# Patient Record
Sex: FEMALE | Race: WHITE | NOT HISPANIC OR LATINO
[De-identification: names, ages, dates, MRNs, and addresses within clinical notes are randomized per-mention and may not be internally consistent; named-entity substitution may affect disease eponyms.]

---

## 2017-01-19 ENCOUNTER — MEDICATION RENEWAL (OUTPATIENT)
Age: 62
End: 2017-01-19

## 2017-01-28 ENCOUNTER — APPOINTMENT (OUTPATIENT)
Dept: INTERNAL MEDICINE | Facility: CLINIC | Age: 62
End: 2017-01-28

## 2017-01-31 ENCOUNTER — TRANSCRIPTION ENCOUNTER (OUTPATIENT)
Age: 62
End: 2017-01-31

## 2017-02-01 ENCOUNTER — APPOINTMENT (OUTPATIENT)
Dept: ORTHOPEDIC SURGERY | Facility: CLINIC | Age: 62
End: 2017-02-01

## 2017-02-01 VITALS — BODY MASS INDEX: 32.2 KG/M2 | WEIGHT: 184 LBS | HEIGHT: 63.5 IN

## 2017-02-01 DIAGNOSIS — H53.8 OTHER VISUAL DISTURBANCES: ICD-10-CM

## 2017-02-01 DIAGNOSIS — M54.16 RADICULOPATHY, LUMBAR REGION: ICD-10-CM

## 2017-06-14 ENCOUNTER — EMERGENCY (EMERGENCY)
Facility: HOSPITAL | Age: 62
LOS: 1 days | Discharge: HOME | End: 2017-06-14
Attending: EMERGENCY MEDICINE | Admitting: EMERGENCY MEDICINE
Payer: COMMERCIAL

## 2017-06-14 VITALS
OXYGEN SATURATION: 92 % | DIASTOLIC BLOOD PRESSURE: 92 MMHG | SYSTOLIC BLOOD PRESSURE: 174 MMHG | HEIGHT: 63 IN | RESPIRATION RATE: 14 BRPM | TEMPERATURE: 98 F | HEART RATE: 78 BPM | WEIGHT: 199.96 LBS

## 2017-06-14 VITALS
DIASTOLIC BLOOD PRESSURE: 87 MMHG | SYSTOLIC BLOOD PRESSURE: 152 MMHG | RESPIRATION RATE: 19 BRPM | OXYGEN SATURATION: 99 % | HEART RATE: 82 BPM | TEMPERATURE: 98 F

## 2017-06-14 DIAGNOSIS — Z98.89 OTHER SPECIFIED POSTPROCEDURAL STATES: Chronic | ICD-10-CM

## 2017-06-14 DIAGNOSIS — Z98.890 OTHER SPECIFIED POSTPROCEDURAL STATES: ICD-10-CM

## 2017-06-14 DIAGNOSIS — Z88.8 ALLERGY STATUS TO OTHER DRUGS, MEDICAMENTS AND BIOLOGICAL SUBSTANCES STATUS: ICD-10-CM

## 2017-06-14 DIAGNOSIS — E03.9 HYPOTHYROIDISM, UNSPECIFIED: ICD-10-CM

## 2017-06-14 DIAGNOSIS — S05.02XA INJURY OF CONJUNCTIVA AND CORNEAL ABRASION WITHOUT FOREIGN BODY, LEFT EYE, INITIAL ENCOUNTER: ICD-10-CM
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
## 2017-06-14 DIAGNOSIS — I10 ESSENTIAL (PRIMARY) HYPERTENSION: ICD-10-CM

## 2017-06-14 PROCEDURE — 99284 EMERGENCY DEPT VISIT MOD MDM: CPT

## 2017-06-14 PROCEDURE — 99283 EMERGENCY DEPT VISIT LOW MDM: CPT

## 2017-06-14 RX ORDER — SULFACETAMIDE SODIUM 10 %
2 DROPS OPHTHALMIC (EYE) ONCE
Qty: 0 | Refills: 0 | Status: COMPLETED | OUTPATIENT
Start: 2017-06-14 | End: 2017-06-14

## 2017-06-14 RX ORDER — ERYTHROMYCIN BASE 5 MG/GRAM
1 OINTMENT (GRAM) OPHTHALMIC (EYE) ONCE
Qty: 0 | Refills: 0 | Status: COMPLETED | OUTPATIENT
Start: 2017-06-14 | End: 2017-06-14

## 2017-06-14 RX ORDER — IBUPROFEN 200 MG
600 TABLET ORAL ONCE
Qty: 0 | Refills: 0 | Status: COMPLETED | OUTPATIENT
Start: 2017-06-14 | End: 2017-06-14

## 2017-06-14 RX ADMIN — Medication 2 APPLICATION(S): at 22:43

## 2017-06-14 RX ADMIN — Medication 1 APPLICATION(S): at 22:43

## 2017-06-14 RX ADMIN — Medication 600 MILLIGRAM(S): at 22:43

## 2017-06-14 NOTE — ED PROVIDER NOTE - PROGRESS NOTE DETAILS
patient states eye pain has improved, states she has her own opthalmologist and will get appoinment tomorrow, understands to return to ER if having severe pain, loss of vision or worsening of symptoms

## 2017-06-14 NOTE — ED PROVIDER NOTE - OBJECTIVE STATEMENT
61 female presents to ER with  states she has left eye pain, started this morning, noted discharge and redness, states she may have scratched it, having decreased vision.

## 2017-06-15 ENCOUNTER — APPOINTMENT (OUTPATIENT)
Dept: OPHTHALMOLOGY | Facility: CLINIC | Age: 62
End: 2017-06-15

## 2017-06-23 ENCOUNTER — APPOINTMENT (OUTPATIENT)
Dept: OPHTHALMOLOGY | Facility: CLINIC | Age: 62
End: 2017-06-23

## 2017-06-27 ENCOUNTER — APPOINTMENT (OUTPATIENT)
Dept: OPHTHALMOLOGY | Facility: CLINIC | Age: 62
End: 2017-06-27

## 2017-07-03 ENCOUNTER — RX RENEWAL (OUTPATIENT)
Age: 62
End: 2017-07-03

## 2017-07-05 ENCOUNTER — RX RENEWAL (OUTPATIENT)
Age: 62
End: 2017-07-05

## 2017-07-12 NOTE — ED ADULT NURSE NOTE - NS ED NURSE LEVEL OF CONSCIOUSNESS SPEECH
Speaking Coherently Split-Thickness Skin Graft Text: The defect edges were debeveled with a #15 scalpel blade.  Given the location of the defect, shape of the defect and the proximity to free margins a split thickness skin graft was deemed most appropriate.  Using a sterile surgical marker, the primary defect shape was transferred to the donor site. The split thickness graft was then harvested.  The skin graft was then placed in the primary defect and oriented appropriately.

## 2017-07-21 ENCOUNTER — APPOINTMENT (OUTPATIENT)
Dept: INTERNAL MEDICINE | Facility: CLINIC | Age: 62
End: 2017-07-21

## 2017-08-10 ENCOUNTER — MEDICATION RENEWAL (OUTPATIENT)
Age: 62
End: 2017-08-10

## 2017-08-10 ENCOUNTER — RX RENEWAL (OUTPATIENT)
Age: 62
End: 2017-08-10

## 2017-08-11 ENCOUNTER — RX RENEWAL (OUTPATIENT)
Age: 62
End: 2017-08-11

## 2017-08-30 ENCOUNTER — MEDICATION RENEWAL (OUTPATIENT)
Age: 62
End: 2017-08-30

## 2017-10-07 ENCOUNTER — MEDICATION RENEWAL (OUTPATIENT)
Age: 62
End: 2017-10-07

## 2017-10-31 ENCOUNTER — APPOINTMENT (OUTPATIENT)
Dept: INTERNAL MEDICINE | Facility: CLINIC | Age: 62
End: 2017-10-31
Payer: COMMERCIAL

## 2017-10-31 VITALS
DIASTOLIC BLOOD PRESSURE: 80 MMHG | SYSTOLIC BLOOD PRESSURE: 130 MMHG | OXYGEN SATURATION: 97 % | HEART RATE: 81 BPM | TEMPERATURE: 98.3 F

## 2017-10-31 DIAGNOSIS — M43.16 SPONDYLOLISTHESIS, LUMBAR REGION: ICD-10-CM

## 2017-10-31 DIAGNOSIS — Z83.518 FAMILY HISTORY OF OTHER SPECIFIED EYE DISORDER: ICD-10-CM

## 2017-10-31 DIAGNOSIS — Z84.89 FAMILY HISTORY OF OTHER SPECIFIED CONDITIONS: ICD-10-CM

## 2017-10-31 PROCEDURE — 99396 PREV VISIT EST AGE 40-64: CPT | Mod: 25

## 2017-10-31 PROCEDURE — 36415 COLL VENOUS BLD VENIPUNCTURE: CPT

## 2017-11-01 LAB
ALBUMIN SERPL ELPH-MCNC: 4.2 G/DL
ALP BLD-CCNC: 93 U/L
ALT SERPL-CCNC: 34 U/L
ANION GAP SERPL CALC-SCNC: 15 MMOL/L
AST SERPL-CCNC: 34 U/L
BASOPHILS # BLD AUTO: 0.04 K/UL
BASOPHILS NFR BLD AUTO: 0.6 %
BILIRUB SERPL-MCNC: 0.4 MG/DL
BUN SERPL-MCNC: 7 MG/DL
CALCIUM SERPL-MCNC: 9.6 MG/DL
CHLORIDE SERPL-SCNC: 102 MMOL/L
CHOLEST SERPL-MCNC: 298 MG/DL
CHOLEST/HDLC SERPL: 2.6 RATIO
CO2 SERPL-SCNC: 25 MMOL/L
CREAT SERPL-MCNC: 1.15 MG/DL
EOSINOPHIL # BLD AUTO: 0.16 K/UL
EOSINOPHIL NFR BLD AUTO: 2.5 %
GLUCOSE SERPL-MCNC: 86 MG/DL
HBA1C MFR BLD HPLC: 5.4 %
HCT VFR BLD CALC: 44.8 %
HDLC SERPL-MCNC: 116 MG/DL
HGB BLD-MCNC: 14.5 G/DL
IMM GRANULOCYTES NFR BLD AUTO: 0.2 %
LDLC SERPL CALC-MCNC: 167 MG/DL
LYMPHOCYTES # BLD AUTO: 1.85 K/UL
LYMPHOCYTES NFR BLD AUTO: 28.9 %
MAN DIFF?: NORMAL
MCHC RBC-ENTMCNC: 30 PG
MCHC RBC-ENTMCNC: 32.4 GM/DL
MCV RBC AUTO: 92.8 FL
MONOCYTES # BLD AUTO: 0.39 K/UL
MONOCYTES NFR BLD AUTO: 6.1 %
NEUTROPHILS # BLD AUTO: 3.96 K/UL
NEUTROPHILS NFR BLD AUTO: 61.7 %
PLATELET # BLD AUTO: 281 K/UL
POTASSIUM SERPL-SCNC: 4.6 MMOL/L
PROT SERPL-MCNC: 7.8 G/DL
RBC # BLD: 4.83 M/UL
RBC # FLD: 14.4 %
SODIUM SERPL-SCNC: 142 MMOL/L
TRIGL SERPL-MCNC: 77 MG/DL
TSH SERPL-ACNC: 5.22 UIU/ML
WBC # FLD AUTO: 6.41 K/UL

## 2017-12-26 ENCOUNTER — EMERGENCY (EMERGENCY)
Facility: HOSPITAL | Age: 62
LOS: 1 days | Discharge: HOME | End: 2017-12-26
Attending: EMERGENCY MEDICINE | Admitting: EMERGENCY MEDICINE
Payer: COMMERCIAL

## 2017-12-26 VITALS
DIASTOLIC BLOOD PRESSURE: 92 MMHG | OXYGEN SATURATION: 98 % | RESPIRATION RATE: 16 BRPM | TEMPERATURE: 98 F | HEART RATE: 77 BPM | SYSTOLIC BLOOD PRESSURE: 158 MMHG

## 2017-12-26 DIAGNOSIS — Z98.89 OTHER SPECIFIED POSTPROCEDURAL STATES: Chronic | ICD-10-CM

## 2017-12-26 PROCEDURE — 99283 EMERGENCY DEPT VISIT LOW MDM: CPT

## 2017-12-26 RX ORDER — BACITRACIN ZINC 500 UNIT/G
1 OINTMENT IN PACKET (EA) TOPICAL
Qty: 2 | Refills: 0 | OUTPATIENT
Start: 2017-12-26 | End: 2018-01-08

## 2017-12-26 NOTE — ED PROVIDER NOTE - PROGRESS NOTE DETAILS
Huggins: pt without clear cut sign of cellulitis or sign of infection.  states she will never be placed back on steroids and has appt with new rheumatologist 1st week of January and will see pain md as well.  Dx pyoderma granuloma flare.  rx doxy and bacitracin.  f/u with rheume and pain.  left ambulatory.  return precautions given.

## 2017-12-26 NOTE — ED ADULT TRIAGE NOTE - CHIEF COMPLAINT QUOTE
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 denies any fevers.

## 2017-12-26 NOTE — ED PROVIDER NOTE - OBJECTIVE STATEMENT
Resident: 62y F PMH HTN, HLD, Lupus, Hashimoto's, Sjogren's, RA, pyoderma granulosum presents with worsening painful lesions x 2 months. Long history of pyoderma granulosum, for past five years has had only one or two lesions which have not caused significant pain. For past few months, patient has developed multiple new lesions, which are painful. For past few days, pain has become acutely worse, and lesions appear more red. Patient sees pain management, is taking Percocet at home without relief. Has appointment with new rheumatologist next week. Links new flare to recent financial stress and having to share small apartment with special needs son. Denies SI/HI.

## 2017-12-26 NOTE — ED PROVIDER NOTE - PHYSICAL EXAMINATION
Resident: Gen: well appearing, of stated age, no acute distress; ENT: PERRL, MMM, no uvular deviation, no tonsilar erythema; Chest: CTAB, no retractions, rate normal, appears to breathe comfortably; Heart: RRR S1S2 mild nonpitting peripheral edema b/l pulses 2+ in arms and legs; Abd: Soft non-tender, no rebound or guarding, no CVAT; Back: No spinal deformity; Ext: Moving all 4 extremities without obvious impairment to ROM, no obvious weakness; Neuro: fluid speech; Psych: tearful; Skin: scattered 1-2cm crusted lesions with surrounded erythema over left breast, upper back, lower abdomen, left anterior calf.

## 2017-12-26 NOTE — ED PROVIDER NOTE - ATTENDING CONTRIBUTION TO CARE
I was physically present for the E/M service provided. I agree with above history, physical, and plan which I have reviewed and edited where appropriate. I was physically present for the key portions of the service provided.    62 yr old female with hx of HTN, HLD, autoimmune d/o and pyoderma granulosum presents to ed concern for possible skin infection due to persistent pyoderma granulosum eruption at left breast, back, left leg.  pt states chronic issue but is much improve compare to past however the remaining lesion remains and now noticed it being red and wanted to make sure no infection.  pt taking percocet with mild relieve. no fever, no chills, no joint pain, no n/v, no abd pain.    *GEN:   comfortable, in no apparent distress, AOx3  *EYES:   PERRL, extra-occular movements intact  *HEENT:   airway patent, moist mucosal membranes, uvula midline  *CV:   regular rate and rhythm, normal S1/S2, no murmur  *RESP:   clear to auscultation bilaterally, non-labored, speaking in full sentences  *ABD:   soft, non tender, no guarding  *:   no cva tenderness  *MSK:   no musculoskeletal tenderness, 5/5 strength, moving all extremity  *SKIN:   multiple pyodermal granulosum lesion with mild erythema, no crepitus or discharge noted.  not foul smelling at left breast, left leg, back and lower abd.  *NEURO:   AOx3, no focal weakness or loss of sensation, gait normal, GCS 15    pt with known autoimmune will rx pt with doxycyline to cover for possible mrsa skin infection and bacitracin to area.  close return precautions given.  currently no sign of active cellulitis but due to open skin lesions will treat.

## 2017-12-26 NOTE — ED PROVIDER NOTE - MEDICAL DECISION MAKING DETAILS
Resident: pyoderma granuloma flare, possible 2/2 stress, concern for superimposed infection. Will treat with antibiotics, likely dc with rheum follow-up.

## 2018-01-18 ENCOUNTER — RX RENEWAL (OUTPATIENT)
Age: 63
End: 2018-01-18

## 2018-01-23 ENCOUNTER — INPATIENT (INPATIENT)
Facility: HOSPITAL | Age: 63
LOS: 2 days | Discharge: HOME HEALTH | End: 2018-01-26
Attending: HOSPITALIST | Admitting: HOSPITALIST
Payer: COMMERCIAL

## 2018-01-23 ENCOUNTER — MEDICATION RENEWAL (OUTPATIENT)
Age: 63
End: 2018-01-23

## 2018-01-23 VITALS
RESPIRATION RATE: 18 BRPM | HEART RATE: 74 BPM | SYSTOLIC BLOOD PRESSURE: 174 MMHG | TEMPERATURE: 99 F | OXYGEN SATURATION: 96 % | DIASTOLIC BLOOD PRESSURE: 88 MMHG

## 2018-01-23 DIAGNOSIS — L88 PYODERMA GANGRENOSUM: ICD-10-CM

## 2018-01-23 DIAGNOSIS — Z98.89 OTHER SPECIFIED POSTPROCEDURAL STATES: Chronic | ICD-10-CM

## 2018-01-23 LAB
ALBUMIN SERPL ELPH-MCNC: 3.8 G/DL — SIGNIFICANT CHANGE UP (ref 3.3–5)
ALP SERPL-CCNC: 73 U/L — SIGNIFICANT CHANGE UP (ref 40–120)
ALT FLD-CCNC: 22 U/L — SIGNIFICANT CHANGE UP (ref 4–33)
AST SERPL-CCNC: 23 U/L — SIGNIFICANT CHANGE UP (ref 4–32)
BASE EXCESS BLDV CALC-SCNC: 7.1 MMOL/L — SIGNIFICANT CHANGE UP
BASOPHILS # BLD AUTO: 0.02 K/UL — SIGNIFICANT CHANGE UP (ref 0–0.2)
BASOPHILS NFR BLD AUTO: 0.2 % — SIGNIFICANT CHANGE UP (ref 0–2)
BILIRUB SERPL-MCNC: 1.3 MG/DL — HIGH (ref 0.2–1.2)
BLOOD GAS VENOUS - CREATININE: 0.69 MG/DL — SIGNIFICANT CHANGE UP (ref 0.5–1.3)
BUN SERPL-MCNC: 13 MG/DL — SIGNIFICANT CHANGE UP (ref 7–23)
CALCIUM SERPL-MCNC: 8.8 MG/DL — SIGNIFICANT CHANGE UP (ref 8.4–10.5)
CHLORIDE BLDV-SCNC: 99 MMOL/L — SIGNIFICANT CHANGE UP (ref 96–108)
CHLORIDE SERPL-SCNC: 99 MMOL/L — SIGNIFICANT CHANGE UP (ref 98–107)
CO2 SERPL-SCNC: 32 MMOL/L — HIGH (ref 22–31)
CREAT SERPL-MCNC: 0.82 MG/DL — SIGNIFICANT CHANGE UP (ref 0.5–1.3)
EOSINOPHIL # BLD AUTO: 0.01 K/UL — SIGNIFICANT CHANGE UP (ref 0–0.5)
EOSINOPHIL NFR BLD AUTO: 0.1 % — SIGNIFICANT CHANGE UP (ref 0–6)
GAS PNL BLDV: 140 MMOL/L — SIGNIFICANT CHANGE UP (ref 136–146)
GLUCOSE BLDV-MCNC: 115 — HIGH (ref 70–99)
GLUCOSE SERPL-MCNC: 114 MG/DL — HIGH (ref 70–99)
HCO3 BLDV-SCNC: 29 MMOL/L — HIGH (ref 20–27)
HCT VFR BLD CALC: 37.6 % — SIGNIFICANT CHANGE UP (ref 34.5–45)
HCT VFR BLDV CALC: 36.8 % — SIGNIFICANT CHANGE UP (ref 34.5–45)
HGB BLD-MCNC: 11.5 G/DL — SIGNIFICANT CHANGE UP (ref 11.5–15.5)
HGB BLDV-MCNC: 12 G/DL — SIGNIFICANT CHANGE UP (ref 11.5–15.5)
IMM GRANULOCYTES # BLD AUTO: 0.03 # — SIGNIFICANT CHANGE UP
IMM GRANULOCYTES NFR BLD AUTO: 0.4 % — SIGNIFICANT CHANGE UP (ref 0–1.5)
LACTATE BLDV-MCNC: 1.5 MMOL/L — SIGNIFICANT CHANGE UP (ref 0.5–2)
LYMPHOCYTES # BLD AUTO: 0.92 K/UL — LOW (ref 1–3.3)
LYMPHOCYTES # BLD AUTO: 11.2 % — LOW (ref 13–44)
MCHC RBC-ENTMCNC: 29.4 PG — SIGNIFICANT CHANGE UP (ref 27–34)
MCHC RBC-ENTMCNC: 30.6 % — LOW (ref 32–36)
MCV RBC AUTO: 96.2 FL — SIGNIFICANT CHANGE UP (ref 80–100)
MONOCYTES # BLD AUTO: 0.26 K/UL — SIGNIFICANT CHANGE UP (ref 0–0.9)
MONOCYTES NFR BLD AUTO: 3.2 % — SIGNIFICANT CHANGE UP (ref 2–14)
NEUTROPHILS # BLD AUTO: 6.95 K/UL — SIGNIFICANT CHANGE UP (ref 1.8–7.4)
NEUTROPHILS NFR BLD AUTO: 84.9 % — HIGH (ref 43–77)
NRBC # FLD: 0 — SIGNIFICANT CHANGE UP
PCO2 BLDV: 52 MMHG — HIGH (ref 41–51)
PH BLDV: 7.4 PH — SIGNIFICANT CHANGE UP (ref 7.32–7.43)
PLATELET # BLD AUTO: 163 K/UL — SIGNIFICANT CHANGE UP (ref 150–400)
PMV BLD: 12.1 FL — SIGNIFICANT CHANGE UP (ref 7–13)
PO2 BLDV: < 24 MMHG — LOW (ref 35–40)
POTASSIUM BLDV-SCNC: 4 MMOL/L — SIGNIFICANT CHANGE UP (ref 3.4–4.5)
POTASSIUM SERPL-MCNC: 4.3 MMOL/L — SIGNIFICANT CHANGE UP (ref 3.5–5.3)
POTASSIUM SERPL-SCNC: 4.3 MMOL/L — SIGNIFICANT CHANGE UP (ref 3.5–5.3)
PROT SERPL-MCNC: 7 G/DL — SIGNIFICANT CHANGE UP (ref 6–8.3)
RBC # BLD: 3.91 M/UL — SIGNIFICANT CHANGE UP (ref 3.8–5.2)
RBC # FLD: 14.8 % — HIGH (ref 10.3–14.5)
SAO2 % BLDV: 24.1 % — LOW (ref 60–85)
SODIUM SERPL-SCNC: 142 MMOL/L — SIGNIFICANT CHANGE UP (ref 135–145)
WBC # BLD: 8.19 K/UL — SIGNIFICANT CHANGE UP (ref 3.8–10.5)
WBC # FLD AUTO: 8.19 K/UL — SIGNIFICANT CHANGE UP (ref 3.8–10.5)

## 2018-01-23 RX ORDER — MINOCYCLINE HYDROCHLORIDE 45 MG/1
100 TABLET, EXTENDED RELEASE ORAL ONCE
Qty: 0 | Refills: 0 | Status: COMPLETED | OUTPATIENT
Start: 2018-01-23 | End: 2018-01-23

## 2018-01-23 RX ORDER — MORPHINE SULFATE 50 MG/1
4 CAPSULE, EXTENDED RELEASE ORAL ONCE
Qty: 0 | Refills: 0 | Status: DISCONTINUED | OUTPATIENT
Start: 2018-01-23 | End: 2018-01-23

## 2018-01-23 RX ORDER — SODIUM CHLORIDE 9 MG/ML
1000 INJECTION INTRAMUSCULAR; INTRAVENOUS; SUBCUTANEOUS ONCE
Qty: 0 | Refills: 0 | Status: COMPLETED | OUTPATIENT
Start: 2018-01-23 | End: 2018-01-23

## 2018-01-23 RX ADMIN — MORPHINE SULFATE 4 MILLIGRAM(S): 50 CAPSULE, EXTENDED RELEASE ORAL at 20:22

## 2018-01-23 RX ADMIN — MORPHINE SULFATE 4 MILLIGRAM(S): 50 CAPSULE, EXTENDED RELEASE ORAL at 20:37

## 2018-01-23 RX ADMIN — SODIUM CHLORIDE 1000 MILLILITER(S): 9 INJECTION INTRAMUSCULAR; INTRAVENOUS; SUBCUTANEOUS at 20:22

## 2018-01-23 RX ADMIN — MINOCYCLINE HYDROCHLORIDE 100 MILLIGRAM(S): 45 TABLET, EXTENDED RELEASE ORAL at 20:22

## 2018-01-23 RX ADMIN — Medication 108 MILLIGRAM(S): at 21:45

## 2018-01-23 NOTE — ED ADULT TRIAGE NOTE - CHIEF COMPLAINT QUOTE
states "I have lesions all over my body."   h/o pyoderma granulosum, lupus, arhtritis, sjogren's syndrome with c/o worsening body lesions over the past 3 weeks.   pt appears uncomfortable in triage.

## 2018-01-23 NOTE — ED PROVIDER NOTE - MEDICAL DECISION MAKING DETAILS
61 y/o female w/ worsening pyoderma granulosum now w/ yousif superimposed infection on open wounds- labs, pain control, admit

## 2018-01-23 NOTE — ED PROVIDER NOTE - PHYSICAL EXAMINATION
multiple ulcerative lesions w/ purulence and surrounding erythema to b/l breast, torso, b/l LE and back. TTP.

## 2018-01-23 NOTE — ED PROVIDER NOTE - OBJECTIVE STATEMENT
63 y/o female pmh pyoderma gangrenosum(in remission x7 years), lupus, scleroderma, hypothyroidism, c/o multiple skin lesions x1 month. Pt admits to developing multiple skin lesions in the last 1-2 months. Pt has been following up with a dermatologist and has been taking Dapsone and prednisone x3 weeks. Pt admits to worsening lesions, now w/ purulence and surrounding erythema. Pt admits to severe pain, not relieved with percocet. Pt admits to seeing her PMD today whop advised pt to come to the hospital. Pt admits to subjective chills. Denies chest pain, sob, abd pain, n/v/d, numbness, tingling, weakness, dizziness, syncope or fever.

## 2018-01-23 NOTE — ED PROVIDER NOTE - ATTENDING CONTRIBUTION TO CARE
agree with resident note  "63 y/o female pmh pyoderma gangrenosum(in remission x7 years), lupus, scleroderma, hypothyroidism" presents with worsening PG.  Had been in remission for multiple years but over last month diffuse eyrthematous, draining lesions throughout body most prevalent on chest/breast and lower extremities have started.  Was placed on steroids and dapsone per dermatologist with no effect.  Was told by dermatologist that she needed to come to ER.  No prior hx of being on IVIG.    PE: uncomfortable in pain, VSS, CTAB/L; s1 s2 no m/r/g abd soft/NT/ND ext: no edema skin: multiple diffuse eyrthematous necrotic patches with discharge most prevalent on chest and lower extremities.

## 2018-01-24 DIAGNOSIS — I10 ESSENTIAL (PRIMARY) HYPERTENSION: ICD-10-CM

## 2018-01-24 DIAGNOSIS — E03.9 HYPOTHYROIDISM, UNSPECIFIED: ICD-10-CM

## 2018-01-24 DIAGNOSIS — E80.6 OTHER DISORDERS OF BILIRUBIN METABOLISM: ICD-10-CM

## 2018-01-24 DIAGNOSIS — L03.90 CELLULITIS, UNSPECIFIED: ICD-10-CM

## 2018-01-24 DIAGNOSIS — Z29.9 ENCOUNTER FOR PROPHYLACTIC MEASURES, UNSPECIFIED: ICD-10-CM

## 2018-01-24 DIAGNOSIS — M32.9 SYSTEMIC LUPUS ERYTHEMATOSUS, UNSPECIFIED: ICD-10-CM

## 2018-01-24 DIAGNOSIS — F41.9 ANXIETY DISORDER, UNSPECIFIED: ICD-10-CM

## 2018-01-24 DIAGNOSIS — L88 PYODERMA GANGRENOSUM: ICD-10-CM

## 2018-01-24 LAB
ALBUMIN SERPL ELPH-MCNC: 3.5 G/DL — SIGNIFICANT CHANGE UP (ref 3.3–5)
ALP SERPL-CCNC: 72 U/L — SIGNIFICANT CHANGE UP (ref 40–120)
ALT FLD-CCNC: 20 U/L — SIGNIFICANT CHANGE UP (ref 4–33)
AST SERPL-CCNC: 17 U/L — SIGNIFICANT CHANGE UP (ref 4–32)
BILIRUB DIRECT SERPL-MCNC: 0.3 MG/DL — HIGH (ref 0.1–0.2)
BILIRUB SERPL-MCNC: 0.9 MG/DL — SIGNIFICANT CHANGE UP (ref 0.2–1.2)
BILIRUB SERPL-MCNC: 0.9 MG/DL — SIGNIFICANT CHANGE UP (ref 0.2–1.2)
BUN SERPL-MCNC: 19 MG/DL — SIGNIFICANT CHANGE UP (ref 7–23)
CALCIUM SERPL-MCNC: 8.4 MG/DL — SIGNIFICANT CHANGE UP (ref 8.4–10.5)
CHLORIDE SERPL-SCNC: 101 MMOL/L — SIGNIFICANT CHANGE UP (ref 98–107)
CO2 SERPL-SCNC: 28 MMOL/L — SIGNIFICANT CHANGE UP (ref 22–31)
CREAT SERPL-MCNC: 0.77 MG/DL — SIGNIFICANT CHANGE UP (ref 0.5–1.3)
GLUCOSE SERPL-MCNC: 158 MG/DL — HIGH (ref 70–99)
HCT VFR BLD CALC: 35.5 % — SIGNIFICANT CHANGE UP (ref 34.5–45)
HGB BLD-MCNC: 11.5 G/DL — SIGNIFICANT CHANGE UP (ref 11.5–15.5)
MCHC RBC-ENTMCNC: 30.9 PG — SIGNIFICANT CHANGE UP (ref 27–34)
MCHC RBC-ENTMCNC: 32.4 % — SIGNIFICANT CHANGE UP (ref 32–36)
MCV RBC AUTO: 95.4 FL — SIGNIFICANT CHANGE UP (ref 80–100)
NRBC # FLD: 0 — SIGNIFICANT CHANGE UP
PLATELET # BLD AUTO: 162 K/UL — SIGNIFICANT CHANGE UP (ref 150–400)
PMV BLD: 11.6 FL — SIGNIFICANT CHANGE UP (ref 7–13)
POTASSIUM SERPL-MCNC: 3.8 MMOL/L — SIGNIFICANT CHANGE UP (ref 3.5–5.3)
POTASSIUM SERPL-SCNC: 3.8 MMOL/L — SIGNIFICANT CHANGE UP (ref 3.5–5.3)
PROT SERPL-MCNC: 6.2 G/DL — SIGNIFICANT CHANGE UP (ref 6–8.3)
RBC # BLD: 3.72 M/UL — LOW (ref 3.8–5.2)
RBC # FLD: 14.4 % — SIGNIFICANT CHANGE UP (ref 10.3–14.5)
SODIUM SERPL-SCNC: 142 MMOL/L — SIGNIFICANT CHANGE UP (ref 135–145)
SPECIMEN SOURCE: SIGNIFICANT CHANGE UP
SPECIMEN SOURCE: SIGNIFICANT CHANGE UP
TSH SERPL-MCNC: 0.53 UIU/ML — SIGNIFICANT CHANGE UP (ref 0.27–4.2)
WBC # BLD: 7.1 K/UL — SIGNIFICANT CHANGE UP (ref 3.8–10.5)
WBC # FLD AUTO: 7.1 K/UL — SIGNIFICANT CHANGE UP (ref 3.8–10.5)

## 2018-01-24 PROCEDURE — 12345: CPT | Mod: GC,NC

## 2018-01-24 PROCEDURE — 99223 1ST HOSP IP/OBS HIGH 75: CPT | Mod: GC

## 2018-01-24 PROCEDURE — 99223 1ST HOSP IP/OBS HIGH 75: CPT

## 2018-01-24 RX ORDER — LEVOTHYROXINE SODIUM 125 MCG
137 TABLET ORAL DAILY
Qty: 0 | Refills: 0 | Status: DISCONTINUED | OUTPATIENT
Start: 2018-01-24 | End: 2018-01-26

## 2018-01-24 RX ORDER — MUPIROCIN 20 MG/G
1 OINTMENT TOPICAL
Qty: 0 | Refills: 0 | Status: DISCONTINUED | OUTPATIENT
Start: 2018-01-24 | End: 2018-01-26

## 2018-01-24 RX ORDER — CLONAZEPAM 1 MG
1 TABLET ORAL
Qty: 0 | Refills: 0 | Status: DISCONTINUED | OUTPATIENT
Start: 2018-01-24 | End: 2018-01-26

## 2018-01-24 RX ORDER — ACETAMINOPHEN 500 MG
650 TABLET ORAL EVERY 6 HOURS
Qty: 0 | Refills: 0 | Status: DISCONTINUED | OUTPATIENT
Start: 2018-01-24 | End: 2018-01-26

## 2018-01-24 RX ORDER — FLUOXETINE HCL 10 MG
80 CAPSULE ORAL DAILY
Qty: 0 | Refills: 0 | Status: DISCONTINUED | OUTPATIENT
Start: 2018-01-24 | End: 2018-01-26

## 2018-01-24 RX ORDER — MORPHINE SULFATE 50 MG/1
4 CAPSULE, EXTENDED RELEASE ORAL EVERY 4 HOURS
Qty: 0 | Refills: 0 | Status: DISCONTINUED | OUTPATIENT
Start: 2018-01-24 | End: 2018-01-25

## 2018-01-24 RX ORDER — DAPSONE 100 MG/1
100 TABLET ORAL DAILY
Qty: 0 | Refills: 0 | Status: DISCONTINUED | OUTPATIENT
Start: 2018-01-24 | End: 2018-01-24

## 2018-01-24 RX ORDER — ENOXAPARIN SODIUM 100 MG/ML
40 INJECTION SUBCUTANEOUS DAILY
Qty: 0 | Refills: 0 | Status: DISCONTINUED | OUTPATIENT
Start: 2018-01-24 | End: 2018-01-24

## 2018-01-24 RX ORDER — HYDROXYCHLOROQUINE SULFATE 200 MG
200 TABLET ORAL EVERY 12 HOURS
Qty: 0 | Refills: 0 | Status: DISCONTINUED | OUTPATIENT
Start: 2018-01-24 | End: 2018-01-26

## 2018-01-24 RX ORDER — OXYCODONE AND ACETAMINOPHEN 5; 325 MG/1; MG/1
1 TABLET ORAL EVERY 6 HOURS
Qty: 0 | Refills: 0 | Status: DISCONTINUED | OUTPATIENT
Start: 2018-01-24 | End: 2018-01-26

## 2018-01-24 RX ORDER — METOPROLOL TARTRATE 50 MG
25 TABLET ORAL DAILY
Qty: 0 | Refills: 0 | Status: DISCONTINUED | OUTPATIENT
Start: 2018-01-24 | End: 2018-01-24

## 2018-01-24 RX ORDER — MORPHINE SULFATE 50 MG/1
4 CAPSULE, EXTENDED RELEASE ORAL EVERY 6 HOURS
Qty: 0 | Refills: 0 | Status: DISCONTINUED | OUTPATIENT
Start: 2018-01-24 | End: 2018-01-24

## 2018-01-24 RX ORDER — METOPROLOL TARTRATE 50 MG
25 TABLET ORAL DAILY
Qty: 0 | Refills: 0 | Status: DISCONTINUED | OUTPATIENT
Start: 2018-01-24 | End: 2018-01-26

## 2018-01-24 RX ADMIN — Medication 200 MILLIGRAM(S): at 19:54

## 2018-01-24 RX ADMIN — MORPHINE SULFATE 4 MILLIGRAM(S): 50 CAPSULE, EXTENDED RELEASE ORAL at 00:37

## 2018-01-24 RX ADMIN — MORPHINE SULFATE 4 MILLIGRAM(S): 50 CAPSULE, EXTENDED RELEASE ORAL at 06:55

## 2018-01-24 RX ADMIN — MORPHINE SULFATE 4 MILLIGRAM(S): 50 CAPSULE, EXTENDED RELEASE ORAL at 06:40

## 2018-01-24 RX ADMIN — Medication 80 MILLIGRAM(S): at 14:07

## 2018-01-24 RX ADMIN — Medication 25 MILLIGRAM(S): at 19:55

## 2018-01-24 RX ADMIN — MORPHINE SULFATE 4 MILLIGRAM(S): 50 CAPSULE, EXTENDED RELEASE ORAL at 16:37

## 2018-01-24 RX ADMIN — MORPHINE SULFATE 4 MILLIGRAM(S): 50 CAPSULE, EXTENDED RELEASE ORAL at 11:05

## 2018-01-24 RX ADMIN — MORPHINE SULFATE 4 MILLIGRAM(S): 50 CAPSULE, EXTENDED RELEASE ORAL at 00:22

## 2018-01-24 RX ADMIN — MORPHINE SULFATE 4 MILLIGRAM(S): 50 CAPSULE, EXTENDED RELEASE ORAL at 20:51

## 2018-01-24 RX ADMIN — MUPIROCIN 1 APPLICATION(S): 20 OINTMENT TOPICAL at 19:55

## 2018-01-24 RX ADMIN — Medication 10 MILLIGRAM(S): at 05:14

## 2018-01-24 RX ADMIN — MORPHINE SULFATE 4 MILLIGRAM(S): 50 CAPSULE, EXTENDED RELEASE ORAL at 20:36

## 2018-01-24 RX ADMIN — MORPHINE SULFATE 4 MILLIGRAM(S): 50 CAPSULE, EXTENDED RELEASE ORAL at 16:22

## 2018-01-24 RX ADMIN — MORPHINE SULFATE 4 MILLIGRAM(S): 50 CAPSULE, EXTENDED RELEASE ORAL at 10:50

## 2018-01-24 RX ADMIN — Medication 200 MILLIGRAM(S): at 05:14

## 2018-01-24 RX ADMIN — Medication 137 MICROGRAM(S): at 05:15

## 2018-01-24 NOTE — H&P ADULT - ASSESSMENT
62F PMH SLE, pyoderma gangrenosum, hashimotos, sjogrens presents with painful skin lesions that have been worsening over the past few months 2/2 pyoderma gangrenosum flare.

## 2018-01-24 NOTE — H&P ADULT - PROBLEM SELECTOR PLAN 7
- DVT ppx: IMPROVE score 2 - immobilization 2/2 pain and age - will give lovenox - c/w enalapril 10 mg   - elevated bp likely in the setting of pain, if does not improve with pain control will consider adding additional agent

## 2018-01-24 NOTE — H&P ADULT - PROBLEM SELECTOR PLAN 3
- c/w synthroid 137 mcg  - TSH in am - c/w plaquenil 200 mg BID   - outpatient rheumatology f/u - patient currently looking for new rheumatologist and has been having difficulty finding one, can set her up with appt at discharge

## 2018-01-24 NOTE — H&P ADULT - PROBLEM SELECTOR PLAN 1
- patient with pyoderma gangrenosum flare, currently on prednisone taper and dapsone 100 mg daily per dermatologist  - patient currently unsure of dose of prednisone she is currently on, will discuss with patient's dermatologist in AM   - s/p solumedrol 1 g in ED  - dermatology consult in the am  - pain control with morphine / percocet/ acetaminophen for mild/moderate/severe pain - patient with pyoderma gangrenosum flare, currently on prednisone taper and dapsone 100 mg daily per dermatologist  - patient currently unsure of dose of prednisone she is currently on, will discuss with patient's dermatologist in AM   - s/p solumedrol 1 g in ED  - dermatology consult in the am  - pain control with morphine / percocet/ acetaminophen for mild/moderate/severe pain  - wound care consult - patient with pyoderma gangrenosum flare, currently on prednisone taper and dapsone 100 mg daily per dermatologist  - patient currently unsure of dose of prednisone she is currently on, will discuss with patient's dermatologist in AM   - c/w dapsone 100 mg daily  - s/p solumedrol 1 g in ED  - dermatology consult in the am  - pain control with morphine / percocet/ acetaminophen for mild/moderate/severe pain  - wound care consult

## 2018-01-24 NOTE — H&P ADULT - HISTORY OF PRESENT ILLNESS
62F PMH SLE, pyoderma gangrenosum, hashimotos, sjogrens presents with painful skin lesions that have been worsening over the past few months.  Per patient, was diagnosed with Pyoderma Gangrenosum 15 years ago, has been in remission for the past 7 years, however over the past few months her lesions have been flaring up.  She has lesions on her chest, back, groin, legs, and abdomen.  She recently saw her dermatologist who started her on Dapsone and prednisone which she has been taking over the past three weeks, however the lesions have been getting worse and now are more purulent and with more erythema around them.  She saw her dermatologist yesterday who told her to go to the ED.  She denies fevers, chills, shortness of breath, weakness, dysuria, nausea, vomiting, diarrhea.  No involvement of mucus membranes.  She does not currently have a rheumatologist, is currently looking for a new one. 62F PMH SLE, pyoderma gangrenosum, hashimotos, sjogrens presents with painful skin lesions that have been worsening over the past few months.  Per patient, was diagnosed with Pyoderma Gangrenosum 15 years ago, has been in remission for the past 7 years, however over the past few months her lesions have been flaring up.  She has lesions on her chest, back, groin, legs, and abdomen.  She recently saw her dermatologist who started her on Dapsone and prednisone which she has been taking over the past three weeks, however the lesions have been getting worse and now are more purulent and with more erythema around them.  She saw her dermatologist yesterday who told her to go to the ED.  She denies fevers, chills, shortness of breath, weakness, dysuria, nausea, vomiting, diarrhea.  No involvement of mucus membranes.  She does not currently have a rheumatologist, is currently looking for a new one.      Patient was seen in the ED on 12/26 for worsening painful lesions, was discharged from the ED on Doxycycline and bacitracin with outpatient rheumatology followup.  She completed the course of doxycycline.      In the ED VS 98.7 74 174/88 18 96% RA  Labs notable for WBC 8, Hgb 11.5, Cr 0.82, bilirubin 1.3, lactate 1.5.  Blood cultures x 2 pending  Patient was given minocycline 100 mg, solumedrol 1 g, morphine 4 mg, and NaCl 1L.

## 2018-01-24 NOTE — CONSULT NOTE ADULT - PROBLEM SELECTOR RECOMMENDATION 9
given lesion sizes, topical therapy is appropriate. recommend Clobetasol Ointment under occlusion daily to AA.    suprapubic erosion may or may not be PG - would recommend mupirocin ointment bid    f/u in derm clinic given lesion sizes, topical therapy is appropriate. recommend Clobetasol Ointment under occlusion daily to AA. Would avoid systemic corticosteroids given patient's previous intolerance to medication and extensive side effect profile.     suprapubic erosion does not appear to be ulcerated and not related to primary problem (pyoderma gangrenosum) - recommend mupirocin ointment bid    f/u in derm clinic after discharge. Dermatology office address and phone # provided to patient.

## 2018-01-24 NOTE — H&P ADULT - PROBLEM SELECTOR PLAN 6
- c/w enalapril 10 mg   - elevated bp likely in the setting of pain, if does not improve with pain control will consider adding additional agent - c/w konipin 1 mg QID, Fluoxetine 80 mg daily  - ISTOP reviewed - reference # [id number] - DVT ppx: IMPROVE score 1 for age - no DVT ppx needed

## 2018-01-24 NOTE — H&P ADULT - NSHPREVIEWOFSYSTEMS_GEN_ALL_CORE
CONSTITUTIONAL: No fever, weight loss, or fatigue  EYES: No eye pain, visual disturbances, or discharge  ENMT:  No difficulty hearing, tinnitus, vertigo; No sinus or throat pain  RESPIRATORY: No cough, wheezing, chills or hemoptysis; No shortness of breath  CARDIOVASCULAR: No chest pain, palpitations, dizziness, or leg swelling  GASTROINTESTINAL: No abdominal or epigastric pain. No nausea, vomiting, or hematemesis; No diarrhea or constipation. No melena or hematochezia.  GENITOURINARY: No dysuria, frequency, hematuria, or incontinence  NEUROLOGICAL: No headaches, loss of strength, numbness, or tremors  SKIN: +painful skin lesions   LYMPH NODES: No enlarged glands  ENDOCRINE: No heat or cold intolerance; No polydipsia or polyuria  MUSCULOSKELETAL: No joint pain or swelling;   PSYCHIATRIC: +anxiety

## 2018-01-24 NOTE — H&P ADULT - NSHPLABSRESULTS_GEN_ALL_CORE
LABS:                        11.5   8.19  )-----------( 163      ( 23 Jan 2018 19:51 )             37.6     23 Jan 2018 19:51    142    |  99     |  13     ----------------------------<  114    4.3     |  32     |  0.82     Ca    8.8        23 Jan 2018 19:51    TPro  7.0    /  Alb  3.8    /  TBili  1.3    /  DBili  x      /  AST  23     /  ALT  22     /  AlkPhos  73     23 Jan 2018 19:51      BLOOD CULTURES PENDING     RADIOLOGY & ADDITIONAL TESTS:    Imaging Personally Reviewed:  [ ] YES

## 2018-01-24 NOTE — H&P ADULT - PROBLEM SELECTOR PLAN 2
- c/w plaquenil 200 mg BID   - outpatient rheumatology f/u - patient currently looking for new rheumatologist and has been having difficulty finding one, can set her up with appt at discharge - pyoderma gangrenosum with surrounding erythema concerning for cellulitis  - will start vancomycin, can transition to oral agent at discharge - c/w plaquenil 200 mg BID   - outpatient rheumatology f/u - patient currently looking for new rheumatologist and has been having difficulty finding one, can give her # to rheum clinic at discharge

## 2018-01-24 NOTE — H&P ADULT - PMH
Depression    HTN (hypertension)    Hypothyroid    Pyoderma gangrenosum    SLE (systemic lupus erythematosus)

## 2018-01-24 NOTE — CONSULT NOTE ADULT - SUBJECTIVE AND OBJECTIVE BOX
HPI Patient is a 62y old  Female who presents with a chief complaint of painful skin lesions (2018 00:03) PMH SLE, pyoderma gangrenosum, hashimotos, sjogrens presents with painful skin lesions that have been worsening over the past few months.  Per patient, was diagnosed with Pyoderma Gangrenosum 15 years ago, has been in remission for the past 7 years, however over the past few months her lesions have been flaring up.  She has lesions on her chest, back, groin, legs, and abdomen.  She recently saw her dermatologist who started her on Dapsone and prednisone which she has been taking over the past three weeks, however the lesions have been getting worse and now are more purulent and with more erythema around them.  She saw her dermatologist yesterday who told her to go to the ED.  She denies fevers, chills, shortness of breath, weakness, dysuria, nausea, vomiting, diarrhea.  No involvement of mucus membranes.  She does not currently have a rheumatologist, is currently looking for a new one.      Patient was seen in the ED on  for worsening painful lesions, was discharged from the ED on Doxycycline and bacitracin with outpatient rheumatology followup.  She completed the course of doxycycline.      In the ED VS 98.7 74 174/88 18 96% RA  Labs notable for WBC 8, Hgb 11.5, Cr 0.82, bilirubin 1.3, lactate 1.5.  Blood cultures x 2 pending  Patient was given minocycline 100 mg, solumedrol 1 g, morphine 4 mg, and NaCl 1L. (2018 00:03)     REVIEW OF SYSTEM : please see pmh and hpi otherwise all others negative  Allergies    Compazine (Other)    Intolerances     MEDICATIONS  (STANDING):  enalapril 10 milliGRAM(s) Oral daily  FLUoxetine 80 milliGRAM(s) Oral daily  hydroxychloroquine 200 milliGRAM(s) Oral every 12 hours  levothyroxine 137 MICROGram(s) Oral daily    MEDICATIONS  (PRN):  acetaminophen   Tablet. 650 milliGRAM(s) Oral every 6 hours PRN Mild Pain (1 - 3)  clonazePAM Tablet 1 milliGRAM(s) Oral four times a day PRN anxiety  morphine  - Injectable 4 milliGRAM(s) IV Push every 4 hours PRN Severe Pain (7 - 10)  oxyCODONE    5 mG/acetaminophen 325 mG 1 Tablet(s) Oral every 6 hours PRN Moderate Pain (4 - 6)      FAMILY HISTORY:  No lupus or PG in family history in first degree relatives      Social history:, non-smoker    PAST MEDICAL & SURGICAL HISTORY:  Pyoderma gangrenosum  Depression  Hypothyroid  SLE (systemic lupus erythematosus)  HTN (hypertension)  S/P   S/P myomectomy      I&O's Summary      Vital Signs Last 24 Hrs  T(C): 36.7 (2018 05:11), Max: 37.1 (2018 15:34)  T(F): 98 (2018 05:11), Max: 98.7 (2018 15:34)  HR: 88 (2018 10:47) (74 - 88)  BP: 160/90 (2018 10:47) (143/77 - 174/88)  BP(mean): --  RR: 18 (2018 10:47) (16 - 18)  SpO2: 95% (2018 10:47) (95% - 97%)    Height (cm): 160.02 ( @ 23:27)  Weight (kg): 92.3 ( @ 23:27)  BMI (kg/m2): 36 ( @ 23:27)  BSA (m2): 1.95 ( @ 23:27)    PHYSICAL EXAM:     Constitutional:NAD wide awake , gives history, NAD a/ox3    Eyes:non icteric    ENMT:clear, normal    Neck:posterior  lower neckc7/t1 4x1.5cmx.1    Breasts: bilateral skin lesions right 15x11.5x.3, left 13x16 clusterx.2    Back: lwer l2 paravertebral .5x.5x.2, left buttock 2.5x2.5x.3 ,     Respiratory:clear    Cardiovascular:rrr/ sternal ulcer .5x2cmx.1    Gastrointestinal:non tender, left periumbilical cluster  1x4cm    Genitourinary: supra pubic in hystectomy scar 2x1x.1 with some bleeding    Extremities:left leg thigh 2x2cm with 2inch ring of discolored skin/scar, medial calf 1x1.5x.1cm  right upper arm 2.5x.1x.1, hyperpig 1 cm around    Vascular:palpable pulse 1+    Neurological:intact rom    Skin:as noted above    Lymph Nodes:non tender    Musculoskeletal: rom intact    Psychiatric: intermitently cheerful to weepy        CBC Full  -  ( 2018 05:40 )  WBC Count : 7.10 K/uL  Hemoglobin : 11.5 g/dL  Hematocrit : 35.5 %  Platelet Count - Automated : 162 K/uL  Mean Cell Volume : 95.4 fL  Mean Cell Hemoglobin : 30.9 pg  Mean Cell Hemoglobin Concentration : 32.4 %  Auto Neutrophil # : x  Auto Lymphocyte # : x  Auto Monocyte # : x  Auto Eosinophil # : x  Auto Basophil # : x  Auto Neutrophil % : x  Auto Lymphocyte % : x  Auto Monocyte % : x  Auto Eosinophil % : x  Auto Basophil % : x          142  |  101  |  19  ----------------------------<  158<H>  3.8   |  28  |  0.77    Ca    8.4      2018 05:40    TPro  6.2  /  Alb  3.5  /  TBili  0.9  /  DBili  0.3<H>  /  AST  17  /  ALT  20  /  AlkPhos  72        eGFR if AA97  eGFR if non AA84  eGFR if AA90  eGFR if non AA78          Radiology:  none

## 2018-01-24 NOTE — CONSULT NOTE ADULT - ASSESSMENT
62y old  Female who presents with painful skin lesions back arm and left leg, sepsis signs on admit, no clear source  SLE, pyoderma gangrenosum, hashimotos, Sjogren's being treated  for lupus, s/p steroids, on  plaquenil/ consider thyroid  function and neck ultrasound, r/o nodules    currently skin does not exhibit necrosis with pus, all wounds are dry with eschar not pressure related, no cellultis  several with old changes of hyperpigmentation from previous/ chronic episodes (arm and leg)  abdominal wound? complicated by scratching, minor trauma-  daily hygiene keep covered/ for sacrum doubt pressure, regardless, would offload   would keep all covered while in hospital, wound gel applied, with foam and mepilex/allevyn    Awaiting rheumatology and dermatology assessment  Patient to followup at wound center  DVT prophylaxisis  MO- consider nutritional assessment, check hga1c

## 2018-01-24 NOTE — H&P ADULT - NSHPPHYSICALEXAM_GEN_ALL_CORE
Vital Signs Last 24 Hrs  T(C): 36.4 (23 Jan 2018 23:27), Max: 37.1 (23 Jan 2018 15:34)  T(F): 97.5 (23 Jan 2018 23:27), Max: 98.7 (23 Jan 2018 15:34)  HR: 75 (23 Jan 2018 23:27) (74 - 76)  BP: 150/89 (23 Jan 2018 23:27) (143/77 - 174/88)  BP(mean): --  RR: 18 (23 Jan 2018 23:27) (18 - 18)  SpO2: 96% (23 Jan 2018 23:27) (95% - 96%)    PHYSICAL EXAM:  GENERAL: NAD, well-groomed, well-developed  HEAD:  Atraumatic, Normocephalic  EYES: EOMI, PERRLA, conjunctiva and sclera clear  ENMT: No tonsillar erythema, exudates, or enlargement; Moist mucous membranes, Good dentition  NECK: Supple, No JVD  NERVOUS SYSTEM: AOX3, motor and sensation grossly intact in b/l UE and b/l LE  PSYCHIATRIC: Appropriate affect and mood  CHEST/LUNG: Clear to auscultation bilaterally; No rales, rhonchi, wheezing, or rubs  HEART: Regular rate and rhythm; No murmurs, rubs, or gallops. No LE edema  ABDOMEN: Soft, Nontender, Nondistended; Bowel sounds present  EXTREMITIES:  2+ Peripheral Pulses, No clubbing, cyanosis  SKIN: +open vesicles w/ surrounding erythema on chest, back, right lower extremity, right groin.  No purulence Vital Signs Last 24 Hrs  T(C): 36.4 (23 Jan 2018 23:27), Max: 37.1 (23 Jan 2018 15:34)  T(F): 97.5 (23 Jan 2018 23:27), Max: 98.7 (23 Jan 2018 15:34)  HR: 75 (23 Jan 2018 23:27) (74 - 76)  BP: 150/89 (23 Jan 2018 23:27) (143/77 - 174/88)  BP(mean): --  RR: 18 (23 Jan 2018 23:27) (18 - 18)  SpO2: 96% (23 Jan 2018 23:27) (95% - 96%)    PHYSICAL EXAM:  GENERAL: NAD, well-groomed, well-developed  HEAD:  Atraumatic, Normocephalic  EYES: EOMI, PERRLA, conjunctiva and sclera clear  ENMT: No tonsillar erythema, exudates, or enlargement; Moist mucous membranes, Good dentition  NECK: Supple, No JVD  NERVOUS SYSTEM: AOX3, motor and sensation grossly intact in b/l UE and b/l LE  PSYCHIATRIC: Appropriate affect and mood  CHEST/LUNG: Clear to auscultation bilaterally; No rales, rhonchi, wheezing, or rubs  HEART: Regular rate and rhythm; No murmurs, rubs, or gallops. No LE edema  ABDOMEN: Soft, Nontender, Nondistended; Bowel sounds present  EXTREMITIES:  2+ Peripheral Pulses, No clubbing, cyanosis  SKIN: +open crusted vesicles w/ surrounding erythema on chest, back, right lower extremity, right groin.  No purulence

## 2018-01-24 NOTE — PROGRESS NOTE ADULT - ASSESSMENT
61 yo F w/ PMH of SLE, pyoderma gangrenosum (dx 15 years ago, in remission for 7 years until a few months ago), hashimotos, sjogrens presents with painful skin lesions that have been worsening over the past few months 2/2 pyoderma gangrenosum flare.

## 2018-01-24 NOTE — H&P ADULT - PROBLEM SELECTOR PLAN 5
- c/w konipin 1 mg QID, Fluoxetine 80 mg daily  - ISTOP reviewed - reference # [id number] - bilirubin slightly elevated at 1.3   - ? 2/2 hemolysis from dapsone, will fractionate - c/w enalapril 10 mg   - elevated bp likely in the setting of pain, if does not improve with pain control will consider adding additional agent

## 2018-01-24 NOTE — H&P ADULT - PROBLEM SELECTOR PLAN 4
- bilirubin slighly elevated at 1.3   - ? 2/2 hemolysis from dapsone, will fractionate - bilirubin slightly elevated at 1.3   - ? 2/2 hemolysis from dapsone, will fractionate - c/w synthroid 137 mcg  - TSH in am - c/w konipin 1 mg QID, Fluoxetine 80 mg daily  - ISTOP reviewed - reference # [id number]

## 2018-01-24 NOTE — PROGRESS NOTE ADULT - PROBLEM SELECTOR PLAN 2
Has been stable per pt.   - c/w plaquenil 200 mg BID   - outpatient rheumatology f/u - patient currently looking for new rheumatologist and has been having difficulty finding one  - refer to rheumatology as outpatient at discharge

## 2018-01-24 NOTE — CONSULT NOTE ADULT - SUBJECTIVE AND OBJECTIVE BOX
HPI:  62F PMH SLE, pyoderma gangrenosum, hashimotos, sjogrens presents with painful skin lesions that have been worsening over the past few months.  Per patient, was diagnosed with Pyoderma Gangrenosum 15 years ago, had been on pred 9760-6260, as well as thalidomide, by Dr. Scott, and has been in remission for the past 7 years; however over the past few months her lesions have been flaring up.  She has lesions on her chest, back, groin, legs, and abdomen.  She recently saw her dermatologist who started her on Dapsone and prednisone which she has been taking over the past three weeks, however the lesions have been getting worse and now are more purulent and with more erythema around them.  She saw her dermatologist yesterday who told her to go to the ED.  She denies fevers, chills, shortness of breath, weakness, dysuria, nausea, vomiting, diarrhea.  No involvement of mucus membranes.  She does not currently have a rheumatologist, is currently looking for a new one.      Patient was seen in the ED on  for worsening painful lesions, was discharged from the ED on Doxycycline and bacitracin with outpatient rheumatology followup.  She completed the course of doxycycline.      In the ED VS 98.7 74 174/88 18 96% RA  Labs notable for WBC 8, Hgb 11.5, Cr 0.82, bilirubin 1.3, lactate 1.5.  Blood cultures x 2 pending  Patient was given minocycline 100 mg, solumedrol 1 g, morphine 4 mg, and NaCl 1L. (2018 00:03)      PAST MEDICAL & SURGICAL HISTORY:  Pyoderma gangrenosum  Depression  Hypothyroid  SLE (systemic lupus erythematosus)  HTN (hypertension)  S/P   S/P myomectomy      REVIEW OF SYSTEMS      General: no fevers/chills, no lethary	    Skin/Breast: see HPI  	  Ophthalmologic: no eye pain or change in vision  	  ENMT: no dysphagia or change in hearing    Respiratory and Thorax: no SOB or cough  	  Cardiovascular: no palpitations or chest pain    Gastrointestinal: no abdomenal pain or blood in stool     Genitourinary: no dysuria or frequency    Musculoskeletal: no joint pains or weakness	    Neurological:no weakness, numbness , or tingling    MEDICATIONS  (STANDING):  enalapril 10 milliGRAM(s) Oral daily  FLUoxetine 80 milliGRAM(s) Oral daily  hydroxychloroquine 200 milliGRAM(s) Oral every 12 hours  levothyroxine 137 MICROGram(s) Oral daily  metoprolol succinate ER 25 milliGRAM(s) Oral daily    MEDICATIONS  (PRN):  acetaminophen   Tablet. 650 milliGRAM(s) Oral every 6 hours PRN Mild Pain (1 - 3)  clonazePAM Tablet 1 milliGRAM(s) Oral four times a day PRN anxiety  morphine  - Injectable 4 milliGRAM(s) IV Push every 4 hours PRN Severe Pain (7 - 10)  oxyCODONE    5 mG/acetaminophen 325 mG 1 Tablet(s) Oral every 6 hours PRN Moderate Pain (4 - 6)      Allergies    Compazine (Other)    Intolerances        SOCIAL HISTORY:    FAMILY HISTORY:  No pertinent family history in first degree relatives      Vital Signs Last 24 Hrs  T(C): 36.8 (2018 13:05), Max: 36.8 (2018 13:05)  T(F): 98.2 (2018 13:05), Max: 98.2 (2018 13:05)  HR: 82 (2018 16:23) (75 - 94)  BP: 165/76 (2018 16:23) (150/89 - 165/76)  BP(mean): --  RR: 18 (2018 16:23) (16 - 18)  SpO2: 95% (2018 16:23) (94% - 97%)    PHYSICAL EXAM:     The patient was alert and oriented X 3, well nourished, and in no  apparent distress.  OP showed no ulcerations  There was no visible lymphadenopathy.  Conjunctiva were non injected  There was no clubbing or edema of extremities.  The scalp, hair, face, eyebrows, lips, OP, neck, chest, back,   extremities X 4, nails were examined.  There was no hyperhidrosis or bromhidrosis.    Of note on skin exam:     scattered 2-3cm ulcerations with fibrinous exudate on chest, upper back, b/l lower legs; mid abdomen with scar and overlying crust     LABS:                        11.5   7.10  )-----------( 162      ( 2018 05:40 )             35.5     01-24    142  |  101  |  19  ----------------------------<  158<H>  3.8   |  28  |  0.77    Ca    8.4      2018 05:40    TPro  6.2  /  Alb  3.5  /  TBili  0.9  /  DBili  0.3<H>  /  AST  17  /  ALT  20  /  AlkPhos  72            RADIOLOGY & ADDITIONAL STUDIES: HPI:  62F PMH SLE, pyoderma gangrenosum (biopsy proven), hashimotos, sjogrens presents with painful skin lesions that have been worsening over the past few months.  Per patient, was diagnosed with Pyoderma Gangrenosum 15 years ago, had been on pred 7451-8307, as well as thalidomide, by Dr. Scott (who had biopsied her ulcers) and has been in remission for the past 7 years; however over the past few months her lesions have been flaring up.  She has lesions on her chest, back, groin, legs, and abdomen.  She recently saw her dermatologist who started her on Dapsone and prednisone which she has been taking over the past three weeks, however the lesions have been getting worse and now are more purulent and with more erythema around them.  She saw her dermatologist yesterday who told her to go to the ED.  She denies fevers, chills, shortness of breath, weakness, dysuria, nausea, vomiting, diarrhea.  No involvement of mucus membranes.  She does not currently have a rheumatologist, is currently looking for a new one.      Patient was seen in the ED on  for worsening painful lesions, was discharged from the ED on Doxycycline and bacitracin with outpatient rheumatology followup.  She completed the course of doxycycline.      In the ED VS 98.7 74 174/88 18 96% RA  Labs notable for WBC 8, Hgb 11.5, Cr 0.82, bilirubin 1.3, lactate 1.5.  Blood cultures x 2 pending  Patient was given minocycline 100 mg, solumedrol 1 g, morphine 4 mg, and NaCl 1L. (2018 00:03)      PAST MEDICAL & SURGICAL HISTORY:  Pyoderma gangrenosum  Depression  Hypothyroid  SLE (systemic lupus erythematosus)  HTN (hypertension)  S/P   S/P myomectomy      REVIEW OF SYSTEMS      General: no fevers/chills, no lethary	    Skin/Breast: see HPI  	  Ophthalmologic: no eye pain or change in vision  	  ENMT: no dysphagia or change in hearing    Respiratory and Thorax: no SOB or cough  	  Cardiovascular: no palpitations or chest pain    Gastrointestinal: no abdomenal pain or blood in stool     Genitourinary: no dysuria or frequency    Musculoskeletal: no joint pains or weakness	    Neurological:no weakness, numbness , or tingling    MEDICATIONS  (STANDING):  enalapril 10 milliGRAM(s) Oral daily  FLUoxetine 80 milliGRAM(s) Oral daily  hydroxychloroquine 200 milliGRAM(s) Oral every 12 hours  levothyroxine 137 MICROGram(s) Oral daily  metoprolol succinate ER 25 milliGRAM(s) Oral daily    MEDICATIONS  (PRN):  acetaminophen   Tablet. 650 milliGRAM(s) Oral every 6 hours PRN Mild Pain (1 - 3)  clonazePAM Tablet 1 milliGRAM(s) Oral four times a day PRN anxiety  morphine  - Injectable 4 milliGRAM(s) IV Push every 4 hours PRN Severe Pain (7 - 10)  oxyCODONE    5 mG/acetaminophen 325 mG 1 Tablet(s) Oral every 6 hours PRN Moderate Pain (4 - 6)      Allergies    Compazine (Other)    Intolerances        SOCIAL HISTORY:    FAMILY HISTORY:  No pertinent family history in first degree relatives      Vital Signs Last 24 Hrs  T(C): 36.8 (2018 13:05), Max: 36.8 (2018 13:05)  T(F): 98.2 (2018 13:05), Max: 98.2 (2018 13:05)  HR: 82 (2018 16:23) (75 - 94)  BP: 165/76 (2018 16:23) (150/89 - 165/76)  BP(mean): --  RR: 18 (2018 16:23) (16 - 18)  SpO2: 95% (2018 16:23) (94% - 97%)    PHYSICAL EXAM:     The patient was alert and oriented X 3, well nourished, and in no  apparent distress.  OP showed no ulcerations  There was no visible lymphadenopathy.  Conjunctiva were non injected  There was no clubbing or edema of extremities.  The scalp, hair, face, eyebrows, lips, OP, neck, chest, back,   extremities X 4, nails were examined.  There was no hyperhidrosis or bromhidrosis.    Of note on skin exam:     scattered 2-3cm ulcerations with fibrinous exudate on chest, upper back, b/l lower legs; mid abdomen with shallow erosion and overlying crust     LABS:                        11.5   7.10  )-----------( 162      ( 2018 05:40 )             35.5     -    142  |  101  |  19  ----------------------------<  158<H>  3.8   |  28  |  0.77    Ca    8.4      2018 05:40    TPro  6.2  /  Alb  3.5  /  TBili  0.9  /  DBili  0.3<H>  /  AST  17  /  ALT  20  /  AlkPhos  72            RADIOLOGY & ADDITIONAL STUDIES:

## 2018-01-24 NOTE — PROGRESS NOTE ADULT - SUBJECTIVE AND OBJECTIVE BOX
Ja Mathews MD  PGY-1 | Internal Medicine  [phone] / [id number]      Patient is a 62y old  Female who presents with a chief complaint of painful skin lesions (24 Jan 2018 00:03)      SUBJECTIVE / OVERNIGHT EVENTS:    MEDICATIONS  (STANDING):  dapsone 100 milliGRAM(s) Oral daily  enalapril 10 milliGRAM(s) Oral daily  FLUoxetine 80 milliGRAM(s) Oral daily  hydroxychloroquine 200 milliGRAM(s) Oral every 12 hours  levothyroxine 137 MICROGram(s) Oral daily    MEDICATIONS  (PRN):  acetaminophen   Tablet. 650 milliGRAM(s) Oral every 6 hours PRN Mild Pain (1 - 3)  clonazePAM Tablet 1 milliGRAM(s) Oral four times a day PRN anxiety  morphine  - Injectable 4 milliGRAM(s) IV Push every 6 hours PRN Severe Pain (7 - 10)  oxyCODONE    5 mG/acetaminophen 325 mG 1 Tablet(s) Oral every 6 hours PRN Moderate Pain (4 - 6)      Vital Signs Last 24 Hrs  T(C): 36.7 (24 Jan 2018 05:11), Max: 37.1 (23 Jan 2018 15:34)  T(F): 98 (24 Jan 2018 05:11), Max: 98.7 (23 Jan 2018 15:34)  HR: 81 (24 Jan 2018 06:37) (74 - 81)  BP: 158/96 (24 Jan 2018 06:37) (143/77 - 174/88)  BP(mean): --  RR: 16 (24 Jan 2018 05:11) (16 - 18)  SpO2: 97% (24 Jan 2018 05:11) (95% - 97%)    CAPILLARY BLOOD GLUCOSE          I&O's Summary        PHYSICAL EXAM:  GENERAL: NAD, well-developed  HEAD:  NC, AT  EYES: EOMI, PERRL, conjunctiva and sclera clear  ENMT: Airway patent. MMM.  NECK: Supple, No JVD  HEART: RRR; Normal S1, S2. No murmurs, rubs, or gallops  CHEST/LUNG: CTABL; No wheezing, rhonchi, or rales  ABDOMEN: +BS; Soft, nondistended, nontender  EXTREMITIES:  2+ Peripheral Pulses, No clubbing, cyanosis, or edema  PSYCH: AAOx3  NEUROLOGY: non-focal  SKIN: Warm, dry, intact; No rashes or lesions      LABS:                        11.5   7.10  )-----------( 162      ( 24 Jan 2018 05:40 )             35.5     01-24    142  |  101  |  19  ----------------------------<  158<H>  3.8   |  28  |  0.77    Ca    8.4      24 Jan 2018 05:40    TPro  6.2  /  Alb  3.5  /  TBili  0.9  /  DBili  0.3<H>  /  AST  17  /  ALT  20  /  AlkPhos  72  01-24    LIVER FUNCTIONS - ( 24 Jan 2018 05:40 )  Alb: 3.5 g/dL / Pro: 6.2 g/dL / ALK PHOS: 72 u/L / ALT: 20 u/L / AST: 17 u/L / GGT: x                       RADIOLOGY & ADDITIONAL TESTS:    Imaging Personally Reviewed:     Consultant(s) Notes Reviewed:     Care Discussed with Consultants/Other Providers: Ja Mathews MD  PGY-1 | Internal Medicine  [phone] / [id number]      Patient is a 62y old  Female who presents with a chief complaint of painful skin lesions (24 Jan 2018 00:03)      SUBJECTIVE / OVERNIGHT EVENTS: Pt admitted overnight for severely painful pyoderma gangrenosum flare. No acute overnight events. Pt is talkative and craves coffee. Pt feels well except for tender pyoderma lesions and is stressed due to family financial situation. Pain is somewhat controlled on morphine IV. Denies fevers, chills, n/v, CP, SOB, abd pain, diarrhea, dysuria.     MEDICATIONS  (STANDING):  dapsone 100 milliGRAM(s) Oral daily  enalapril 10 milliGRAM(s) Oral daily  FLUoxetine 80 milliGRAM(s) Oral daily  hydroxychloroquine 200 milliGRAM(s) Oral every 12 hours  levothyroxine 137 MICROGram(s) Oral daily    MEDICATIONS  (PRN):  acetaminophen   Tablet. 650 milliGRAM(s) Oral every 6 hours PRN Mild Pain (1 - 3)  clonazePAM Tablet 1 milliGRAM(s) Oral four times a day PRN anxiety  morphine  - Injectable 4 milliGRAM(s) IV Push every 6 hours PRN Severe Pain (7 - 10)  oxyCODONE    5 mG/acetaminophen 325 mG 1 Tablet(s) Oral every 6 hours PRN Moderate Pain (4 - 6)      Vital Signs Last 24 Hrs  T(C): 36.7 (24 Jan 2018 05:11), Max: 37.1 (23 Jan 2018 15:34)  T(F): 98 (24 Jan 2018 05:11), Max: 98.7 (23 Jan 2018 15:34)  HR: 81 (24 Jan 2018 06:37) (74 - 81)  BP: 158/96 (24 Jan 2018 06:37) (143/77 - 174/88)  BP(mean): --  RR: 16 (24 Jan 2018 05:11) (16 - 18)  SpO2: 97% (24 Jan 2018 05:11) (95% - 97%)    CAPILLARY BLOOD GLUCOSE          I&O's Summary        PHYSICAL EXAM:  GENERAL: NAD, well-developed, obese middle aged woman lying comfortably in bed; in good spirits  HEAD:  NC, AT  EYES: EOMI, conjunctiva and sclera clear  ENMT: Airway patent. MMM.  HEART: RRR; Normal S1, S2. No murmurs, rubs, or gallops  CHEST/LUNG: CTABL; No wheezing, rhonchi, or rales  ABDOMEN: +BS; Soft, nondistended, tender purulent lesions with surrounding erythema  EXTREMITIES:  2+ Peripheral Pulses, No clubbing, cyanosis. +1 edema in L LE, trace edema in R LE  PSYCH: AAOx3  NEUROLOGY: non-focal  SKIN:  Warm, multiple large purulent lesions 1-4 cm in diameter with surrounding erythema, not actively draining, throughout abd, 1 in midback, chest, LE.       LABS:                        11.5   7.10  )-----------( 162      ( 24 Jan 2018 05:40 )             35.5     01-24    142  |  101  |  19  ----------------------------<  158<H>  3.8   |  28  |  0.77    Ca    8.4      24 Jan 2018 05:40    TPro  6.2  /  Alb  3.5  /  TBili  0.9  /  DBili  0.3<H>  /  AST  17  /  ALT  20  /  AlkPhos  72  01-24    LIVER FUNCTIONS - ( 24 Jan 2018 05:40 )  Alb: 3.5 g/dL / Pro: 6.2 g/dL / ALK PHOS: 72 u/L / ALT: 20 u/L / AST: 17 u/L / GGT: x                       RADIOLOGY & ADDITIONAL TESTS:    Imaging Personally Reviewed:     Consultant(s) Notes Reviewed:     Care Discussed with Consultants/Other Providers:

## 2018-01-25 ENCOUNTER — TRANSCRIPTION ENCOUNTER (OUTPATIENT)
Age: 63
End: 2018-01-25

## 2018-01-25 LAB
BUN SERPL-MCNC: 18 MG/DL — SIGNIFICANT CHANGE UP (ref 7–23)
CALCIUM SERPL-MCNC: 8.1 MG/DL — LOW (ref 8.4–10.5)
CHLORIDE SERPL-SCNC: 102 MMOL/L — SIGNIFICANT CHANGE UP (ref 98–107)
CO2 SERPL-SCNC: 29 MMOL/L — SIGNIFICANT CHANGE UP (ref 22–31)
CREAT SERPL-MCNC: 0.75 MG/DL — SIGNIFICANT CHANGE UP (ref 0.5–1.3)
GLUCOSE SERPL-MCNC: 97 MG/DL — SIGNIFICANT CHANGE UP (ref 70–99)
HBA1C BLD-MCNC: 5.1 % — SIGNIFICANT CHANGE UP (ref 4–5.6)
HCT VFR BLD CALC: 32.9 % — LOW (ref 34.5–45)
HGB BLD-MCNC: 10.3 G/DL — LOW (ref 11.5–15.5)
MAGNESIUM SERPL-MCNC: 2.3 MG/DL — SIGNIFICANT CHANGE UP (ref 1.6–2.6)
MCHC RBC-ENTMCNC: 29.6 PG — SIGNIFICANT CHANGE UP (ref 27–34)
MCHC RBC-ENTMCNC: 31.3 % — LOW (ref 32–36)
MCV RBC AUTO: 94.5 FL — SIGNIFICANT CHANGE UP (ref 80–100)
NRBC # FLD: 0 — SIGNIFICANT CHANGE UP
PHOSPHATE SERPL-MCNC: 3.3 MG/DL — SIGNIFICANT CHANGE UP (ref 2.5–4.5)
PLATELET # BLD AUTO: 177 K/UL — SIGNIFICANT CHANGE UP (ref 150–400)
PMV BLD: 12 FL — SIGNIFICANT CHANGE UP (ref 7–13)
POTASSIUM SERPL-MCNC: 3.9 MMOL/L — SIGNIFICANT CHANGE UP (ref 3.5–5.3)
POTASSIUM SERPL-SCNC: 3.9 MMOL/L — SIGNIFICANT CHANGE UP (ref 3.5–5.3)
RBC # BLD: 3.48 M/UL — LOW (ref 3.8–5.2)
RBC # FLD: 14.7 % — HIGH (ref 10.3–14.5)
SODIUM SERPL-SCNC: 141 MMOL/L — SIGNIFICANT CHANGE UP (ref 135–145)
WBC # BLD: 12.83 K/UL — HIGH (ref 3.8–10.5)
WBC # FLD AUTO: 12.83 K/UL — HIGH (ref 3.8–10.5)

## 2018-01-25 PROCEDURE — 99223 1ST HOSP IP/OBS HIGH 75: CPT | Mod: GC

## 2018-01-25 PROCEDURE — 93010 ELECTROCARDIOGRAM REPORT: CPT

## 2018-01-25 PROCEDURE — 99233 SBSQ HOSP IP/OBS HIGH 50: CPT | Mod: GC

## 2018-01-25 RX ORDER — HYDROMORPHONE HYDROCHLORIDE 2 MG/ML
1 INJECTION INTRAMUSCULAR; INTRAVENOUS; SUBCUTANEOUS ONCE
Qty: 0 | Refills: 0 | Status: DISCONTINUED | OUTPATIENT
Start: 2018-01-25 | End: 2018-01-25

## 2018-01-25 RX ORDER — HYDROMORPHONE HYDROCHLORIDE 2 MG/ML
1 INJECTION INTRAMUSCULAR; INTRAVENOUS; SUBCUTANEOUS EVERY 4 HOURS
Qty: 0 | Refills: 0 | Status: DISCONTINUED | OUTPATIENT
Start: 2018-01-25 | End: 2018-01-26

## 2018-01-25 RX ADMIN — Medication 200 MILLIGRAM(S): at 18:22

## 2018-01-25 RX ADMIN — HYDROMORPHONE HYDROCHLORIDE 1 MILLIGRAM(S): 2 INJECTION INTRAMUSCULAR; INTRAVENOUS; SUBCUTANEOUS at 16:29

## 2018-01-25 RX ADMIN — Medication 10 MILLIGRAM(S): at 05:24

## 2018-01-25 RX ADMIN — MORPHINE SULFATE 4 MILLIGRAM(S): 50 CAPSULE, EXTENDED RELEASE ORAL at 09:03

## 2018-01-25 RX ADMIN — HYDROMORPHONE HYDROCHLORIDE 1 MILLIGRAM(S): 2 INJECTION INTRAMUSCULAR; INTRAVENOUS; SUBCUTANEOUS at 13:04

## 2018-01-25 RX ADMIN — HYDROMORPHONE HYDROCHLORIDE 1 MILLIGRAM(S): 2 INJECTION INTRAMUSCULAR; INTRAVENOUS; SUBCUTANEOUS at 21:18

## 2018-01-25 RX ADMIN — MORPHINE SULFATE 4 MILLIGRAM(S): 50 CAPSULE, EXTENDED RELEASE ORAL at 05:00

## 2018-01-25 RX ADMIN — Medication 25 MILLIGRAM(S): at 05:24

## 2018-01-25 RX ADMIN — MUPIROCIN 1 APPLICATION(S): 20 OINTMENT TOPICAL at 18:22

## 2018-01-25 RX ADMIN — MORPHINE SULFATE 4 MILLIGRAM(S): 50 CAPSULE, EXTENDED RELEASE ORAL at 04:46

## 2018-01-25 RX ADMIN — MORPHINE SULFATE 4 MILLIGRAM(S): 50 CAPSULE, EXTENDED RELEASE ORAL at 09:18

## 2018-01-25 RX ADMIN — Medication 80 MILLIGRAM(S): at 13:06

## 2018-01-25 RX ADMIN — HYDROMORPHONE HYDROCHLORIDE 1 MILLIGRAM(S): 2 INJECTION INTRAMUSCULAR; INTRAVENOUS; SUBCUTANEOUS at 13:19

## 2018-01-25 RX ADMIN — HYDROMORPHONE HYDROCHLORIDE 1 MILLIGRAM(S): 2 INJECTION INTRAMUSCULAR; INTRAVENOUS; SUBCUTANEOUS at 21:38

## 2018-01-25 RX ADMIN — MORPHINE SULFATE 4 MILLIGRAM(S): 50 CAPSULE, EXTENDED RELEASE ORAL at 01:06

## 2018-01-25 RX ADMIN — Medication 200 MILLIGRAM(S): at 05:24

## 2018-01-25 RX ADMIN — Medication 1 APPLICATION(S): at 13:06

## 2018-01-25 RX ADMIN — HYDROMORPHONE HYDROCHLORIDE 1 MILLIGRAM(S): 2 INJECTION INTRAMUSCULAR; INTRAVENOUS; SUBCUTANEOUS at 16:14

## 2018-01-25 RX ADMIN — Medication 1 MILLIGRAM(S): at 21:17

## 2018-01-25 RX ADMIN — MORPHINE SULFATE 4 MILLIGRAM(S): 50 CAPSULE, EXTENDED RELEASE ORAL at 00:46

## 2018-01-25 RX ADMIN — MUPIROCIN 1 APPLICATION(S): 20 OINTMENT TOPICAL at 05:26

## 2018-01-25 RX ADMIN — Medication 1 MILLIGRAM(S): at 05:30

## 2018-01-25 RX ADMIN — Medication 137 MICROGRAM(S): at 05:24

## 2018-01-25 NOTE — DISCHARGE NOTE ADULT - ADDITIONAL INSTRUCTIONS
Wound care recs: apply clobetasol ointment, cover with foam and mepilex/allevyn. Change daily.  For suprapubic lesion, apply bacitracin twice a day instead of clobetasol ointment and cover with foam and mepilex/allevyn    Follow up with dermatology (Dr. Sidhu) within 2-4 weeks of discharge.  Please follow up with rheumatology within 2-4 weeks of discharge.  Please follow up with your primary medical doctor (Dr. Chisholm) within 1-2 weeks of discharge.  Please call and make an appointment with the pain management specialist (Dr. Dobbs).  Please follow up with your psychiatrist (Dr. Graham) at your regularly scheduled follow-up appointment.

## 2018-01-25 NOTE — DISCHARGE NOTE ADULT - PROVIDER TOKENS
TOKEN:'[id number]:'[id number]:'[id number]' TOKEN:'[id number]:'[id number]:'[id number]'

## 2018-01-25 NOTE — DISCHARGE NOTE ADULT - CARE PLAN
Principal Discharge DX:	Pyoderma gangrenosum  Goal:	To heal and prevent lesions  Assessment and plan of treatment:	Wound care recs:   Follow up with dermatology (Dr. Sidhu) within 2-4 weeks of discharge. Contact info provided below. Take your medications as prescribed.  Secondary Diagnosis:	Hypertension  Goal:	To control blood pressure  Assessment and plan of treatment:	Please follow up with your primary medical doctor (Dr. Chisholm) within 1-2 weeks of discharge. Take your medications as prescribed.  Secondary Diagnosis:	SLE (systemic lupus erythematosus)  Assessment and plan of treatment:	Follow up with rheumatology within 2-4 weeks of discharge. Contact info provided below. Take your medications as prescribed.  Secondary Diagnosis:	Depression  Assessment and plan of treatment:	Please follow up with your psychiatrist (Dr. Graham) at your regularly scheduled follow-up appointment. Take your medications as prescribed. Principal Discharge DX:	Pyoderma gangrenosum  Goal:	To heal and prevent lesions  Assessment and plan of treatment:	Wound care recs: apply clobetasol ointment, cover with foam and mepilex/allevyn. Change daily.   For suprapubic lesion, apply bacitracin twice a day instead of clobetasol ointment and cover with foam and mepilex/allevyn.   Follow up with dermatology (Dr. Sidhu) within 2-4 weeks of discharge. Contact info provided below. Take your medications as prescribed.  Secondary Diagnosis:	Hypertension  Goal:	To control blood pressure  Assessment and plan of treatment:	Please follow up with your primary medical doctor (Dr. Chisholm) within 1-2 weeks of discharge. Take your medications as prescribed.  Secondary Diagnosis:	SLE (systemic lupus erythematosus)  Assessment and plan of treatment:	Follow up with rheumatology within 2-4 weeks of discharge. Contact info provided below. Take your medications as prescribed.  Secondary Diagnosis:	Depression  Assessment and plan of treatment:	Please follow up with your psychiatrist (Dr. Graham) at your regularly scheduled follow-up appointment. Take your medications as prescribed.

## 2018-01-25 NOTE — DISCHARGE NOTE ADULT - MEDICATION SUMMARY - MEDICATIONS TO CHANGE
I will SWITCH the dose or number of times a day I take the medications listed below when I get home from the hospital:    enalapril 10 mg oral tablet  -- 1 tab(s) by mouth 2 times a day  -- HOLD for SBP <110, HR <60

## 2018-01-25 NOTE — DISCHARGE NOTE ADULT - CARE PROVIDERS DIRECT ADDRESSES
,DirectAddress_Unknown,[email or web address] ,DirectAddress_Unknown,[email or web address],DirectAddress_Unknown

## 2018-01-25 NOTE — DISCHARGE NOTE ADULT - MEDICATION SUMMARY - MEDICATIONS TO TAKE
I will START or STAY ON the medications listed below when I get home from the hospital:    oxycodone-acetaminophen 7.5 mg-300 mg oral tablet  -- 1 tab(s) by mouth every 6 hours, As Needed -for severe pain MDD:No more than 4 tabs per day  -- Caution federal law prohibits the transfer of this drug to any person other  than the person for whom it was prescribed.  May cause drowsiness.  Alcohol may intensify this effect.  Use care when operating dangerous machinery.  This drug may impair the ability to drive or operate machinery.  Use care until you become familiar with its effects.  This prescription cannot be refilled.  This product contains acetaminophen.  Do not use  with any other product containing acetaminophen to prevent possible liver damage.  Using more of this medication than prescribed may cause serious breathing problems.    -- Indication: For Severe pain    enalapril 10 mg oral tablet  -- 1 tab(s) by mouth once a day  -- Indication: For Hypertension    KlonoPIN 1 mg oral tablet  -- 1 tab(s) by mouth 4 times a day, As Needed  -- Indication: For Anxiety    PROzac 40 mg oral capsule  -- 2 cap(s) by mouth once a day  -- Indication: For Depression    traZODone 50 mg oral tablet  -- 1 tab(s) by mouth 2 times a day, As Needed for sleep  -- Indication: For Insomnia    Plaquenil 200 mg oral tablet  -- 2 tab(s) by mouth once a day  -- Indication: For SLE (systemic lupus erythematosus)    Toprol-XL 25 mg oral tablet, extended release  -- 1 tab(s) by mouth once a day  -- Indication: For Hypertension    clobetasol 0.05% topical ointment  -- 1 application on skin once a day  -- Indication: For Pyoderma gangrenosum    mupirocin 2% topical ointment  -- 1 application on skin 2 times a day  -- Indication: For Pyoderma gangrenosum    Synthroid 137 mcg (0.137 mg) oral tablet  -- 1 tab(s) by mouth once a day  -- Indication: For Hypothyroid I will START or STAY ON the medications listed below when I get home from the hospital:    Percocet 5/325 oral tablet  -- 2 tab(s) by mouth every 6 hours MDD:max daily dose is 8 tablets daily.  -- Caution federal law prohibits the transfer of this drug to any person other  than the person for whom it was prescribed.  May cause drowsiness.  Alcohol may intensify this effect.  Use care when operating dangerous machinery.  This prescription cannot be refilled.  This product contains acetaminophen.  Do not use  with any other product containing acetaminophen to prevent possible liver damage.  Using more of this medication than prescribed may cause serious breathing problems.    -- Indication: For Pain    enalapril 10 mg oral tablet  -- 1 tab(s) by mouth once a day  -- Indication: For Hypertension    KlonoPIN 1 mg oral tablet  -- 1 tab(s) by mouth 4 times a day, As Needed  -- Indication: For Anxiety    PROzac 40 mg oral capsule  -- 2 cap(s) by mouth once a day  -- Indication: For Depression    traZODone 50 mg oral tablet  -- 1 tab(s) by mouth 2 times a day, As Needed for sleep  -- Indication: For Insomnia    Plaquenil 200 mg oral tablet  -- 2 tab(s) by mouth once a day  -- Indication: For SLE (systemic lupus erythematosus)    Toprol-XL 25 mg oral tablet, extended release  -- 1 tab(s) by mouth once a day  -- Indication: For Hypertension    clobetasol 0.05% topical ointment  -- 1 application on skin once a day  -- Indication: For Pyoderma gangrenosum    mupirocin 2% topical ointment  -- 1 application on skin 2 times a day  -- Indication: For Pyoderma gangrenosum    Synthroid 137 mcg (0.137 mg) oral tablet  -- 1 tab(s) by mouth once a day  -- Indication: For Hypothyroid

## 2018-01-25 NOTE — DISCHARGE NOTE ADULT - CARE PROVIDER_API CALL
Pao Sidhu), DermatologyDermatopathology  [address] [place] [address]  Phone: [phone]  Fax: [phone]    Lashay Cline (JOSE; MPH), Internal Medicine; Rheumatology  [address] [facility] [address]  [place] [address]  Phone: [phone]  Fax: [phone]    Stefani Chisholm), Internal Medicine  [address] [facility] [address]  [place] [address]  Phone: [phone]  Fax: [phone] Pao Sidhu), DermatologyDermatopathology  [address] [place] [address]  Phone: [phone]  Fax: [phone]    Lashay Cline (MBCANDI; MPH), Internal Medicine; Rheumatology  [address] [facility] [address]  [place] [address]  Phone: [phone]  Fax: [phone]    Stefani Chisholm), Internal Medicine  [address] [facility] [address] [address]  Phone: [phone]  Fax: [phone]    Chato Dobbs (MD), Anesthesiology; Pain Medicine  [address]  [place] [address]  Phone: [phone]  Fax: [phone]

## 2018-01-25 NOTE — DISCHARGE NOTE ADULT - PLAN OF CARE
To heal and prevent lesions Wound care recs:   Follow up with dermatology (Dr. Sidhu) within 2-4 weeks of discharge. Contact info provided below. Take your medications as prescribed. To control blood pressure Please follow up with your primary medical doctor (Dr. Chisholm) within 1-2 weeks of discharge. Take your medications as prescribed. Follow up with rheumatology within 2-4 weeks of discharge. Contact info provided below. Take your medications as prescribed. Please follow up with your psychiatrist (Dr. Graham) at your regularly scheduled follow-up appointment. Take your medications as prescribed. Wound care recs: apply clobetasol ointment, cover with foam and mepilex/allevyn. Change daily.   For suprapubic lesion, apply bacitracin twice a day instead of clobetasol ointment and cover with foam and mepilex/allevyn.   Follow up with dermatology (Dr. Sidhu) within 2-4 weeks of discharge. Contact info provided below. Take your medications as prescribed.

## 2018-01-25 NOTE — CONSULT NOTE ADULT - ASSESSMENT
63 yo W, with history of SLE ( + FLAKO, arthritis , rash) apparently quiescent for years , on plaquenil  Pyoderma Gangrenosum ( intially developed 15 y ago, treated with thalidomide) , now with recurrence    - topical steroids as per dermatology  if no improvement, ? topical tacrolimus  - SLE clinically stable, but would check serologies and UA    patient informed about outpatient follow up with Rheumatology office ( [address] [place] [address], [place] )     Holli Gaxiola MD   Rheumatology Fellow  Pager: [phone] 61 yo W, with history of SLE (+ FLAKO, arthritis , rash) apparently quiescent for years , on plaquenil  Pyoderma Gangrenosum ( intially developed 15 y ago, treated with thalidomide) , now with recurrence    - topical steroids as per dermatology  if no improvement, ? topical tacrolimus versus systemic therapy  - SLE clinically stable, but would check serologies and UA    patient informed about outpatient follow up with Rheumatology office ([address] [place] [address], [place] )     Holli Gaxiola MD   Rheumatology Fellow  Pager: [phone]

## 2018-01-25 NOTE — DISCHARGE NOTE ADULT - HOSPITAL COURSE
HPI:   62F PMH SLE, pyoderma gangrenosum, hashimotos, sjogrens presents with painful skin lesions that have been worsening over the past few months 2/2 pyoderma flare likely due to stress.  Per patient, was diagnosed with Pyoderma Gangrenosum 15 years ago, has been in remission for the past 7 years, however over the past few months her lesions have been flaring up.  She has lesions on her chest, back, groin, legs, and abdomen.  She recently saw her dermatologist who started her on Dapsone and prednisone which she has been taking over the past three weeks, however the lesions have been getting worse and now are more purulent and with more erythema around them.  She saw her dermatologist yesterday who told her to go to the ED.  She denies fevers, chills, shortness of breath, weakness, dysuria, nausea, vomiting, diarrhea.  No involvement of mucus membranes.  She does not currently have a rheumatologist, is currently looking for a new one.      Patient was seen in the ED on 12/26 for worsening painful lesions, was discharged from the ED on Doxycycline and bacitracin with outpatient rheumatology followup.  She completed the course of doxycycline.  Per pt, she completed prednisone taper at home.     Hospital Course:   In the ED VS 98.7 74 174/88 18 96% RA. Patient was given minocycline 100 mg, solumedrol 1 g, morphine 4 mg, and NaCl 1L.  Pt was admitted to medicine. Home dapsone from private derm d/c. Wound care attending consulted. Dermatology consulted and pt was given clobetasol ointment daily for pyoderma lesions. Bacitracin BID for suprapubic lesion. Restarted home plaquenil, enalapril, toprol xl, fluoxetine, levothyroxine, klonopin. Pain managed with morphine IV. Had chest pain, EKG shows septal infarct age undetermined. Rheumatology consulted. HPI:   62F PMH SLE, pyoderma gangrenosum, hashimotos, sjogrens presents with painful skin lesions that have been worsening over the past few months 2/2 pyoderma flare likely due to stress.  Per patient, was diagnosed with Pyoderma Gangrenosum 15 years ago, has been in remission for the past 7 years, however over the past few months her lesions have been flaring up.  She has lesions on her chest, back, groin, legs, and abdomen.  She recently saw her dermatologist who started her on Dapsone and prednisone which she has been taking over the past three weeks, however the lesions have been getting worse and now are more purulent and with more erythema around them.  She saw her dermatologist yesterday who told her to go to the ED.  She denies fevers, chills, shortness of breath, weakness, dysuria, nausea, vomiting, diarrhea.  No involvement of mucus membranes.  She does not currently have a rheumatologist, is currently looking for a new one.      Patient was seen in the ED on 12/26 for worsening painful lesions, was discharged from the ED on Doxycycline and bacitracin with outpatient rheumatology followup.  She completed the course of doxycycline.  Per pt, she completed prednisone taper at home.     Hospital Course:   In the ED VS 98.7 74 174/88 18 96% RA. Patient was given minocycline 100 mg, solumedrol 1 g, morphine 4 mg, and NaCl 1L.  Pt was admitted to medicine. Home dapsone from private derm d/c. Wound care attending consulted. Dermatology consulted and pt was given clobetasol ointment daily for pyoderma lesions. Bacitracin BID for suprapubic lesion. Restarted home plaquenil, enalapril, toprol xl, fluoxetine, levothyroxine, klonopin. Pain managed with morphine IV. Had chest pain, EKG shows septal infarct age undetermined. Rheumatology consulted and recommended to continue topical steroids. Outpatient rheum follow up. Pt unhappy with morphine; switched to dilaudid, still not controlling pain. Discharge on percocet with follow up for pain management.

## 2018-01-25 NOTE — PROGRESS NOTE ADULT - SUBJECTIVE AND OBJECTIVE BOX
Ja Mathews MD  PGY-1 | Internal Medicine  [phone] / [phone]      Patient is a 62y old  Female who presents with a chief complaint of painful skin lesions (24 Jan 2018 00:03)      SUBJECTIVE / OVERNIGHT EVENTS:    MEDICATIONS  (STANDING):  clobetasol 0.05% Ointment 1 Application(s) Topical daily  enalapril 10 milliGRAM(s) Oral daily  FLUoxetine 80 milliGRAM(s) Oral daily  hydroxychloroquine 200 milliGRAM(s) Oral every 12 hours  levothyroxine 137 MICROGram(s) Oral daily  metoprolol succinate ER 25 milliGRAM(s) Oral daily  mupirocin 2% Ointment 1 Application(s) Topical two times a day    MEDICATIONS  (PRN):  acetaminophen   Tablet. 650 milliGRAM(s) Oral every 6 hours PRN Mild Pain (1 - 3)  clonazePAM Tablet 1 milliGRAM(s) Oral four times a day PRN anxiety  morphine  - Injectable 4 milliGRAM(s) IV Push every 4 hours PRN Severe Pain (7 - 10)  oxyCODONE    5 mG/acetaminophen 325 mG 1 Tablet(s) Oral every 6 hours PRN Moderate Pain (4 - 6)      Vital Signs Last 24 Hrs  T(C): 36.6 (25 Jan 2018 05:22), Max: 36.8 (24 Jan 2018 13:05)  T(F): 97.9 (25 Jan 2018 05:22), Max: 98.3 (25 Jan 2018 00:42)  HR: 64 (25 Jan 2018 09:02) (64 - 94)  BP: 143/75 (25 Jan 2018 09:02) (143/75 - 165/76)  BP(mean): --  RR: 17 (25 Jan 2018 09:02) (17 - 18)  SpO2: 98% (25 Jan 2018 09:02) (94% - 98%)    CAPILLARY BLOOD GLUCOSE          I&O's Summary        PHYSICAL EXAM:  GENERAL: NAD, well-developed  HEAD:  NC, AT  EYES: EOMI, PERRL, conjunctiva and sclera clear  ENMT: Airway patent. MMM.  NECK: Supple, No JVD  HEART: RRR; Normal S1, S2. No murmurs, rubs, or gallops  CHEST/LUNG: CTABL; No wheezing, rhonchi, or rales  ABDOMEN: +BS; Soft, nondistended, nontender  EXTREMITIES:  2+ Peripheral Pulses, No clubbing, cyanosis, or edema  PSYCH: AAOx3  NEUROLOGY: non-focal  SKIN: Warm, dry, intact; No rashes or lesions      LABS:                        10.3   12.83 )-----------( 177      ( 25 Jan 2018 05:15 )             32.9     01-25    141  |  102  |  18  ----------------------------<  97  3.9   |  29  |  0.75    Ca    8.1<L>      25 Jan 2018 05:15  Phos  3.3     01-25  Mg     2.3     01-25    TPro  6.2  /  Alb  3.5  /  TBili  0.9  /  DBili  0.3<H>  /  AST  17  /  ALT  20  /  AlkPhos  72  01-24    LIVER FUNCTIONS - ( 24 Jan 2018 05:40 )  Alb: 3.5 g/dL / Pro: 6.2 g/dL / ALK PHOS: 72 u/L / ALT: 20 u/L / AST: 17 u/L / GGT: x                       RADIOLOGY & ADDITIONAL TESTS:    Imaging Personally Reviewed:     Consultant(s) Notes Reviewed:     Care Discussed with Consultants/Other Providers: Ja Mathews MD  PGY-1 | Internal Medicine  [phone] / [phone]      Patient is a 62y old  Female who presents with a chief complaint of painful skin lesions (24 Jan 2018 00:03)      SUBJECTIVE / OVERNIGHT EVENTS: No acute overnight events. Pt complains of pain, 8/10 after morphine. 10/10 prior to pain medication. Pt unable to elaborate why she doesn't like the morphine. Pt says some lesions are oozing more pus and some are improving. Pt denies fevers, chills, n/v, diarrhea, CP, SOB, dysuria.     MEDICATIONS  (STANDING):  clobetasol 0.05% Ointment 1 Application(s) Topical daily  enalapril 10 milliGRAM(s) Oral daily  FLUoxetine 80 milliGRAM(s) Oral daily  hydroxychloroquine 200 milliGRAM(s) Oral every 12 hours  levothyroxine 137 MICROGram(s) Oral daily  metoprolol succinate ER 25 milliGRAM(s) Oral daily  mupirocin 2% Ointment 1 Application(s) Topical two times a day    MEDICATIONS  (PRN):  acetaminophen   Tablet. 650 milliGRAM(s) Oral every 6 hours PRN Mild Pain (1 - 3)  clonazePAM Tablet 1 milliGRAM(s) Oral four times a day PRN anxiety  morphine  - Injectable 4 milliGRAM(s) IV Push every 4 hours PRN Severe Pain (7 - 10)  oxyCODONE    5 mG/acetaminophen 325 mG 1 Tablet(s) Oral every 6 hours PRN Moderate Pain (4 - 6)      Vital Signs Last 24 Hrs  T(C): 36.6 (25 Jan 2018 05:22), Max: 36.8 (24 Jan 2018 13:05)  T(F): 97.9 (25 Jan 2018 05:22), Max: 98.3 (25 Jan 2018 00:42)  HR: 64 (25 Jan 2018 09:02) (64 - 94)  BP: 143/75 (25 Jan 2018 09:02) (143/75 - 165/76)  BP(mean): --  RR: 17 (25 Jan 2018 09:02) (17 - 18)  SpO2: 98% (25 Jan 2018 09:02) (94% - 98%)    CAPILLARY BLOOD GLUCOSE          I&O's Summary        PHYSICAL EXAM:  GENERAL: NAD, well-developed, obese middle aged woman lying comfortably in bed;   HEAD:  NC, AT  EYES: EOMI, conjunctiva and sclera clear  ENMT: Airway patent. MMM.  HEART: RRR; Normal S1, S2. No murmurs, rubs, or gallops  CHEST/LUNG: CTABL; No wheezing, rhonchi, or rales  ABDOMEN: +BS; Soft, nondistended, tender purulent lesions with surrounding erythema  EXTREMITIES:  2+ Peripheral Pulses, No clubbing, cyanosis. +1 edema in L LE, trace edema in R LE  PSYCH: AAOx3, appears to be in good mood  NEUROLOGY: non-focal  SKIN:  Warm, multiple large ulcerating lesions 1-4 cm in diameter with surrounding erythema, on abd, 1 in midback, chest, buttocks, suprapubic, LE, dressed with bandages/foam.       LABS:                        10.3   12.83 )-----------( 177      ( 25 Jan 2018 05:15 )             32.9     01-25    141  |  102  |  18  ----------------------------<  97  3.9   |  29  |  0.75    Ca    8.1<L>      25 Jan 2018 05:15  Phos  3.3     01-25  Mg     2.3     01-25    TPro  6.2  /  Alb  3.5  /  TBili  0.9  /  DBili  0.3<H>  /  AST  17  /  ALT  20  /  AlkPhos  72  01-24    LIVER FUNCTIONS - ( 24 Jan 2018 05:40 )  Alb: 3.5 g/dL / Pro: 6.2 g/dL / ALK PHOS: 72 u/L / ALT: 20 u/L / AST: 17 u/L / GGT: x                       RADIOLOGY & ADDITIONAL TESTS:    Imaging Personally Reviewed:     Consultant(s) Notes Reviewed:     Care Discussed with Consultants/Other Providers:

## 2018-01-25 NOTE — DISCHARGE NOTE ADULT - PATIENT PORTAL LINK FT
“You can access the Follow[employer] Patient Portal, offered by [facility], by registering with the following website: [email or web address]”

## 2018-01-25 NOTE — PROGRESS NOTE ADULT - PROBLEM SELECTOR PLAN 1
Slight improved, while some lesions still draining. Per pt, prednisone taper completed at home.   - c/w clobetasol ointment daily on all lesions except for suprapubic lesion  - c/w bacitracin BID on suprapubic lesion  - dermatology consulted; appreciate recs  - c/w pain control with percocet/ acetaminophen for mild/moderate/severe pain  - d/c morphine to see if pt can tolerate PO pain meds  - wound care consulted  - rheumatology consult

## 2018-01-25 NOTE — DISCHARGE NOTE ADULT - CONDITIONS AT DISCHARGE
Pt PIV removed- site w/out incident, pt was provided with dressings and wound care supplies, pt c/o pain in lesions relieved with percocet, pt provided all d/c paperwork, safety maintained.

## 2018-01-25 NOTE — DISCHARGE NOTE ADULT - HOME CARE AGENCY
[facility] AGENCY [phone]. Visiting Nurse to visit day after discharge. Nurse to call prior to visit to arrange visit.

## 2018-01-26 VITALS
HEART RATE: 86 BPM | SYSTOLIC BLOOD PRESSURE: 129 MMHG | DIASTOLIC BLOOD PRESSURE: 73 MMHG | OXYGEN SATURATION: 95 % | RESPIRATION RATE: 17 BRPM | TEMPERATURE: 98 F

## 2018-01-26 LAB
APPEARANCE UR: CLEAR — SIGNIFICANT CHANGE UP
APTT BLD: 24 SEC — LOW (ref 27.5–37.4)
BILIRUB UR-MCNC: NEGATIVE — SIGNIFICANT CHANGE UP
BLOOD UR QL VISUAL: NEGATIVE — SIGNIFICANT CHANGE UP
BUN SERPL-MCNC: 18 MG/DL — SIGNIFICANT CHANGE UP (ref 7–23)
C3 SERPL-MCNC: 126.6 MG/DL — SIGNIFICANT CHANGE UP (ref 90–180)
C4 SERPL-MCNC: 29.4 MG/DL — SIGNIFICANT CHANGE UP (ref 10–40)
CALCIUM SERPL-MCNC: 8 MG/DL — LOW (ref 8.4–10.5)
CHLORIDE SERPL-SCNC: 101 MMOL/L — SIGNIFICANT CHANGE UP (ref 98–107)
CO2 SERPL-SCNC: 28 MMOL/L — SIGNIFICANT CHANGE UP (ref 22–31)
COLOR SPEC: SIGNIFICANT CHANGE UP
CREAT SERPL-MCNC: 0.83 MG/DL — SIGNIFICANT CHANGE UP (ref 0.5–1.3)
DRVVT SCREEN TO CONFIRM RATIO: 1.06 — SIGNIFICANT CHANGE UP (ref 0–1.2)
GLUCOSE SERPL-MCNC: 86 MG/DL — SIGNIFICANT CHANGE UP (ref 70–99)
GLUCOSE UR-MCNC: NEGATIVE — SIGNIFICANT CHANGE UP
HCT VFR BLD CALC: 35.6 % — SIGNIFICANT CHANGE UP (ref 34.5–45)
HGB BLD-MCNC: 11.2 G/DL — LOW (ref 11.5–15.5)
INR BLD: 0.96 — SIGNIFICANT CHANGE UP (ref 0.88–1.17)
KETONES UR-MCNC: NEGATIVE — SIGNIFICANT CHANGE UP
LEUKOCYTE ESTERASE UR-ACNC: NEGATIVE — SIGNIFICANT CHANGE UP
MAGNESIUM SERPL-MCNC: 2.2 MG/DL — SIGNIFICANT CHANGE UP (ref 1.6–2.6)
MCHC RBC-ENTMCNC: 30.4 PG — SIGNIFICANT CHANGE UP (ref 27–34)
MCHC RBC-ENTMCNC: 31.5 % — LOW (ref 32–36)
MCV RBC AUTO: 96.5 FL — SIGNIFICANT CHANGE UP (ref 80–100)
MUCOUS THREADS # UR AUTO: SIGNIFICANT CHANGE UP
NITRITE UR-MCNC: NEGATIVE — SIGNIFICANT CHANGE UP
NON-SQ EPI CELLS # UR AUTO: <1 — SIGNIFICANT CHANGE UP
NORMALIZED SCT PPP-RTO: 1 — SIGNIFICANT CHANGE UP (ref 0.88–1.27)
NRBC # FLD: 0 — SIGNIFICANT CHANGE UP
PH UR: 6 — SIGNIFICANT CHANGE UP (ref 4.6–8)
PHOSPHATE SERPL-MCNC: 3.8 MG/DL — SIGNIFICANT CHANGE UP (ref 2.5–4.5)
PLATELET # BLD AUTO: 179 K/UL — SIGNIFICANT CHANGE UP (ref 150–400)
PMV BLD: 11.4 FL — SIGNIFICANT CHANGE UP (ref 7–13)
POTASSIUM SERPL-MCNC: 4.2 MMOL/L — SIGNIFICANT CHANGE UP (ref 3.5–5.3)
POTASSIUM SERPL-SCNC: 4.2 MMOL/L — SIGNIFICANT CHANGE UP (ref 3.5–5.3)
PROT UR-MCNC: NEGATIVE MG/DL — SIGNIFICANT CHANGE UP
PROTHROM AB SERPL-ACNC: 10.7 SEC — SIGNIFICANT CHANGE UP (ref 9.8–13.1)
RBC # BLD: 3.69 M/UL — LOW (ref 3.8–5.2)
RBC # FLD: 14.7 % — HIGH (ref 10.3–14.5)
RBC CASTS # UR COMP ASSIST: SIGNIFICANT CHANGE UP (ref 0–?)
SODIUM SERPL-SCNC: 140 MMOL/L — SIGNIFICANT CHANGE UP (ref 135–145)
SP GR SPEC: 1.01 — SIGNIFICANT CHANGE UP (ref 1–1.04)
SQUAMOUS # UR AUTO: SIGNIFICANT CHANGE UP
THROMBIN TIME: 21.2 SEC — SIGNIFICANT CHANGE UP (ref 17–26)
UROBILINOGEN FLD QL: NORMAL MG/DL — SIGNIFICANT CHANGE UP
WBC # BLD: 8.61 K/UL — SIGNIFICANT CHANGE UP (ref 3.8–10.5)
WBC # FLD AUTO: 8.61 K/UL — SIGNIFICANT CHANGE UP (ref 3.8–10.5)
WBC UR QL: SIGNIFICANT CHANGE UP (ref 0–?)

## 2018-01-26 PROCEDURE — 99239 HOSP IP/OBS DSCHRG MGMT >30: CPT

## 2018-01-26 RX ORDER — OXYCODONE AND ACETAMINOPHEN 5; 325 MG/1; MG/1
2 TABLET ORAL EVERY 6 HOURS
Qty: 0 | Refills: 0 | Status: DISCONTINUED | OUTPATIENT
Start: 2018-01-26 | End: 2018-01-26

## 2018-01-26 RX ORDER — MUPIROCIN 20 MG/G
1 OINTMENT TOPICAL
Qty: 15 | Refills: 0 | OUTPATIENT
Start: 2018-01-26 | End: 2018-02-24

## 2018-01-26 RX ORDER — DAPSONE 100 MG/1
1 TABLET ORAL
Qty: 0 | Refills: 0 | COMMUNITY

## 2018-01-26 RX ADMIN — HYDROMORPHONE HYDROCHLORIDE 1 MILLIGRAM(S): 2 INJECTION INTRAMUSCULAR; INTRAVENOUS; SUBCUTANEOUS at 03:21

## 2018-01-26 RX ADMIN — MUPIROCIN 1 APPLICATION(S): 20 OINTMENT TOPICAL at 05:25

## 2018-01-26 RX ADMIN — Medication 1 MILLIGRAM(S): at 05:21

## 2018-01-26 RX ADMIN — HYDROMORPHONE HYDROCHLORIDE 1 MILLIGRAM(S): 2 INJECTION INTRAMUSCULAR; INTRAVENOUS; SUBCUTANEOUS at 03:41

## 2018-01-26 RX ADMIN — HYDROMORPHONE HYDROCHLORIDE 1 MILLIGRAM(S): 2 INJECTION INTRAMUSCULAR; INTRAVENOUS; SUBCUTANEOUS at 07:25

## 2018-01-26 RX ADMIN — Medication 80 MILLIGRAM(S): at 11:45

## 2018-01-26 RX ADMIN — Medication 137 MICROGRAM(S): at 05:21

## 2018-01-26 RX ADMIN — Medication 200 MILLIGRAM(S): at 05:21

## 2018-01-26 RX ADMIN — Medication 25 MILLIGRAM(S): at 05:25

## 2018-01-26 RX ADMIN — Medication 1 MILLIGRAM(S): at 12:13

## 2018-01-26 RX ADMIN — OXYCODONE AND ACETAMINOPHEN 2 TABLET(S): 5; 325 TABLET ORAL at 11:45

## 2018-01-26 RX ADMIN — Medication 10 MILLIGRAM(S): at 05:21

## 2018-01-26 RX ADMIN — HYDROMORPHONE HYDROCHLORIDE 1 MILLIGRAM(S): 2 INJECTION INTRAMUSCULAR; INTRAVENOUS; SUBCUTANEOUS at 07:45

## 2018-01-26 RX ADMIN — OXYCODONE AND ACETAMINOPHEN 2 TABLET(S): 5; 325 TABLET ORAL at 12:30

## 2018-01-26 NOTE — PROGRESS NOTE ADULT - PROBLEM SELECTOR PROBLEM 2
SLE (systemic lupus erythematosus)

## 2018-01-26 NOTE — PROGRESS NOTE ADULT - PROBLEM SELECTOR PLAN 5
BP elevated 155/76.   - c/w enalapril 10 mg and toprol xl 25 daily   - continue to monitor BP
BP controlled; 119/65 today   - c/w enalapril 10 mg and toprol xl 25 daily   - continue to monitor BP
BP elevated 158/96.   - c/w enalapril 10 mg   - continue to monitor BP  - considering adding amlodipine if BP still elevated after pain control

## 2018-01-26 NOTE — PROGRESS NOTE ADULT - SUBJECTIVE AND OBJECTIVE BOX
Ja Mathews MD  PGY-1 | Internal Medicine  [phone] / [phone]      Patient is a 62y old  Female who presents with a chief complaint of painful skin lesions (25 Jan 2018 14:57)      SUBJECTIVE / OVERNIGHT EVENTS:    MEDICATIONS  (STANDING):  clobetasol 0.05% Ointment 1 Application(s) Topical daily  enalapril 10 milliGRAM(s) Oral daily  FLUoxetine 80 milliGRAM(s) Oral daily  hydroxychloroquine 200 milliGRAM(s) Oral every 12 hours  levothyroxine 137 MICROGram(s) Oral daily  metoprolol succinate ER 25 milliGRAM(s) Oral daily  mupirocin 2% Ointment 1 Application(s) Topical two times a day    MEDICATIONS  (PRN):  acetaminophen   Tablet. 650 milliGRAM(s) Oral every 6 hours PRN Mild Pain (1 - 3)  clonazePAM Tablet 1 milliGRAM(s) Oral four times a day PRN anxiety  HYDROmorphone  Injectable 1 milliGRAM(s) IV Push every 4 hours PRN Severe Pain (7 - 10)  oxyCODONE    5 mG/acetaminophen 325 mG 1 Tablet(s) Oral every 6 hours PRN Moderate Pain (4 - 6)      Vital Signs Last 24 Hrs  T(C): 36.6 (26 Jan 2018 05:45), Max: 37.1 (25 Jan 2018 13:02)  T(F): 97.8 (26 Jan 2018 05:45), Max: 98.7 (25 Jan 2018 13:02)  HR: 70 (26 Jan 2018 05:45) (64 - 72)  BP: 125/67 (26 Jan 2018 05:45) (109/53 - 130/65)  BP(mean): --  RR: 18 (26 Jan 2018 05:45) (17 - 18)  SpO2: 100% (26 Jan 2018 05:45) (96% - 100%)    CAPILLARY BLOOD GLUCOSE          I&O's Summary        PHYSICAL EXAM:  GENERAL: NAD, well-developed  HEAD:  NC, AT  EYES: EOMI, PERRL, conjunctiva and sclera clear  ENMT: Airway patent. MMM.  NECK: Supple, No JVD  HEART: RRR; Normal S1, S2. No murmurs, rubs, or gallops  CHEST/LUNG: CTABL; No wheezing, rhonchi, or rales  ABDOMEN: +BS; Soft, nondistended, nontender  EXTREMITIES:  2+ Peripheral Pulses, No clubbing, cyanosis, or edema  PSYCH: AAOx3  NEUROLOGY: non-focal  SKIN: Warm, dry, intact; No rashes or lesions      LABS:                        11.2   8.61  )-----------( 179      ( 26 Jan 2018 06:10 )             35.6     01-26    140  |  101  |  18  ----------------------------<  86  4.2   |  28  |  0.83    Ca    8.0<L>      26 Jan 2018 06:10  Phos  3.8     01-26  Mg     2.2     01-26        PT/INR - ( 26 Jan 2018 06:10 )   PT: 10.7 SEC;   INR: 0.96          PTT - ( 26 Jan 2018 06:10 )  PTT:24.0 SEC            RADIOLOGY & ADDITIONAL TESTS:    Imaging Personally Reviewed:     Consultant(s) Notes Reviewed:     Care Discussed with Consultants/Other Providers: Ja Mathews MD  PGY-1 | Internal Medicine  [phone] / [phone]      Patient is a 62y old  Female who presents with a chief complaint of painful skin lesions (25 Jan 2018 14:57)      SUBJECTIVE / OVERNIGHT EVENTS: No acute overnight events. Pt complains of not getting clobetasol at night. Clobetasol last applied 1 pm yesterday. Pt says shes in 9/10 pain after dilaudid, but was 15/10 pain prior to iv dilaudid. Pt was able to sleep. Pt denies fevers, chills, CP, n/v, SOB, abd pain, diarrhea, dysuria.     MEDICATIONS  (STANDING):  clobetasol 0.05% Ointment 1 Application(s) Topical daily  enalapril 10 milliGRAM(s) Oral daily  FLUoxetine 80 milliGRAM(s) Oral daily  hydroxychloroquine 200 milliGRAM(s) Oral every 12 hours  levothyroxine 137 MICROGram(s) Oral daily  metoprolol succinate ER 25 milliGRAM(s) Oral daily  mupirocin 2% Ointment 1 Application(s) Topical two times a day    MEDICATIONS  (PRN):  acetaminophen   Tablet. 650 milliGRAM(s) Oral every 6 hours PRN Mild Pain (1 - 3)  clonazePAM Tablet 1 milliGRAM(s) Oral four times a day PRN anxiety  HYDROmorphone  Injectable 1 milliGRAM(s) IV Push every 4 hours PRN Severe Pain (7 - 10)  oxyCODONE    5 mG/acetaminophen 325 mG 1 Tablet(s) Oral every 6 hours PRN Moderate Pain (4 - 6)      Vital Signs Last 24 Hrs  T(C): 36.6 (26 Jan 2018 05:45), Max: 37.1 (25 Jan 2018 13:02)  T(F): 97.8 (26 Jan 2018 05:45), Max: 98.7 (25 Jan 2018 13:02)  HR: 70 (26 Jan 2018 05:45) (64 - 72)  BP: 125/67 (26 Jan 2018 05:45) (109/53 - 130/65)  BP(mean): --  RR: 18 (26 Jan 2018 05:45) (17 - 18)  SpO2: 100% (26 Jan 2018 05:45) (96% - 100%)    CAPILLARY BLOOD GLUCOSE          I&O's Summary        PHYSICAL EXAM:  GENERAL: NAD, well-developed, obese middle aged woman lying comfortably in bed   HEAD:  NC, AT  EYES: EOMI, conjunctiva and sclera clear  ENMT: Airway patent. MMM.  HEART: RRR; Normal S1, S2. No murmurs, rubs, or gallops  CHEST/LUNG: CTABL; No wheezing, rhonchi, or rales  ABDOMEN: +BS; Soft, nondistended, tender purulent lesions with surrounding erythema  EXTREMITIES:  2+ Peripheral Pulses, No clubbing, cyanosis. trace edema b/l LE  PSYCH: AAOx3, appears to be in good mood  NEUROLOGY: non-focal  SKIN:  Warm, multiple large ulcerating lesions 1-4 cm in diameter with surrounding erythema, on abd, 1 in midback, chest, buttocks, suprapubic, LE, dressed with bandages/foam.     LABS:                        11.2   8.61  )-----------( 179      ( 26 Jan 2018 06:10 )             35.6     01-26    140  |  101  |  18  ----------------------------<  86  4.2   |  28  |  0.83    Ca    8.0<L>      26 Jan 2018 06:10  Phos  3.8     01-26  Mg     2.2     01-26        PT/INR - ( 26 Jan 2018 06:10 )   PT: 10.7 SEC;   INR: 0.96          PTT - ( 26 Jan 2018 06:10 )  PTT:24.0 SEC            RADIOLOGY & ADDITIONAL TESTS:    Imaging Personally Reviewed:     Consultant(s) Notes Reviewed:     Care Discussed with Consultants/Other Providers:

## 2018-01-26 NOTE — PROGRESS NOTE ADULT - PROBLEM SELECTOR PLAN 1
Slight improved, while some lesions still draining.  - c/w clobetasol ointment daily on all lesions except for suprapubic lesion  - c/w bacitracin BID on suprapubic lesion  - dermatology consulted; appreciate recs  - c/w pain control with percocet/ acetaminophen for mild/moderate/severe pain  - on dilaudid IV 1 mg Q4 for severe pain  - wound care consulted  - rheumatology consulted; recommended continue topical steroids and outpt followup  - f/u pain management consult Slight improved, while some lesions still draining.  - c/w clobetasol ointment daily on all lesions except for suprapubic lesion  - c/w bacitracin BID on suprapubic lesion  - dermatology consulted; appreciate recs  - c/w pain control with percocet/ acetaminophen for mild/moderate/severe pain  - d/c dilaudid; discharge on percocet  - wound care consulted, recs appreciated  - rheumatology consulted; recommended continue topical steroids and outpt followup  - f/u pain management consult

## 2018-01-26 NOTE — PROGRESS NOTE ADULT - ATTENDING COMMENTS
Appreciate Derm eval, clobetasol ointment initiated. Rheum consulted as patient previously treated by Rheum for her pyoderma with success, also needs establishment of care w/ Rheum as pt has several autoimmune diseases w/o current rheumatologist.   Pt reports uncontrolled pain, w/o relief from morphine. Will give one dose of Dilaudid IV, and have Pain Management eval (pt follows w/ OP Pain Management physician in [place]). Expressed to pt that I am hesitant to continue IV narcotics.
Appreciate Wound Care recs.  Dapsone d/c'ed as patient feels her lesions worsened on it, awaiting Derm eval.   c/w pain control w/ morphine IV/Percocet prn.
Medically stable for d/c home today  OP f/u w/ Derm/Rheum, and Pain Management

## 2018-01-26 NOTE — PROGRESS NOTE ADULT - PROBLEM SELECTOR PLAN 3
Stable; TSH 0.53  - c/w synthroid 137 mcg

## 2018-01-26 NOTE — PROGRESS NOTE ADULT - PROBLEM SELECTOR PLAN 2
Has been stable per pt.   - c/w plaquenil 200 mg BID   - outpatient rheumatology f/u  - refer to rheumatology as outpatient at discharge Has been stable per pt. C3, C4 wnl.   - c/w plaquenil 200 mg BID   - rheum consulted; recs appreciated  - f/u dsDNA titer  - refer to rheumatology as outpatient at discharge

## 2018-01-26 NOTE — PROGRESS NOTE ADULT - PROBLEM SELECTOR PLAN 4
Pt anxious due to health and family financial issues  - c/w klonipin 1 mg QID, Fluoxetine 80 mg daily
Pt anxious due to health and family financial issues  - c/w konipin 1 mg QID, Fluoxetine 80 mg daily
Pt anxious due to health and family financial issues  - c/w klonipin 1 mg QID, Fluoxetine 80 mg daily

## 2018-01-26 NOTE — PROGRESS NOTE ADULT - PROBLEM SELECTOR PLAN 6
- DVT ppx: IMPROVE score 1 - no pharmacological DVT ppx indicated  Dispo: to home when on stable pain management regimen
- DVT ppx: IMPROVE score 1 - no pharmacological DVT ppx indicated  Dispo: to home when on stable pain management regimen
- DVT ppx: IMPROVE score 1 - no pharmacological DVT ppx indicated

## 2018-01-26 NOTE — PROGRESS NOTE ADULT - ASSESSMENT
61 yo F w/ PMH of SLE, pyoderma gangrenosum (dx 15 years ago, in remission for 7 years until a few months ago), hashimotos, sjogrens presents with painful skin lesions that have been worsening over the past few months 2/2 pyoderma gangrenosum flare. 63 yo F w/ PMH of SLE, pyoderma gangrenosum (dx 15 years ago, in remission for 7 years until a few months ago), hashimotos, sjogrens presents with painful skin lesions that have been worsening over the past few months 2/2 pyoderma gangrenosum flare. Lesions improving with topical clobetasol ointment.

## 2018-01-27 LAB
ANA PAT FLD IF-IMP: SIGNIFICANT CHANGE UP
ANA TITR SER: SIGNIFICANT CHANGE UP
DSDNA AB FLD-ACNC: <0.2 — SIGNIFICANT CHANGE UP
DSDNA AB SER-ACNC: <12 IU/ML — SIGNIFICANT CHANGE UP
ENA RNP IGG SER-ACNC: < 0.2 — SIGNIFICANT CHANGE UP
ENA SM AB TITR SER: < 0.2 — SIGNIFICANT CHANGE UP
ENA SS-A AB FLD IA-ACNC: 4.5 — HIGH

## 2018-01-28 LAB
BACTERIA BLD CULT: SIGNIFICANT CHANGE UP
BACTERIA BLD CULT: SIGNIFICANT CHANGE UP

## 2018-02-02 ENCOUNTER — APPOINTMENT (OUTPATIENT)
Dept: DERMATOLOGY | Facility: CLINIC | Age: 63
End: 2018-02-02
Payer: COMMERCIAL

## 2018-02-02 VITALS
HEIGHT: 63.5 IN | WEIGHT: 200 LBS | DIASTOLIC BLOOD PRESSURE: 70 MMHG | SYSTOLIC BLOOD PRESSURE: 118 MMHG | BODY MASS INDEX: 35 KG/M2

## 2018-02-02 DIAGNOSIS — Z87.898 PERSONAL HISTORY OF OTHER SPECIFIED CONDITIONS: ICD-10-CM

## 2018-02-02 DIAGNOSIS — Z84.0 FAMILY HISTORY OF DISEASES OF THE SKIN AND SUBCUTANEOUS TISSUE: ICD-10-CM

## 2018-02-02 DIAGNOSIS — Z80.8 FAMILY HISTORY OF MALIGNANT NEOPLASM OF OTHER ORGANS OR SYSTEMS: ICD-10-CM

## 2018-02-02 DIAGNOSIS — Z86.69 PERSONAL HISTORY OF OTHER DISEASES OF THE NERVOUS SYSTEM AND SENSE ORGANS: ICD-10-CM

## 2018-02-02 DIAGNOSIS — Z87.39 PERSONAL HISTORY OF OTHER DISEASES OF THE MUSCULOSKELETAL SYSTEM AND CONNECTIVE TISSUE: ICD-10-CM

## 2018-02-02 PROCEDURE — 99203 OFFICE O/P NEW LOW 30 MIN: CPT | Mod: GC

## 2018-02-05 LAB
CARDIOLIPIN IGM SER-MCNC: 16.95 GPL — SIGNIFICANT CHANGE UP (ref 0–23)
CARDIOLIPIN IGM SER-MCNC: 9.07 MPL — SIGNIFICANT CHANGE UP (ref 0–11)

## 2018-02-06 ENCOUNTER — APPOINTMENT (OUTPATIENT)
Dept: WOUND CARE | Facility: CLINIC | Age: 63
End: 2018-02-06
Payer: COMMERCIAL

## 2018-02-06 VITALS — HEART RATE: 79 BPM | DIASTOLIC BLOOD PRESSURE: 79 MMHG | SYSTOLIC BLOOD PRESSURE: 124 MMHG | TEMPERATURE: 97.7 F

## 2018-02-06 DIAGNOSIS — Z86.39 PERSONAL HISTORY OF OTHER ENDOCRINE, NUTRITIONAL AND METABOLIC DISEASE: ICD-10-CM

## 2018-02-06 DIAGNOSIS — Z80.8 FAMILY HISTORY OF MALIGNANT NEOPLASM OF OTHER ORGANS OR SYSTEMS: ICD-10-CM

## 2018-02-06 PROCEDURE — 99214 OFFICE O/P EST MOD 30 MIN: CPT

## 2018-02-09 ENCOUNTER — INPATIENT (INPATIENT)
Facility: HOSPITAL | Age: 63
LOS: 3 days | Discharge: HOME | End: 2018-02-13
Attending: INTERNAL MEDICINE | Admitting: INTERNAL MEDICINE
Payer: COMMERCIAL

## 2018-02-09 VITALS
RESPIRATION RATE: 16 BRPM | DIASTOLIC BLOOD PRESSURE: 80 MMHG | HEART RATE: 74 BPM | TEMPERATURE: 99 F | OXYGEN SATURATION: 99 % | SYSTOLIC BLOOD PRESSURE: 150 MMHG

## 2018-02-09 DIAGNOSIS — L88 PYODERMA GANGRENOSUM: ICD-10-CM

## 2018-02-09 DIAGNOSIS — Z98.89 OTHER SPECIFIED POSTPROCEDURAL STATES: Chronic | ICD-10-CM

## 2018-02-09 LAB
ALBUMIN SERPL ELPH-MCNC: 3.4 G/DL — SIGNIFICANT CHANGE UP (ref 3.3–5)
ALP SERPL-CCNC: 73 U/L — SIGNIFICANT CHANGE UP (ref 40–120)
ALT FLD-CCNC: 26 U/L — SIGNIFICANT CHANGE UP (ref 4–33)
AST SERPL-CCNC: 38 U/L — HIGH (ref 4–32)
BASE EXCESS BLDV CALC-SCNC: 3.9 MMOL/L — SIGNIFICANT CHANGE UP
BASOPHILS # BLD AUTO: 0.03 K/UL — SIGNIFICANT CHANGE UP (ref 0–0.2)
BASOPHILS NFR BLD AUTO: 0.6 % — SIGNIFICANT CHANGE UP (ref 0–2)
BILIRUB SERPL-MCNC: 0.3 MG/DL — SIGNIFICANT CHANGE UP (ref 0.2–1.2)
BLOOD GAS VENOUS - CREATININE: 0.7 MG/DL — SIGNIFICANT CHANGE UP (ref 0.5–1.3)
BUN SERPL-MCNC: 6 MG/DL — LOW (ref 7–23)
CALCIUM SERPL-MCNC: 8.2 MG/DL — LOW (ref 8.4–10.5)
CHLORIDE BLDV-SCNC: 110 MMOL/L — HIGH (ref 96–108)
CHLORIDE SERPL-SCNC: 106 MMOL/L — SIGNIFICANT CHANGE UP (ref 98–107)
CO2 SERPL-SCNC: 24 MMOL/L — SIGNIFICANT CHANGE UP (ref 22–31)
CREAT SERPL-MCNC: 0.75 MG/DL — SIGNIFICANT CHANGE UP (ref 0.5–1.3)
EOSINOPHIL # BLD AUTO: 0.1 K/UL — SIGNIFICANT CHANGE UP (ref 0–0.5)
EOSINOPHIL NFR BLD AUTO: 1.9 % — SIGNIFICANT CHANGE UP (ref 0–6)
ERYTHROCYTE [SEDIMENTATION RATE] IN BLOOD: 43 MM/HR — HIGH (ref 4–25)
GAS PNL BLDV: 140 MMOL/L — SIGNIFICANT CHANGE UP (ref 136–146)
GLUCOSE BLDV-MCNC: 100 — HIGH (ref 70–99)
GLUCOSE SERPL-MCNC: 101 MG/DL — HIGH (ref 70–99)
HCO3 BLDV-SCNC: 27 MMOL/L — SIGNIFICANT CHANGE UP (ref 20–27)
HCT VFR BLD CALC: 35.3 % — SIGNIFICANT CHANGE UP (ref 34.5–45)
HCT VFR BLDV CALC: 34.9 % — SIGNIFICANT CHANGE UP (ref 34.5–45)
HGB BLD-MCNC: 11.2 G/DL — LOW (ref 11.5–15.5)
HGB BLDV-MCNC: 11.3 G/DL — LOW (ref 11.5–15.5)
IMM GRANULOCYTES # BLD AUTO: 0.02 # — SIGNIFICANT CHANGE UP
IMM GRANULOCYTES NFR BLD AUTO: 0.4 % — SIGNIFICANT CHANGE UP (ref 0–1.5)
LACTATE BLDV-MCNC: 1 MMOL/L — SIGNIFICANT CHANGE UP (ref 0.5–2)
LYMPHOCYTES # BLD AUTO: 1.49 K/UL — SIGNIFICANT CHANGE UP (ref 1–3.3)
LYMPHOCYTES # BLD AUTO: 28.4 % — SIGNIFICANT CHANGE UP (ref 13–44)
MCHC RBC-ENTMCNC: 30.4 PG — SIGNIFICANT CHANGE UP (ref 27–34)
MCHC RBC-ENTMCNC: 31.7 % — LOW (ref 32–36)
MCV RBC AUTO: 95.9 FL — SIGNIFICANT CHANGE UP (ref 80–100)
MONOCYTES # BLD AUTO: 0.4 K/UL — SIGNIFICANT CHANGE UP (ref 0–0.9)
MONOCYTES NFR BLD AUTO: 7.6 % — SIGNIFICANT CHANGE UP (ref 2–14)
NEUTROPHILS # BLD AUTO: 3.2 K/UL — SIGNIFICANT CHANGE UP (ref 1.8–7.4)
NEUTROPHILS NFR BLD AUTO: 61.1 % — SIGNIFICANT CHANGE UP (ref 43–77)
NRBC # FLD: 0 — SIGNIFICANT CHANGE UP
NT-PROBNP SERPL-SCNC: 2750 PG/ML — SIGNIFICANT CHANGE UP
PCO2 BLDV: 43 MMHG — SIGNIFICANT CHANGE UP (ref 41–51)
PH BLDV: 7.43 PH — SIGNIFICANT CHANGE UP (ref 7.32–7.43)
PLATELET # BLD AUTO: 238 K/UL — SIGNIFICANT CHANGE UP (ref 150–400)
PMV BLD: 10.8 FL — SIGNIFICANT CHANGE UP (ref 7–13)
PO2 BLDV: 32 MMHG — LOW (ref 35–40)
POTASSIUM BLDV-SCNC: 3.3 MMOL/L — LOW (ref 3.4–4.5)
POTASSIUM SERPL-MCNC: 4 MMOL/L — SIGNIFICANT CHANGE UP (ref 3.5–5.3)
POTASSIUM SERPL-SCNC: 4 MMOL/L — SIGNIFICANT CHANGE UP (ref 3.5–5.3)
PROT SERPL-MCNC: 6.3 G/DL — SIGNIFICANT CHANGE UP (ref 6–8.3)
RBC # BLD: 3.68 M/UL — LOW (ref 3.8–5.2)
RBC # FLD: 15.9 % — HIGH (ref 10.3–14.5)
SAO2 % BLDV: 60.2 % — SIGNIFICANT CHANGE UP (ref 60–85)
SODIUM SERPL-SCNC: 143 MMOL/L — SIGNIFICANT CHANGE UP (ref 135–145)
WBC # BLD: 5.24 K/UL — SIGNIFICANT CHANGE UP (ref 3.8–10.5)
WBC # FLD AUTO: 5.24 K/UL — SIGNIFICANT CHANGE UP (ref 3.8–10.5)

## 2018-02-09 PROCEDURE — 71046 X-RAY EXAM CHEST 2 VIEWS: CPT | Mod: 26

## 2018-02-09 PROCEDURE — 99223 1ST HOSP IP/OBS HIGH 75: CPT

## 2018-02-09 RX ORDER — HYDROMORPHONE HYDROCHLORIDE 2 MG/ML
1 INJECTION INTRAMUSCULAR; INTRAVENOUS; SUBCUTANEOUS ONCE
Qty: 0 | Refills: 0 | Status: DISCONTINUED | OUTPATIENT
Start: 2018-02-09 | End: 2018-02-09

## 2018-02-09 RX ADMIN — HYDROMORPHONE HYDROCHLORIDE 1 MILLIGRAM(S): 2 INJECTION INTRAMUSCULAR; INTRAVENOUS; SUBCUTANEOUS at 21:57

## 2018-02-09 RX ADMIN — HYDROMORPHONE HYDROCHLORIDE 1 MILLIGRAM(S): 2 INJECTION INTRAMUSCULAR; INTRAVENOUS; SUBCUTANEOUS at 23:17

## 2018-02-09 RX ADMIN — HYDROMORPHONE HYDROCHLORIDE 1 MILLIGRAM(S): 2 INJECTION INTRAMUSCULAR; INTRAVENOUS; SUBCUTANEOUS at 20:39

## 2018-02-09 RX ADMIN — HYDROMORPHONE HYDROCHLORIDE 1 MILLIGRAM(S): 2 INJECTION INTRAMUSCULAR; INTRAVENOUS; SUBCUTANEOUS at 21:19

## 2018-02-09 RX ADMIN — HYDROMORPHONE HYDROCHLORIDE 1 MILLIGRAM(S): 2 INJECTION INTRAMUSCULAR; INTRAVENOUS; SUBCUTANEOUS at 23:50

## 2018-02-09 NOTE — ED PROVIDER NOTE - OBJECTIVE STATEMENT
This pt is a 62y female w a PMHx SLE, pyoderma gangrenosum, hashimotos, sjogrens, p/w pyoderma gangrenosum flare. She was recently dced on PO percocet for pain control 1/26 after admission for management of these painful skin lesions. Prior to several months ago she states that her disease was in remission, and so she was lost to rheumatology f/u. She presents today with intractable pain from these lesions, which she says is uncontrolled. She has also had bilateral LE swelling over the last week which she says is new, and has developed a mild cough.

## 2018-02-09 NOTE — ED ADULT NURSE NOTE - OBJECTIVE STATEMENT
Pt received in spot 9. Alert and oriented x3, ambulatory. Hx of pyoderma gangrenosum and multiple autoimmune disorders including lupus, RA and Sjogren's. Co pain to entire body. Pt noted to have lesions to entire body, no drainage noted. Pt recently admitted to hospital and states pain medications she was discharged on are "not working". Denies fevers, chills, n/v/d, dizziness, chest pain, sob. Respirations even and non labored. No swelling noted to extremities. Labs sent. IV placed. Bp elevated, hx of "hypertensive episodes". Awaiting MD noonan. Will continue to monitor.

## 2018-02-09 NOTE — ED PROVIDER NOTE - ENMT, MLM
Airway patent, Nose No congestion, throat- membranes moist, tongue has a 1 cm superficial ulceration on left side. No swelling or exudate. Neck supple. No JVD, No lymphadenopathy

## 2018-02-09 NOTE — ED ADULT TRIAGE NOTE - CHIEF COMPLAINT QUOTE
pt with pyoderma gangrenosum, c/o pain to body that is not relieved by prescribed medication, recently discharged from hospital for same recently.  PMH "multiple autoimmune disorders"

## 2018-02-09 NOTE — ED PROVIDER NOTE - MEDICAL DECISION MAKING DETAILS
2y female w a PMHx SLE, pyoderma gangrenosum, hashimotos, sjogrens, p/w pyoderma gangrenosum flare. She was recently dced on PO percocet for pain control 1/26 after admission for management of these painful skin lesions. Pt returning to ED for insufficient pain control. All labs at pt baseline, disease presentation has not progressed since last dc but will admit pt for intractable pain -adrianna

## 2018-02-10 DIAGNOSIS — E66.09 OTHER OBESITY DUE TO EXCESS CALORIES: ICD-10-CM

## 2018-02-10 DIAGNOSIS — E03.9 HYPOTHYROIDISM, UNSPECIFIED: ICD-10-CM

## 2018-02-10 DIAGNOSIS — I10 ESSENTIAL (PRIMARY) HYPERTENSION: ICD-10-CM

## 2018-02-10 DIAGNOSIS — Z29.9 ENCOUNTER FOR PROPHYLACTIC MEASURES, UNSPECIFIED: ICD-10-CM

## 2018-02-10 DIAGNOSIS — F32.9 MAJOR DEPRESSIVE DISORDER, SINGLE EPISODE, UNSPECIFIED: ICD-10-CM

## 2018-02-10 DIAGNOSIS — L88 PYODERMA GANGRENOSUM: ICD-10-CM

## 2018-02-10 LAB
BASOPHILS # BLD AUTO: 0.02 K/UL — SIGNIFICANT CHANGE UP (ref 0–0.2)
BASOPHILS NFR BLD AUTO: 0.5 % — SIGNIFICANT CHANGE UP (ref 0–2)
BUN SERPL-MCNC: 7 MG/DL — SIGNIFICANT CHANGE UP (ref 7–23)
CALCIUM SERPL-MCNC: 8.8 MG/DL — SIGNIFICANT CHANGE UP (ref 8.4–10.5)
CHLORIDE SERPL-SCNC: 103 MMOL/L — SIGNIFICANT CHANGE UP (ref 98–107)
CO2 SERPL-SCNC: 30 MMOL/L — SIGNIFICANT CHANGE UP (ref 22–31)
CREAT SERPL-MCNC: 0.88 MG/DL — SIGNIFICANT CHANGE UP (ref 0.5–1.3)
EOSINOPHIL # BLD AUTO: 0.13 K/UL — SIGNIFICANT CHANGE UP (ref 0–0.5)
EOSINOPHIL NFR BLD AUTO: 3 % — SIGNIFICANT CHANGE UP (ref 0–6)
ERYTHROCYTE [SEDIMENTATION RATE] IN BLOOD: 36 MM/HR — HIGH (ref 4–25)
GLUCOSE SERPL-MCNC: 87 MG/DL — SIGNIFICANT CHANGE UP (ref 70–99)
HCT VFR BLD CALC: 37.1 % — SIGNIFICANT CHANGE UP (ref 34.5–45)
HGB BLD-MCNC: 11.5 G/DL — SIGNIFICANT CHANGE UP (ref 11.5–15.5)
IMM GRANULOCYTES # BLD AUTO: 0.01 # — SIGNIFICANT CHANGE UP
IMM GRANULOCYTES NFR BLD AUTO: 0.2 % — SIGNIFICANT CHANGE UP (ref 0–1.5)
LYMPHOCYTES # BLD AUTO: 1.47 K/UL — SIGNIFICANT CHANGE UP (ref 1–3.3)
LYMPHOCYTES # BLD AUTO: 34.2 % — SIGNIFICANT CHANGE UP (ref 13–44)
MCHC RBC-ENTMCNC: 30.1 PG — SIGNIFICANT CHANGE UP (ref 27–34)
MCHC RBC-ENTMCNC: 31 % — LOW (ref 32–36)
MCV RBC AUTO: 97.1 FL — SIGNIFICANT CHANGE UP (ref 80–100)
MONOCYTES # BLD AUTO: 0.38 K/UL — SIGNIFICANT CHANGE UP (ref 0–0.9)
MONOCYTES NFR BLD AUTO: 8.8 % — SIGNIFICANT CHANGE UP (ref 2–14)
NEUTROPHILS # BLD AUTO: 2.29 K/UL — SIGNIFICANT CHANGE UP (ref 1.8–7.4)
NEUTROPHILS NFR BLD AUTO: 53.3 % — SIGNIFICANT CHANGE UP (ref 43–77)
NRBC # FLD: 0 — SIGNIFICANT CHANGE UP
PLATELET # BLD AUTO: 237 K/UL — SIGNIFICANT CHANGE UP (ref 150–400)
PMV BLD: 10.9 FL — SIGNIFICANT CHANGE UP (ref 7–13)
POTASSIUM SERPL-MCNC: 3 MMOL/L — LOW (ref 3.5–5.3)
POTASSIUM SERPL-SCNC: 3 MMOL/L — LOW (ref 3.5–5.3)
RBC # BLD: 3.82 M/UL — SIGNIFICANT CHANGE UP (ref 3.8–5.2)
RBC # FLD: 16.3 % — HIGH (ref 10.3–14.5)
SODIUM SERPL-SCNC: 144 MMOL/L — SIGNIFICANT CHANGE UP (ref 135–145)
WBC # BLD: 4.3 K/UL — SIGNIFICANT CHANGE UP (ref 3.8–10.5)
WBC # FLD AUTO: 4.3 K/UL — SIGNIFICANT CHANGE UP (ref 3.8–10.5)

## 2018-02-10 PROCEDURE — 99233 SBSQ HOSP IP/OBS HIGH 50: CPT

## 2018-02-10 RX ORDER — LEVOTHYROXINE SODIUM 125 MCG
137 TABLET ORAL DAILY
Qty: 0 | Refills: 0 | Status: DISCONTINUED | OUTPATIENT
Start: 2018-02-10 | End: 2018-02-13

## 2018-02-10 RX ORDER — DOCUSATE SODIUM 100 MG
100 CAPSULE ORAL THREE TIMES A DAY
Qty: 0 | Refills: 0 | Status: DISCONTINUED | OUTPATIENT
Start: 2018-02-10 | End: 2018-02-13

## 2018-02-10 RX ORDER — ACETAMINOPHEN 500 MG
650 TABLET ORAL EVERY 6 HOURS
Qty: 0 | Refills: 0 | Status: DISCONTINUED | OUTPATIENT
Start: 2018-02-10 | End: 2018-02-13

## 2018-02-10 RX ORDER — METOPROLOL TARTRATE 50 MG
25 TABLET ORAL DAILY
Qty: 0 | Refills: 0 | Status: DISCONTINUED | OUTPATIENT
Start: 2018-02-10 | End: 2018-02-13

## 2018-02-10 RX ORDER — POTASSIUM CHLORIDE 20 MEQ
40 PACKET (EA) ORAL EVERY 4 HOURS
Qty: 0 | Refills: 0 | Status: COMPLETED | OUTPATIENT
Start: 2018-02-10 | End: 2018-02-10

## 2018-02-10 RX ORDER — HYDROMORPHONE HYDROCHLORIDE 2 MG/ML
0.5 INJECTION INTRAMUSCULAR; INTRAVENOUS; SUBCUTANEOUS EVERY 4 HOURS
Qty: 0 | Refills: 0 | Status: DISCONTINUED | OUTPATIENT
Start: 2018-02-10 | End: 2018-02-13

## 2018-02-10 RX ORDER — HYDROXYCHLOROQUINE SULFATE 200 MG
200 TABLET ORAL DAILY
Qty: 0 | Refills: 0 | Status: DISCONTINUED | OUTPATIENT
Start: 2018-02-10 | End: 2018-02-13

## 2018-02-10 RX ORDER — CLONAZEPAM 1 MG
1 TABLET ORAL
Qty: 0 | Refills: 0 | Status: DISCONTINUED | OUTPATIENT
Start: 2018-02-10 | End: 2018-02-13

## 2018-02-10 RX ORDER — TRAZODONE HCL 50 MG
50 TABLET ORAL AT BEDTIME
Qty: 0 | Refills: 0 | Status: DISCONTINUED | OUTPATIENT
Start: 2018-02-10 | End: 2018-02-13

## 2018-02-10 RX ORDER — SENNA PLUS 8.6 MG/1
2 TABLET ORAL AT BEDTIME
Qty: 0 | Refills: 0 | Status: DISCONTINUED | OUTPATIENT
Start: 2018-02-10 | End: 2018-02-13

## 2018-02-10 RX ORDER — ENOXAPARIN SODIUM 100 MG/ML
40 INJECTION SUBCUTANEOUS EVERY 24 HOURS
Qty: 0 | Refills: 0 | Status: DISCONTINUED | OUTPATIENT
Start: 2018-02-10 | End: 2018-02-13

## 2018-02-10 RX ORDER — KETOROLAC TROMETHAMINE 30 MG/ML
15 SYRINGE (ML) INJECTION ONCE
Qty: 0 | Refills: 0 | Status: DISCONTINUED | OUTPATIENT
Start: 2018-02-10 | End: 2018-02-13

## 2018-02-10 RX ORDER — FLUOXETINE HCL 10 MG
80 CAPSULE ORAL DAILY
Qty: 0 | Refills: 0 | Status: DISCONTINUED | OUTPATIENT
Start: 2018-02-10 | End: 2018-02-13

## 2018-02-10 RX ORDER — HYDROMORPHONE HYDROCHLORIDE 2 MG/ML
1 INJECTION INTRAMUSCULAR; INTRAVENOUS; SUBCUTANEOUS EVERY 4 HOURS
Qty: 0 | Refills: 0 | Status: DISCONTINUED | OUTPATIENT
Start: 2018-02-10 | End: 2018-02-13

## 2018-02-10 RX ADMIN — Medication 100 MILLIGRAM(S): at 22:50

## 2018-02-10 RX ADMIN — Medication 40 MILLIEQUIVALENT(S): at 18:22

## 2018-02-10 RX ADMIN — HYDROMORPHONE HYDROCHLORIDE 1 MILLIGRAM(S): 2 INJECTION INTRAMUSCULAR; INTRAVENOUS; SUBCUTANEOUS at 04:01

## 2018-02-10 RX ADMIN — HYDROMORPHONE HYDROCHLORIDE 1 MILLIGRAM(S): 2 INJECTION INTRAMUSCULAR; INTRAVENOUS; SUBCUTANEOUS at 09:30

## 2018-02-10 RX ADMIN — HYDROMORPHONE HYDROCHLORIDE 1 MILLIGRAM(S): 2 INJECTION INTRAMUSCULAR; INTRAVENOUS; SUBCUTANEOUS at 13:46

## 2018-02-10 RX ADMIN — Medication 25 MILLIGRAM(S): at 07:08

## 2018-02-10 RX ADMIN — Medication 10 MILLIGRAM(S): at 03:56

## 2018-02-10 RX ADMIN — Medication 1 TABLET(S): at 18:22

## 2018-02-10 RX ADMIN — Medication 200 MILLIGRAM(S): at 13:46

## 2018-02-10 RX ADMIN — HYDROMORPHONE HYDROCHLORIDE 1 MILLIGRAM(S): 2 INJECTION INTRAMUSCULAR; INTRAVENOUS; SUBCUTANEOUS at 00:33

## 2018-02-10 RX ADMIN — Medication 100 MILLIGRAM(S): at 07:01

## 2018-02-10 RX ADMIN — Medication 80 MILLIGRAM(S): at 13:46

## 2018-02-10 RX ADMIN — Medication 137 MICROGRAM(S): at 07:01

## 2018-02-10 RX ADMIN — Medication 40 MILLIEQUIVALENT(S): at 13:46

## 2018-02-10 RX ADMIN — HYDROMORPHONE HYDROCHLORIDE 1 MILLIGRAM(S): 2 INJECTION INTRAMUSCULAR; INTRAVENOUS; SUBCUTANEOUS at 04:30

## 2018-02-10 RX ADMIN — HYDROMORPHONE HYDROCHLORIDE 1 MILLIGRAM(S): 2 INJECTION INTRAMUSCULAR; INTRAVENOUS; SUBCUTANEOUS at 14:00

## 2018-02-10 RX ADMIN — ENOXAPARIN SODIUM 40 MILLIGRAM(S): 100 INJECTION SUBCUTANEOUS at 07:01

## 2018-02-10 RX ADMIN — Medication 100 MILLIGRAM(S): at 13:46

## 2018-02-10 RX ADMIN — Medication 1 TABLET(S): at 18:52

## 2018-02-10 RX ADMIN — HYDROMORPHONE HYDROCHLORIDE 1 MILLIGRAM(S): 2 INJECTION INTRAMUSCULAR; INTRAVENOUS; SUBCUTANEOUS at 09:13

## 2018-02-10 NOTE — H&P ADULT - RS GEN PE MLT RESP DETAILS PC
clear to auscultation bilaterally/breath sounds equal/good air movement/no rales/no subcutaneous emphysema/no chest wall tenderness/no rhonchi/no intercostal retractions/respirations non-labored/no wheezes/airway patent

## 2018-02-10 NOTE — H&P ADULT - PROBLEM SELECTOR PLAN 1
- Continue with current treatment   - Pain control with tylenol and dilaudid   - Wound care evaluation   - Rheumatology follow up as outpatient

## 2018-02-10 NOTE — H&P ADULT - NSHPPHYSICALEXAM_GEN_ALL_CORE
Vital Signs Last 24 Hrs  T(C): 36.6 (10 Feb 2018 02:03), Max: 37.2 (09 Feb 2018 16:41)  T(F): 97.9 (10 Feb 2018 02:03), Max: 99 (09 Feb 2018 16:41)  HR: 79 (10 Feb 2018 02:03) (74 - 80)  BP: 173/86 (10 Feb 2018 03:11) (150/80 - 178/92)  BP(mean): --  RR: 18 (10 Feb 2018 02:03) (16 - 18)  SpO2: 99% (10 Feb 2018 02:03) (98% - 99%)

## 2018-02-10 NOTE — H&P ADULT - ASSESSMENT
62 y.o. woman with multiple medical co-morbidities now with painful pyoderma gangrenosum lesions over body.

## 2018-02-10 NOTE — PROGRESS NOTE ADULT - SUBJECTIVE AND OBJECTIVE BOX
Patient is a 62y old  Female who presents with a chief complaint of painful skin lesions (10 Feb 2018 01:13)      INTERVAL HPI/OVERNIGHT EVENTS:    MEDICATIONS  (STANDING):  docusate sodium 100 milliGRAM(s) Oral three times a day  enalapril 10 milliGRAM(s) Oral daily  enoxaparin Injectable 40 milliGRAM(s) SubCutaneous every 24 hours  FLUoxetine 80 milliGRAM(s) Oral daily  hydroxychloroquine 200 milliGRAM(s) Oral daily  levothyroxine 137 MICROGram(s) Oral daily  metoprolol succinate ER 25 milliGRAM(s) Oral daily  potassium chloride    Tablet ER 40 milliEquivalent(s) Oral every 4 hours    MEDICATIONS  (PRN):  acetaminophen   Tablet. 650 milliGRAM(s) Oral every 6 hours PRN Mild Pain (1 - 3)  clonazePAM Tablet 1 milliGRAM(s) Oral four times a day PRN Anxiety  HYDROmorphone  Injectable 0.5 milliGRAM(s) IV Push every 4 hours PRN Moderate Pain (4 - 6)  HYDROmorphone  Injectable 1 milliGRAM(s) IV Push every 4 hours PRN Severe Pain (7 - 10)  senna 2 Tablet(s) Oral at bedtime PRN Constipation  traZODone 50 milliGRAM(s) Oral at bedtime PRN Insomnia      Allergies    Compazine (Other)    Intolerances        REVIEW OF SYSTEMS:  Please see interval HPI:    Vital Signs Last 24 Hrs  T(C): 37 (10 Feb 2018 09:05), Max: 37.2 (09 Feb 2018 16:41)  T(F): 98.6 (10 Feb 2018 09:05), Max: 99 (09 Feb 2018 16:41)  HR: 82 (10 Feb 2018 09:05) (70 - 82)  BP: 140/64 (10 Feb 2018 09:05) (140/64 - 178/92)  BP(mean): --  RR: 18 (10 Feb 2018 09:05) (16 - 18)  SpO2: 98% (10 Feb 2018 09:05) (98% - 100%)  I&O's Detail        PHYSICAL EXAM:  GENERAL:   HEAD:    EYES:   ENMT:   NECK:   NERVOUS SYSTEM:    CHEST/LUNG:   HEART:   ABDOMEN:   EXTREMITIES:    LYMPH:   SKIN:     LABS:                        11.5   4.30  )-----------( 237      ( 10 Feb 2018 06:51 )             37.1     10 Feb 2018 06:51    144    |  103    |  7      ----------------------------<  87     3.0     |  30     |  0.88     Ca    8.8        10 Feb 2018 06:51    TPro  6.3    /  Alb  3.4    /  TBili  0.3    /  DBili  x      /  AST  38     /  ALT  26     /  AlkPhos  73     09 Feb 2018 19:24      CAPILLARY BLOOD GLUCOSE        BLOOD CULTURE    RADIOLOGY & ADDITIONAL TESTS:    Imaging Personally Reviewed:  [ ] YES     Consultant(s) Notes Reviewed:      Care Discussed with Consultants/Other Providers: Patient is a 62y old  Female who presents with a chief complaint of painful skin lesions (10 Feb 2018 01:13)      INTERVAL HPI/OVERNIGHT EVENTS:  With pyoderma gangrenosum, no hesitation to show lesions. On abdomen, lower extremities, back. Deepest lesion on left thigh. Reporting "20 out of 10" pain, not controlled on home pain regimen. No fever, no purulent drainage. Did see pain management doctor and dermatologist, wound care in interval period since recent discharge. Felt that pain was getting worse. Interested in starting systemic steroids (was previously discussed during derm visit, patient had declined at that time). Reports no other family history of autoimmune diseases.     MEDICATIONS  (STANDING):  docusate sodium 100 milliGRAM(s) Oral three times a day  enalapril 10 milliGRAM(s) Oral daily  enoxaparin Injectable 40 milliGRAM(s) SubCutaneous every 24 hours  FLUoxetine 80 milliGRAM(s) Oral daily  hydroxychloroquine 200 milliGRAM(s) Oral daily  levothyroxine 137 MICROGram(s) Oral daily  metoprolol succinate ER 25 milliGRAM(s) Oral daily  potassium chloride    Tablet ER 40 milliEquivalent(s) Oral every 4 hours    MEDICATIONS  (PRN):  acetaminophen   Tablet. 650 milliGRAM(s) Oral every 6 hours PRN Mild Pain (1 - 3)  clonazePAM Tablet 1 milliGRAM(s) Oral four times a day PRN Anxiety  HYDROmorphone  Injectable 0.5 milliGRAM(s) IV Push every 4 hours PRN Moderate Pain (4 - 6)  HYDROmorphone  Injectable 1 milliGRAM(s) IV Push every 4 hours PRN Severe Pain (7 - 10)  senna 2 Tablet(s) Oral at bedtime PRN Constipation  traZODone 50 milliGRAM(s) Oral at bedtime PRN Insomnia      Allergies  Compazine (Other)    Intolerances    REVIEW OF SYSTEMS:  Please see interval HPI:    Vital Signs Last 24 Hrs  T(C): 37 (10 Feb 2018 09:05), Max: 37.2 (09 Feb 2018 16:41)  T(F): 98.6 (10 Feb 2018 09:05), Max: 99 (09 Feb 2018 16:41)  HR: 82 (10 Feb 2018 09:05) (70 - 82)  BP: 140/64 (10 Feb 2018 09:05) (140/64 - 178/92)  BP(mean): --  RR: 18 (10 Feb 2018 09:05) (16 - 18)  SpO2: 98% (10 Feb 2018 09:05) (98% - 100%)  I&O's Detail    PHYSICAL EXAM:  GENERAL: NAD, moving around bed, laughing at times  HEAD:  NC/AT  EYES: EOMI  ENMT: MMM  NECK: supple  NERVOUS SYSTEM: AOx3, moving all extremities, non-focal    CHEST/LUNG: CTAB  HEART: S1S2 RRR  ABDOMEN: obese, non-tender  EXTREMITIES:  no c/c, trace LE edema  SKIN: multiple lesions on lower extremities, abdomen, back consistent with pyoderma gangrenosum, no purulent discharge noted, deepest lesion on left thigh    LABS:                        11.5   4.30  )-----------( 237      ( 10 Feb 2018 06:51 )             37.1     10 Feb 2018 06:51    144    |  103    |  7      ----------------------------<  87     3.0     |  30     |  0.88     Ca    8.8        10 Feb 2018 06:51    TPro  6.3    /  Alb  3.4    /  TBili  0.3    /  DBili  x      /  AST  38     /  ALT  26     /  AlkPhos  73     09 Feb 2018 19:24      CAPILLARY BLOOD GLUCOSE    BLOOD CULTURE    RADIOLOGY & ADDITIONAL TESTS:    Imaging Personally Reviewed:  [ ]      Consultant(s) Notes Reviewed:      Care Discussed with Consultants/Other Providers: Derm

## 2018-02-10 NOTE — H&P ADULT - NSHPSOCIALHISTORY_GEN_ALL_CORE
lives with /son, no recent travel, no sick contacts.  Non-smoker  No alcohol use  No history of substance abuse.

## 2018-02-10 NOTE — H&P ADULT - HISTORY OF PRESENT ILLNESS
62F PMH SLE, pyoderma gangrenosum, hashimotos, sjogrens presents with painful skin lesions that have been worsening over the past few months.  Patient was recently discharged from [facility] following recent admission and treatment of painful lesions. Patient states that since discharge, she was seen and evaluated by wound care and she has followed all recommendations, however, her pain recurred and therefore, she came to the ER for further evaluation. Patient reports that her pain is over her lesions, chest, back, breast, and torso. Pain is 10/10, sharp, non-radiating, aggravated with palpation, alleviated with pain medications. No other complaints at present. In ER, patient was treated with dilaudid IVP and admitted to the medicine service for further care.

## 2018-02-11 LAB
BASOPHILS # BLD AUTO: 0.02 K/UL — SIGNIFICANT CHANGE UP (ref 0–0.2)
BASOPHILS NFR BLD AUTO: 0.5 % — SIGNIFICANT CHANGE UP (ref 0–2)
BUN SERPL-MCNC: 6 MG/DL — LOW (ref 7–23)
CALCIUM SERPL-MCNC: 8.9 MG/DL — SIGNIFICANT CHANGE UP (ref 8.4–10.5)
CHLORIDE SERPL-SCNC: 102 MMOL/L — SIGNIFICANT CHANGE UP (ref 98–107)
CO2 SERPL-SCNC: 26 MMOL/L — SIGNIFICANT CHANGE UP (ref 22–31)
CREAT SERPL-MCNC: 0.74 MG/DL — SIGNIFICANT CHANGE UP (ref 0.5–1.3)
EOSINOPHIL # BLD AUTO: 0.07 K/UL — SIGNIFICANT CHANGE UP (ref 0–0.5)
EOSINOPHIL NFR BLD AUTO: 1.6 % — SIGNIFICANT CHANGE UP (ref 0–6)
GLUCOSE SERPL-MCNC: 89 MG/DL — SIGNIFICANT CHANGE UP (ref 70–99)
HCT VFR BLD CALC: 38.3 % — SIGNIFICANT CHANGE UP (ref 34.5–45)
HGB BLD-MCNC: 12.7 G/DL — SIGNIFICANT CHANGE UP (ref 11.5–15.5)
IMM GRANULOCYTES # BLD AUTO: 0.01 # — SIGNIFICANT CHANGE UP
IMM GRANULOCYTES NFR BLD AUTO: 0.2 % — SIGNIFICANT CHANGE UP (ref 0–1.5)
LYMPHOCYTES # BLD AUTO: 0.94 K/UL — LOW (ref 1–3.3)
LYMPHOCYTES # BLD AUTO: 21.5 % — SIGNIFICANT CHANGE UP (ref 13–44)
MAGNESIUM SERPL-MCNC: 1.9 MG/DL — SIGNIFICANT CHANGE UP (ref 1.6–2.6)
MCHC RBC-ENTMCNC: 31.5 PG — SIGNIFICANT CHANGE UP (ref 27–34)
MCHC RBC-ENTMCNC: 33.2 % — SIGNIFICANT CHANGE UP (ref 32–36)
MCV RBC AUTO: 95 FL — SIGNIFICANT CHANGE UP (ref 80–100)
MONOCYTES # BLD AUTO: 0.27 K/UL — SIGNIFICANT CHANGE UP (ref 0–0.9)
MONOCYTES NFR BLD AUTO: 6.2 % — SIGNIFICANT CHANGE UP (ref 2–14)
NEUTROPHILS # BLD AUTO: 3.06 K/UL — SIGNIFICANT CHANGE UP (ref 1.8–7.4)
NEUTROPHILS NFR BLD AUTO: 70 % — SIGNIFICANT CHANGE UP (ref 43–77)
NRBC # FLD: 0 — SIGNIFICANT CHANGE UP
PHOSPHATE SERPL-MCNC: 3.2 MG/DL — SIGNIFICANT CHANGE UP (ref 2.5–4.5)
PLATELET # BLD AUTO: 242 K/UL — SIGNIFICANT CHANGE UP (ref 150–400)
PMV BLD: 10.9 FL — SIGNIFICANT CHANGE UP (ref 7–13)
POTASSIUM SERPL-MCNC: 3.7 MMOL/L — SIGNIFICANT CHANGE UP (ref 3.5–5.3)
POTASSIUM SERPL-SCNC: 3.7 MMOL/L — SIGNIFICANT CHANGE UP (ref 3.5–5.3)
RBC # BLD: 4.03 M/UL — SIGNIFICANT CHANGE UP (ref 3.8–5.2)
RBC # FLD: 15.4 % — HIGH (ref 10.3–14.5)
SODIUM SERPL-SCNC: 143 MMOL/L — SIGNIFICANT CHANGE UP (ref 135–145)
TSH SERPL-MCNC: 0.97 UIU/ML — SIGNIFICANT CHANGE UP (ref 0.27–4.2)
WBC # BLD: 4.37 K/UL — SIGNIFICANT CHANGE UP (ref 3.8–10.5)
WBC # FLD AUTO: 4.37 K/UL — SIGNIFICANT CHANGE UP (ref 3.8–10.5)

## 2018-02-11 PROCEDURE — 99233 SBSQ HOSP IP/OBS HIGH 50: CPT

## 2018-02-11 RX ADMIN — Medication 25 MILLIGRAM(S): at 05:15

## 2018-02-11 RX ADMIN — HYDROMORPHONE HYDROCHLORIDE 1 MILLIGRAM(S): 2 INJECTION INTRAMUSCULAR; INTRAVENOUS; SUBCUTANEOUS at 05:15

## 2018-02-11 RX ADMIN — Medication 100 MILLIGRAM(S): at 21:13

## 2018-02-11 RX ADMIN — Medication 1 APPLICATION(S): at 12:56

## 2018-02-11 RX ADMIN — HYDROMORPHONE HYDROCHLORIDE 1 MILLIGRAM(S): 2 INJECTION INTRAMUSCULAR; INTRAVENOUS; SUBCUTANEOUS at 16:46

## 2018-02-11 RX ADMIN — HYDROMORPHONE HYDROCHLORIDE 1 MILLIGRAM(S): 2 INJECTION INTRAMUSCULAR; INTRAVENOUS; SUBCUTANEOUS at 21:27

## 2018-02-11 RX ADMIN — Medication 200 MILLIGRAM(S): at 12:58

## 2018-02-11 RX ADMIN — Medication 100 MILLIGRAM(S): at 16:12

## 2018-02-11 RX ADMIN — HYDROMORPHONE HYDROCHLORIDE 1 MILLIGRAM(S): 2 INJECTION INTRAMUSCULAR; INTRAVENOUS; SUBCUTANEOUS at 16:31

## 2018-02-11 RX ADMIN — Medication 10 MILLIGRAM(S): at 05:15

## 2018-02-11 RX ADMIN — HYDROMORPHONE HYDROCHLORIDE 1 MILLIGRAM(S): 2 INJECTION INTRAMUSCULAR; INTRAVENOUS; SUBCUTANEOUS at 11:11

## 2018-02-11 RX ADMIN — Medication 100 MILLIGRAM(S): at 05:14

## 2018-02-11 RX ADMIN — ENOXAPARIN SODIUM 40 MILLIGRAM(S): 100 INJECTION SUBCUTANEOUS at 08:35

## 2018-02-11 RX ADMIN — Medication 137 MICROGRAM(S): at 05:15

## 2018-02-11 RX ADMIN — Medication 80 MILLIGRAM(S): at 12:56

## 2018-02-11 RX ADMIN — HYDROMORPHONE HYDROCHLORIDE 1 MILLIGRAM(S): 2 INJECTION INTRAMUSCULAR; INTRAVENOUS; SUBCUTANEOUS at 21:12

## 2018-02-11 RX ADMIN — HYDROMORPHONE HYDROCHLORIDE 1 MILLIGRAM(S): 2 INJECTION INTRAMUSCULAR; INTRAVENOUS; SUBCUTANEOUS at 11:26

## 2018-02-11 RX ADMIN — HYDROMORPHONE HYDROCHLORIDE 1 MILLIGRAM(S): 2 INJECTION INTRAMUSCULAR; INTRAVENOUS; SUBCUTANEOUS at 05:30

## 2018-02-11 NOTE — PROGRESS NOTE ADULT - SUBJECTIVE AND OBJECTIVE BOX
CHIEF COMPLAINT: Patient is a 62y old  female who presents with a chief complaint of painful skin lesions (10 Feb 2018 01:13)      SUBJECTIVE / OVERNIGHT EVENTS:    Reports that she had migraines yesterday due to caffeine withdrawal. Feeling better today.    Continues to have pain from skin lesions. No other complaints. Requesting to see dermatology.    MEDICATIONS  (STANDING):  clobetasol 0.05% Ointment 1 Application(s) Topical daily  docusate sodium 100 milliGRAM(s) Oral three times a day  enalapril 10 milliGRAM(s) Oral daily  enoxaparin Injectable 40 milliGRAM(s) SubCutaneous every 24 hours  FLUoxetine 80 milliGRAM(s) Oral daily  hydroxychloroquine 200 milliGRAM(s) Oral daily  ketorolac   Injectable 15 milliGRAM(s) IV Push once  levothyroxine 137 MICROGram(s) Oral daily  metoprolol succinate ER 25 milliGRAM(s) Oral daily    MEDICATIONS  (PRN):  acetaminophen   Tablet. 650 milliGRAM(s) Oral every 6 hours PRN Mild Pain (1 - 3)  clonazePAM Tablet 1 milliGRAM(s) Oral four times a day PRN Anxiety  HYDROmorphone  Injectable 0.5 milliGRAM(s) IV Push every 4 hours PRN Moderate Pain (4 - 6)  HYDROmorphone  Injectable 1 milliGRAM(s) IV Push every 4 hours PRN Severe Pain (7 - 10)  senna 2 Tablet(s) Oral at bedtime PRN Constipation  traZODone 50 milliGRAM(s) Oral at bedtime PRN Insomnia      VITALS:  T(F): 97.8 (18 @ 16:10), Max: 98.6 (18 @ 10:19)  HR: 77 (18 @ 16:10) (76 - 82)  BP: 146/88 (18 @ 16:10) (138/78 - 183/97)  RR: 18 (18 @ 16:10) (18 - 18)  SpO2: 96% (18 @ 16:10)      CAPILLARY BLOOD GLUCOSE    Output     I&O's Summary  T(F): 97.8 (18 @ 16:10), Max: 98.6 (18 @ 10:19)  HR: 77 (18 @ 16:10) (76 - 82)  BP: 146/88 (18 @ 16:10) (138/78 - 183/97)  RR: 18 (18 @ 16:10) (18 - 18)  SpO2: 96% (18 @ 16:10)    PHYSICAL EXAM:  GENERAL: NAD, well-developed  HEAD:  Atraumatic, Normocephalic  EYES: EOMI  NECK: Supple, No JVD  CHEST/LUNG: nonlabored breathing  HEART: nl S1/S2  ABDOMEN: nondistended, soft  EXTREMITIES:  no LE edema  PSYCH: A&Ox3  NEUROLOGY: non-focal  SKIN: No rashes noted    LABS:              12.7                 143  | 26   | 6            4.37  >-----------< 242     ------------------------< 89                    38.3                 3.7  | 102  | 0.74                                         Ca 8.9   Mg 1.9   Ph 3.2         TPro  6.3  /  Alb  3.4      TBili  0.3  /  DBili  x         AST  38  /  ALT  26            AlkPhos  73                VB-09 @ 19:24  7.43/43/32/27/60.2/3.9        MICROBIOLOGY:        RADIOLOGY & ADDITIONAL TESTS:    < from: Xray Chest 2 Views PA/Lat (18 @ 21:13) >  IMPRESSION:   Small bilateral pleural effusions, right greater than left.      < end of copied text >      Imaging Personally Reviewed:    [ ] Consultant(s) Notes Reviewed:  [ ] Care Discussed with Consultants/Other Providers:

## 2018-02-11 NOTE — PROVIDER CONTACT NOTE (OTHER) - ASSESSMENT
Pt states that she has migraine and that she has generalized pain rating 10/10. Pain med morning bp meds administered as per eMAR

## 2018-02-12 ENCOUNTER — TRANSCRIPTION ENCOUNTER (OUTPATIENT)
Age: 63
End: 2018-02-12

## 2018-02-12 LAB
BASOPHILS # BLD AUTO: 0.03 K/UL — SIGNIFICANT CHANGE UP (ref 0–0.2)
BASOPHILS NFR BLD AUTO: 0.6 % — SIGNIFICANT CHANGE UP (ref 0–2)
BUN SERPL-MCNC: 13 MG/DL — SIGNIFICANT CHANGE UP (ref 7–23)
CALCIUM SERPL-MCNC: 8.6 MG/DL — SIGNIFICANT CHANGE UP (ref 8.4–10.5)
CHLORIDE SERPL-SCNC: 102 MMOL/L — SIGNIFICANT CHANGE UP (ref 98–107)
CO2 SERPL-SCNC: 27 MMOL/L — SIGNIFICANT CHANGE UP (ref 22–31)
CREAT SERPL-MCNC: 0.87 MG/DL — SIGNIFICANT CHANGE UP (ref 0.5–1.3)
EOSINOPHIL # BLD AUTO: 0.17 K/UL — SIGNIFICANT CHANGE UP (ref 0–0.5)
EOSINOPHIL NFR BLD AUTO: 3.6 % — SIGNIFICANT CHANGE UP (ref 0–6)
GLUCOSE SERPL-MCNC: 88 MG/DL — SIGNIFICANT CHANGE UP (ref 70–99)
HCT VFR BLD CALC: 37.3 % — SIGNIFICANT CHANGE UP (ref 34.5–45)
HGB BLD-MCNC: 11.9 G/DL — SIGNIFICANT CHANGE UP (ref 11.5–15.5)
IMM GRANULOCYTES # BLD AUTO: 0.02 # — SIGNIFICANT CHANGE UP
IMM GRANULOCYTES NFR BLD AUTO: 0.4 % — SIGNIFICANT CHANGE UP (ref 0–1.5)
LYMPHOCYTES # BLD AUTO: 1.72 K/UL — SIGNIFICANT CHANGE UP (ref 1–3.3)
LYMPHOCYTES # BLD AUTO: 36.3 % — SIGNIFICANT CHANGE UP (ref 13–44)
MAGNESIUM SERPL-MCNC: 2 MG/DL — SIGNIFICANT CHANGE UP (ref 1.6–2.6)
MCHC RBC-ENTMCNC: 30.3 PG — SIGNIFICANT CHANGE UP (ref 27–34)
MCHC RBC-ENTMCNC: 31.9 % — LOW (ref 32–36)
MCV RBC AUTO: 94.9 FL — SIGNIFICANT CHANGE UP (ref 80–100)
MONOCYTES # BLD AUTO: 0.38 K/UL — SIGNIFICANT CHANGE UP (ref 0–0.9)
MONOCYTES NFR BLD AUTO: 8 % — SIGNIFICANT CHANGE UP (ref 2–14)
NEUTROPHILS # BLD AUTO: 2.42 K/UL — SIGNIFICANT CHANGE UP (ref 1.8–7.4)
NEUTROPHILS NFR BLD AUTO: 51.1 % — SIGNIFICANT CHANGE UP (ref 43–77)
NRBC # FLD: 0 — SIGNIFICANT CHANGE UP
PHOSPHATE SERPL-MCNC: 3.5 MG/DL — SIGNIFICANT CHANGE UP (ref 2.5–4.5)
PLATELET # BLD AUTO: 231 K/UL — SIGNIFICANT CHANGE UP (ref 150–400)
PMV BLD: 11 FL — SIGNIFICANT CHANGE UP (ref 7–13)
POTASSIUM SERPL-MCNC: 3.8 MMOL/L — SIGNIFICANT CHANGE UP (ref 3.5–5.3)
POTASSIUM SERPL-SCNC: 3.8 MMOL/L — SIGNIFICANT CHANGE UP (ref 3.5–5.3)
RBC # BLD: 3.93 M/UL — SIGNIFICANT CHANGE UP (ref 3.8–5.2)
RBC # FLD: 15.7 % — HIGH (ref 10.3–14.5)
SODIUM SERPL-SCNC: 140 MMOL/L — SIGNIFICANT CHANGE UP (ref 135–145)
WBC # BLD: 4.74 K/UL — SIGNIFICANT CHANGE UP (ref 3.8–10.5)
WBC # FLD AUTO: 4.74 K/UL — SIGNIFICANT CHANGE UP (ref 3.8–10.5)

## 2018-02-12 PROCEDURE — 11900 INJECT SKIN LESIONS </W 7: CPT

## 2018-02-12 PROCEDURE — 99233 SBSQ HOSP IP/OBS HIGH 50: CPT

## 2018-02-12 PROCEDURE — 99223 1ST HOSP IP/OBS HIGH 75: CPT | Mod: 25,GC

## 2018-02-12 RX ADMIN — HYDROMORPHONE HYDROCHLORIDE 1 MILLIGRAM(S): 2 INJECTION INTRAMUSCULAR; INTRAVENOUS; SUBCUTANEOUS at 18:48

## 2018-02-12 RX ADMIN — Medication 25 MILLIGRAM(S): at 06:23

## 2018-02-12 RX ADMIN — HYDROMORPHONE HYDROCHLORIDE 1 MILLIGRAM(S): 2 INJECTION INTRAMUSCULAR; INTRAVENOUS; SUBCUTANEOUS at 01:48

## 2018-02-12 RX ADMIN — Medication 200 MILLIGRAM(S): at 13:15

## 2018-02-12 RX ADMIN — Medication 137 MICROGRAM(S): at 06:23

## 2018-02-12 RX ADMIN — HYDROMORPHONE HYDROCHLORIDE 1 MILLIGRAM(S): 2 INJECTION INTRAMUSCULAR; INTRAVENOUS; SUBCUTANEOUS at 22:48

## 2018-02-12 RX ADMIN — Medication 100 MILLIGRAM(S): at 22:48

## 2018-02-12 RX ADMIN — HYDROMORPHONE HYDROCHLORIDE 1 MILLIGRAM(S): 2 INJECTION INTRAMUSCULAR; INTRAVENOUS; SUBCUTANEOUS at 06:38

## 2018-02-12 RX ADMIN — Medication 100 MILLIGRAM(S): at 06:23

## 2018-02-12 RX ADMIN — Medication 100 MILLIGRAM(S): at 13:15

## 2018-02-12 RX ADMIN — Medication 1 APPLICATION(S): at 13:15

## 2018-02-12 RX ADMIN — HYDROMORPHONE HYDROCHLORIDE 1 MILLIGRAM(S): 2 INJECTION INTRAMUSCULAR; INTRAVENOUS; SUBCUTANEOUS at 19:05

## 2018-02-12 RX ADMIN — HYDROMORPHONE HYDROCHLORIDE 1 MILLIGRAM(S): 2 INJECTION INTRAMUSCULAR; INTRAVENOUS; SUBCUTANEOUS at 01:33

## 2018-02-12 RX ADMIN — HYDROMORPHONE HYDROCHLORIDE 1 MILLIGRAM(S): 2 INJECTION INTRAMUSCULAR; INTRAVENOUS; SUBCUTANEOUS at 10:42

## 2018-02-12 RX ADMIN — Medication 10 MILLIGRAM(S): at 06:23

## 2018-02-12 RX ADMIN — HYDROMORPHONE HYDROCHLORIDE 1 MILLIGRAM(S): 2 INJECTION INTRAMUSCULAR; INTRAVENOUS; SUBCUTANEOUS at 06:23

## 2018-02-12 RX ADMIN — HYDROMORPHONE HYDROCHLORIDE 1 MILLIGRAM(S): 2 INJECTION INTRAMUSCULAR; INTRAVENOUS; SUBCUTANEOUS at 14:43

## 2018-02-12 RX ADMIN — Medication 80 MILLIGRAM(S): at 13:15

## 2018-02-12 RX ADMIN — ENOXAPARIN SODIUM 40 MILLIGRAM(S): 100 INJECTION SUBCUTANEOUS at 10:42

## 2018-02-12 RX ADMIN — HYDROMORPHONE HYDROCHLORIDE 1 MILLIGRAM(S): 2 INJECTION INTRAMUSCULAR; INTRAVENOUS; SUBCUTANEOUS at 11:05

## 2018-02-12 RX ADMIN — HYDROMORPHONE HYDROCHLORIDE 1 MILLIGRAM(S): 2 INJECTION INTRAMUSCULAR; INTRAVENOUS; SUBCUTANEOUS at 23:06

## 2018-02-12 RX ADMIN — HYDROMORPHONE HYDROCHLORIDE 1 MILLIGRAM(S): 2 INJECTION INTRAMUSCULAR; INTRAVENOUS; SUBCUTANEOUS at 15:05

## 2018-02-12 NOTE — DISCHARGE NOTE ADULT - PROVIDER TOKENS
TOKEN:'[id number]',FREE:[LAST:[Adelfo],FIRST:[Gunnar],PHONE:[phone]],FAX:[(   )    -],ADDRESS:[[address] [place] [address], [address]]]

## 2018-02-12 NOTE — PROVIDER CONTACT NOTE (OTHER) - ASSESSMENT
Patient resting in bed, asymptomatic, no acute distress noted, denies chest pain, no shortness of breath indicated. ADS Cali Pate notified regarding BP. Safety maintained.

## 2018-02-12 NOTE — DISCHARGE NOTE ADULT - HOSPITAL COURSE
62 y.o. woman with multiple medical co-morbidities now with painful pyoderma gangrenosum lesions over body.    Pyoderma gangrenosum.    - Continue with current treatment   - Pain control with tylenol and dilaudid   - Rheumatology follow up as outpatient  - Derm recs: clobetasol 0.05% BID, f/u as outpt    Acquired hypothyroidism  - Secondary to hashimoto's disease  - continue with levothyroxine  -TSH 0.97   - TFT follow up as outpatient.     Essential hypertension.    - Low sodium diet  - Continue with enalapril.     Class 1 obesity due to excess calories without serious comorbidity in adult, unspecified BMI.    - Dietary counseling  - Outpatient follow up.     Prophylactic measure.    - Lovenox 40 mg s/c daily  CXR: Small bilateral pleural effusions, right greater than left.    Dispo: 62 y.o. woman with multiple medical co-morbidities now with painful pyoderma gangrenosum lesions over body.    Pyoderma gangrenosum  - continue current regimen, change to PO dilaudid 4 mg PO q 4 PRN x 5 days  - dermatology consult - PO options, such as prednisone and cyclosporine, which pt is not interested in. Discussed topical and intralesional options as well, such as topical/intralesional steroid and cyclosporine eyedrop. Pt would like to try intralesional Kenalog injection. Pt will follow with Dr. Sidhu for re-evaluation post injection.  - dress wounds  - c/w bowel regimen to prevent opioid induced constipation  - clobetasol cream for now to lesions  - c/w plaquenil repeat FLAKO/dsDNA   -has outpt pain management Dr. Gunnar Rader-d/w him states she was given a letter of dismissal due to non compliance with treatment plan. Will need to f/u with PCP Dr. Chisholm.      Acquired hypothyroidism  - c/w synthroid  - TSH-WNL.     Essential hypertension  - monitor blood pressure  - c/w enalapril, metoprolol.     Class 1 obesity due to excess calories without serious comorbidity in adult  -  weight loss  - BMI 36.4.     Depression  - c/w klonopin prn anxiety  - c/w fluoxetine  - trazodone prn insomnia.    Stable for discharge home, follow up with PCP within 5 days. Follow up with dermatologist and rheumatologist.

## 2018-02-12 NOTE — DISCHARGE NOTE ADULT - CARE PLAN
Principal Discharge DX:	Pyoderma gangrenosum  Goal:	management and resolution  Assessment and plan of treatment:	Dermatology followed. Intralesional kenalog injection done 2/13. Continue with clobetasol 0.05% 2x/day as prescribed. Follow up with dermatologist Dr Sidhu, call for appointment, or with private dermatologist Dr. Venus Stahl [phone]. Dilaudid 4mg every 4 hours as needed for pain. Follow up with PCP Dr. Chisholm [phone] within 5 days, call for appointment  Secondary Diagnosis:	SLE (systemic lupus erythematosus)  Goal:	stable  Assessment and plan of treatment:	Continue with plaquenil. Follow up with rheumatologist within 1 week.  Secondary Diagnosis:	Essential hypertension  Goal:	stable  Assessment and plan of treatment:	continue with enalapril and toprol XL  Secondary Diagnosis:	Depression  Goal:	stable  Assessment and plan of treatment:	continue with Trazadone and prozac  Secondary Diagnosis:	Acquired hypothyroidism  Goal:	stable  Assessment and plan of treatment:	continue with synthroid

## 2018-02-12 NOTE — CONSULT NOTE ADULT - SUBJECTIVE AND OBJECTIVE BOX
HPI:  62F PMH SLE, pyoderma gangrenosum, hashimotos, sjogrens admitted for pain control of pyoderma gangrenosum. Pt is a patient of Dr. Pao Sidhu in Dermatology and dermatology was consulted for further discussion on treatment of PG.  Pt saw Dr. Sidhu on  at which time she was reluctant to start on systemic medication for her PG. Pt was using clobetasol, but the pain became uncontrollable when she ran out of medication x~1 week. Pt reports no fever, chills, or malodorous wound.    PAST MEDICAL & SURGICAL HISTORY:  Pyoderma gangrenosum  Depression  Hypothyroid  SLE (systemic lupus erythematosus)  HTN (hypertension)  S/P   S/P myomectomy    Review of Symptoms  General: no fevers/chills, no lethargy  Skin: See HPI  Ophthalmologic: no eye pain or change in vision  ENT: no dysphagia or change in hearing  Respiratory: no SOB or cough  Cardiovascular: no palpitations or chest pain  Gastrointestinal: no abdominal  pain or blood in stool  Genitourinary: no dysuria or frequency  Musculoskeletal: no joint pain or weakness  Neurological: no weakness, numbness or tingling    MEDICATIONS  (STANDING):  clobetasol 0.05% Ointment 1 Application(s) Topical daily  docusate sodium 100 milliGRAM(s) Oral three times a day  enalapril 10 milliGRAM(s) Oral daily  enoxaparin Injectable 40 milliGRAM(s) SubCutaneous every 24 hours  FLUoxetine 80 milliGRAM(s) Oral daily  hydroxychloroquine 200 milliGRAM(s) Oral daily  ketorolac   Injectable 15 milliGRAM(s) IV Push once  levothyroxine 137 MICROGram(s) Oral daily  metoprolol succinate ER 25 milliGRAM(s) Oral daily    MEDICATIONS  (PRN):  acetaminophen   Tablet. 650 milliGRAM(s) Oral every 6 hours PRN Mild Pain (1 - 3)  clonazePAM Tablet 1 milliGRAM(s) Oral four times a day PRN Anxiety  HYDROmorphone  Injectable 0.5 milliGRAM(s) IV Push every 4 hours PRN Moderate Pain (4 - 6)  HYDROmorphone  Injectable 1 milliGRAM(s) IV Push every 4 hours PRN Severe Pain (7 - 10)  senna 2 Tablet(s) Oral at bedtime PRN Constipation  traZODone 50 milliGRAM(s) Oral at bedtime PRN Insomnia      Allergies    Compazine (Other)        SOCIAL HISTORY: Non-smoker    FAMILY HISTORY:  No pertinent family history in first degree relatives      Vital Signs Last 24 Hrs  T(C): 36.8 (2018 13:10), Max: 37.2 (2018 01:05)  T(F): 98.3 (2018 13:10), Max: 98.9 (2018 01:05)  HR: 76 (2018 14:42) (66 - 77)  BP: 134/76 (2018 14:42) (118/51 - 175/88)  BP(mean): --  RR: 18 (:42) (17 - 18)  SpO2: 96% (:42) (95% - 96%)    PHYSICAL EXAM:     The patient was alert and oriented X 3, well nourished, and in no  apparent distress.  OP showed no ulcerations  There was no visible lymphadenopathy.  Conjunctiva were non injected  There was no clubbing or edema of extremities.  The scalp, hair, face, eyebrows, lips, OP, neck, chest, back,   extremities X 4, nails were examined.  There was no hyperhidrosis or bromhidrosis.    Of note on skin exam:   Deep ulcers with undermined borders on L thigh and abdomen  Shallow erosions scattered on R breast, abdomen and back      LABS:                        11.9   4.74  )-----------( 231      ( 2018 05:54 )             37.3     02-12    140  |  102  |  13  ----------------------------<  88  3.8   |  27  |  0.87    Ca    8.6      2018 05:54  Phos  3.5     02-12  Mg     2.0     02-12

## 2018-02-12 NOTE — DISCHARGE NOTE ADULT - PATIENT PORTAL LINK FT
You can access the [facility] Patient Portal, offered by [facility], by registering with the following website: [email or web address]

## 2018-02-12 NOTE — PROVIDER CONTACT NOTE (OTHER) - SITUATION
Upon vitals, patient BP high. Patient resting in bed, asymptomatic, no acute distress noted. ADS Cali Pate notified regarding BP.

## 2018-02-12 NOTE — DISCHARGE NOTE ADULT - CARE PROVIDER_API CALL
Pao Sidhu), DermatologyDermatopathology  [address] [place] [address]  Phone: [phone]  Fax: [phone]    Gunnar Emanuel  [address] [place] [address], [address]  Phone: [phone]  Fax: (       -

## 2018-02-12 NOTE — DISCHARGE NOTE ADULT - MEDICATION SUMMARY - MEDICATIONS TO TAKE
I will START or STAY ON the medications listed below when I get home from the hospital:    Dilaudid 4 mg oral tablet  -- 1 tab(s) by mouth every 4 hours, As Needed MDD:6 tabs  -- Caution federal law prohibits the transfer of this drug to any person other  than the person for whom it was prescribed.  May cause drowsiness.  Alcohol may intensify this effect.  Use care when operating dangerous machinery.  This prescription cannot be refilled.  Using more of this medication than prescribed may cause serious breathing problems.    -- Indication: For Pain medication as needed    enalapril 10 mg oral tablet  -- 1 tab(s) by mouth once a day  -- Indication: For hypertension    KlonoPIN 1 mg oral tablet  -- 1 tab(s) by mouth 4 times a day, As Needed  -- Indication: For Anxiety    PROzac 40 mg oral capsule  -- 2 cap(s) by mouth once a day  -- Indication: For Depression    traZODone 50 mg oral tablet  -- 1 tab(s) by mouth 2 times a day, As Needed for sleep  -- Indication: For Depression    Plaquenil 200 mg oral tablet  -- 2 tab(s) by mouth once a day  -- Indication: For Lupus    Toprol-XL 25 mg oral tablet, extended release  -- 1 tab(s) by mouth once a day  -- Indication: For hypertension    clobetasol 0.05% topical ointment  -- 1 application on skin once a day  -- Indication: For Pyoderma gangrenosum    mupirocin 2% topical ointment  -- 1 application on skin 2 times a day  -- Indication: For Pyoderma gangrenosum    senna oral tablet  -- 2 tab(s) by mouth once a day (at bedtime), As needed, Constipation  -- Indication: For Constipation    docusate sodium 100 mg oral capsule  -- 1 cap(s) by mouth 3 times a day  -- Indication: For Constipation    Synthroid 137 mcg (0.137 mg) oral tablet  -- 1 tab(s) by mouth once a day  -- Indication: For hypothyroid

## 2018-02-12 NOTE — DISCHARGE NOTE ADULT - PLAN OF CARE
management and resolution Dermatology followed. Intralesional kenalog injection done 2/13. Continue with clobetasol 0.05% 2x/day as prescribed. Follow up with dermatologist Dr Sidhu, call for appointment, or with private dermatologist Dr. Venus Stahl [phone]. Dilaudid 4mg every 4 hours as needed for pain. Follow up with PCP Dr. Chisholm [phone] within 5 days, call for appointment stable Continue with plaquenil. Follow up with rheumatologist within 1 week. continue with enalapril and toprol XL continue with Trazadone and prozac continue with synthroid

## 2018-02-12 NOTE — PROGRESS NOTE ADULT - SUBJECTIVE AND OBJECTIVE BOX
Patient is a 62y old  Female who presents with a chief complaint of painful skin lesions (10 Feb 2018 01:13)      SUBJECTIVE / OVERNIGHT EVENTS: Pt states pain better controlled comfortable on current regimen.    MEDICATIONS  (STANDING):  clobetasol 0.05% Ointment 1 Application(s) Topical daily  docusate sodium 100 milliGRAM(s) Oral three times a day  enalapril 10 milliGRAM(s) Oral daily  enoxaparin Injectable 40 milliGRAM(s) SubCutaneous every 24 hours  FLUoxetine 80 milliGRAM(s) Oral daily  hydroxychloroquine 200 milliGRAM(s) Oral daily  ketorolac   Injectable 15 milliGRAM(s) IV Push once  levothyroxine 137 MICROGram(s) Oral daily  metoprolol succinate ER 25 milliGRAM(s) Oral daily    MEDICATIONS  (PRN):  acetaminophen   Tablet. 650 milliGRAM(s) Oral every 6 hours PRN Mild Pain (1 - 3)  clonazePAM Tablet 1 milliGRAM(s) Oral four times a day PRN Anxiety  HYDROmorphone  Injectable 0.5 milliGRAM(s) IV Push every 4 hours PRN Moderate Pain (4 - 6)  HYDROmorphone  Injectable 1 milliGRAM(s) IV Push every 4 hours PRN Severe Pain (7 - 10)  senna 2 Tablet(s) Oral at bedtime PRN Constipation  traZODone 50 milliGRAM(s) Oral at bedtime PRN Insomnia        CAPILLARY BLOOD GLUCOSE        I&O's Summary      T(C): 36.8 (02-12-18 @ 13:10), Max: 37.2 (02-12-18 @ 01:05)  HR: 75 (02-12-18 @ 13:14) (66 - 77)  BP: 121/76 (02-12-18 @ 13:14) (118/51 - 175/88)  RR: 18 (02-12-18 @ 13:14) (17 - 18)  SpO2: 95% (02-12-18 @ 13:14) (95% - 96%)    PHYSICAL EXAM:  GENERAL: NAD, well-developed  HEAD:  Atraumatic, Normocephalic  EYES: EOMI, PERRLA, conjunctiva and sclera clear  NECK: Supple, No JVD  CHEST/LUNG: Clear to auscultation bilaterally; No wheeze  HEART: Regular rate and rhythm; No murmurs, rubs, or gallops  ABDOMEN: Soft, Nontender, Nondistended; Bowel sounds present  EXTREMITIES:  2+ Peripheral Pulses, No clubbing, cyanosis, or edema  PSYCH: AAOx3  NEUROLOGY: non-focal  SKIN: multiple skin ulcers diffuse all over  LABS:                        11.9   4.74  )-----------( 231      ( 12 Feb 2018 05:54 )             37.3     02-12    140  |  102  |  13  ----------------------------<  88  3.8   |  27  |  0.87    Ca    8.6      12 Feb 2018 05:54  Phos  3.5     02-12  Mg     2.0     02-12                  RADIOLOGY & ADDITIONAL TESTS:    Imaging Personally Reviewed:    Consultant(s) Notes Reviewed:      Care Discussed with Consultants/Other Providers:

## 2018-02-12 NOTE — ADVANCED PRACTICE NURSE CONSULT - REASON FOR CONSULT
Patient seen by Dermatology services for management of pyoderma gangrenosum, topical recommendations in consult note.

## 2018-02-12 NOTE — DISCHARGE NOTE ADULT - MEDICATION SUMMARY - MEDICATIONS TO STOP TAKING
I will STOP taking the medications listed below when I get home from the hospital:    Percocet 5/325 oral tablet  -- 2 tab(s) by mouth every 6 hours MDD:max daily dose is 8 tablets daily.  -- Caution federal law prohibits the transfer of this drug to any person other  than the person for whom it was prescribed.  May cause drowsiness.  Alcohol may intensify this effect.  Use care when operating dangerous machinery.  This prescription cannot be refilled.  This product contains acetaminophen.  Do not use  with any other product containing acetaminophen to prevent possible liver damage.  Using more of this medication than prescribed may cause serious breathing problems.

## 2018-02-13 VITALS
HEART RATE: 76 BPM | OXYGEN SATURATION: 96 % | SYSTOLIC BLOOD PRESSURE: 156 MMHG | RESPIRATION RATE: 18 BRPM | DIASTOLIC BLOOD PRESSURE: 72 MMHG | TEMPERATURE: 98 F

## 2018-02-13 LAB
DSDNA AB FLD-ACNC: <0.2 — SIGNIFICANT CHANGE UP
DSDNA AB SER-ACNC: <12 IU/ML — SIGNIFICANT CHANGE UP
ENA RNP IGG SER-ACNC: 0.2 — SIGNIFICANT CHANGE UP
ENA SM AB TITR SER: < 0.2 — SIGNIFICANT CHANGE UP
ENA SS-A AB FLD IA-ACNC: 3.9 — HIGH

## 2018-02-13 PROCEDURE — 99239 HOSP IP/OBS DSCHRG MGMT >30: CPT

## 2018-02-13 RX ORDER — HYDROMORPHONE HYDROCHLORIDE 2 MG/ML
1 INJECTION INTRAMUSCULAR; INTRAVENOUS; SUBCUTANEOUS
Qty: 30 | Refills: 0 | OUTPATIENT
Start: 2018-02-13 | End: 2018-02-17

## 2018-02-13 RX ORDER — SENNA PLUS 8.6 MG/1
2 TABLET ORAL
Qty: 14 | Refills: 0 | OUTPATIENT
Start: 2018-02-13 | End: 2018-02-19

## 2018-02-13 RX ORDER — DOCUSATE SODIUM 100 MG
1 CAPSULE ORAL
Qty: 42 | Refills: 0 | OUTPATIENT
Start: 2018-02-13 | End: 2018-02-26

## 2018-02-13 RX ORDER — LEVOTHYROXINE SODIUM 125 MCG
1 TABLET ORAL
Qty: 30 | Refills: 0 | OUTPATIENT
Start: 2018-02-13 | End: 2018-03-14

## 2018-02-13 RX ADMIN — ENOXAPARIN SODIUM 40 MILLIGRAM(S): 100 INJECTION SUBCUTANEOUS at 12:00

## 2018-02-13 RX ADMIN — Medication 80 MILLIGRAM(S): at 11:57

## 2018-02-13 RX ADMIN — HYDROMORPHONE HYDROCHLORIDE 1 MILLIGRAM(S): 2 INJECTION INTRAMUSCULAR; INTRAVENOUS; SUBCUTANEOUS at 07:51

## 2018-02-13 RX ADMIN — Medication 1 APPLICATION(S): at 11:59

## 2018-02-13 RX ADMIN — Medication 100 MILLIGRAM(S): at 06:07

## 2018-02-13 RX ADMIN — Medication 10 MILLIGRAM(S): at 06:08

## 2018-02-13 RX ADMIN — HYDROMORPHONE HYDROCHLORIDE 1 MILLIGRAM(S): 2 INJECTION INTRAMUSCULAR; INTRAVENOUS; SUBCUTANEOUS at 03:42

## 2018-02-13 RX ADMIN — Medication 100 MILLIGRAM(S): at 11:57

## 2018-02-13 RX ADMIN — Medication 200 MILLIGRAM(S): at 11:58

## 2018-02-13 RX ADMIN — HYDROMORPHONE HYDROCHLORIDE 1 MILLIGRAM(S): 2 INJECTION INTRAMUSCULAR; INTRAVENOUS; SUBCUTANEOUS at 12:15

## 2018-02-13 RX ADMIN — Medication 137 MICROGRAM(S): at 06:07

## 2018-02-13 RX ADMIN — HYDROMORPHONE HYDROCHLORIDE 1 MILLIGRAM(S): 2 INJECTION INTRAMUSCULAR; INTRAVENOUS; SUBCUTANEOUS at 08:11

## 2018-02-13 RX ADMIN — HYDROMORPHONE HYDROCHLORIDE 1 MILLIGRAM(S): 2 INJECTION INTRAMUSCULAR; INTRAVENOUS; SUBCUTANEOUS at 11:57

## 2018-02-13 RX ADMIN — Medication 25 MILLIGRAM(S): at 06:07

## 2018-02-13 RX ADMIN — HYDROMORPHONE HYDROCHLORIDE 1 MILLIGRAM(S): 2 INJECTION INTRAMUSCULAR; INTRAVENOUS; SUBCUTANEOUS at 03:27

## 2018-02-13 NOTE — PROVIDER CONTACT NOTE (OTHER) - ACTION/TREATMENT ORDERED:
As per ADS, continue to monitor, recheck BP in an hour.
NIALL Acevedo made aware, no further interventions at this time. Will continue to monitor
BP meds and continue to monitor

## 2018-02-13 NOTE — PROGRESS NOTE ADULT - PROBLEM SELECTOR PLAN 3
- monitor blood pressure  - c/w enalapril, metoprolol

## 2018-02-13 NOTE — PROGRESS NOTE ADULT - PROBLEM SELECTOR PLAN 6
DVT ppx: lovenox  Hypokalemia: replete potassium

## 2018-02-13 NOTE — PROGRESS NOTE ADULT - PROBLEM SELECTOR PLAN 5
- c/w klonopin prn anxiety  - c/w fluoxetine  - trazodone prn insomnia

## 2018-02-13 NOTE — PROGRESS NOTE ADULT - PROBLEM SELECTOR PLAN 2
- c/w synthroid  - TSH-WNL
- c/w synthroid  - f/u TSH

## 2018-02-13 NOTE — PROGRESS NOTE ADULT - SUBJECTIVE AND OBJECTIVE BOX
Patient is a 62y old  Female who presents with a chief complaint of painful skin lesions (12 Feb 2018 18:59)      SUBJECTIVE / OVERNIGHT EVENTS: Pt in NAD    MEDICATIONS  (STANDING):  clobetasol 0.05% Ointment 1 Application(s) Topical daily  docusate sodium 100 milliGRAM(s) Oral three times a day  enalapril 10 milliGRAM(s) Oral daily  enoxaparin Injectable 40 milliGRAM(s) SubCutaneous every 24 hours  FLUoxetine 80 milliGRAM(s) Oral daily  hydroxychloroquine 200 milliGRAM(s) Oral daily  ketorolac   Injectable 15 milliGRAM(s) IV Push once  levothyroxine 137 MICROGram(s) Oral daily  metoprolol succinate ER 25 milliGRAM(s) Oral daily    MEDICATIONS  (PRN):  acetaminophen   Tablet. 650 milliGRAM(s) Oral every 6 hours PRN Mild Pain (1 - 3)  clonazePAM Tablet 1 milliGRAM(s) Oral four times a day PRN Anxiety  HYDROmorphone  Injectable 0.5 milliGRAM(s) IV Push every 4 hours PRN Moderate Pain (4 - 6)  HYDROmorphone  Injectable 1 milliGRAM(s) IV Push every 4 hours PRN Severe Pain (7 - 10)  senna 2 Tablet(s) Oral at bedtime PRN Constipation  traZODone 50 milliGRAM(s) Oral at bedtime PRN Insomnia        CAPILLARY BLOOD GLUCOSE        I&O's Summary      T(C): 36.4 (02-13-18 @ 10:39), Max: 37.1 (02-12-18 @ 17:38)  HR: 77 (02-13-18 @ 10:39) (68 - 77)  BP: 144/76 (02-13-18 @ 10:39) (118/51 - 177/99)  RR: 18 (02-13-18 @ 10:39) (17 - 18)  SpO2: 97% (02-13-18 @ 10:39) (95% - 97%)    PHYSICAL EXAM:  GENERAL: NAD, well-developed  HEAD:  Atraumatic, Normocephalic  EYES: EOMI, PERRLA, conjunctiva and sclera clear  NECK: Supple, No JVD  CHEST/LUNG: Clear to auscultation bilaterally; No wheeze  HEART: Regular rate and rhythm; No murmurs, rubs, or gallops  ABDOMEN: Soft, Nontender, Nondistended; Bowel sounds present  EXTREMITIES:  2+ Peripheral Pulses, No clubbing, cyanosis, or edema  PSYCH: AAOx3  NEUROLOGY: non-focal  SKIN: multiple skin lesions    LABS:                        11.9   4.74  )-----------( 231      ( 12 Feb 2018 05:54 )             37.3     02-12    140  |  102  |  13  ----------------------------<  88  3.8   |  27  |  0.87    Ca    8.6      12 Feb 2018 05:54  Phos  3.5     02-12  Mg     2.0     02-12                  RADIOLOGY & ADDITIONAL TESTS:    Imaging Personally Reviewed:    Consultant(s) Notes Reviewed:      Care Discussed with Consultants/Other Providers:

## 2018-02-13 NOTE — PROGRESS NOTE ADULT - PROBLEM SELECTOR PLAN 4
-  weight loss  - BMI 36.4

## 2018-02-13 NOTE — PROGRESS NOTE ADULT - ASSESSMENT
61 yo F with history of multiple autoimmune diseases (SLE/Sjogren/hashimoto's_ with pyoderma gangrenosum, presents with complaints of painful skin lesions not controlled with oral medications. Had recent admission 1/23 - 1/26 for similar reasons.
63 yo F with history of multiple autoimmune diseases (SLE/Sjogren/hashimoto's_ with pyoderma gangrenosum, presents with complaints of painful skin lesions not controlled with oral medications. Had recent admission 1/23 - 1/26 for similar reasons.

## 2018-02-13 NOTE — CHART NOTE - NSCHARTNOTEFT_GEN_A_CORE
63 yo F admitted for pain control for pyoderma gangrenosum.     Intralesional Kenalog 10mg/ml (total of 1cc) was injected to a lesion on the L medial thigh.  The risks of the injection were discussed, including but not limited to pain, atrophy, infection, bleeding, skin discoloration and no effect.     Pt tolerated the procedure.    Pt knows to follow up with Dr. Sidhu (dermatology) next week.

## 2018-02-13 NOTE — PROGRESS NOTE ADULT - PROBLEM SELECTOR PLAN 1
- continue current regimen, change to PO dilaudid 4 mg PO q 4 PRN x 5 days  - dermatology consult - PO options, such as prednisone and cyclosporine, which pt is not interested in. Discussed topical and intralesional options as well, such as topical/intralesional steroid and cyclosporine eyedrop. Pt would like to try intralesional Kenalog injection. Pt will follow with Dr. Sidhu for re-evaluation post injection.  - dress wounds  - c/w bowel regimen to prevent opioid induced constipation  - clobetasol cream for now to lesions  - c/w plaquenil repeat FLAKO/dsDNA   -has outpt pain management Dr. Gunnar Rader-d/w him states she was given a letter of dismissal due to non compliance with treatment plan. Will need to f/u with PCP Dr. Chisholm.
- reports "20/10" pain not controlled with IV dilaudid, though does not appear acutely uncomfortable on my exam, laughing, moving fully  - nursing reportedly also concerned as they note patient has been sleepy at times, they do not feel comfortable with escalation in IV dilaudid regimen at this time  - may need to reach out to pain management for assistance, would need to be cautious with escalation of IV narcotics  - ISTOP # [id number] patient last filled script for oxycodone-acetaminophen  mg 150 tabs on 2/1/18 (25 day supply)  - offer other means of pain control  - c/w bowel regimen to prevent opioid induced constipation  - wound care consult  - clobetasol cream for now to lesions  - I consulted dermatology to see if there would be any benefit to systemic glucocorticoids as they did discuss it during recent visit (per note in [employer]), f/u recs, is there also benefit for topical pain management?  - given multiple autoimmune disease history, should have rheumatology followup as well  - c/w plaquenil
- continue current regimen, IV Dilaudid PRN pain  - dermatology consult - Dr. Venus Stahl  - may benefit from systemic immunosuppressants?  - per patient, her last episode improved with thalidomide  - wound care consult  - dress wounds  - c/w bowel regimen to prevent opioid induced constipation  - clobetasol cream for now to lesions  - given multiple autoimmune disease history, should have rheumatology followup as well  - c/w plaquenil
- continue current regimen, IV Dilaudid PRN pain  - dermatology consult -P  - may benefit from systemic immunosuppressants  - per patient, her last episode improved with thalidomide  - wound care consult  - dress wounds  - c/w bowel regimen to prevent opioid induced constipation  - clobetasol cream for now to lesions  - c/w plaquenil repeat FLAKO/dsDNA   -has outpt pain management Dr. Gunnar Rader

## 2018-02-14 ENCOUNTER — OTHER (OUTPATIENT)
Age: 63
End: 2018-02-14

## 2018-02-14 LAB
ANA PAT FLD IF-IMP: SIGNIFICANT CHANGE UP
ANA PAT FLD IF-IMP: SIGNIFICANT CHANGE UP
ANA TITR SER: SIGNIFICANT CHANGE UP
ANA TITR SER: SIGNIFICANT CHANGE UP
DSDNA AB SER-ACNC: <12 IU/ML — SIGNIFICANT CHANGE UP

## 2018-02-22 LAB
CARDIOLIPIN IGM SER-MCNC: 6.12 MPL — SIGNIFICANT CHANGE UP (ref 0–11)
CARDIOLIPIN IGM SER-MCNC: 9.98 GPL — SIGNIFICANT CHANGE UP (ref 0–23)

## 2018-02-23 ENCOUNTER — APPOINTMENT (OUTPATIENT)
Dept: DERMATOLOGY | Facility: CLINIC | Age: 63
End: 2018-02-23
Payer: COMMERCIAL

## 2018-02-23 VITALS — DIASTOLIC BLOOD PRESSURE: 86 MMHG | SYSTOLIC BLOOD PRESSURE: 130 MMHG

## 2018-02-23 PROCEDURE — 11900 INJECT SKIN LESIONS </W 7: CPT | Mod: GC

## 2018-02-26 ENCOUNTER — APPOINTMENT (OUTPATIENT)
Dept: GASTROENTEROLOGY | Facility: CLINIC | Age: 63
End: 2018-02-26
Payer: COMMERCIAL

## 2018-02-26 VITALS
TEMPERATURE: 98.1 F | HEIGHT: 63 IN | WEIGHT: 200 LBS | DIASTOLIC BLOOD PRESSURE: 83 MMHG | OXYGEN SATURATION: 95 % | HEART RATE: 65 BPM | SYSTOLIC BLOOD PRESSURE: 129 MMHG | BODY MASS INDEX: 35.44 KG/M2

## 2018-02-26 DIAGNOSIS — Z12.11 ENCOUNTER FOR SCREENING FOR MALIGNANT NEOPLASM OF COLON: ICD-10-CM

## 2018-02-26 DIAGNOSIS — Z82.49 FAMILY HISTORY OF ISCHEMIC HEART DISEASE AND OTHER DISEASES OF THE CIRCULATORY SYSTEM: ICD-10-CM

## 2018-02-26 DIAGNOSIS — Z80.0 FAMILY HISTORY OF MALIGNANT NEOPLASM OF DIGESTIVE ORGANS: ICD-10-CM

## 2018-02-26 DIAGNOSIS — Z80.2 FAMILY HISTORY OF MALIGNANT NEOPLASM OF OTHER RESPIRATORY AND INTRATHORACIC ORGANS: ICD-10-CM

## 2018-02-26 PROCEDURE — 99244 OFF/OP CNSLTJ NEW/EST MOD 40: CPT

## 2018-02-26 RX ORDER — OFLOXACIN 3 MG/ML
0.3 SOLUTION/ DROPS OPHTHALMIC 4 TIMES DAILY
Qty: 1 | Refills: 5 | Status: DISCONTINUED | COMMUNITY
Start: 2017-06-15 | End: 2018-02-26

## 2018-02-26 RX ORDER — ACYCLOVIR 800 MG/1
800 TABLET ORAL
Qty: 60 | Refills: 5 | Status: DISCONTINUED | COMMUNITY
Start: 2017-06-15 | End: 2018-02-26

## 2018-02-27 PROBLEM — Z80.2 FAMILY HISTORY OF LYMPHOMA: Status: ACTIVE | Noted: 2018-02-26

## 2018-02-27 PROBLEM — Z82.49 FAMILY HISTORY OF HEART FAILURE: Status: ACTIVE | Noted: 2018-02-26

## 2018-02-27 PROBLEM — Z80.0 FAMILY HISTORY OF PANCREATIC CANCER: Status: ACTIVE | Noted: 2018-02-26

## 2018-03-08 ENCOUNTER — MEDICATION RENEWAL (OUTPATIENT)
Age: 63
End: 2018-03-08

## 2018-03-14 ENCOUNTER — APPOINTMENT (OUTPATIENT)
Dept: INTERNAL MEDICINE | Facility: CLINIC | Age: 63
End: 2018-03-14
Payer: COMMERCIAL

## 2018-03-14 VITALS
HEART RATE: 72 BPM | OXYGEN SATURATION: 97 % | HEIGHT: 63 IN | SYSTOLIC BLOOD PRESSURE: 128 MMHG | TEMPERATURE: 97.5 F | DIASTOLIC BLOOD PRESSURE: 82 MMHG

## 2018-03-14 DIAGNOSIS — M32.9 SYSTEMIC LUPUS ERYTHEMATOSUS, UNSPECIFIED: ICD-10-CM

## 2018-03-14 DIAGNOSIS — Z86.39 PERSONAL HISTORY OF OTHER ENDOCRINE, NUTRITIONAL AND METABOLIC DISEASE: ICD-10-CM

## 2018-03-14 DIAGNOSIS — Z86.59 PERSONAL HISTORY OF OTHER MENTAL AND BEHAVIORAL DISORDERS: ICD-10-CM

## 2018-03-14 PROCEDURE — 99214 OFFICE O/P EST MOD 30 MIN: CPT | Mod: 25

## 2018-03-14 PROCEDURE — 36415 COLL VENOUS BLD VENIPUNCTURE: CPT

## 2018-03-16 ENCOUNTER — APPOINTMENT (OUTPATIENT)
Dept: DERMATOLOGY | Facility: CLINIC | Age: 63
End: 2018-03-16
Payer: COMMERCIAL

## 2018-03-16 VITALS — DIASTOLIC BLOOD PRESSURE: 80 MMHG | SYSTOLIC BLOOD PRESSURE: 140 MMHG

## 2018-03-16 PROCEDURE — 99213 OFFICE O/P EST LOW 20 MIN: CPT | Mod: 25

## 2018-03-16 PROCEDURE — 11901 INJECT SKIN LESIONS >7: CPT

## 2018-03-22 ENCOUNTER — MEDICATION RENEWAL (OUTPATIENT)
Age: 63
End: 2018-03-22

## 2018-03-22 ENCOUNTER — OTHER (OUTPATIENT)
Age: 63
End: 2018-03-22

## 2018-03-27 PROBLEM — Z80.8 FAMILY HISTORY OF BASAL CELL CARCINOMA (BCC): Status: ACTIVE | Noted: 2018-02-02

## 2018-03-27 PROBLEM — Z86.39 HISTORY OF HASHIMOTO THYROIDITIS: Status: RESOLVED | Noted: 2018-02-02 | Resolved: 2018-03-27

## 2018-03-30 ENCOUNTER — APPOINTMENT (OUTPATIENT)
Dept: DERMATOLOGY | Facility: CLINIC | Age: 63
End: 2018-03-30
Payer: COMMERCIAL

## 2018-03-30 ENCOUNTER — MOBILE ON CALL (OUTPATIENT)
Age: 63
End: 2018-03-30

## 2018-03-30 VITALS
SYSTOLIC BLOOD PRESSURE: 130 MMHG | WEIGHT: 202 LBS | BODY MASS INDEX: 35.79 KG/M2 | DIASTOLIC BLOOD PRESSURE: 70 MMHG | HEIGHT: 63 IN

## 2018-03-30 PROCEDURE — 99213 OFFICE O/P EST LOW 20 MIN: CPT

## 2018-04-12 ENCOUNTER — APPOINTMENT (OUTPATIENT)
Dept: GASTROENTEROLOGY | Facility: HOSPITAL | Age: 63
End: 2018-04-12

## 2018-04-13 ENCOUNTER — APPOINTMENT (OUTPATIENT)
Dept: DERMATOLOGY | Facility: CLINIC | Age: 63
End: 2018-04-13
Payer: COMMERCIAL

## 2018-04-13 VITALS
DIASTOLIC BLOOD PRESSURE: 78 MMHG | HEIGHT: 63 IN | BODY MASS INDEX: 35.79 KG/M2 | SYSTOLIC BLOOD PRESSURE: 140 MMHG | WEIGHT: 202 LBS

## 2018-04-13 PROCEDURE — 99214 OFFICE O/P EST MOD 30 MIN: CPT

## 2018-04-19 ENCOUNTER — APPOINTMENT (OUTPATIENT)
Dept: DERMATOLOGY | Facility: CLINIC | Age: 63
End: 2018-04-19
Payer: COMMERCIAL

## 2018-04-19 PROCEDURE — 99215 OFFICE O/P EST HI 40 MIN: CPT

## 2018-05-03 ENCOUNTER — INPATIENT (INPATIENT)
Facility: HOSPITAL | Age: 63
LOS: 0 days | Discharge: HOME | End: 2018-05-04
Attending: HOSPITALIST | Admitting: HOSPITALIST
Payer: COMMERCIAL

## 2018-05-03 VITALS
RESPIRATION RATE: 20 BRPM | HEART RATE: 100 BPM | DIASTOLIC BLOOD PRESSURE: 120 MMHG | SYSTOLIC BLOOD PRESSURE: 189 MMHG | TEMPERATURE: 98 F

## 2018-05-03 DIAGNOSIS — F41.9 ANXIETY DISORDER, UNSPECIFIED: ICD-10-CM

## 2018-05-03 DIAGNOSIS — R60.0 LOCALIZED EDEMA: ICD-10-CM

## 2018-05-03 DIAGNOSIS — L88 PYODERMA GANGRENOSUM: ICD-10-CM

## 2018-05-03 DIAGNOSIS — M32.9 SYSTEMIC LUPUS ERYTHEMATOSUS, UNSPECIFIED: ICD-10-CM

## 2018-05-03 DIAGNOSIS — Z98.89 OTHER SPECIFIED POSTPROCEDURAL STATES: Chronic | ICD-10-CM

## 2018-05-03 DIAGNOSIS — M35.00 SJOGREN SYNDROME, UNSPECIFIED: ICD-10-CM

## 2018-05-03 DIAGNOSIS — R52 PAIN, UNSPECIFIED: ICD-10-CM

## 2018-05-03 DIAGNOSIS — I10 ESSENTIAL (PRIMARY) HYPERTENSION: ICD-10-CM

## 2018-05-03 DIAGNOSIS — E03.9 HYPOTHYROIDISM, UNSPECIFIED: ICD-10-CM

## 2018-05-03 DIAGNOSIS — Z98.890 OTHER SPECIFIED POSTPROCEDURAL STATES: Chronic | ICD-10-CM

## 2018-05-03 DIAGNOSIS — R41.3 OTHER AMNESIA: ICD-10-CM

## 2018-05-03 DIAGNOSIS — R58 HEMORRHAGE, NOT ELSEWHERE CLASSIFIED: ICD-10-CM

## 2018-05-03 LAB
ALBUMIN SERPL ELPH-MCNC: 3.7 G/DL — SIGNIFICANT CHANGE UP (ref 3.3–5)
ALP SERPL-CCNC: 61 U/L — SIGNIFICANT CHANGE UP (ref 40–120)
ALT FLD-CCNC: 24 U/L — SIGNIFICANT CHANGE UP (ref 4–33)
AST SERPL-CCNC: 21 U/L — SIGNIFICANT CHANGE UP (ref 4–32)
BASOPHILS # BLD AUTO: 0.03 K/UL — SIGNIFICANT CHANGE UP (ref 0–0.2)
BASOPHILS NFR BLD AUTO: 0.5 % — SIGNIFICANT CHANGE UP (ref 0–2)
BILIRUB SERPL-MCNC: 0.4 MG/DL — SIGNIFICANT CHANGE UP (ref 0.2–1.2)
BUN SERPL-MCNC: 15 MG/DL — SIGNIFICANT CHANGE UP (ref 7–23)
CALCIUM SERPL-MCNC: 8.9 MG/DL — SIGNIFICANT CHANGE UP (ref 8.4–10.5)
CHLORIDE SERPL-SCNC: 102 MMOL/L — SIGNIFICANT CHANGE UP (ref 98–107)
CO2 SERPL-SCNC: 28 MMOL/L — SIGNIFICANT CHANGE UP (ref 22–31)
CREAT SERPL-MCNC: 0.99 MG/DL — SIGNIFICANT CHANGE UP (ref 0.5–1.3)
EOSINOPHIL # BLD AUTO: 0.13 K/UL — SIGNIFICANT CHANGE UP (ref 0–0.5)
EOSINOPHIL NFR BLD AUTO: 2 % — SIGNIFICANT CHANGE UP (ref 0–6)
GLUCOSE SERPL-MCNC: 95 MG/DL — SIGNIFICANT CHANGE UP (ref 70–99)
HCT VFR BLD CALC: 37.2 % — SIGNIFICANT CHANGE UP (ref 34.5–45)
HGB BLD-MCNC: 11.8 G/DL — SIGNIFICANT CHANGE UP (ref 11.5–15.5)
IMM GRANULOCYTES # BLD AUTO: 0.01 # — SIGNIFICANT CHANGE UP
IMM GRANULOCYTES NFR BLD AUTO: 0.2 % — SIGNIFICANT CHANGE UP (ref 0–1.5)
LYMPHOCYTES # BLD AUTO: 1.54 K/UL — SIGNIFICANT CHANGE UP (ref 1–3.3)
LYMPHOCYTES # BLD AUTO: 24.1 % — SIGNIFICANT CHANGE UP (ref 13–44)
MCHC RBC-ENTMCNC: 29.9 PG — SIGNIFICANT CHANGE UP (ref 27–34)
MCHC RBC-ENTMCNC: 31.7 % — LOW (ref 32–36)
MCV RBC AUTO: 94.2 FL — SIGNIFICANT CHANGE UP (ref 80–100)
MONOCYTES # BLD AUTO: 0.44 K/UL — SIGNIFICANT CHANGE UP (ref 0–0.9)
MONOCYTES NFR BLD AUTO: 6.9 % — SIGNIFICANT CHANGE UP (ref 2–14)
NEUTROPHILS # BLD AUTO: 4.23 K/UL — SIGNIFICANT CHANGE UP (ref 1.8–7.4)
NEUTROPHILS NFR BLD AUTO: 66.3 % — SIGNIFICANT CHANGE UP (ref 43–77)
NRBC # FLD: 0 — SIGNIFICANT CHANGE UP
PLATELET # BLD AUTO: 204 K/UL — SIGNIFICANT CHANGE UP (ref 150–400)
PMV BLD: 11.2 FL — SIGNIFICANT CHANGE UP (ref 7–13)
POTASSIUM SERPL-MCNC: 3.7 MMOL/L — SIGNIFICANT CHANGE UP (ref 3.5–5.3)
POTASSIUM SERPL-SCNC: 3.7 MMOL/L — SIGNIFICANT CHANGE UP (ref 3.5–5.3)
PROT SERPL-MCNC: 6.5 G/DL — SIGNIFICANT CHANGE UP (ref 6–8.3)
RBC # BLD: 3.95 M/UL — SIGNIFICANT CHANGE UP (ref 3.8–5.2)
RBC # FLD: 13.2 % — SIGNIFICANT CHANGE UP (ref 10.3–14.5)
SODIUM SERPL-SCNC: 140 MMOL/L — SIGNIFICANT CHANGE UP (ref 135–145)
WBC # BLD: 6.38 K/UL — SIGNIFICANT CHANGE UP (ref 3.8–10.5)
WBC # FLD AUTO: 6.38 K/UL — SIGNIFICANT CHANGE UP (ref 3.8–10.5)

## 2018-05-03 PROCEDURE — 93010 ELECTROCARDIOGRAM REPORT: CPT

## 2018-05-03 PROCEDURE — 99223 1ST HOSP IP/OBS HIGH 75: CPT | Mod: GC

## 2018-05-03 RX ORDER — HYDROMORPHONE HYDROCHLORIDE 2 MG/ML
0.5 INJECTION INTRAMUSCULAR; INTRAVENOUS; SUBCUTANEOUS ONCE
Qty: 0 | Refills: 0 | Status: DISCONTINUED | OUTPATIENT
Start: 2018-05-03 | End: 2018-05-03

## 2018-05-03 RX ORDER — KETOROLAC TROMETHAMINE 30 MG/ML
15 SYRINGE (ML) INJECTION ONCE
Qty: 0 | Refills: 0 | Status: DISCONTINUED | OUTPATIENT
Start: 2018-05-03 | End: 2018-05-03

## 2018-05-03 RX ORDER — MORPHINE SULFATE 50 MG/1
4 CAPSULE, EXTENDED RELEASE ORAL ONCE
Qty: 0 | Refills: 0 | Status: DISCONTINUED | OUTPATIENT
Start: 2018-05-03 | End: 2018-05-03

## 2018-05-03 RX ORDER — LIDOCAINE 4 G/100G
1 CREAM TOPICAL EVERY 12 HOURS
Qty: 0 | Refills: 0 | Status: DISCONTINUED | OUTPATIENT
Start: 2018-05-03 | End: 2018-05-04

## 2018-05-03 RX ORDER — CLONAZEPAM 1 MG
1 TABLET ORAL EVERY 6 HOURS
Qty: 0 | Refills: 0 | Status: DISCONTINUED | OUTPATIENT
Start: 2018-05-03 | End: 2018-05-04

## 2018-05-03 RX ORDER — LEVOTHYROXINE SODIUM 125 MCG
137 TABLET ORAL DAILY
Qty: 0 | Refills: 0 | Status: DISCONTINUED | OUTPATIENT
Start: 2018-05-03 | End: 2018-05-04

## 2018-05-03 RX ORDER — HYDROMORPHONE HYDROCHLORIDE 2 MG/ML
0.5 INJECTION INTRAMUSCULAR; INTRAVENOUS; SUBCUTANEOUS EVERY 4 HOURS
Qty: 0 | Refills: 0 | Status: DISCONTINUED | OUTPATIENT
Start: 2018-05-03 | End: 2018-05-04

## 2018-05-03 RX ORDER — CLONAZEPAM 1 MG
1 TABLET ORAL ONCE
Qty: 0 | Refills: 0 | Status: DISCONTINUED | OUTPATIENT
Start: 2018-05-03 | End: 2018-05-03

## 2018-05-03 RX ORDER — HYDROMORPHONE HYDROCHLORIDE 2 MG/ML
1 INJECTION INTRAMUSCULAR; INTRAVENOUS; SUBCUTANEOUS EVERY 4 HOURS
Qty: 0 | Refills: 0 | Status: DISCONTINUED | OUTPATIENT
Start: 2018-05-03 | End: 2018-05-04

## 2018-05-03 RX ORDER — ENOXAPARIN SODIUM 100 MG/ML
40 INJECTION SUBCUTANEOUS EVERY 24 HOURS
Qty: 0 | Refills: 0 | Status: DISCONTINUED | OUTPATIENT
Start: 2018-05-03 | End: 2018-05-04

## 2018-05-03 RX ORDER — SENNA PLUS 8.6 MG/1
2 TABLET ORAL AT BEDTIME
Qty: 0 | Refills: 0 | Status: DISCONTINUED | OUTPATIENT
Start: 2018-05-03 | End: 2018-05-04

## 2018-05-03 RX ORDER — DOCUSATE SODIUM 100 MG
100 CAPSULE ORAL THREE TIMES A DAY
Qty: 0 | Refills: 0 | Status: DISCONTINUED | OUTPATIENT
Start: 2018-05-03 | End: 2018-05-04

## 2018-05-03 RX ORDER — METOPROLOL TARTRATE 50 MG
12.5 TABLET ORAL
Qty: 0 | Refills: 0 | Status: DISCONTINUED | OUTPATIENT
Start: 2018-05-03 | End: 2018-05-04

## 2018-05-03 RX ORDER — FLUOXETINE HCL 10 MG
80 CAPSULE ORAL DAILY
Qty: 0 | Refills: 0 | Status: DISCONTINUED | OUTPATIENT
Start: 2018-05-03 | End: 2018-05-04

## 2018-05-03 RX ORDER — HYDROXYCHLOROQUINE SULFATE 200 MG
400 TABLET ORAL DAILY
Qty: 0 | Refills: 0 | Status: DISCONTINUED | OUTPATIENT
Start: 2018-05-03 | End: 2018-05-04

## 2018-05-03 RX ORDER — ACETAMINOPHEN 500 MG
650 TABLET ORAL EVERY 6 HOURS
Qty: 0 | Refills: 0 | Status: DISCONTINUED | OUTPATIENT
Start: 2018-05-03 | End: 2018-05-04

## 2018-05-03 RX ADMIN — Medication 100 MILLIGRAM(S): at 23:22

## 2018-05-03 RX ADMIN — MORPHINE SULFATE 4 MILLIGRAM(S): 50 CAPSULE, EXTENDED RELEASE ORAL at 18:59

## 2018-05-03 RX ADMIN — Medication 400 MILLIGRAM(S): at 23:21

## 2018-05-03 RX ADMIN — Medication 12.5 MILLIGRAM(S): at 20:32

## 2018-05-03 RX ADMIN — HYDROMORPHONE HYDROCHLORIDE 0.5 MILLIGRAM(S): 2 INJECTION INTRAMUSCULAR; INTRAVENOUS; SUBCUTANEOUS at 23:40

## 2018-05-03 RX ADMIN — MORPHINE SULFATE 4 MILLIGRAM(S): 50 CAPSULE, EXTENDED RELEASE ORAL at 17:47

## 2018-05-03 RX ADMIN — Medication 15 MILLIGRAM(S): at 17:47

## 2018-05-03 RX ADMIN — Medication 1 MILLIGRAM(S): at 23:21

## 2018-05-03 RX ADMIN — MORPHINE SULFATE 4 MILLIGRAM(S): 50 CAPSULE, EXTENDED RELEASE ORAL at 19:15

## 2018-05-03 RX ADMIN — MORPHINE SULFATE 4 MILLIGRAM(S): 50 CAPSULE, EXTENDED RELEASE ORAL at 18:00

## 2018-05-03 RX ADMIN — Medication 137 MICROGRAM(S): at 23:21

## 2018-05-03 RX ADMIN — HYDROMORPHONE HYDROCHLORIDE 0.5 MILLIGRAM(S): 2 INJECTION INTRAMUSCULAR; INTRAVENOUS; SUBCUTANEOUS at 23:23

## 2018-05-03 RX ADMIN — Medication 1 APPLICATION(S): at 22:51

## 2018-05-03 RX ADMIN — Medication 15 MILLIGRAM(S): at 18:00

## 2018-05-03 NOTE — ED ADULT NURSE NOTE - OBJECTIVE STATEMENT
Pt a&ox3 getting treated for pain mgmt related to skin lesions. Pt denies sob breathing even and unlabored, denies cp/discomfort, denies headache/dizziness. Abdomen soft, non-tender, non-distended. Skin is cool dry and intact. IVL placed, labs sent. Will continue to monitor.

## 2018-05-03 NOTE — H&P ADULT - PROBLEM SELECTOR PLAN 1
Reports worsening over last few months with worsening pain. Follows with Derm. On clobetasol cream. Increased pain.  - Pain control as below.  - Wound care consult  - Derm consult in AM  - Rheum consult in AM  - C/w clobetasol BID for now Reports worsening over last few months with worsening pain. Follows with Derm. On clobetasol cream. Increased pain.  - reports relief with thalidomide in the past, but states practitioners hesitant to re-prescribe same at present  - Pain control as below.  - Wound care consult  - Derm consult in AM  - Rheum consult in AM  - C/w clobetasol BID for now Reports worsening over last few months with worsening pain. Follows with Derm. On clobetasol cream. Increased pain.  - reports relief with thalidomide in the past, but states practitioners hesitant to re-prescribe same at present  - Pain control as below.  - Wound care consult  - Derm consult in AM  - Rheum consult in AM  - C/w clobetasol BID for now  - Consider adding Vit C, MVI, zinc sulfate x 20 days to aid wound healing, if Derm  and Rheum agree

## 2018-05-03 NOTE — H&P ADULT - NSHPPHYSICALEXAM_GEN_ALL_CORE
.  VITAL SIGNS:  T(C): 37 (05-03-18 @ 20:51), Max: 37 (05-03-18 @ 20:51)  T(F): 98.6 (05-03-18 @ 20:51), Max: 98.6 (05-03-18 @ 20:51)  HR: 80 (05-03-18 @ 20:51) (80 - 100)  BP: 154/84 (05-03-18 @ 20:51) (154/84 - 189/120)  BP(mean): --  RR: 18 (05-03-18 @ 20:51) (16 - 20)  SpO2: 97% (05-03-18 @ 20:51) (97% - 100%)  Wt(kg): --    PHYSICAL EXAM:    Constitutional: resting comfortably in bed; NAD  Head: NC/AT  Eyes: PERRL, EOMI, anicteric sclera  ENT: no oral ulcers, nasal discharge; uvula midline, no oropharyngeal erythema or exudates; MMM  Neck: supple; no JVD or thyromegaly  Respiratory: CTA B/L; no retractions  Cardiac: +S1/S2; RRR; faint systolic murmur  Gastrointestinal: soft, NT/ND; no rebound or guarding; +BSx4  Back: spine midline, no bony tenderness or step-offs; no CVAT B/L  Extremities: bilateral pitting edema, primarily below ankle in R foot and above ankle in Left. No clubbing or cyanosis  Musculoskeletal: NROM x4; no joint swelling, tenderness or erythema  Vascular: 2+ radial, femoral, DP/PT pulses B/L  Dermatologic: skin warm, dry and intact; no rashes, wounds, or scars  Lymphatic: no submandibular or cervical LAD  Neurologic: AAOx3; CNII-XII grossly intact; no focal deficits  Psychiatric: Patient with some difficulty recalling words, meds, names, aspects of history. affect and characteristics of appearance, verbalizations, behaviors are appropriate

## 2018-05-03 NOTE — ED PROCEDURE NOTE - PROCEDURE ADDITIONAL DETAILS
Focused Ed ultrasound bilateral lower extremity.  Leg scanned in B mode and with color doppler from the common femoral  past bifurcation into superficial and deep femoral, then down to popliteal vein past the trifurcation.  Full compression at all sites, with no visualization of clot.  Recommend repeat ultrasound in 5 - 7 days.    Impression: no proximal DVT of bilateral lower extremity  [facility]  [id number]

## 2018-05-03 NOTE — ED PROVIDER NOTE - ATTENDING CONTRIBUTION TO CARE
Attending Attestation: Dr. Lemon  I have personally performed a history and physical examination of the patient and discussed management with the resident as well as the patient.  I reviewed the resident's note and agree with the documented findings and plan of care.  I have authored and modified critical sections of the Provider Note, including but not limited to HPI, Physical Exam and MDM. 62F presenting with worsening of skin lesion and pain despite home pain meds. No fever. No other systemic sxs. Will get Basic labs, Pain control, consider admission for derm eval.  Also duplex to r/o dvt for new swelling.

## 2018-05-03 NOTE — H&P ADULT - FAMILY HISTORY
No pertinent family history in first degree relatives Mother  Still living? Unknown  Family history of pancreatic cancer, Age at diagnosis: Age Unknown     Father  Still living? Unknown  Family history of coronary artery disease, Age at diagnosis: Age Unknown

## 2018-05-03 NOTE — ED PROVIDER NOTE - MUSCULOSKELETAL, MLM
Spine appears normal, range of motion is not limited, no muscle or joint tenderness Spine appears normal, range of motion is not limited, no muscle or joint tenderness. BL LE swelling, new for her.

## 2018-05-03 NOTE — H&P ADULT - HISTORY OF PRESENT ILLNESS
62F PMH SLE, Sjogren's, pyoderma gangrenosum (dx 15 yrs ago, bx 2007, s/p prednisone [id number] and thalidomide treatments, had been in remission past 7 yrs), hypothyroidism 2/2 Hashimoto's thyroiditis, HTN, anxiety and depression who presents with pyoderma gangrenosum pain. She had 2 prior admissions this year for pain related to her pyoderma gangrenosum and presents today for pain and worsening pyoderma not relieved by her home PO opiates. She is currently having pain most significantly from lesions on the R breast and shoulder, and also reports painful lesions on her abdomen and legs. She states her lesions have changed as they used to be broader in area but now appear deeper. She currently uses clobetasol cream at home which her  helps her to apply to the wounds. She also ecchymotic on her arms that have appeared over the past 3 weeks, not associated with trauma or pain. She notes LE swelling that is new but is unsure when this started, and does endorse some increased BALDERAS recently. The patient states she has not followed with Rheumatology in some time. She states she is supposed to be on Plaquenil but has not taken it in several months due to cost.  Additionally, she complains of memory loss and episodes of disorientation over the past 6 months. Patient denies fevers, chills, N/V, diarrhea, constipation, palpitations, chest pain, vision changes, dizziness, headaches.    Initial ED Vitals: T 98.2, , /120, RR 20/  Initial Labs: WBC 6.38, Hgb 11.8, Plt 204, BUN 15, Cr .99 (baseline ~.75 in Feb), LFTs wnl.  Initial Imaging: Bedside ED LE Doppler performed, no DVT observed.    In the ED, the patient received Toradol 15 mg IV, morphine 4 mg IV x2.

## 2018-05-03 NOTE — ED PROVIDER NOTE - PROGRESS NOTE DETAILS
Pain still not controlled.  Will admit for further pain mgmt and Derm eval, possible DMARD initiation.

## 2018-05-03 NOTE — H&P ADULT - PROBLEM SELECTOR PLAN 10
No eye dryness or use of drops.  Nothing to do actively. No eye dryness or use of drops.  Prescribe artificial tears if becomes symptomatic Noted on lab-work of 12/2018  In the setting of autoimmune disease, most likely inactivity of intrinsic factor  - may be contributing toward report of dizziness for the past few days  - starting supplementation IM; to be transitioned to oral formulation upon discharge  - would recommend prescribing sublingual at time of discharge in lieu of tablets (?inactivity of intrinsic factor) to ensure absorption Noted on lab-work today  In the setting of autoimmune disease, most likely inactivity of intrinsic factor  - may be contributing toward report of dizziness for the past few days  - starting supplementation IM; to be transitioned to oral formulation upon discharge  - would recommend prescribing sublingual at time of discharge in lieu of tablets (?inactivity of intrinsic factor) to ensure absorption

## 2018-05-03 NOTE — ED PROVIDER NOTE - MEDICAL DECISION MAKING DETAILS
62F presenting with worsening of skin lesion and pain despite home pain meds. No fever. No other systemic sxs. Will get Basics, Pain control, consider admission. 62F presenting with worsening of skin lesion and pain despite home pain meds. No fever. No other systemic sxs. Will get Basic labs, Pain control, consider admission for derm eval.  Also duplex to r/o dvt for new swelling.

## 2018-05-03 NOTE — H&P ADULT - PROBLEM SELECTOR PLAN 7
Patient not demonstrating any signs of flare of Lupus besides pyoderma and possible neurologic manifestations (memory loss). Patient has not been adherent to Plaquenil  - Check urinalysis and urine protein/Cr given slight increase in Cr.

## 2018-05-03 NOTE — H&P ADULT - PROBLEM SELECTOR PLAN 9
Patient on Prozac 80 mg daily and Clonazepam 1 mg QID PRN. Patient reports frequent use of her Clonazepam at home.   - C/w home Prozac 80 mg daily  - PRN Clonazepam 1 mg q6h Patient on Prozac 80 mg daily and Clonazepam 1 mg QID PRN. Patient reports frequent use of her Clonazepam at home.   - C/w home Prozac 80 mg daily  - PRN Clonazepam 1 mg q6h  - Consider psychiatry consult as patient may benefit from amitriptyline given pain and depression

## 2018-05-03 NOTE — H&P ADULT - PROBLEM SELECTOR PLAN 5
Patient on Metoprolol 25 ER daily and Enalapril 10 mg PO daily. Patient took Enalapril this AM but did not take her metoprolol today. Patient hypertensive to 189/120 in ED, likely secondary to pain.  - Monitor with pain control  - Will give pm dose Metoprolol tartrate 12.5 now, start metoprolol tartrate 12.5 BID.  - Patient anxious, has not gotten her clonazepam today, will give home dose.  - Monitor, will escalate BP control if needed.

## 2018-05-03 NOTE — H&P ADULT - PROBLEM SELECTOR PLAN 6
Chronic memory loss and disorientation reported. May be due to age/dementia, overmedication (opiates and benzodiazepines), lupus/autoimmune history.  - TSH .53 (1/24/18).  - B12 on AM labs  - Outpatient follow-up Chronic memory loss and disorientation reported. May be due to age/dementia, overmedication (opiates and benzodiazepines), lupus/autoimmune history.  - TSH .53 (1/24/18).  - B12, vitamin D level on AM labs  - Outpatient follow-up Chronic memory loss and disorientation reported. May be due to age/dementia, overmedication (opiates and benzodiazepines), lupus/autoimmune history.  - TSH 0.53 (1/24/18).  - B12, vitamin D level on AM labs  - Outpatient follow-up

## 2018-05-03 NOTE — ED ADULT NURSE REASSESSMENT NOTE - NS ED NURSE REASSESS COMMENT FT1
Pt left unit at this time. pt is a&ox3, respirations even and unlabored, in NAD. Pt hypertensive as per report, medicated as ordered. pt states "I always have pain, I'm okay right now". Denies severe headache or vision changes. belongings at bedside. Pt left to [facility], pt given to accepting RN by HINA PAYNE

## 2018-05-03 NOTE — H&P ADULT - PROBLEM SELECTOR PLAN 3
Patient has developed new ecchymotic lesions, primarily in the arms, over the past 3 weeks. CBC wnl. Unclear cause. Patient with history significant for multiple autoimmune processes. Would rule out vasculitis etiology.  - F/u Coags, fibrinogen, D-dimer  - F/u ANCA, MPO ab, ESR/CRP

## 2018-05-03 NOTE — H&P ADULT - NSHPREVIEWOFSYSTEMS_GEN_ALL_CORE
Review of Systems:   CONSTITUTIONAL: No fever, chills  EYES: No eye pain, visual disturbances, or discharge  ENMT:  No oral ulcers. No difficulty hearing, tinnitus, vertigo; No sinus or throat pain  NECK: No pain or stiffness  RESPIRATORY: + BALDERAS. No cough, wheezing, chills or hemoptysis  CARDIOVASCULAR: + leg swelling. No chest pain, palpitations, dizziness  GASTROINTESTINAL: + chronic abdominal pain (associates with known pancreatic cyst). No nausea, vomiting, or hematemesis; No diarrhea or constipation. No melena or hematochezia.  GENITOURINARY: No dysuria, frequency, hematuria, or incontinence  NEUROLOGICAL: + memory loss. No headaches, loss of strength, numbness, or tremors  SKIN: + painful lesions on breast, leg, back, abdomen, R shoulder   LYMPH NODES: No enlarged glands  PSYCHIATRIC: No depression, anxiety, mood swings, or difficulty sleeping  HEME/LYMPH: No bleeding gums  ALLERY AND IMMUNOLOGIC: No hives or eczema

## 2018-05-03 NOTE — ED ADULT TRIAGE NOTE - CHIEF COMPLAINT QUOTE
Pt co blister like lesions to torso and bruising. Pt co pain to area. Pt states has a rare skin condition called Pyoderma gangrenosum. Pt states took morphine IR and percocet this am.

## 2018-05-03 NOTE — H&P ADULT - ASSESSMENT
62F PMH SLE, Sjogren's, pyoderma gangrenosum (dx 15 yrs ago, bx 2007, s/p prednisone [id number] and thalidomide treatments, had been in remission past 7 yrs), hypothyroidism 2/2 Hashimoto's thyroiditis, HTN, anxiety and depression who presents with pyoderma gangrenosum pain, noted to have bilateral LE swelling, recent ecchymosis, and reporting increased memory loss.

## 2018-05-03 NOTE — ED PROVIDER NOTE - PHYSICAL EXAMINATION
Several skin lesions (x2 on left leg, x5 on R. breast, x 4 on rt. arm, x 4 on abdomen, x 2 Buttock, x 2 neck)  Appear sleepy Several skin lesions (x2 on left leg, x5 on R. breast, x 4 on rt. arm, x 4 on abdomen, x 2 Buttock, x 2 neck), erythematous. Tender.  Some with discharge. Various stages of healing.   Appear sleepy

## 2018-05-03 NOTE — H&P ADULT - NSHPLABSRESULTS_GEN_ALL_CORE
All labs personally reviewed: WBC 6.38, Hgb 11.8, Plt 204, BUN 15, Cr .99 (baseline ~.75 in Feb), LFTs wnl.    All Imaging personally reviewed: Bedside ED LE Doppler performed, no DVT observed.    No EKG to review. EKG ordered

## 2018-05-03 NOTE — H&P ADULT - PROBLEM SELECTOR PLAN 2
Patient taking Percocet 10mg/325mg BID at home. Patient s/p 4 mg IV x2 in ED. Noted on last hospitalization to be on PRN IV dilaudid 1 mg severe pain, .5 mg moderate pain.  - C/w regimen 1 mg IV dilaudid for severe pain, .5 mg IV dilaudid for moderate pain.  - Lidocaine gel 2%.

## 2018-05-03 NOTE — H&P ADULT - PROBLEM SELECTOR PLAN 8
Secondary to Hashimoto's Thyroiditis.  - C/w home Synthroid 137 mcg/daily Secondary to Hashimoto's Thyroiditis. TSH .53 (1/24/18)  - C/w home Synthroid 137 mcg/daily Secondary to Hashimoto's Thyroiditis. TSH 0.53 (1/24/18)  - C/w home Synthroid 137 mcg/daily

## 2018-05-03 NOTE — H&P ADULT - PROBLEM SELECTOR PLAN 4
Patient reports recent increase in LE swelling. Exam demonstrates bilateral pitting edema. More edema in foot on R side whereas primarily above ankle on left side. Some minor signs of venous insufficiency. Bedside doppler in ED negative for DVT. Reports some shortness of breath in recent months.  - CXR, EKG, TTE ordered  - Follow-up urinalysis and urine protein/cr ratio given history of Lupus.  - Would repeat formal LE dopplers if suspicion VTE increases or above work-up negative Patient reports recent increase in LE swelling. Exam demonstrates bilateral pitting edema. More edema in foot on R side whereas primarily above ankle on left side. Some minor signs of venous insufficiency. Bedside doppler in ED negative for DVT. Reports some shortness of breath in recent months.  Family history of CAD  - CXR, EKG, TTE ordered  - Follow-up urinalysis and urine protein/cr ratio given history of Lupus.  - Would repeat formal LE dopplers if suspicion VTE increases or above work-up negative  - Elevate B/L LE while in bed, and while siting, as tolerated

## 2018-05-04 ENCOUNTER — TRANSCRIPTION ENCOUNTER (OUTPATIENT)
Age: 63
End: 2018-05-04

## 2018-05-04 VITALS
DIASTOLIC BLOOD PRESSURE: 101 MMHG | TEMPERATURE: 98 F | SYSTOLIC BLOOD PRESSURE: 181 MMHG | OXYGEN SATURATION: 96 % | HEART RATE: 66 BPM | RESPIRATION RATE: 18 BRPM

## 2018-05-04 DIAGNOSIS — F11.90 OPIOID USE, UNSPECIFIED, UNCOMPLICATED: ICD-10-CM

## 2018-05-04 DIAGNOSIS — E53.8 DEFICIENCY OF OTHER SPECIFIED B GROUP VITAMINS: ICD-10-CM

## 2018-05-04 LAB
24R-OH-CALCIDIOL SERPL-MCNC: 6.1 NG/ML — LOW (ref 30–80)
APTT BLD: 27.9 SEC — SIGNIFICANT CHANGE UP (ref 27.5–37.4)
BASOPHILS # BLD AUTO: 0.04 K/UL — SIGNIFICANT CHANGE UP (ref 0–0.2)
BASOPHILS NFR BLD AUTO: 0.6 % — SIGNIFICANT CHANGE UP (ref 0–2)
BUN SERPL-MCNC: 15 MG/DL — SIGNIFICANT CHANGE UP (ref 7–23)
CALCIUM SERPL-MCNC: 8.9 MG/DL — SIGNIFICANT CHANGE UP (ref 8.4–10.5)
CHLORIDE SERPL-SCNC: 103 MMOL/L — SIGNIFICANT CHANGE UP (ref 98–107)
CO2 SERPL-SCNC: 24 MMOL/L — SIGNIFICANT CHANGE UP (ref 22–31)
CREAT ?TM UR-MCNC: 26.07 MG/DL — SIGNIFICANT CHANGE UP
CREAT SERPL-MCNC: 0.98 MG/DL — SIGNIFICANT CHANGE UP (ref 0.5–1.3)
CRP SERPL-MCNC: 6.1 MG/L — HIGH
D DIMER BLD IA.RAPID-MCNC: 258 NG/ML — SIGNIFICANT CHANGE UP
EOSINOPHIL # BLD AUTO: 0.16 K/UL — SIGNIFICANT CHANGE UP (ref 0–0.5)
EOSINOPHIL NFR BLD AUTO: 2.3 % — SIGNIFICANT CHANGE UP (ref 0–6)
ERYTHROCYTE [SEDIMENTATION RATE] IN BLOOD: 22 MM/HR — SIGNIFICANT CHANGE UP (ref 4–25)
FIBRINOGEN PPP-MCNC: 413 MG/DL — SIGNIFICANT CHANGE UP (ref 310–510)
GLUCOSE SERPL-MCNC: 84 MG/DL — SIGNIFICANT CHANGE UP (ref 70–99)
HCT VFR BLD CALC: 40.7 % — SIGNIFICANT CHANGE UP (ref 34.5–45)
HGB BLD-MCNC: 12.8 G/DL — SIGNIFICANT CHANGE UP (ref 11.5–15.5)
IMM GRANULOCYTES # BLD AUTO: 0.01 # — SIGNIFICANT CHANGE UP
IMM GRANULOCYTES NFR BLD AUTO: 0.1 % — SIGNIFICANT CHANGE UP (ref 0–1.5)
INR BLD: 0.96 — SIGNIFICANT CHANGE UP (ref 0.88–1.17)
LYMPHOCYTES # BLD AUTO: 1.47 K/UL — SIGNIFICANT CHANGE UP (ref 1–3.3)
LYMPHOCYTES # BLD AUTO: 21.4 % — SIGNIFICANT CHANGE UP (ref 13–44)
MAGNESIUM SERPL-MCNC: 2.1 MG/DL — SIGNIFICANT CHANGE UP (ref 1.6–2.6)
MCHC RBC-ENTMCNC: 29.5 PG — SIGNIFICANT CHANGE UP (ref 27–34)
MCHC RBC-ENTMCNC: 31.4 % — LOW (ref 32–36)
MCV RBC AUTO: 93.8 FL — SIGNIFICANT CHANGE UP (ref 80–100)
MONOCYTES # BLD AUTO: 0.44 K/UL — SIGNIFICANT CHANGE UP (ref 0–0.9)
MONOCYTES NFR BLD AUTO: 6.4 % — SIGNIFICANT CHANGE UP (ref 2–14)
MYELOPEROXIDASE AB SER-ACNC: <5 UNITS — SIGNIFICANT CHANGE UP
NEUTROPHILS # BLD AUTO: 4.75 K/UL — SIGNIFICANT CHANGE UP (ref 1.8–7.4)
NEUTROPHILS NFR BLD AUTO: 69.2 % — SIGNIFICANT CHANGE UP (ref 43–77)
NRBC # FLD: 0 — SIGNIFICANT CHANGE UP
NT-PROBNP SERPL-SCNC: 594.6 PG/ML — SIGNIFICANT CHANGE UP
PHOSPHATE SERPL-MCNC: 3.6 MG/DL — SIGNIFICANT CHANGE UP (ref 2.5–4.5)
PLATELET # BLD AUTO: 207 K/UL — SIGNIFICANT CHANGE UP (ref 150–400)
PMV BLD: 11 FL — SIGNIFICANT CHANGE UP (ref 7–13)
POTASSIUM SERPL-MCNC: 3.8 MMOL/L — SIGNIFICANT CHANGE UP (ref 3.5–5.3)
POTASSIUM SERPL-SCNC: 3.8 MMOL/L — SIGNIFICANT CHANGE UP (ref 3.5–5.3)
PROT UR-MCNC: < 4 MG/DL — SIGNIFICANT CHANGE UP
PROTHROM AB SERPL-ACNC: 10.7 SEC — SIGNIFICANT CHANGE UP (ref 9.8–13.1)
RBC # BLD: 4.34 M/UL — SIGNIFICANT CHANGE UP (ref 3.8–5.2)
RBC # FLD: 13.3 % — SIGNIFICANT CHANGE UP (ref 10.3–14.5)
SODIUM SERPL-SCNC: 137 MMOL/L — SIGNIFICANT CHANGE UP (ref 135–145)
VIT B12 SERPL-MCNC: 214 PG/ML — SIGNIFICANT CHANGE UP (ref 200–900)
WBC # BLD: 6.87 K/UL — SIGNIFICANT CHANGE UP (ref 3.8–10.5)
WBC # FLD AUTO: 6.87 K/UL — SIGNIFICANT CHANGE UP (ref 3.8–10.5)

## 2018-05-04 PROCEDURE — 99223 1ST HOSP IP/OBS HIGH 75: CPT

## 2018-05-04 PROCEDURE — 71045 X-RAY EXAM CHEST 1 VIEW: CPT | Mod: 26

## 2018-05-04 PROCEDURE — 99239 HOSP IP/OBS DSCHRG MGMT >30: CPT

## 2018-05-04 RX ORDER — PREGABALIN 225 MG/1
1 CAPSULE ORAL
Qty: 30 | Refills: 0 | OUTPATIENT
Start: 2018-05-04 | End: 2018-06-02

## 2018-05-04 RX ORDER — PREGABALIN 225 MG/1
1000 CAPSULE ORAL DAILY
Qty: 0 | Refills: 0 | Status: DISCONTINUED | OUTPATIENT
Start: 2018-05-04 | End: 2018-05-04

## 2018-05-04 RX ORDER — OXYCODONE AND ACETAMINOPHEN 5; 325 MG/1; MG/1
2 TABLET ORAL
Qty: 0 | Refills: 0 | Status: DISCONTINUED | OUTPATIENT
Start: 2018-05-04 | End: 2018-05-04

## 2018-05-04 RX ORDER — MORPHINE SULFATE 50 MG/1
1 CAPSULE, EXTENDED RELEASE ORAL
Qty: 0 | Refills: 0 | DISCHARGE
Start: 2018-05-04

## 2018-05-04 RX ORDER — CHOLECALCIFEROL (VITAMIN D3) 125 MCG
1 CAPSULE ORAL
Qty: 8 | Refills: 0 | OUTPATIENT
Start: 2018-05-04 | End: 2018-06-28

## 2018-05-04 RX ORDER — MORPHINE SULFATE 50 MG/1
15 CAPSULE, EXTENDED RELEASE ORAL EVERY 8 HOURS
Qty: 0 | Refills: 0 | Status: DISCONTINUED | OUTPATIENT
Start: 2018-05-04 | End: 2018-05-04

## 2018-05-04 RX ORDER — PREGABALIN 225 MG/1
1000 CAPSULE ORAL DAILY
Qty: 0 | Refills: 0 | Status: CANCELLED | OUTPATIENT
Start: 2018-05-07 | End: 2018-05-04

## 2018-05-04 RX ORDER — MORPHINE SULFATE 50 MG/1
1 CAPSULE, EXTENDED RELEASE ORAL
Qty: 0 | Refills: 0 | COMMUNITY
Start: 2018-05-04

## 2018-05-04 RX ADMIN — MORPHINE SULFATE 15 MILLIGRAM(S): 50 CAPSULE, EXTENDED RELEASE ORAL at 15:20

## 2018-05-04 RX ADMIN — OXYCODONE AND ACETAMINOPHEN 2 TABLET(S): 5; 325 TABLET ORAL at 10:17

## 2018-05-04 RX ADMIN — Medication 100 MILLIGRAM(S): at 06:36

## 2018-05-04 RX ADMIN — PREGABALIN 1000 MICROGRAM(S): 225 CAPSULE ORAL at 14:21

## 2018-05-04 RX ADMIN — Medication 400 MILLIGRAM(S): at 14:22

## 2018-05-04 RX ADMIN — OXYCODONE AND ACETAMINOPHEN 2 TABLET(S): 5; 325 TABLET ORAL at 10:59

## 2018-05-04 RX ADMIN — Medication 80 MILLIGRAM(S): at 14:22

## 2018-05-04 RX ADMIN — Medication 137 MICROGRAM(S): at 06:35

## 2018-05-04 RX ADMIN — ENOXAPARIN SODIUM 40 MILLIGRAM(S): 100 INJECTION SUBCUTANEOUS at 06:35

## 2018-05-04 RX ADMIN — Medication 12.5 MILLIGRAM(S): at 06:37

## 2018-05-04 RX ADMIN — HYDROMORPHONE HYDROCHLORIDE 1 MILLIGRAM(S): 2 INJECTION INTRAMUSCULAR; INTRAVENOUS; SUBCUTANEOUS at 04:30

## 2018-05-04 RX ADMIN — Medication 1 APPLICATION(S): at 06:36

## 2018-05-04 RX ADMIN — Medication 10 MILLIGRAM(S): at 06:37

## 2018-05-04 RX ADMIN — MORPHINE SULFATE 15 MILLIGRAM(S): 50 CAPSULE, EXTENDED RELEASE ORAL at 14:21

## 2018-05-04 NOTE — PROGRESS NOTE ADULT - PROBLEM SELECTOR PLAN 10
- Noted on lab-work today  - In the setting of autoimmune disease, most likely inactivity of intrinsic factor  - may be contributing toward memory loss  - starting supplementation IM; to be transitioned to oral formulation upon discharge    11. Need for ppx:  - lovenox subq  - DASH diet  - Dispo- today?

## 2018-05-04 NOTE — CHART NOTE - NSCHARTNOTEFT_GEN_A_CORE
pt with known dermatitis artefacta / neurodermatitis  a/w worsening lesions.    pt with poor insight into her condition. denies SI/HI    no interventions from dermatology at this time.  recommend psych consult    Blade Kulkarni MD  d/w Dr Martinez (attg) dermatitis artefacta / neurodermatitis  a/w worsening lesions.    pt well known to derm. pt with poor insight into her condition. denies SI/HI    no interventions from dermatology at this time.  recommend psych consult    Blade Kulkarni MD  d/w Dr Martinez (attg)

## 2018-05-04 NOTE — ADVANCED PRACTICE NURSE CONSULT - REASON FOR CONSULT
Patient appropriately seen and followed by Wound MD, Dr. Askew.     Please contact Wound Care Service Line if we can be of further assistance (ext [phone]).

## 2018-05-04 NOTE — DISCHARGE NOTE ADULT - PLAN OF CARE
Ongoing care- dermatology follow up You were admitted to the hospital for worsening of your pyoderma lesions. You were treated with clobetasol, and evaluated by our wound care team as well as our dermatology team, who did not recommend any further interventions in patient. It is macie You were admitted to the hospital for worsening of your pyoderma lesions. You were treated with clobetasol, and evaluated by our wound care team as well as our dermatology team, who did not recommend any further interventions in patient. It is very important you follow up with your dermatologist for your biopsy and follow up with a rheumatologist as well. ongoing care Please follow up with your rheumatologist for further management and evaluation of your bruises as well. Ongoing care Please continue to take your vitamin B 12 as prescribed, and follow up with your primary care doctor for possible outpatient IM B 12 injections. Further evaluation Please follow up with your primary care regarding concerns for memory loss. You will likely need a further workup as outpatient please continue to take your synthroid as prescribed. Your vitamin D level was extremely low. Please take 1 capsule of vitamin D as prescribed every week for 8 weeks.

## 2018-05-04 NOTE — DISCHARGE NOTE ADULT - CARE PROVIDERS DIRECT ADDRESSES
[email or web address]

## 2018-05-04 NOTE — PROGRESS NOTE ADULT - ATTENDING COMMENTS
Patient seen and examined. During my interview, patient extremely tearful about her experience in the [facility]. She has very slow speech and her response to the experience seems quite exaggerated. Lesions visualized by me throughout the body. Agree with description above by Dr. Sanders with several scattered bruises on her arms. Although I do not know her baseline wounds, dermatology and wound care (Dr. Askew) stating they appear better. There is certainly no sign of infection in any of the wounds. There is a concern that these wounds may be self-inflicted. Either way, there does not appear to be any indication for continued inpatient hospitalization as even her pain is controlled. Medically stable for discharge home once BP improved.     Further details of plan per resident note above Patient seen and examined. During my interview, patient extremely tearful about her experience in the [facility]. She has very slow speech and her response to the experience seems quite exaggerated. Lesions visualized by me throughout the body. Agree with description above by Dr. Sanders with several scattered bruises on her arms. Although I do not know her baseline wounds, dermatology and wound care (Dr. Askew) stating they appear better. There is certainly no sign of infection in any of the wounds. There is a concern that these wounds may be self-inflicted. Either way, there does not appear to be any indication for continued inpatient hospitalization as even her pain is controlled. Medically stable for discharge home once BP improved.     Further details of plan per resident note above    DISCHARGE TIME- 31 MINUTES SPENT PREPARING DISCHARGE, INCLUDING PAPERWORK, MEDICATION RECONCILIATION, AND COUNSELLING OF PATIENT.

## 2018-05-04 NOTE — PROGRESS NOTE ADULT - PROBLEM SELECTOR PLAN 9
- Patient on Prozac 80 mg daily and Clonazepam 1 mg QID PRN  - C/w home Prozac 80 mg daily  - PRN Clonazepam 1 mg q6h

## 2018-05-04 NOTE — DISCHARGE NOTE ADULT - CARE PLAN
Principal Discharge DX:	Pyoderma gangrenosum  Goal:	Ongoing care- dermatology follow up  Assessment and plan of treatment:	You were admitted to the hospital for worsening of your pyoderma lesions. You were treated with clobetasol, and evaluated by our wound care team as well as our dermatology team, who did not recommend any further interventions in patient. It is macie  Secondary Diagnosis:	SLE (systemic lupus erythematosus)  Secondary Diagnosis:	Vitamin B12 deficiency  Secondary Diagnosis:	Memory loss  Secondary Diagnosis:	Hypothyroidism Principal Discharge DX:	Pyoderma gangrenosum  Goal:	Ongoing care- dermatology follow up  Assessment and plan of treatment:	You were admitted to the hospital for worsening of your pyoderma lesions. You were treated with clobetasol, and evaluated by our wound care team as well as our dermatology team, who did not recommend any further interventions in patient. It is very important you follow up with your dermatologist for your biopsy and follow up with a rheumatologist as well.  Secondary Diagnosis:	SLE (systemic lupus erythematosus)  Goal:	ongoing care  Assessment and plan of treatment:	Please follow up with your rheumatologist for further management and evaluation of your bruises as well.  Secondary Diagnosis:	Vitamin B12 deficiency  Goal:	Ongoing care  Assessment and plan of treatment:	Please continue to take your vitamin B 12 as prescribed, and follow up with your primary care doctor for possible outpatient IM B 12 injections.  Secondary Diagnosis:	Memory loss  Goal:	Further evaluation  Assessment and plan of treatment:	Please follow up with your primary care regarding concerns for memory loss. You will likely need a further workup as outpatient  Secondary Diagnosis:	Hypothyroidism  Goal:	ongoing care  Assessment and plan of treatment:	please continue to take your synthroid as prescribed. Principal Discharge DX:	Pyoderma gangrenosum  Goal:	Ongoing care- dermatology follow up  Assessment and plan of treatment:	You were admitted to the hospital for worsening of your pyoderma lesions. You were treated with clobetasol, and evaluated by our wound care team as well as our dermatology team, who did not recommend any further interventions in patient. It is very important you follow up with your dermatologist for your biopsy and follow up with a rheumatologist as well.  Secondary Diagnosis:	SLE (systemic lupus erythematosus)  Goal:	ongoing care  Assessment and plan of treatment:	Please follow up with your rheumatologist for further management and evaluation of your bruises as well.  Secondary Diagnosis:	Vitamin B12 deficiency  Goal:	Ongoing care  Assessment and plan of treatment:	Please continue to take your vitamin B 12 as prescribed, and follow up with your primary care doctor for possible outpatient IM B 12 injections.  Secondary Diagnosis:	Memory loss  Goal:	Further evaluation  Assessment and plan of treatment:	Please follow up with your primary care regarding concerns for memory loss. You will likely need a further workup as outpatient  Secondary Diagnosis:	Hypothyroidism  Goal:	ongoing care  Assessment and plan of treatment:	please continue to take your synthroid as prescribed.  Secondary Diagnosis:	Vitamin D deficiency  Goal:	Ongoing care  Assessment and plan of treatment:	Your vitamin D level was extremely low. Please take 1 capsule of vitamin D as prescribed every week for 8 weeks.

## 2018-05-04 NOTE — DISCHARGE NOTE ADULT - SECONDARY DIAGNOSIS.
SLE (systemic lupus erythematosus) Vitamin B12 deficiency Memory loss Hypothyroidism Vitamin D deficiency

## 2018-05-04 NOTE — PROGRESS NOTE ADULT - PROBLEM SELECTOR PLAN 3
- Patient has developed new ecchymotic lesions, primarily in the arms, over the past 3 weeks  - CBC and platelets WNL  - may be underlying rheumatological process- SLE vs vasculitis   - history significant for multiple autoimmune processes  - coags WNL   - F/u ANCA, MPO ab, ESR/CRP

## 2018-05-04 NOTE — DISCHARGE NOTE ADULT - ADDITIONAL INSTRUCTIONS
1. Please follow up with your primary care doctor within 1 week of discharge.  2. Please follow up with a rheumatologist as soon as possible.  3. Please follow up with your dermatologist for your biopsy and management of your skin conditions.

## 2018-05-04 NOTE — PROGRESS NOTE ADULT - PROBLEM SELECTOR PLAN 1
- patient reports worsening pain and increased number of lesions over the past few days- weeks  - patient follows with outpatient dermatologist Dr. Sidhu, who has scheduled a biopsy for next week  - per conversation with Derm fellow, likely not PG- however official recs to follow  - Dr. Escalante evaluated patient in the AM- official recs to follow  - reports relief with thalidomide in the past, but states practitioners hesitant to re-prescribe same at present  - c/w clobetasol BID for now

## 2018-05-04 NOTE — DISCHARGE NOTE ADULT - CARE PROVIDER_API CALL
Eduardo Joseph), Internal Medicine; Rheumatology  [address] [facility] [address]  [place] [address]  Phone: [phone]  Fax: [phone]    Pao Sidhu), DermatologyDermatopathology  [address] [place] [address]  Phone: [phone]  Fax: [phone]    Stefani Chisholm), Internal Medicine  [address] [facility] [address]  [place] [address]  Phone: [phone]  Fax: [phone]

## 2018-05-04 NOTE — PROGRESS NOTE ADULT - PROBLEM SELECTOR PLAN 5
- patient on Metoprolol 25 ER daily and Enalapril 10 mg PO daily- c/w home medications  - can uptitrate as necessary

## 2018-05-04 NOTE — PROGRESS NOTE ADULT - SUBJECTIVE AND OBJECTIVE BOX
Mikala Tommy PGY 1  Pager: [phone]/ [phone]    Patient is a 62y old  Female who presents with a chief complaint of painful liesions  all over body (04 May 2018 04:59)      SUBJECTIVE / OVERNIGHT EVENTS:  Patient seen and examined at bedside. Patient reports pain at the site of her lesions. Otherwise, she has no complaints at this time.    Other Review of Systems Negative.    MEDICATIONS  (STANDING):  clobetasol 0.05% Ointment 1 Application(s) Topical every 12 hours  cyanocobalamin Injectable 1000 MICROGram(s) IntraMuscular daily  docusate sodium 100 milliGRAM(s) Oral three times a day  enalapril 10 milliGRAM(s) Oral daily  enoxaparin Injectable 40 milliGRAM(s) SubCutaneous every 24 hours  FLUoxetine 80 milliGRAM(s) Oral daily  hydroxychloroquine 400 milliGRAM(s) Oral daily  levothyroxine 137 MICROGram(s) Oral daily  metoprolol tartrate 12.5 milliGRAM(s) Oral two times a day  morphine ER Tablet 15 milliGRAM(s) Oral every 8 hours    MEDICATIONS  (PRN):  acetaminophen   Tablet 650 milliGRAM(s) Oral every 6 hours PRN For Temp greater than 38 C (100.4 F)  clonazePAM Tablet 1 milliGRAM(s) Oral every 6 hours PRN Anxiety  lidocaine 2% Gel 1 Application(s) Topical every 12 hours PRN pain  oxyCODONE    5 mG/acetaminophen 325 mG 2 Tablet(s) Oral two times a day PRN Severe Pain (7 - 10)  senna 2 Tablet(s) Oral at bedtime PRN Constipation      OBJECTIVE:    Vital Signs Last 24 Hrs  T(C): 36.6 (04 May 2018 04:55), Max: 37 (03 May 2018 20:51)  T(F): 97.9 (04 May 2018 04:55), Max: 98.6 (03 May 2018 20:51)  HR: 60 (04 May 2018 04:55) (60 - 100)  BP: 186/90 (04 May 2018 04:55) (154/84 - 189/120)  BP(mean): --  RR: 18 (04 May 2018 04:55) (16 - 20)  SpO2: 97% (04 May 2018 04:55) (97% - 100%)    CAPILLARY BLOOD GLUCOSE        I&O's Summary      PHYSICAL EXAM:  GENERAL: NAD, well-developed  EYES: EOMI, PERRLA, conjunctiva and sclera clear  NECK: Supple, No JVD  CHEST/LUNG: Clear to auscultation bilaterally; No wheeze  HEART: Regular rate and rhythm; No murmurs, rubs, or gallops  ABDOMEN: Soft, Nontender, Nondistended; Bowel sounds present  EXTREMITIES:  2+ Peripheral Pulses, No clubbing, cyanosis, 1+ pitting edema up to ankles  PSYCH: AAOx3  NEUROLOGY: non-focal  SKIN: deep ulcerations on her left thigh and left leg, and abdomen; shallow similar appearing ulcers diffusely on her back, chest and abdomen no purulence or erythema, no signs of cellulitis     LABS:                        12.8   6.87  )-----------( 207      ( 04 May 2018 04:15 )             40.7     Auto Eosinophil # 0.16  / Auto Eosinophil % 2.3   / Auto Neutrophil # 4.75  / Auto Neutrophil % 69.2  / BANDS % x                            11.8   6.38  )-----------( 204      ( 03 May 2018 17:20 )             37.2     Auto Eosinophil # 0.13  / Auto Eosinophil % 2.0   / Auto Neutrophil # 4.23  / Auto Neutrophil % 66.3  / BANDS % x        05-04    137  |  103  |  15  ----------------------------<  84  3.8   |  24  |  0.98  05-03    140  |  102  |  15  ----------------------------<  95  3.7   |  28  |  0.99    Ca    8.9      04 May 2018 04:15  Mg     2.1     05-04  Phos  3.6     05-04  TPro  6.5  /  Alb  3.7  /  TBili  0.4  /  DBili  x   /  AST  21  /  ALT  24  /  AlkPhos  61  05-03    PT/INR - ( 04 May 2018 04:15 )   PT: 10.7 SEC;   INR: 0.96          PTT - ( 04 May 2018 04:15 )  PTT:27.9 SEC Mikala Sanders PGY 1  Pager: [phone]/ [phone]    Patient is a 62y old  Female who presents with a chief complaint of painful liesions  all over body (04 May 2018 04:59)      SUBJECTIVE / OVERNIGHT EVENTS:  Patient seen and examined at bedside. Patient reports pain at the site of her lesions. Otherwise, she has no complaints at this time.    Patient re-examined with Dr. Levy and at that time very tearful and upset regarding her experience in the [facility].    Other Review of Systems Negative.    MEDICATIONS  (STANDING):  clobetasol 0.05% Ointment 1 Application(s) Topical every 12 hours  cyanocobalamin Injectable 1000 MICROGram(s) IntraMuscular daily  docusate sodium 100 milliGRAM(s) Oral three times a day  enalapril 10 milliGRAM(s) Oral daily  enoxaparin Injectable 40 milliGRAM(s) SubCutaneous every 24 hours  FLUoxetine 80 milliGRAM(s) Oral daily  hydroxychloroquine 400 milliGRAM(s) Oral daily  levothyroxine 137 MICROGram(s) Oral daily  metoprolol tartrate 12.5 milliGRAM(s) Oral two times a day  morphine ER Tablet 15 milliGRAM(s) Oral every 8 hours    MEDICATIONS  (PRN):  acetaminophen   Tablet 650 milliGRAM(s) Oral every 6 hours PRN For Temp greater than 38 C (100.4 F)  clonazePAM Tablet 1 milliGRAM(s) Oral every 6 hours PRN Anxiety  lidocaine 2% Gel 1 Application(s) Topical every 12 hours PRN pain  oxyCODONE    5 mG/acetaminophen 325 mG 2 Tablet(s) Oral two times a day PRN Severe Pain (7 - 10)  senna 2 Tablet(s) Oral at bedtime PRN Constipation      OBJECTIVE:    Vital Signs Last 24 Hrs  T(C): 36.6 (04 May 2018 04:55), Max: 37 (03 May 2018 20:51)  T(F): 97.9 (04 May 2018 04:55), Max: 98.6 (03 May 2018 20:51)  HR: 60 (04 May 2018 04:55) (60 - 100)  BP: 186/90 (04 May 2018 04:55) (154/84 - 189/120)  BP(mean): --  RR: 18 (04 May 2018 04:55) (16 - 20)  SpO2: 97% (04 May 2018 04:55) (97% - 100%)    CAPILLARY BLOOD GLUCOSE        I&O's Summary      PHYSICAL EXAM:  GENERAL: NAD, well-developed  EYES: EOMI, PERRLA, conjunctiva and sclera clear  NECK: Supple, No JVD  CHEST/LUNG: Clear to auscultation bilaterally; No wheeze  HEART: Regular rate and rhythm; No murmurs, rubs, or gallops  ABDOMEN: Soft, Nontender, Nondistended; Bowel sounds present  EXTREMITIES:  2+ Peripheral Pulses, No clubbing, cyanosis, 1+ pitting edema up to ankles  PSYCH: labile mood  NEUROLOGY: non-focal  SKIN: deep ulcerations on her left thigh and left leg, and abdomen; shallow similar appearing ulcers diffusely on her back, chest and abdomen no purulence or erythema, no signs of cellulitis     LABS:                        12.8   6.87  )-----------( 207      ( 04 May 2018 04:15 )             40.7     Auto Eosinophil # 0.16  / Auto Eosinophil % 2.3   / Auto Neutrophil # 4.75  / Auto Neutrophil % 69.2  / BANDS % x                            11.8   6.38  )-----------( 204      ( 03 May 2018 17:20 )             37.2     Auto Eosinophil # 0.13  / Auto Eosinophil % 2.0   / Auto Neutrophil # 4.23  / Auto Neutrophil % 66.3  / BANDS % x        05-04    137  |  103  |  15  ----------------------------<  84  3.8   |  24  |  0.98  05-03    140  |  102  |  15  ----------------------------<  95  3.7   |  28  |  0.99    Ca    8.9      04 May 2018 04:15  Mg     2.1     05-04  Phos  3.6     05-04  TPro  6.5  /  Alb  3.7  /  TBili  0.4  /  DBili  x   /  AST  21  /  ALT  24  /  AlkPhos  61  05-03    PT/INR - ( 04 May 2018 04:15 )   PT: 10.7 SEC;   INR: 0.96          PTT - ( 04 May 2018 04:15 )  PTT:27.9 SEC Mikala Sanders PGY 1  Pager: [phone]/ [phone]    Patient is a 62y old  Female who presents with a chief complaint of painful liesions  all over body (04 May 2018 04:59)      SUBJECTIVE / OVERNIGHT EVENTS:  Patient seen and examined at bedside. Patient reports pain at the site of her lesions. Otherwise, she has no complaints at this time.    Patient re-examined with Dr. Levy and at that time very tearful and upset regarding her experience in the [facility].    Other Review of Systems Negative.    MEDICATIONS  (STANDING):  clobetasol 0.05% Ointment 1 Application(s) Topical every 12 hours  cyanocobalamin Injectable 1000 MICROGram(s) IntraMuscular daily  docusate sodium 100 milliGRAM(s) Oral three times a day  enalapril 10 milliGRAM(s) Oral daily  enoxaparin Injectable 40 milliGRAM(s) SubCutaneous every 24 hours  FLUoxetine 80 milliGRAM(s) Oral daily  hydroxychloroquine 400 milliGRAM(s) Oral daily  levothyroxine 137 MICROGram(s) Oral daily  metoprolol tartrate 12.5 milliGRAM(s) Oral two times a day  morphine ER Tablet 15 milliGRAM(s) Oral every 8 hours    MEDICATIONS  (PRN):  acetaminophen   Tablet 650 milliGRAM(s) Oral every 6 hours PRN For Temp greater than 38 C (100.4 F)  clonazePAM Tablet 1 milliGRAM(s) Oral every 6 hours PRN Anxiety  lidocaine 2% Gel 1 Application(s) Topical every 12 hours PRN pain  oxyCODONE    5 mG/acetaminophen 325 mG 2 Tablet(s) Oral two times a day PRN Severe Pain (7 - 10)  senna 2 Tablet(s) Oral at bedtime PRN Constipation      OBJECTIVE:    Vital Signs Last 24 Hrs  T(C): 36.6 (04 May 2018 04:55), Max: 37 (03 May 2018 20:51)  T(F): 97.9 (04 May 2018 04:55), Max: 98.6 (03 May 2018 20:51)  HR: 60 (04 May 2018 04:55) (60 - 100)  BP: 186/90 (04 May 2018 04:55) (154/84 - 189/120)  BP(mean): --  RR: 18 (04 May 2018 04:55) (16 - 20)  SpO2: 97% (04 May 2018 04:55) (97% - 100%)    CAPILLARY BLOOD GLUCOSE        I&O's Summary      PHYSICAL EXAM:  GENERAL: NAD, well-developed  EYES: EOMI, PERRLA, conjunctiva and sclera clear  NECK: Supple, No JVD  CHEST/LUNG: Clear to auscultation bilaterally; No wheeze  HEART: Regular rate and rhythm; No murmurs, rubs, or gallops  ABDOMEN: Soft, Nontender, Nondistended; Bowel sounds present  EXTREMITIES:  2+ Peripheral Pulses, No clubbing, cyanosis, 1+ pitting edema up to ankles  PSYCH: labile mood  NEUROLOGY: non-focal  SKIN: Small ulcerations on her left thigh and left leg, and abdomen; shallow similar appearing ulcers diffusely on her back, chest and abdomen no purulence or erythema, no signs of cellulitis     LABS:                        12.8   6.87  )-----------( 207      ( 04 May 2018 04:15 )             40.7     Auto Eosinophil # 0.16  / Auto Eosinophil % 2.3   / Auto Neutrophil # 4.75  / Auto Neutrophil % 69.2  / BANDS % x                            11.8   6.38  )-----------( 204      ( 03 May 2018 17:20 )             37.2     Auto Eosinophil # 0.13  / Auto Eosinophil % 2.0   / Auto Neutrophil # 4.23  / Auto Neutrophil % 66.3  / BANDS % x        05-04    137  |  103  |  15  ----------------------------<  84  3.8   |  24  |  0.98  05-03    140  |  102  |  15  ----------------------------<  95  3.7   |  28  |  0.99    Ca    8.9      04 May 2018 04:15  Mg     2.1     05-04  Phos  3.6     05-04  TPro  6.5  /  Alb  3.7  /  TBili  0.4  /  DBili  x   /  AST  21  /  ALT  24  /  AlkPhos  61  05-03    PT/INR - ( 04 May 2018 04:15 )   PT: 10.7 SEC;   INR: 0.96          PTT - ( 04 May 2018 04:15 )  PTT:27.9 SEC

## 2018-05-04 NOTE — PROGRESS NOTE ADULT - PROBLEM SELECTOR PLAN 2
- on morphine ER 15mg q8 and percocet 10mg BID PRN for pain  - will continue on home medications as patient states the dose is working at this time

## 2018-05-04 NOTE — DISCHARGE NOTE ADULT - CONDITIONS AT DISCHARGE
She is stable for discharge, alert , independent with all of her care and she will follow up with her PMD and Dermatologist.  Vital Signs are stable and Discharge Instructions are provided.

## 2018-05-04 NOTE — DISCHARGE NOTE ADULT - PATIENT PORTAL LINK FT
You can access the [facility], offered by [facility], by registering with the following website: [email or web address]

## 2018-05-04 NOTE — PROGRESS NOTE ADULT - PROBLEM SELECTOR PLAN 4
- b/l ankle swelling, increased slightly over the past few days  - non pitting above the ankle   - CXR with small left sided effusion- no evidence of fluid overload at this time; no orthopnea  - Elevate B/L LE while in bed, and while siting, as tolerated- may be 2/2 to venous stasis  - low suspicion for HF- pro BNP <600 and low suspicion for VTE as Wells Score =0

## 2018-05-04 NOTE — PROGRESS NOTE ADULT - PROBLEM SELECTOR PLAN 7
- patient not demonstrating any signs of flare of Lupus besides pyoderma   - Patient has not been adherent to Plaquenil  - Check urinalysis and urine protein/Cr given slight increase in Cr  - Pr/Cr ratio < .15 (WNL)

## 2018-05-04 NOTE — PROGRESS NOTE ADULT - ASSESSMENT
62F PMH SLE, Sjogren's, pyoderma gangrenosum (dx 15 yrs ago, bx 2007, s/p prednisone [id number] and thalidomide treatments, had been in remission past 7 yrs), hypothyroidism 2/2 Hashimoto's thyroiditis, HTN, anxiety and depression who presents with pyoderma gangrenosum pain, noted to have bilateral LE swelling, recent ecchymosis, and reporting increased memory loss.

## 2018-05-04 NOTE — PROGRESS NOTE ADULT - PROBLEM SELECTOR PLAN 6
- chronic memory loss and disorientation reported  - patient mood also very labile  - possible underlying neuro/psych issue- B12 very low, will give IM supplementation inpatient

## 2018-05-04 NOTE — DISCHARGE NOTE ADULT - MEDICATION SUMMARY - MEDICATIONS TO TAKE
I will START or STAY ON the medications listed below when I get home from the hospital:    Percocet 10/325 oral tablet  -- 1 tab(s) by mouth every 6 hours  -- Indication: For Pain    morphine 15 mg/12 hr oral tablet, extended release  -- 1 tab(s) by mouth every 8 hours  -- Indication: For Pain    enalapril 10 mg oral tablet  -- 1 tab(s) by mouth once a day  -- Indication: For Hypertension    KlonoPIN 1 mg oral tablet  -- 1 tab(s) by mouth 4 times a day, As Needed  -- Indication: For Anxiety    PROzac 40 mg oral capsule  -- 2 cap(s) by mouth once a day  -- Indication: For depression    traZODone 50 mg oral tablet  -- 1 tab(s) by mouth 2 times a day, As Needed for sleep  -- Indication: For SLEep    Plaquenil 200 mg oral tablet  -- 2 tab(s) by mouth once a day  -- Indication: For SLE (systemic lupus erythematosus)    Toprol-XL 25 mg oral tablet, extended release  -- 1 tab(s) by mouth once a day  -- Indication: For Hypertension    clobetasol 0.05% topical ointment  -- 1 application on skin once a day  -- Indication: For Pyoderma gangrenosum    senna oral tablet  -- 2 tab(s) by mouth once a day (at bedtime), As needed, Constipation  -- Indication: For Constipation    docusate sodium 100 mg oral capsule  -- 1 cap(s) by mouth 3 times a day  -- Indication: For Constipation    Synthroid 137 mcg (0.137 mg) oral tablet  -- 1 tab(s) by mouth once a day  -- Indication: For Hypothyroidism    Vitamin B12 1000 mcg oral tablet  -- 1 tab(s) by mouth once a day  -- Indication: For Low vitamin B12 I will START or STAY ON the medications listed below when I get home from the hospital:    Percocet 10/325 oral tablet  -- 1 tab(s) by mouth every 6 hours  -- Indication: For Pain    morphine 15 mg/12 hr oral tablet, extended release  -- 1 tab(s) by mouth every 8 hours  -- Indication: For Pain    enalapril 10 mg oral tablet  -- 1 tab(s) by mouth once a day  -- Indication: For Hypertension    KlonoPIN 1 mg oral tablet  -- 1 tab(s) by mouth 4 times a day, As Needed  -- Indication: For Anxiety    PROzac 40 mg oral capsule  -- 2 cap(s) by mouth once a day  -- Indication: For depression    traZODone 50 mg oral tablet  -- 1 tab(s) by mouth 2 times a day, As Needed for sleep  -- Indication: For SLEep    Plaquenil 200 mg oral tablet  -- 2 tab(s) by mouth once a day  -- Indication: For SLE (systemic lupus erythematosus)    Toprol-XL 25 mg oral tablet, extended release  -- 1 tab(s) by mouth once a day  -- Indication: For Hypertension    clobetasol 0.05% topical ointment  -- 1 application on skin once a day  -- Indication: For Pyoderma gangrenosum    senna oral tablet  -- 2 tab(s) by mouth once a day (at bedtime), As needed, Constipation  -- Indication: For Constipation    docusate sodium 100 mg oral capsule  -- 1 cap(s) by mouth 3 times a day  -- Indication: For Constipation    Synthroid 137 mcg (0.137 mg) oral tablet  -- 1 tab(s) by mouth once a day  -- Indication: For Hypothyroidism    Vitamin B12 1000 mcg oral tablet  -- 1 tab(s) by mouth once a day  -- Indication: For Low vitamin B12    Vitamin D3 50,000 intl units oral capsule  -- 1 cap(s) by mouth once a week for 8 weeks  -- It is very important that you take or use this exactly as directed.  Do not skip doses or discontinue unless directed by your doctor.    -- Indication: For Vitamin d deficiency

## 2018-05-04 NOTE — DISCHARGE NOTE ADULT - HOSPITAL COURSE
[facility]  [address]  [place] [address]               Eloy Chang   2017 12:40 AM   ED    Description:  Male : 1962   Department:  [facility]           Your Care was Coordinated By:     Provider Role From To    Nancie Hinson MD Attending Provider 17 0047 --      Reason for Visit     Cough     Muscle Pain           Diagnoses this Visit        Comments    Bronchitis    -  Primary     Abdominal pain         Cough           ED Disposition     ED Disposition Condition Comment    Discharge             To Do List           Follow-up Information     Follow up with Halle Fernandes MD In 1 week(s).    Specialty:  Cardiology    Contact information:    [address]  [address]  [place] [address]  [phone]         These Medications        Disp Refills Start End    hydrocodone-homatropine 5-1.5 mg/5 ml (HYCODAN) 5-1.5 mg/5 mL Syrp 120 mL 0 2017     Take 5 mLs by mouth every 4 (four) hours as needed. - Oral    Pharmacy: [facility] [place], [place] - [address] Ph #: [phone]       predniSONE (DELTASONE) 20 MG tablet 10 tablet 0 2017    Take 2 tablets (40 mg total) by mouth once daily. - Oral    Pharmacy: [facility] [place], [place] - [address] Ph #: [phone]         [facility]     [facility] Care [facility] -  Assistance  Unless otherwise directed by your provider, please contact [facility], our nurse care line that is available for  assistance.     Registered nurses in the [facility] provide: appointment scheduling, clinical advisement, health education, and other advisory services.  Call: [phone] (toll free)               Medications           Message regarding Medications     Verify the changes and/or additions to your medication regime listed below are the same as discussed with your clinician today.  If any of these changes or additions  "are incorrect, please notify your healthcare provider.        START taking these NEW medications        Refills    hydrocodone-homatropine 5-1.5 mg/5 ml (HYCODAN) 5-1.5 mg/5 mL Syrp 0    Sig: Take 5 mLs by mouth every 4 (four) hours as needed.    Class: Print    Route: Oral    predniSONE (DELTASONE) 20 MG tablet 0    Sig: Take 2 tablets (40 mg total) by mouth once daily.    Class: Normal    Route: Oral           Verify that the below list of medications is an accurate representation of the medications you are currently taking.  If none reported, the list may be blank. If incorrect, please contact your healthcare provider. Carry this list with you in case of emergency.           Current Medications     buPROPion (WELLBUTRIN SR) 150 MG TBSR 12 hr tablet Take 150 mg by mouth once daily.    clopidogrel (PLAVIX) 75 mg tablet Take 75 mg by mouth once daily.    etodolac (LODINE XL) 400 MG 24 hr tablet Take 400 mg by mouth 2 (two) times daily as needed.    fluticasone (FLONASE) 50 mcg/actuation nasal spray U 2 SPRAYS IEN QD    hydrochlorothiazide (HYDRODIURIL) 25 MG tablet TK 1 T PO QD FOR HIGH BP    hydrocodone-homatropine 5-1.5 mg/5 ml (HYCODAN) 5-1.5 mg/5 mL Syrp Take 5 mLs by mouth every 4 (four) hours as needed.    insulin glargine (LANTUS) 100 unit/mL injection Inject 36 Units into the skin every evening.    insulin needles, disposable, 31 X 5/16 " Ndle by Misc.(Non-Drug; Combo Route) route as directed.    insulin syringe-needle U-100 0.5 mL 31 gauge x 5/16 Syrg U UTD WITH LANTUS    lisinopril (PRINIVIL,ZESTRIL) 20 MG tablet Take 20 mg by mouth once daily.    metformin (GLUCOPHAGE) 1000 MG tablet Take 1,000 mg by mouth 2 (two) times daily.    pentoxifylline (TRENTAL) 400 mg TbSR Take 400 mg by mouth 2 (two) times daily with meals.    predniSONE (DELTASONE) 20 MG tablet Take 2 tablets (40 mg total) by mouth once daily.    tramadol (ULTRAM) 50 mg tablet TK 1 TO 2 TS PO Q 6 H PRN P    trazodone (DESYREL) 100 MG tablet TK " 1 T PO QHS PRF INSOMNIA           Clinical Reference Information           Your Vitals Were     BP Pulse Temp Resp Height Weight    146/86 92 100 °F (37.8 °C) (Oral) 18 6' (1.829 m) 95.3 kg (210 lb)    SpO2 BMI             99% 28.48 kg/m2         Allergies as of 5/4/2017        Reactions    No Known Allergies       Immunizations Administered on Date of Encounter - 5/4/2017     None      ED Micro, Lab, POCT     None      ED Imaging Orders     Start Ordered       Status Ordering Provider    05/04/17 0104 05/04/17 0103  X-Ray Abdomen Flat And Erect  1 time imaging      In process     05/04/17 0104 05/04/17 0103  X-Ray Chest PA And Lateral  1 time imaging      In process         Discharge Instructions           Acute Bronchitis  Your healthcare provider has told you that you have acute bronchitis. Bronchitis is infection or inflammation of the bronchial tubes (airways in the lungs). Normally, air moves easily in and out of the airways. Bronchitis narrows the airways, making it harder for air to flow in and out of the lungs. This causes symptoms such as shortness of breath, coughing, and wheezing. Bronchitis can be acute or chronic. Acute means the condition comes on quickly and goes away in a short time. Chronic means a condition lasts a long time and often comes back. Read on to learn more about acute bronchitis.    What causes acute bronchitis?  Acute bronchitis almost always starts as a viral respiratory infection, such as a cold or the flu. Certain factors make it more likely for a cold or flu to turn into bronchitis. These include being very young or very old or having a heart or lung problem. Cigarette smoking also makes bronchitis more likely.  When bronchitis develops, the airways become swollen. The airways may also become infected with bacteria. This is known as a secondary infection.  Diagnosing acute bronchitis  Your healthcare provider will examine you and ask about your symptoms and health history. You  may also have a sputum culture to test the fluid in your lungs. Chest X-rays may be done to look for infection in the lungs.  Treating acute bronchitis  Bronchitis usually clears up as the cold or flu goes away. You can help feel better faster by doing the following:  · Take medicine as directed. You may be told to take ibuprofen or other over-the-counter medicines. These help relieve inflammation in your bronchial tubes. Your doctor may prescribe an inhaler to help open up the bronchial tubes. Most of the time, acute bronchitis is caused by a viral infection. Antibiotics are usually not prescribed for viral infections.  · Drink plenty of fluids, such as water, juice, or warm soup. Fluids loosen mucus so that you can cough it up. This helps you breathe more easily. Fluids also prevent dehydration.  · Make sure you get plenty of rest.  · Do not smoke. Do not allow anyone else to smoke in your home.  Recovery and follow-up  Follow up with your doctor as you are told. You will likely feel better in a week or two. But a dry cough can linger beyond that time. Let your doctor know if you still have symptoms (other than a dry cough) after 2 weeks. If youre prone to getting bronchial infections, let your doctor know. And take steps to protect yourself from future infections. These steps include stopping smoking and avoiding tobacco smoke, washing your hands often, and getting a yearly flu shot.  When to call the doctor  Call the doctor if you have any of the following:  · Fever of 100.4°F (38.0°C) higher  · Symptoms that get worse, or new symptoms  · Trouble breathing  · Symptoms that dont start to improve within a week, or within 3 days of taking antibiotics   Date Last Reviewed: 8/4/2014  © 9438-2637 [employer]. [address] [place] [address], [place] [address]. All rights reserved. This information is not intended as a substitute for professional medical care. Always follow your healthcare professional's  instructions.          Bronchitis, Viral (Adult)    You have a viral bronchitis. Bronchitis is inflammation and swelling of the lining of the lungs. This is often caused by an infection. Symptoms include a dry, hacking cough that is worse at night. The cough may bring up yellow-green mucus. You may also feel short of breath or wheeze. Other symptoms may include tiredness, chest discomfort, and chills.  Bronchitis that is caused by a virus is not treated with antibiotics. Instead, medicines may be given to help relieve symptoms. Symptoms can last up to 2 weeks, although the cough may last much longer.  This illness is contagious during the first few days and is spread through the air by coughing and sneezing, or by direct contact (touching the sick person and then touching your own eyes, nose, or mouth).  Most viral illnesses resolve within 10 to 14 days with rest and simple home remedies, although they may sometimes last for several weeks.  Home care  · If symptoms are severe, rest at home for the first 2 to 3 days. When you go back to your usual activities, don't let yourself get too tired.  · Do not smoke. Also avoid being exposed to secondhand smoke.  · You may use over-the-counter medicine to control fever or pain, unless another pain medicine was prescribed. (Note: If you have chronic liver or kidney disease or have ever had a stomach ulcer or gastrointestinal bleeding, talk with your healthcare provider before using these medicines. Also talk to your provider if you are taking medicine to prevent blood clots.) Aspirin should never be given to anyone younger than 18 years of age who is ill with a viral infection or fever. It may cause severe liver or brain damage.  · Your appetite may be poor, so a light diet is fine. Avoid dehydration by drinking 6 to 8 glasses of fluids per day (such as water, soft drinks, sports drinks, juices, tea, or soup). Extra fluids will help loosen secretions in the nose and  lungs.  · Over-the-counter cough, cold, and sore-throat medicines will not shorten the length of the illness, but they may help to reduce symptoms. (Note: Do not use decongestants if you have high blood pressure.)  Follow-up care  Follow up with your healthcare provider, or as advised. If you had an X-ray or ECG (electrocardiogram), a specialist will review it. You will be notified of any new findings that may affect your care.  Note: If you are age 65 or older, or if you have a chronic lung disease or condition that affects your immune system, or you smoke, talk to your healthcare provider about having pneumococcal vaccinations and a yearly influenza vaccination (flu shot).  When to seek medical advice  Call your healthcare provider right away if any of these occur:  · Fever of 100.4°F (38°C) or higher  · Coughing up increased amounts of colored sputum  · Weakness, drowsiness, headache, facial pain, ear pain, or a stiff neck  Call 911, or get immediate medical care  Contact emergency services right away if any of these occur:  · Coughing up blood  · Worsening weakness, drowsiness, headache, or stiff neck  · Trouble breathing, wheezing, or pain with breathing  Date Last Reviewed: 9/13/2015  © 3598-0874 [employer]. [address] [place] [address], [place] [address]. All rights reserved. This information is not intended as a substitute for professional medical care. Always follow your healthcare professional's instructions.          [facility] Sign-Up     Activating your [facility] account is as easy as 1-2-3!     1) Visit [email or web address], select Sign Up Now, enter this activation code and your date of birth, then select Next.  [email or web address]  Expires: 6/18/2017  1:36 AM      2) Create a username and password to use when you visit [facility] in the future and select a security question in case you lose your password and select Next.    3) Enter your e-mail address and click Sign Up!    Additional Information  If  you have questions, please e-mail [email or web address] or call [phone] to talk to our [facility] staff. Remember, [facility] is NOT to be used for urgent needs. For medical emergencies, dial 911.         Smoking Cessation     If you would like to quit smoking:   You may be eligible for free services if you are a [place] resident and started smoking cigarettes before September 1, 1988.  Call the Smoking Cessation Trust ([facility]) toll free at ([phone] 522-4709 or ([phone].   Call 1-800-QUIT-NOW if you do not meet the above criteria.   Contact us via email: [email or web address]   View our website for more information: [email or web address]         [facility] complies with applicable Federal civil rights laws and does not discriminate on the basis of race, color, national origin, age, disability, or sex.        Language Assistance Services     ATTENTION: Language assistance services are available, free of charge. Please call [phone].      ATENCIÓN: Si habla español, tiene a washburn disposición servicios gratuitos de asistencia lingüística. Llame al [phone].     CHÚ Ý: N?u b?n nói Ti?ng Vi?t, có các d?ch v? h? tr? ngôn ng? mi?n phí dành cho b?n. G?i s? [phone].         62F PMH SLE, Sjogren's, pyoderma gangrenosum (dx 15 yrs ago, bx 2007, s/p prednisone [id number] and thalidomide treatments, had been in remission past 7 yrs), hypothyroidism 2/2 Hashimoto's thyroiditis, HTN, anxiety and depression who presents with pyoderma gangrenosum pain. She had 2 prior admissions this year for pain related to her pyoderma gangrenosum and presents today for pain and worsening pyoderma not relieved by her home PO opiates. She is currently having pain most significantly from lesions on the R breast and shoulder, and also reports painful lesions on her abdomen and legs. She states her lesions have changed as they used to be broader in area but now appear deeper. She currently uses clobetasol cream at home which her  helps her to apply to the wounds. She also ecchymotic on her arms that have appeared over the past 3 weeks, not associated with trauma or pain. She notes LE swelling that is new but is unsure when this started, and does endorse some increased BALDERAS recently. The patient states she has not followed with Rheumatology in some time. She states she is supposed to be on Plaquenil but has not taken it in several months due to cost.  Additionally, she complains of memory loss and episodes of disorientation over the past 6 months. Patient denies fevers, chills, N/V, diarrhea, constipation, palpitations, chest pain, vision changes, dizziness, headaches.    Initial ED Vitals: T 98.2, , /120, RR 20/  Initial Labs: WBC 6.38, Hgb 11.8, Plt 204, BUN 15, Cr .99 (baseline ~.75 in Feb), LFTs wnl.  Initial Imaging: Bedside ED LE Doppler performed, no DVT observed.  In the ED, the patient received Toradol 15 mg IV, morphine 4 mg IV x2.    Patient admitted for further management of her dermatological conditions. Wound care was consulted who recommended clobetosol dressing twice a day. Dermatology was also consulted who did not recommend any inpatient workup. All lesions on patients body were examined (left leg, chest, breasts, arms and back) and patient was noted to have multiple ulcers, some deeper than other, without evidence of overlying infection or cellulitis. Furthermore, patient had ecchymotic lesions on her arms b/l, one which she states appeared suddenly during hospitalization. Ecchymoses were non blanching and non painful. In regards to her labs, patient was found to be afebrile with no signs of infection. She did have elevated blood pressure on admission, likely secondary to anxiety and pain. During hospitalization, patient's mood was found to be labile, and patient had some trouble with memory recall, however was AxOx3.     Of note, patient found to have markedly decreased B12 on admission, and was given IM B12 x1 and started on PO b12. She will need to follow up outpatient with her PCP for further management.     Given patient HD stable without evidence of infection, acute lupus flare, and pain controlled, she will be discharged with close follow up with her PCP, dermatology and rheumatology. All her providers will be contacted via [employer] Portal to ensure that her 62F PMH SLE, Sjogren's, pyoderma gangrenosum (dx 15 yrs ago, bx 2007, s/p prednisone [id number] and thalidomide treatments, had been in remission past 7 yrs), hypothyroidism 2/2 Hashimoto's thyroiditis, HTN, anxiety and depression who presents with pyoderma gangrenosum pain. She had 2 prior admissions this year for pain related to her pyoderma gangrenosum and presents today for pain and worsening pyoderma not relieved by her home PO opiates. She is currently having pain most significantly from lesions on the R breast and shoulder, and also reports painful lesions on her abdomen and legs. She states her lesions have changed as they used to be broader in area but now appear deeper. She currently uses clobetasol cream at home which her  helps her to apply to the wounds. She also ecchymotic on her arms that have appeared over the past 3 weeks, not associated with trauma or pain. She notes LE swelling that is new but is unsure when this started, and does endorse some increased BALDERAS recently. The patient states she has not followed with Rheumatology in some time. She states she is supposed to be on Plaquenil but has not taken it in several months due to cost.  Additionally, she complains of memory loss and episodes of disorientation over the past 6 months. Patient denies fevers, chills, N/V, diarrhea, constipation, palpitations, chest pain, vision changes, dizziness, headaches.    Initial ED Vitals: T 98.2, , /120, RR 20/  Initial Labs: WBC 6.38, Hgb 11.8, Plt 204, BUN 15, Cr .99 (baseline ~.75 in Feb), LFTs wnl.  Initial Imaging: Bedside ED LE Doppler performed, no DVT observed.  In the ED, the patient received Toradol 15 mg IV, morphine 4 mg IV x2.    Patient admitted for further management of her dermatological conditions. Wound care was consulted who recommended clobetosol dressing twice a day. Dermatology was also consulted who did not recommend any inpatient workup. All lesions on patients body were examined (left leg, chest, breasts, arms and back) and patient was noted to have multiple ulcers, some deeper than other, without evidence of overlying infection or cellulitis. Furthermore, patient had ecchymotic lesions on her arms b/l, one which she states appeared suddenly during hospitalization. Ecchymoses were non blanching and non painful. In regards to her labs, patient was found to be afebrile with no signs of infection. She did have elevated blood pressure on admission, likely secondary to anxiety and pain. During hospitalization, patient's mood was found to be labile, and patient had some trouble with memory recall, however was AxOx3.     Of note, patient found to have markedly decreased B12 on admission, and was given IM B12 x1 and started on PO b12. She will need to follow up outpatient with her PCP for further management.     Given patient HD stable without evidence of infection, acute lupus flare, and pain controlled, she will be discharged with close follow up with her PCP, dermatology and rheumatology. All her providers will be contacted via [employer] Portal to ensure that they are aware of her admission, and it can aid in obtaining earlier follow up appointments (Dr. Joseph, Dr. Sidhu and Dr. Chisholm were all tasked via [employer]). 62F PMH SLE, Sjogren's, pyoderma gangrenosum (dx 15 yrs ago, bx 2007, s/p prednisone [id number] and thalidomide treatments, had been in remission past 7 yrs), hypothyroidism 2/2 Hashimoto's thyroiditis, HTN, anxiety and depression who presents with pyoderma gangrenosum pain. She had 2 prior admissions this year for pain related to her pyoderma gangrenosum and presents today for pain and worsening pyoderma not relieved by her home PO opiates. She is currently having pain most significantly from lesions on the R breast and shoulder, and also reports painful lesions on her abdomen and legs. She states her lesions have changed as they used to be broader in area but now appear deeper. She currently uses clobetasol cream at home which her  helps her to apply to the wounds. She also ecchymotic on her arms that have appeared over the past 3 weeks, not associated with trauma or pain. She notes LE swelling that is new but is unsure when this started, and does endorse some increased BALDERAS recently. The patient states she has not followed with Rheumatology in some time. She states she is supposed to be on Plaquenil but has not taken it in several months due to cost.  Additionally, she complains of memory loss and episodes of disorientation over the past 6 months. Patient denies fevers, chills, N/V, diarrhea, constipation, palpitations, chest pain, vision changes, dizziness, headaches.    Initial ED Vitals: T 98.2, , /120, RR 20/  Initial Labs: WBC 6.38, Hgb 11.8, Plt 204, BUN 15, Cr .99 (baseline ~.75 in Feb), LFTs wnl.  Initial Imaging: Bedside ED LE Doppler performed, no DVT observed.  In the ED, the patient received Toradol 15 mg IV, morphine 4 mg IV x2.    Patient admitted for further management of her dermatological conditions. Wound care was consulted who recommended clobetosol dressing twice a day. Dermatology (well known to their service) was also consulted who did not recommend any inpatient workup given their suspicion for self-inflicted wounds and neurodermatitis. All lesions on patients body were examined (left leg, chest, breasts, arms and back) and patient was noted to have multiple ulcers, some deeper than other, without evidence of overlying infection or cellulitis. Furthermore, patient had ecchymotic lesions on her arms b/l, one which she states appeared suddenly during hospitalization. Ecchymoses were non blanching and non painful. In regards to her labs, patient was found to be afebrile with no signs of infection. She did have elevated blood pressure on admission, likely secondary to anxiety and pain. During hospitalization, patient's mood was found to be labile, and patient had some trouble with memory recall, however was AxOx3.     Of note, patient found to have markedly decreased B12 on admission, and was given IM B12 x1 and started on PO b12. She will need to follow up outpatient with her PCP for further management.     Given patient HD stable without evidence of infection, acute lupus flare, and pain controlled, she will be discharged with close follow up with her PCP, dermatology and rheumatology. All her providers will be contacted via [employer] Portal to ensure that they are aware of her admission, and it can aid in obtaining earlier follow up appointments (Dr. Joseph, Dr. Sidhu and Dr. Chisholm were all tasked via [employer]).

## 2018-05-05 ENCOUNTER — OTHER (OUTPATIENT)
Age: 63
End: 2018-05-05

## 2018-05-07 LAB
C-ANCA SER-ACNC: NEGATIVE — SIGNIFICANT CHANGE UP
P-ANCA SER-ACNC: NEGATIVE — SIGNIFICANT CHANGE UP

## 2018-05-18 ENCOUNTER — LABORATORY RESULT (OUTPATIENT)
Age: 63
End: 2018-05-18

## 2018-05-18 ENCOUNTER — APPOINTMENT (OUTPATIENT)
Dept: DERMATOLOGY | Facility: CLINIC | Age: 63
End: 2018-05-18
Payer: COMMERCIAL

## 2018-05-18 VITALS — DIASTOLIC BLOOD PRESSURE: 92 MMHG | SYSTOLIC BLOOD PRESSURE: 148 MMHG

## 2018-05-18 DIAGNOSIS — D48.5 NEOPLASM OF UNCERTAIN BEHAVIOR OF SKIN: ICD-10-CM

## 2018-05-18 PROCEDURE — 11100 BX SKIN SUBCUTANEOUS&/MUCOUS MEMBRANE 1 LESION: CPT

## 2018-05-18 PROCEDURE — 99213 OFFICE O/P EST LOW 20 MIN: CPT | Mod: 25

## 2018-05-29 ENCOUNTER — OTHER (OUTPATIENT)
Age: 63
End: 2018-05-29

## 2018-05-30 ENCOUNTER — RESULT REVIEW (OUTPATIENT)
Age: 63
End: 2018-05-30

## 2018-06-01 ENCOUNTER — APPOINTMENT (OUTPATIENT)
Dept: DERMATOLOGY | Facility: CLINIC | Age: 63
End: 2018-06-01
Payer: COMMERCIAL

## 2018-06-01 VITALS — SYSTOLIC BLOOD PRESSURE: 146 MMHG | DIASTOLIC BLOOD PRESSURE: 88 MMHG

## 2018-06-01 PROCEDURE — 99212 OFFICE O/P EST SF 10 MIN: CPT

## 2018-06-22 ENCOUNTER — APPOINTMENT (OUTPATIENT)
Dept: WOUND CARE | Facility: CLINIC | Age: 63
End: 2018-06-22
Payer: COMMERCIAL

## 2018-06-22 VITALS
HEART RATE: 74 BPM | BODY MASS INDEX: 35.78 KG/M2 | RESPIRATION RATE: 18 BRPM | WEIGHT: 202 LBS | DIASTOLIC BLOOD PRESSURE: 95 MMHG | TEMPERATURE: 97.7 F | SYSTOLIC BLOOD PRESSURE: 164 MMHG

## 2018-06-22 PROCEDURE — 99204 OFFICE O/P NEW MOD 45 MIN: CPT

## 2018-07-03 ENCOUNTER — RX RENEWAL (OUTPATIENT)
Age: 63
End: 2018-07-03

## 2018-07-11 ENCOUNTER — APPOINTMENT (OUTPATIENT)
Dept: WOUND CARE | Facility: CLINIC | Age: 63
End: 2018-07-11

## 2018-07-24 PROBLEM — F19.959: Chronic | Status: ACTIVE | Noted: 2018-05-04

## 2018-07-24 PROBLEM — L88 PYODERMA GANGRENOSUM: Chronic | Status: ACTIVE | Noted: 2018-01-24

## 2018-07-26 ENCOUNTER — APPOINTMENT (OUTPATIENT)
Dept: INTERNAL MEDICINE | Facility: CLINIC | Age: 63
End: 2018-07-26

## 2018-08-09 ENCOUNTER — APPOINTMENT (OUTPATIENT)
Dept: INTERNAL MEDICINE | Facility: CLINIC | Age: 63
End: 2018-08-09
Payer: COMMERCIAL

## 2018-08-09 VITALS
DIASTOLIC BLOOD PRESSURE: 70 MMHG | RESPIRATION RATE: 16 BRPM | SYSTOLIC BLOOD PRESSURE: 100 MMHG | HEART RATE: 72 BPM | OXYGEN SATURATION: 96 % | TEMPERATURE: 98 F

## 2018-08-09 DIAGNOSIS — L30.4 ERYTHEMA INTERTRIGO: ICD-10-CM

## 2018-08-09 PROCEDURE — 36415 COLL VENOUS BLD VENIPUNCTURE: CPT

## 2018-08-09 PROCEDURE — 99214 OFFICE O/P EST MOD 30 MIN: CPT | Mod: 25

## 2018-08-10 NOTE — ASSESSMENT
[FreeTextEntry1] : 63 yo F pmhx anxiety/depression, HTN, hypothyroid, GERD, Pyoderma gangrenosum, SLE with chronic pain here for acute visit\par \par \par skin rash- likely candidal\par -will tx with clotrimazole\par -advised to keep areas dry\par -advised derm f/u if not improved\par \par HTN- asx\par -on enalapril and metoprolol\par -check bmp\par \par hypothyroid- \par -10/17 TSH 5.2 with Rx increased to 150\par -on levothyroxine 150, reports taking on empty stomach and is adherent.  Short refill given as only has 2 pills left.\par -check TSH \par \par anxiety/depression- reports stable mood\par -followed by psych, on meds\par \par hx chronic pain- reports stable generalized pains 2/2 SLE\par -followed by pain mgmnt- states seen monthly, has appt in 10 days\par -on percocet prn, morphine ER prn- unsure of doses, advised to provide doses to PMD Dr. Chisholm at next f/u appt\par -rheum appt pending 8/18\par \par \par Pt's cell: [phone]

## 2018-08-10 NOTE — REVIEW OF SYSTEMS
[Negative] : Neurological [Dizziness] : no dizziness [FreeTextEntry9] : see HPI [de-identified] : see HPI [de-identified] : see HPI

## 2018-08-10 NOTE — PHYSICAL EXAM
[No Acute Distress] : no acute distress [Normal Sclera/Conjunctiva] : normal sclera/conjunctiva [PERRL] : pupils equal round and reactive to light [Normal Oropharynx] : the oropharynx was normal [Supple] : supple [No Respiratory Distress] : no respiratory distress  [Clear to Auscultation] : lungs were clear to auscultation bilaterally [Normal Rate] : normal rate  [Regular Rhythm] : with a regular rhythm [Normal S1, S2] : normal S1 and S2 [No Murmur] : no murmur heard [No Edema] : there was no peripheral edema [Soft] : abdomen soft [Non Tender] : non-tender [No HSM] : no HSM [Normal Gait] : normal gait [Normal Affect] : the affect was normal [de-identified] : obese [de-identified] : right under breast/left groin/abdominal fold: macular erythema with some scaling and satellite lesions, no tenderness, open wounds or d/c

## 2018-08-10 NOTE — HISTORY OF PRESENT ILLNESS
[FreeTextEntry8] : \par 61 yo F pmhx anxiety/depression, HTN, hypothyroid, GERD, Pyoderma gangrenosum, SLE with chronic pain here for acute visit\par \par Needs med refill- requests to new pharmacy, [facility]\par \par \par c/o rash under left breast, left groin and under abdominal fold - hx sx's on/off treated with topical for fungus per pt.  \par -followed by derm for PG/SLE, reports healed lesions\par \par HTN-\par -on enalapril and metoprolol, adherent\par \par hypothyroid- \par -on levothyroxine 150, reports taking on empty stomach and is adherent\par \par anxiety/depression- reports stable mood\par -followed by psych, on meds- seen last few months ago, considering therapy\par \par hx chronic pain- reports stable generalized pains 2/2 SLE\par -followed by pain mgmnt- states seen monthly, has appt in 10 days\par -on percocet prn, morphine ER prn- unsure of doses\par

## 2018-08-11 LAB
ANION GAP SERPL CALC-SCNC: 13 MMOL/L
BUN SERPL-MCNC: 7 MG/DL
CALCIUM SERPL-MCNC: 8.6 MG/DL
CHLORIDE SERPL-SCNC: 100 MMOL/L
CO2 SERPL-SCNC: 27 MMOL/L
CREAT SERPL-MCNC: 1.1 MG/DL
GLUCOSE SERPL-MCNC: 83 MG/DL
POTASSIUM SERPL-SCNC: 4 MMOL/L
SODIUM SERPL-SCNC: 140 MMOL/L
TSH SERPL-ACNC: 2.14 UIU/ML

## 2018-08-13 ENCOUNTER — APPOINTMENT (OUTPATIENT)
Dept: INTERNAL MEDICINE | Facility: CLINIC | Age: 63
End: 2018-08-13

## 2018-08-24 ENCOUNTER — APPOINTMENT (OUTPATIENT)
Dept: DERMATOLOGY | Facility: CLINIC | Age: 63
End: 2018-08-24

## 2018-08-24 VITALS
TEMPERATURE: 99 F | SYSTOLIC BLOOD PRESSURE: 166 MMHG | OXYGEN SATURATION: 100 % | RESPIRATION RATE: 14 BRPM | HEART RATE: 87 BPM | DIASTOLIC BLOOD PRESSURE: 72 MMHG

## 2018-08-24 NOTE — ED ADULT TRIAGE NOTE - CHIEF COMPLAINT QUOTE
Pt c/o subjective fevers, cough, malaise, green sputum. Pt strange affect, states hx of depression but is not SI/HI. VSS at this time. Hx of SLE, polyderma gangrenosum, hypothyroidism, depression, HTN.

## 2018-08-25 ENCOUNTER — INPATIENT (INPATIENT)
Facility: HOSPITAL | Age: 63
LOS: 1 days | Discharge: HOME | End: 2018-08-27
Attending: HOSPITALIST | Admitting: HOSPITALIST
Payer: COMMERCIAL

## 2018-08-25 DIAGNOSIS — F32.9 MAJOR DEPRESSIVE DISORDER, SINGLE EPISODE, UNSPECIFIED: ICD-10-CM

## 2018-08-25 DIAGNOSIS — M32.9 SYSTEMIC LUPUS ERYTHEMATOSUS, UNSPECIFIED: ICD-10-CM

## 2018-08-25 DIAGNOSIS — Z65.8 OTHER SPECIFIED PROBLEMS RELATED TO PSYCHOSOCIAL CIRCUMSTANCES: ICD-10-CM

## 2018-08-25 DIAGNOSIS — J12.9 VIRAL PNEUMONIA, UNSPECIFIED: ICD-10-CM

## 2018-08-25 DIAGNOSIS — Z98.890 OTHER SPECIFIED POSTPROCEDURAL STATES: Chronic | ICD-10-CM

## 2018-08-25 DIAGNOSIS — I10 ESSENTIAL (PRIMARY) HYPERTENSION: ICD-10-CM

## 2018-08-25 DIAGNOSIS — Z98.89 OTHER SPECIFIED POSTPROCEDURAL STATES: Chronic | ICD-10-CM

## 2018-08-25 DIAGNOSIS — Z29.9 ENCOUNTER FOR PROPHYLACTIC MEASURES, UNSPECIFIED: ICD-10-CM

## 2018-08-25 LAB
ALBUMIN SERPL ELPH-MCNC: 3.8 G/DL — SIGNIFICANT CHANGE UP (ref 3.3–5)
ALP SERPL-CCNC: 69 U/L — SIGNIFICANT CHANGE UP (ref 40–120)
ALT FLD-CCNC: 18 U/L — SIGNIFICANT CHANGE UP (ref 4–33)
APPEARANCE UR: CLEAR — SIGNIFICANT CHANGE UP
AST SERPL-CCNC: 38 U/L — HIGH (ref 4–32)
B PERT DNA SPEC QL NAA+PROBE: SIGNIFICANT CHANGE UP
BASE EXCESS BLDV CALC-SCNC: -1.5 MMOL/L — SIGNIFICANT CHANGE UP
BASOPHILS # BLD AUTO: 0.03 K/UL — SIGNIFICANT CHANGE UP (ref 0–0.2)
BASOPHILS NFR BLD AUTO: 0.4 % — SIGNIFICANT CHANGE UP (ref 0–2)
BILIRUB SERPL-MCNC: 1 MG/DL — SIGNIFICANT CHANGE UP (ref 0.2–1.2)
BILIRUB UR-MCNC: NEGATIVE — SIGNIFICANT CHANGE UP
BLOOD GAS VENOUS - CREATININE: 0.67 MG/DL — SIGNIFICANT CHANGE UP (ref 0.5–1.3)
BLOOD UR QL VISUAL: NEGATIVE — SIGNIFICANT CHANGE UP
BUN SERPL-MCNC: 8 MG/DL — SIGNIFICANT CHANGE UP (ref 7–23)
C PNEUM DNA SPEC QL NAA+PROBE: NOT DETECTED — SIGNIFICANT CHANGE UP
CALCIUM SERPL-MCNC: 9.2 MG/DL — SIGNIFICANT CHANGE UP (ref 8.4–10.5)
CHLORIDE BLDV-SCNC: 103 MMOL/L — SIGNIFICANT CHANGE UP (ref 96–108)
CHLORIDE SERPL-SCNC: 98 MMOL/L — SIGNIFICANT CHANGE UP (ref 98–107)
CO2 SERPL-SCNC: 22 MMOL/L — SIGNIFICANT CHANGE UP (ref 22–31)
COLOR SPEC: SIGNIFICANT CHANGE UP
CREAT SERPL-MCNC: 0.71 MG/DL — SIGNIFICANT CHANGE UP (ref 0.5–1.3)
EOSINOPHIL # BLD AUTO: 0.19 K/UL — SIGNIFICANT CHANGE UP (ref 0–0.5)
EOSINOPHIL NFR BLD AUTO: 2.5 % — SIGNIFICANT CHANGE UP (ref 0–6)
FLUAV H1 2009 PAND RNA SPEC QL NAA+PROBE: NOT DETECTED — SIGNIFICANT CHANGE UP
FLUAV H1 RNA SPEC QL NAA+PROBE: NOT DETECTED — SIGNIFICANT CHANGE UP
FLUAV H3 RNA SPEC QL NAA+PROBE: NOT DETECTED — SIGNIFICANT CHANGE UP
FLUAV SUBTYP SPEC NAA+PROBE: SIGNIFICANT CHANGE UP
FLUBV RNA SPEC QL NAA+PROBE: NOT DETECTED — SIGNIFICANT CHANGE UP
GAS PNL BLDV: 138 MMOL/L — SIGNIFICANT CHANGE UP (ref 136–146)
GLUCOSE BLDV-MCNC: 80 — SIGNIFICANT CHANGE UP (ref 70–99)
GLUCOSE SERPL-MCNC: 74 MG/DL — SIGNIFICANT CHANGE UP (ref 70–99)
GLUCOSE UR-MCNC: NEGATIVE — SIGNIFICANT CHANGE UP
GRAM STN SPT: SIGNIFICANT CHANGE UP
HADV DNA SPEC QL NAA+PROBE: NOT DETECTED — SIGNIFICANT CHANGE UP
HCO3 BLDV-SCNC: 22 MMOL/L — SIGNIFICANT CHANGE UP (ref 20–27)
HCOV 229E RNA SPEC QL NAA+PROBE: NOT DETECTED — SIGNIFICANT CHANGE UP
HCOV HKU1 RNA SPEC QL NAA+PROBE: NOT DETECTED — SIGNIFICANT CHANGE UP
HCOV NL63 RNA SPEC QL NAA+PROBE: NOT DETECTED — SIGNIFICANT CHANGE UP
HCOV OC43 RNA SPEC QL NAA+PROBE: NOT DETECTED — SIGNIFICANT CHANGE UP
HCT VFR BLD CALC: 40 % — SIGNIFICANT CHANGE UP (ref 34.5–45)
HCT VFR BLDV CALC: 41.5 % — SIGNIFICANT CHANGE UP (ref 34.5–45)
HGB BLD-MCNC: 12.5 G/DL — SIGNIFICANT CHANGE UP (ref 11.5–15.5)
HGB BLDV-MCNC: 13.5 G/DL — SIGNIFICANT CHANGE UP (ref 11.5–15.5)
HMPV RNA SPEC QL NAA+PROBE: NOT DETECTED — SIGNIFICANT CHANGE UP
HPIV1 RNA SPEC QL NAA+PROBE: NOT DETECTED — SIGNIFICANT CHANGE UP
HPIV2 RNA SPEC QL NAA+PROBE: NOT DETECTED — SIGNIFICANT CHANGE UP
HPIV3 RNA SPEC QL NAA+PROBE: NOT DETECTED — SIGNIFICANT CHANGE UP
HPIV4 RNA SPEC QL NAA+PROBE: NOT DETECTED — SIGNIFICANT CHANGE UP
IMM GRANULOCYTES # BLD AUTO: 0.02 # — SIGNIFICANT CHANGE UP
IMM GRANULOCYTES NFR BLD AUTO: 0.3 % — SIGNIFICANT CHANGE UP (ref 0–1.5)
KETONES UR-MCNC: HIGH
LACTATE BLDV-MCNC: 1.5 MMOL/L — SIGNIFICANT CHANGE UP (ref 0.5–2)
LEUKOCYTE ESTERASE UR-ACNC: NEGATIVE — SIGNIFICANT CHANGE UP
LYMPHOCYTES # BLD AUTO: 1.24 K/UL — SIGNIFICANT CHANGE UP (ref 1–3.3)
LYMPHOCYTES # BLD AUTO: 16.4 % — SIGNIFICANT CHANGE UP (ref 13–44)
M PNEUMO DNA SPEC QL NAA+PROBE: NOT DETECTED — SIGNIFICANT CHANGE UP
MCHC RBC-ENTMCNC: 28.7 PG — SIGNIFICANT CHANGE UP (ref 27–34)
MCHC RBC-ENTMCNC: 31.3 % — LOW (ref 32–36)
MCV RBC AUTO: 92 FL — SIGNIFICANT CHANGE UP (ref 80–100)
MONOCYTES # BLD AUTO: 0.7 K/UL — SIGNIFICANT CHANGE UP (ref 0–0.9)
MONOCYTES NFR BLD AUTO: 9.2 % — SIGNIFICANT CHANGE UP (ref 2–14)
NEUTROPHILS # BLD AUTO: 5.39 K/UL — SIGNIFICANT CHANGE UP (ref 1.8–7.4)
NEUTROPHILS NFR BLD AUTO: 71.2 % — SIGNIFICANT CHANGE UP (ref 43–77)
NITRITE UR-MCNC: NEGATIVE — SIGNIFICANT CHANGE UP
NRBC # FLD: 0 — SIGNIFICANT CHANGE UP
PCO2 BLDV: 48 MMHG — SIGNIFICANT CHANGE UP (ref 41–51)
PH BLDV: 7.31 PH — LOW (ref 7.32–7.43)
PH UR: 5.5 — SIGNIFICANT CHANGE UP (ref 5–8)
PLATELET # BLD AUTO: 196 K/UL — SIGNIFICANT CHANGE UP (ref 150–400)
PMV BLD: 12 FL — SIGNIFICANT CHANGE UP (ref 7–13)
PO2 BLDV: 58 MMHG — HIGH (ref 35–40)
POTASSIUM BLDV-SCNC: 3.9 MMOL/L — SIGNIFICANT CHANGE UP (ref 3.4–4.5)
POTASSIUM SERPL-MCNC: 5.2 MMOL/L — SIGNIFICANT CHANGE UP (ref 3.5–5.3)
POTASSIUM SERPL-SCNC: 5.2 MMOL/L — SIGNIFICANT CHANGE UP (ref 3.5–5.3)
PROT SERPL-MCNC: 7.6 G/DL — SIGNIFICANT CHANGE UP (ref 6–8.3)
PROT UR-MCNC: NEGATIVE — SIGNIFICANT CHANGE UP
RBC # BLD: 4.35 M/UL — SIGNIFICANT CHANGE UP (ref 3.8–5.2)
RBC # FLD: 13.1 % — SIGNIFICANT CHANGE UP (ref 10.3–14.5)
RSV RNA SPEC QL NAA+PROBE: NOT DETECTED — SIGNIFICANT CHANGE UP
RV+EV RNA SPEC QL NAA+PROBE: POSITIVE — HIGH
SAO2 % BLDV: 87.7 % — HIGH (ref 60–85)
SODIUM SERPL-SCNC: 138 MMOL/L — SIGNIFICANT CHANGE UP (ref 135–145)
SP GR SPEC: 1.01 — SIGNIFICANT CHANGE UP (ref 1–1.04)
SPECIMEN SOURCE: SIGNIFICANT CHANGE UP
TSH SERPL-MCNC: 0.54 UIU/ML — SIGNIFICANT CHANGE UP (ref 0.27–4.2)
UROBILINOGEN FLD QL: NORMAL — SIGNIFICANT CHANGE UP
WBC # BLD: 7.57 K/UL — SIGNIFICANT CHANGE UP (ref 3.8–10.5)
WBC # FLD AUTO: 7.57 K/UL — SIGNIFICANT CHANGE UP (ref 3.8–10.5)

## 2018-08-25 PROCEDURE — 99223 1ST HOSP IP/OBS HIGH 75: CPT | Mod: GC

## 2018-08-25 PROCEDURE — 71046 X-RAY EXAM CHEST 2 VIEWS: CPT | Mod: 26

## 2018-08-25 RX ORDER — METOPROLOL TARTRATE 50 MG
1 TABLET ORAL
Qty: 0 | Refills: 0 | COMMUNITY

## 2018-08-25 RX ORDER — CEFTRIAXONE 500 MG/1
1 INJECTION, POWDER, FOR SOLUTION INTRAMUSCULAR; INTRAVENOUS ONCE
Qty: 0 | Refills: 0 | Status: COMPLETED | OUTPATIENT
Start: 2018-08-25 | End: 2018-08-25

## 2018-08-25 RX ORDER — METOPROLOL TARTRATE 50 MG
25 TABLET ORAL DAILY
Qty: 0 | Refills: 0 | Status: DISCONTINUED | OUTPATIENT
Start: 2018-08-25 | End: 2018-08-25

## 2018-08-25 RX ORDER — FLUOXETINE HCL 10 MG
80 CAPSULE ORAL DAILY
Qty: 0 | Refills: 0 | Status: DISCONTINUED | OUTPATIENT
Start: 2018-08-25 | End: 2018-08-27

## 2018-08-25 RX ORDER — LEVOTHYROXINE SODIUM 125 MCG
1 TABLET ORAL
Qty: 0 | Refills: 0 | COMMUNITY

## 2018-08-25 RX ORDER — LEVOTHYROXINE SODIUM 125 MCG
150 TABLET ORAL DAILY
Qty: 0 | Refills: 0 | Status: DISCONTINUED | OUTPATIENT
Start: 2018-08-25 | End: 2018-08-27

## 2018-08-25 RX ORDER — IPRATROPIUM/ALBUTEROL SULFATE 18-103MCG
3 AEROSOL WITH ADAPTER (GRAM) INHALATION EVERY 6 HOURS
Qty: 0 | Refills: 0 | Status: DISCONTINUED | OUTPATIENT
Start: 2018-08-25 | End: 2018-08-27

## 2018-08-25 RX ORDER — ACETAMINOPHEN 500 MG
650 TABLET ORAL EVERY 6 HOURS
Qty: 0 | Refills: 0 | Status: DISCONTINUED | OUTPATIENT
Start: 2018-08-25 | End: 2018-08-27

## 2018-08-25 RX ORDER — AZITHROMYCIN 500 MG/1
500 TABLET, FILM COATED ORAL ONCE
Qty: 0 | Refills: 0 | Status: COMPLETED | OUTPATIENT
Start: 2018-08-25 | End: 2018-08-25

## 2018-08-25 RX ORDER — MORPHINE SULFATE 50 MG/1
15 CAPSULE, EXTENDED RELEASE ORAL EVERY 12 HOURS
Qty: 0 | Refills: 0 | Status: DISCONTINUED | OUTPATIENT
Start: 2018-08-25 | End: 2018-08-27

## 2018-08-25 RX ORDER — IPRATROPIUM/ALBUTEROL SULFATE 18-103MCG
3 AEROSOL WITH ADAPTER (GRAM) INHALATION ONCE
Qty: 0 | Refills: 0 | Status: COMPLETED | OUTPATIENT
Start: 2018-08-25 | End: 2018-08-25

## 2018-08-25 RX ORDER — BENZOCAINE AND MENTHOL 5; 1 G/100ML; G/100ML
1 LIQUID ORAL THREE TIMES A DAY
Qty: 0 | Refills: 0 | Status: DISCONTINUED | OUTPATIENT
Start: 2018-08-25 | End: 2018-08-27

## 2018-08-25 RX ORDER — METOPROLOL TARTRATE 50 MG
25 TABLET ORAL DAILY
Qty: 0 | Refills: 0 | Status: DISCONTINUED | OUTPATIENT
Start: 2018-08-25 | End: 2018-08-27

## 2018-08-25 RX ORDER — TRAZODONE HCL 50 MG
1 TABLET ORAL
Qty: 0 | Refills: 0 | COMMUNITY

## 2018-08-25 RX ORDER — ACETAMINOPHEN 500 MG
975 TABLET ORAL ONCE
Qty: 0 | Refills: 0 | Status: COMPLETED | OUTPATIENT
Start: 2018-08-25 | End: 2018-08-25

## 2018-08-25 RX ADMIN — CEFTRIAXONE 100 GRAM(S): 500 INJECTION, POWDER, FOR SOLUTION INTRAMUSCULAR; INTRAVENOUS at 06:16

## 2018-08-25 RX ADMIN — CEFTRIAXONE 1 GRAM(S): 500 INJECTION, POWDER, FOR SOLUTION INTRAMUSCULAR; INTRAVENOUS at 09:18

## 2018-08-25 RX ADMIN — AZITHROMYCIN 500 MILLIGRAM(S): 500 TABLET, FILM COATED ORAL at 08:18

## 2018-08-25 RX ADMIN — Medication 3 MILLILITER(S): at 17:05

## 2018-08-25 RX ADMIN — Medication 25 MILLIGRAM(S): at 15:25

## 2018-08-25 RX ADMIN — Medication 80 MILLIGRAM(S): at 17:36

## 2018-08-25 RX ADMIN — MORPHINE SULFATE 15 MILLIGRAM(S): 50 CAPSULE, EXTENDED RELEASE ORAL at 15:26

## 2018-08-25 RX ADMIN — Medication 10 MILLIGRAM(S): at 15:25

## 2018-08-25 RX ADMIN — MORPHINE SULFATE 15 MILLIGRAM(S): 50 CAPSULE, EXTENDED RELEASE ORAL at 16:00

## 2018-08-25 RX ADMIN — AZITHROMYCIN 250 MILLIGRAM(S): 500 TABLET, FILM COATED ORAL at 06:58

## 2018-08-25 RX ADMIN — Medication 3 MILLILITER(S): at 04:45

## 2018-08-25 RX ADMIN — Medication 150 MICROGRAM(S): at 15:25

## 2018-08-25 RX ADMIN — Medication 975 MILLIGRAM(S): at 05:36

## 2018-08-25 RX ADMIN — Medication 1 APPLICATION(S): at 22:38

## 2018-08-25 NOTE — H&P ADULT - PROBLEM SELECTOR PLAN 1
- mild diffuse wheezing on exam, otherwise lungs clear, sating 97% on RA  - CXR clear, WBC count normal  - low suspicion for bacterial pneumonia - will watch off antibiotics - mild diffuse wheezing on exam, otherwise lungs clear, sating 97% on RA  - preceding upper respiratory symptoms   - RVP + rhinorvirus   - CXR clear, WBC count normal  - low suspicion for bacterial pneumonia - will watch off antibiotics  - continue supportive care w/ tessalon ramon, cephacol, tylenol prn, duonebs prn  - f.u sputum culture

## 2018-08-25 NOTE — H&P ADULT - NSHPLABSRESULTS_GEN_ALL_CORE
12.5   7.57  )-----------( 196      ( 25 Aug 2018 04:00 )             40.0           138  |  98  |  8   ----------------------------<  74  5.2   |  22  |  0.71    Ca    9.2      25 Aug 2018 04:00    TPro  7.6  /  Alb  3.8  /  TBili  1.0  /  DBili  x   /  AST  38<H>  /  ALT  18  /  AlkPhos  69                Urinalysis Basic - ( 25 Aug 2018 09:47 )    Color: LIGHT YELLOW / Appearance: CLEAR / S.008 / pH: 5.5  Gluc: NEGATIVE / Ketone: MODERATE  / Bili: NEGATIVE / Urobili: NORMAL   Blood: NEGATIVE / Protein: NEGATIVE / Nitrite: NEGATIVE   Leuk Esterase: NEGATIVE / RBC: x / WBC x   Sq Epi: x / Non Sq Epi: x / Bacteria: x    RVP: Positive for Enterovirus/Rhinovirus         Lactate Trend            CAPILLARY BLOOD GLUCOSE      < from: Xray Chest 2 Views PA/Lat (18 @ 04:15) >      EXAM:  XR CHEST PA LAT 2V        PROCEDURE DATE:  Aug 25 2018         INTERPRETATION:    CLINICAL INDICATION: Cough.    TECHNIQUE: PA and lateral views of the chest.    COMPARISON: Chest radiograph 2018.    IMPRESSION:  Clear lungs. No pleural effusions or pneumothorax.    Stable cardiac and mediastinal silhouettes.     Trachea midline.    Redemonstrated generalized mild osteopenia and spinal degenerative change. Labs and imaging personally reviewed               12.5   7.57  )-----------( 196      ( 25 Aug 2018 04:00 )             40.0           138  |  98  |  8   ----------------------------<  74  5.2   |  22  |  0.71    Ca    9.2      25 Aug 2018 04:00    TPro  7.6  /  Alb  3.8  /  TBili  1.0  /  DBili  x   /  AST  38<H>  /  ALT  18  /  AlkPhos  69                Urinalysis Basic - ( 25 Aug 2018 09:47 )    Color: LIGHT YELLOW / Appearance: CLEAR / S.008 / pH: 5.5  Gluc: NEGATIVE / Ketone: MODERATE  / Bili: NEGATIVE / Urobili: NORMAL   Blood: NEGATIVE / Protein: NEGATIVE / Nitrite: NEGATIVE   Leuk Esterase: NEGATIVE / RBC: x / WBC x   Sq Epi: x / Non Sq Epi: x / Bacteria: x    RVP: Positive for Enterovirus/Rhinovirus         Lactate Trend            CAPILLARY BLOOD GLUCOSE      < from: Xray Chest 2 Views PA/Lat (18 @ 04:15) >      EXAM:  XR CHEST PA LAT 2V        PROCEDURE DATE:  Aug 25 2018         INTERPRETATION:    CLINICAL INDICATION: Cough.    TECHNIQUE: PA and lateral views of the chest.    COMPARISON: Chest radiograph 2018.    IMPRESSION:  Clear lungs. No pleural effusions or pneumothorax.    Stable cardiac and mediastinal silhouettes.     Trachea midline.    Redemonstrated generalized mild osteopenia and spinal degenerative change.

## 2018-08-25 NOTE — ED ADULT NURSE NOTE - OBJECTIVE STATEMENT
62y F AaOx3 c/o fever/ abdominal pain and lesions for 3 days. pt presents with ED with Bilateral upper and lower wheezing.  pt afebrile on initial assessment pt presents with bilateral lower quadrant pain of a 8/10. pt presents with lessions on arms, belly, chest, legs. pt noted with bilateral ankle and foot swelling. pt labs collected and sent, will continue to monitor
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
## 2018-08-25 NOTE — ED PROVIDER NOTE - FAMILY HISTORY
Mother  Still living? Unknown  Family history of pancreatic cancer, Age at diagnosis: Age Unknown     Father  Still living? Unknown  Family history of coronary artery disease, Age at diagnosis: Age Unknown

## 2018-08-25 NOTE — ED PROVIDER NOTE - MEDICAL DECISION MAKING DETAILS
62 F PMH autoimmune conditions on plaquenil p/w persistent cough assoc with fevers and SOB. Will obtain CXR to eval for pneumonia and probably admit 2/2 immunosuppression and prolonged worsening symptoms.

## 2018-08-25 NOTE — H&P ADULT - NSHPREVIEWOFSYSTEMS_GEN_ALL_CORE
REVIEW OF SYSTEMS:    CONSTITUTIONAL: +fatigue,  fevers or chills  EYES/ENT: No visual changes;  +sore throat   NECK: No pain or stiffness  RESPIRATORY: + cough, wheezing, no hemoptysis; no current SOB   CARDIOVASCULAR: No chest pain or palpitations  GASTROINTESTINAL: No abdominal or epigastric pain. No nausea, vomiting, or hematemesis; No diarrhea or constipation. No melena or hematochezia.  GENITOURINARY: No dysuria, frequency or hematuria  NEUROLOGICAL: No numbness or weakness  SKIN: No itching, rashes, chronic patchy scaling and redness

## 2018-08-25 NOTE — ED ADULT NURSE NOTE - PMH
Depression    HTN (hypertension)    Hypothyroid    Pyoderma gangrenosum    SLE (systemic lupus erythematosus)    Steroid-induced psychosis with complication
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
## 2018-08-25 NOTE — ED ADULT NURSE NOTE - NSIMPLEMENTINTERV_GEN_ALL_ED
Implemented All Universal Safety Interventions:  [place] to call system. Call bell, personal items and telephone within reach. Instruct patient to call for assistance. Room bathroom lighting operational. Non-slip footwear when patient is off stretcher. Physically safe environment: no spills, clutter or unnecessary equipment. Stretcher in lowest position, wheels locked, appropriate side rails in place.

## 2018-08-25 NOTE — ED ADULT NURSE REASSESSMENT NOTE - NS ED NURSE REASSESS COMMENT FT1
Received report from Brennen SANTAMARIA. Pt is A/Ox 3. respirations  even and unlabored. Lung sounds clear with equal chest rise bilaterally. ABD is soft, non tender, non distended, with normal active bowel sounds. No complaints of headache, chest pain, nausea, vomiting, fever, chills, SOB and dizziness noted. pt complains chronic generalized body pain due to lupus. Pt has multiple ulcerations noted to her skin with no drainage noted. Pt has 22g to top of right hand with no swelling or redness noted. Will continue to monitor.

## 2018-08-25 NOTE — ED ADULT NURSE REASSESSMENT NOTE - NS ED NURSE REASSESS COMMENT FT1
pt a/o x 3. Respirations even and unlabored. O2 sat maintains at 98%. Pt noted with bilateral coarse breath sounds. Pt also noted with inspiratory and expiratory wheezing. pt given 1 respiratory treatment as noted. pt noted to have a productive cough with green sputum. Will continue to monitor

## 2018-08-25 NOTE — H&P ADULT - NSHPPHYSICALEXAM_GEN_ALL_CORE
Vital Signs Last 24 Hrs  T(C): 37 (25 Aug 2018 13:06), Max: 38.4 (25 Aug 2018 05:17)  T(F): 98.6 (25 Aug 2018 13:06), Max: 101.1 (25 Aug 2018 05:17)  HR: 69 (25 Aug 2018 13:06) (69 - 101)  BP: 133/68 (25 Aug 2018 13:06) (133/68 - 179/96)  BP(mean): --  RR: 16 (25 Aug 2018 13:06) (14 - 17)  SpO2: 100% (25 Aug 2018 13:06) (96% - 100%)    PHYSICAL EXAM:  GENERAL: NAD, well-developed  HEAD:  Atraumatic, Normocephalic  EYES: EOMI, PERRLA, conjunctiva and sclera clear  MOUTH: oropharynx mild erythemic, no exduates,   NECK: Supple, No JVD, no cervical LAD   CHEST/LUNG: b/l mild end expiratory wheezing, no crackles, unlabored breathing, speaking in full sentences w/o discomfort   HEART: Regular rate and rhythm; No murmurs, rubs, or gallops  ABDOMEN: Soft, Nontender, Nondistended; Bowel sounds present  EXTREMITIES:  2+ Peripheral Pulses, No clubbing, cyanosis, or edema  PSYCH: AAOx3  NEUROLOGY: non-focal  SKIN: erythematous superficial ulcers <1cm diffusely present in skin folds under breast, abdominal folds. Patchy areas of scaling diffusely present in back and arms Vital Signs Last 24 Hrs  T(C): 37 (25 Aug 2018 13:06), Max: 38.4 (25 Aug 2018 05:17)  T(F): 98.6 (25 Aug 2018 13:06), Max: 101.1 (25 Aug 2018 05:17)  HR: 69 (25 Aug 2018 13:06) (69 - 101)  BP: 133/68 (25 Aug 2018 13:06) (133/68 - 179/96)  BP(mean): --  RR: 16 (25 Aug 2018 13:06) (14 - 17)  SpO2: 100% (25 Aug 2018 13:06) (96% - 100%)    PHYSICAL EXAM:  GENERAL: NAD, well-developed  HEAD:  Atraumatic, Normocephalic  EYES: EOMI, PERRLA, conjunctiva and sclera clear  MOUTH: oropharynx mild erythemic, no exduates,   NECK: Supple, No JVD, no cervical LAD   CHEST/LUNG: b/l mild end expiratory wheezing, no crackles, unlabored breathing, speaking in full sentences w/o discomfort   HEART: Regular rate and rhythm; No murmurs, rubs, or gallops  ABDOMEN: Soft, Nontender, Nondistended; Bowel sounds present  EXTREMITIES:  2+ Peripheral Pulses, No clubbing, cyanosis, or edema  PSYCH: AAOx3, Denies any SI/HI   NEUROLOGY: non-focal  SKIN: erythematous superficial ulcers <1cm diffusely present in skin folds under breast, abdominal folds. Patchy areas of scaling diffusely present in back and arms Vital Signs Last 24 Hrs  T(C): 37 (25 Aug 2018 13:06), Max: 38.4 (25 Aug 2018 05:17)  T(F): 98.6 (25 Aug 2018 13:06), Max: 101.1 (25 Aug 2018 05:17)  HR: 69 (25 Aug 2018 13:06) (69 - 101)  BP: 133/68 (25 Aug 2018 13:06) (133/68 - 179/96)  BP(mean): --  RR: 16 (25 Aug 2018 13:06) (14 - 17)  SpO2: 100% (25 Aug 2018 13:06) (96% - 100%)    PHYSICAL EXAM:    GENERAL: NAD, well-developed  HEAD:  Atraumatic, Normocephalic  EYES: EOMI, PERRLA, conjunctiva and sclera clear  MOUTH: oropharynx mild erythema, no exduates,   NECK: Supple, No JVD, no cervical LAD   CHEST/LUNG: b/l mild end expiratory wheezing, no crackles, unlabored breathing, speaking in full sentences w/o discomfort   HEART: Regular rate and rhythm; No murmurs, rubs, or gallops  ABDOMEN: Soft, Nontender, Nondistended; Bowel sounds present  EXTREMITIES:  2+ Peripheral Pulses, No clubbing, cyanosis, or edema  PSYCH: AAOx3, Denies any SI/HI   NEUROLOGY: non-focal  SKIN: erythematous superficial ulcers <1cm diffusely present in skin folds under breast, abdominal folds. Patchy areas of scaling diffusely present in back and arms

## 2018-08-25 NOTE — ED PROVIDER NOTE - OBJECTIVE STATEMENT
62 F PMH SLE, Sjogren's, polyderma gangrnosum, 62 F PMH SLE, Sjogren's, on plaquanil p/w cough worsening over two weeks assoc with subjective fever. Initially began with clear sputum and now green, increasing in amount. Now having fevers,chills, and rigors. No chest pain.
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
## 2018-08-25 NOTE — H&P ADULT - ASSESSMENT
61 y/o F, w/ SLE with chronic joint pain, hx of pyeoderma gangrenosum, HTN, Hypothyroid, Anxiety/Depression p/w two weeks upper respiratory symptoms now with worsening productive cough and fever 2/2 rhinovirus pharyngitis vs viral pneumonia. Superimposed bacterial Pneumonia is unlikely, given clear xray, normal wbc count.

## 2018-08-25 NOTE — H&P ADULT - PROBLEM SELECTOR PLAN 2
- continue plaquanil   - continue chronic pain medication, meds verified by pharmacy (ISTOP reference as above)   - per allscripts record started on clomitrazole for suspected topical fungal infection in skin fold, will continue - continue plaquanil   - for chronic pain c/w morphine ER, meds verified by pharmacy (ISTOP reference as above)  - can start percocet prn if needed   - per allscripts record started on clomitrazole for suspected topical fungal infection in skin fold, will continue - holding plaquenil (currently unverified as not in allscripts), patient will bring medication in PM for verification; will restart when verified and improvement of viral pneumonia  - for chronic pain c/w morphine ER, meds verified by pharmacy (ISTOP reference as above)  - can start percocet prn if needed   - per allscripts record started on clomitrazole for suspected topical fungal infection in skin fold, will continue

## 2018-08-25 NOTE — ED PROVIDER NOTE - ATTENDING CONTRIBUTION TO CARE
agree with resident note  "62 F PMH SLE, Sjogren's, on plaquanil" presents with cough, fever, mild SOB.  States sputum has changed from clear to green.  Denies chest pain, abdominal pain    PE: VSS; CTAB/L; s1 s2 no m/r/g abd soft/NT/ND ext: no edema    Imp: given symptoms and pt being immuocompromised will admit for IV abx for PNA agree with resident note  "62 F PMH SLE, Sjogren's, on plaquanil" presents with cough, fever, mild SOB.  States sputum has changed from clear to green.  Denies chest pain, abdominal pain    PE: VSS; diffuse wheezing; s1 s2 no m/r/g abd soft/NT/ND ext: no edema    Imp: given symptoms and pt being immuocompromised will admit for IV abx for PNA
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
## 2018-08-25 NOTE — ED PROVIDER NOTE - PHYSICAL EXAMINATION
PHYSICAL EXAM:    GENERAL: NAD, well-developed, rigors.   HEENT:  Atraumatic, Normocephalic, conjunctiva and sclera clear,   CHEST/LUNG: Diffuse wheezing bilaterally.   HEART: Regular rate and rhythm; S1, S2; No murmurs, rubs, or gallops  ABDOMEN: Soft, Nontender, Nondistended; Normoactive bowel sounds  EXTREMITIES:  2+ Peripheral Pulses, No clubbing, cyanosis, or edema.  SKIN: Lesions throughout body of healed pyoderma gangrenosum  PSYCH: A&Ox3

## 2018-08-25 NOTE — H&P ADULT - PROBLEM SELECTOR PLAN 3
- continue with fluoxetine 40mg BID  - continue with trazadone prn - continue with fluoxetine  80mg qD   - continue with trazadone prn

## 2018-08-26 LAB
BASOPHILS # BLD AUTO: 0.02 K/UL — SIGNIFICANT CHANGE UP (ref 0–0.2)
BASOPHILS NFR BLD AUTO: 0.4 % — SIGNIFICANT CHANGE UP (ref 0–2)
BUN SERPL-MCNC: 6 MG/DL — LOW (ref 7–23)
CALCIUM SERPL-MCNC: 9 MG/DL — SIGNIFICANT CHANGE UP (ref 8.4–10.5)
CHLORIDE SERPL-SCNC: 102 MMOL/L — SIGNIFICANT CHANGE UP (ref 98–107)
CO2 SERPL-SCNC: 27 MMOL/L — SIGNIFICANT CHANGE UP (ref 22–31)
CREAT SERPL-MCNC: 0.77 MG/DL — SIGNIFICANT CHANGE UP (ref 0.5–1.3)
EOSINOPHIL # BLD AUTO: 0.13 K/UL — SIGNIFICANT CHANGE UP (ref 0–0.5)
EOSINOPHIL NFR BLD AUTO: 2.6 % — SIGNIFICANT CHANGE UP (ref 0–6)
GLUCOSE SERPL-MCNC: 92 MG/DL — SIGNIFICANT CHANGE UP (ref 70–99)
HCT VFR BLD CALC: 37 % — SIGNIFICANT CHANGE UP (ref 34.5–45)
HGB BLD-MCNC: 11.8 G/DL — SIGNIFICANT CHANGE UP (ref 11.5–15.5)
IMM GRANULOCYTES # BLD AUTO: 0.01 # — SIGNIFICANT CHANGE UP
IMM GRANULOCYTES NFR BLD AUTO: 0.2 % — SIGNIFICANT CHANGE UP (ref 0–1.5)
LYMPHOCYTES # BLD AUTO: 0.83 K/UL — LOW (ref 1–3.3)
LYMPHOCYTES # BLD AUTO: 16.4 % — SIGNIFICANT CHANGE UP (ref 13–44)
MAGNESIUM SERPL-MCNC: 1.9 MG/DL — SIGNIFICANT CHANGE UP (ref 1.6–2.6)
MCHC RBC-ENTMCNC: 29.1 PG — SIGNIFICANT CHANGE UP (ref 27–34)
MCHC RBC-ENTMCNC: 31.9 % — LOW (ref 32–36)
MCV RBC AUTO: 91.1 FL — SIGNIFICANT CHANGE UP (ref 80–100)
MONOCYTES # BLD AUTO: 0.42 K/UL — SIGNIFICANT CHANGE UP (ref 0–0.9)
MONOCYTES NFR BLD AUTO: 8.3 % — SIGNIFICANT CHANGE UP (ref 2–14)
NEUTROPHILS # BLD AUTO: 3.65 K/UL — SIGNIFICANT CHANGE UP (ref 1.8–7.4)
NEUTROPHILS NFR BLD AUTO: 72.1 % — SIGNIFICANT CHANGE UP (ref 43–77)
NRBC # FLD: 0 — SIGNIFICANT CHANGE UP
PHOSPHATE SERPL-MCNC: 3.3 MG/DL — SIGNIFICANT CHANGE UP (ref 2.5–4.5)
PLATELET # BLD AUTO: 162 K/UL — SIGNIFICANT CHANGE UP (ref 150–400)
PMV BLD: 12.1 FL — SIGNIFICANT CHANGE UP (ref 7–13)
POTASSIUM SERPL-MCNC: 3.7 MMOL/L — SIGNIFICANT CHANGE UP (ref 3.5–5.3)
POTASSIUM SERPL-SCNC: 3.7 MMOL/L — SIGNIFICANT CHANGE UP (ref 3.5–5.3)
RBC # BLD: 4.06 M/UL — SIGNIFICANT CHANGE UP (ref 3.8–5.2)
RBC # FLD: 13.2 % — SIGNIFICANT CHANGE UP (ref 10.3–14.5)
SODIUM SERPL-SCNC: 140 MMOL/L — SIGNIFICANT CHANGE UP (ref 135–145)
SPECIMEN SOURCE: SIGNIFICANT CHANGE UP
SPECIMEN SOURCE: SIGNIFICANT CHANGE UP
WBC # BLD: 5.06 K/UL — SIGNIFICANT CHANGE UP (ref 3.8–10.5)
WBC # FLD AUTO: 5.06 K/UL — SIGNIFICANT CHANGE UP (ref 3.8–10.5)

## 2018-08-26 PROCEDURE — 99232 SBSQ HOSP IP/OBS MODERATE 35: CPT | Mod: GC

## 2018-08-26 RX ORDER — IPRATROPIUM/ALBUTEROL SULFATE 18-103MCG
3 AEROSOL WITH ADAPTER (GRAM) INHALATION EVERY 6 HOURS
Qty: 0 | Refills: 0 | Status: DISCONTINUED | OUTPATIENT
Start: 2018-08-26 | End: 2018-08-27

## 2018-08-26 RX ORDER — OXYCODONE AND ACETAMINOPHEN 5; 325 MG/1; MG/1
1 TABLET ORAL EVERY 6 HOURS
Qty: 0 | Refills: 0 | Status: DISCONTINUED | OUTPATIENT
Start: 2018-08-26 | End: 2018-08-27

## 2018-08-26 RX ORDER — BUDESONIDE AND FORMOTEROL FUMARATE DIHYDRATE 160; 4.5 UG/1; UG/1
2 AEROSOL RESPIRATORY (INHALATION)
Qty: 0 | Refills: 0 | Status: DISCONTINUED | OUTPATIENT
Start: 2018-08-26 | End: 2018-08-27

## 2018-08-26 RX ADMIN — Medication 100 MILLIGRAM(S): at 13:01

## 2018-08-26 RX ADMIN — OXYCODONE AND ACETAMINOPHEN 1 TABLET(S): 5; 325 TABLET ORAL at 13:01

## 2018-08-26 RX ADMIN — MORPHINE SULFATE 15 MILLIGRAM(S): 50 CAPSULE, EXTENDED RELEASE ORAL at 06:24

## 2018-08-26 RX ADMIN — MORPHINE SULFATE 15 MILLIGRAM(S): 50 CAPSULE, EXTENDED RELEASE ORAL at 05:54

## 2018-08-26 RX ADMIN — MORPHINE SULFATE 15 MILLIGRAM(S): 50 CAPSULE, EXTENDED RELEASE ORAL at 17:52

## 2018-08-26 RX ADMIN — BUDESONIDE AND FORMOTEROL FUMARATE DIHYDRATE 2 PUFF(S): 160; 4.5 AEROSOL RESPIRATORY (INHALATION) at 21:45

## 2018-08-26 RX ADMIN — Medication 3 MILLILITER(S): at 11:05

## 2018-08-26 RX ADMIN — MORPHINE SULFATE 15 MILLIGRAM(S): 50 CAPSULE, EXTENDED RELEASE ORAL at 18:38

## 2018-08-26 RX ADMIN — OXYCODONE AND ACETAMINOPHEN 1 TABLET(S): 5; 325 TABLET ORAL at 13:30

## 2018-08-26 RX ADMIN — Medication 10 MILLIGRAM(S): at 05:56

## 2018-08-26 RX ADMIN — BENZOCAINE AND MENTHOL 1 LOZENGE: 5; 1 LIQUID ORAL at 13:01

## 2018-08-26 RX ADMIN — Medication 3 MILLILITER(S): at 02:27

## 2018-08-26 RX ADMIN — Medication 1 APPLICATION(S): at 17:52

## 2018-08-26 RX ADMIN — Medication 1 APPLICATION(S): at 05:55

## 2018-08-26 RX ADMIN — Medication 3 MILLILITER(S): at 20:58

## 2018-08-26 RX ADMIN — Medication 3 MILLILITER(S): at 17:25

## 2018-08-26 RX ADMIN — Medication 80 MILLIGRAM(S): at 13:42

## 2018-08-26 RX ADMIN — Medication 150 MICROGRAM(S): at 05:55

## 2018-08-26 RX ADMIN — Medication 25 MILLIGRAM(S): at 05:56

## 2018-08-26 RX ADMIN — Medication 100 MILLIGRAM(S): at 21:45

## 2018-08-26 NOTE — PROGRESS NOTE ADULT - PROBLEM SELECTOR PLAN 2
- holding plaquenil - - > as per discussion with pharmacist: prescription has not been filled presciption since June   - for chronic pain c/w morphine ER, meds verified by pharmacy (ISTOP reference as above)  - percocet 5/325 started, at home on norco 10/325 q 8hrs   - c/w clomitrazole for skin lesions

## 2018-08-26 NOTE — PROGRESS NOTE ADULT - PROBLEM SELECTOR PLAN 6
- Regular Diet  - SCDs for DVT prophylaxis, encourage ambulation    Dispo: Continue with symptom management for viral PNA. Nothing to suggest bacterial etiology: CXR clear. Started inhaled corticosteroids given hx of psychosis with prednisone. 5/325 percocet for pain at this time with morphine ER, can titrate percocet upwards to 10/325 q8hrs  if not well controlled.     Ralph Alston  PGY2  [phone]

## 2018-08-26 NOTE — PROGRESS NOTE ADULT - PROBLEM SELECTOR PLAN 1
- RVP positive for rhino/entero, CXR not revealing of focal consolidations  - diffuse wheezing on exam --> will start duonebs ATC along with symbicort, holding off of prednisone given hx of steroid induced psychosis

## 2018-08-26 NOTE — PROGRESS NOTE ADULT - SUBJECTIVE AND OBJECTIVE BOX
Patient is a 62y old  Female who presents with a chief complaint of Upper Respiratory Symptoms (25 Aug 2018 18:37)    SUBJECTIVE / OVERNIGHT EVENTS:  Patient seen at bedside this morning: reporting cough with associated chest soreness and pain everywhere. Seen at bedside, coughing prominently throughout exam     MEDICATIONS  (STANDING):  ALBUTerol/ipratropium for Nebulization 3 milliLiter(s) Nebulizer every 6 hours  buDESOnide  80 MICROgram(s)/formoterol 4.5 MICROgram(s) Inhaler 2 Puff(s) Inhalation two times a day  clotrimazole 1% Cream 1 Application(s) Topical two times a day  enalapril 10 milliGRAM(s) Oral daily  FLUoxetine 80 milliGRAM(s) Oral daily  levothyroxine 150 MICROGram(s) Oral daily  metoprolol succinate ER 25 milliGRAM(s) Oral daily  morphine ER Tablet 15 milliGRAM(s) Oral every 12 hours    MEDICATIONS  (PRN):  acetaminophen   Tablet 650 milliGRAM(s) Oral every 6 hours PRN For Temp greater than 38 C (100.4 F)  ALBUTerol/ipratropium for Nebulization 3 milliLiter(s) Nebulizer every 6 hours PRN Shortness of Breath and/or Wheezing  benzocaine 15 mG/menthol 3.6 mG Lozenge 1 Lozenge Oral three times a day PRN Sore Throat  benzonatate 100 milliGRAM(s) Oral three times a day PRN Cough    CAPILLARY BLOOD GLUCOSE  I&O's Summary    PHYSICAL EXAM  GENERAL: NAD, well-developed  HEAD:  Atraumatic, Normocephalic  EYES: EOMI, PERRLA, conjunctiva and sclera clear  NECK: Supple, No JVD  CHEST/LUNG: Diffuse wheezing appreciated in all lung fields with some rhonchi.   HEART: Regular rate and rhythm; No murmurs  ABDOMEN: Soft, Nontender, Nondistended; Bowel sounds present, no organomegaly   EXTREMITIES:  2+ Peripheral Pulses, No clubbing, cyanosis, trace edema  PSYCH: AAOx3  SKIN: Numerous lesions on skin with no evidence of ulceration, warmth or fluctuation     LABS:                        11.8   5.06  )-----------( 162      ( 26 Aug 2018 06:35 )             37.0     08-26    140  |  102  |  6<L>  ----------------------------<  92  3.7   |  27  |  0.77    Ca    9.0      26 Aug 2018 06:35  Phos  3.3       Mg     1.9         TPro  7.6  /  Alb  3.8  /  TBili  1.0  /  DBili  x   /  AST  38<H>  /  ALT  18  /  AlkPhos  69      Urinalysis Basic - ( 25 Aug 2018 09:47 )    Color: LIGHT YELLOW / Appearance: CLEAR / S.008 / pH: 5.5  Gluc: NEGATIVE / Ketone: MODERATE  / Bili: NEGATIVE / Urobili: NORMAL   Blood: NEGATIVE / Protein: NEGATIVE / Nitrite: NEGATIVE   Leuk Esterase: NEGATIVE / RBC: x / WBC x   Sq Epi: x / Non Sq Epi: x / Bacteria: x    RADIOLOGY & ADDITIONAL TESTS:  < from: Xray Chest 2 Views PA/Lat (18 @ 04:15) >  IMPRESSION:  Clear lungs. No pleural effusions or pneumothorax.    Stable cardiac and mediastinal silhouettes.     Trachea midline.    Redemonstrated generalized mild osteopenia and spinal degenerative change.    < end of copied text >    Imaging Personally Reviewed:  Consultant(s) Notes Reviewed:    Care Discussed with Consultants/Other Providers:

## 2018-08-27 ENCOUNTER — TRANSCRIPTION ENCOUNTER (OUTPATIENT)
Age: 63
End: 2018-08-27

## 2018-08-27 VITALS
RESPIRATION RATE: 18 BRPM | OXYGEN SATURATION: 95 % | TEMPERATURE: 98 F | SYSTOLIC BLOOD PRESSURE: 161 MMHG | HEART RATE: 85 BPM | DIASTOLIC BLOOD PRESSURE: 98 MMHG

## 2018-08-27 DIAGNOSIS — E87.6 HYPOKALEMIA: ICD-10-CM

## 2018-08-27 LAB
BUN SERPL-MCNC: 5 MG/DL — LOW (ref 7–23)
BUN SERPL-MCNC: 7 MG/DL — SIGNIFICANT CHANGE UP (ref 7–23)
CALCIUM SERPL-MCNC: 8.8 MG/DL — SIGNIFICANT CHANGE UP (ref 8.4–10.5)
CALCIUM SERPL-MCNC: 9.4 MG/DL — SIGNIFICANT CHANGE UP (ref 8.4–10.5)
CHLORIDE SERPL-SCNC: 102 MMOL/L — SIGNIFICANT CHANGE UP (ref 98–107)
CHLORIDE SERPL-SCNC: 104 MMOL/L — SIGNIFICANT CHANGE UP (ref 98–107)
CO2 SERPL-SCNC: 27 MMOL/L — SIGNIFICANT CHANGE UP (ref 22–31)
CO2 SERPL-SCNC: 28 MMOL/L — SIGNIFICANT CHANGE UP (ref 22–31)
CREAT SERPL-MCNC: 0.8 MG/DL — SIGNIFICANT CHANGE UP (ref 0.5–1.3)
CREAT SERPL-MCNC: 1.01 MG/DL — SIGNIFICANT CHANGE UP (ref 0.5–1.3)
GLUCOSE SERPL-MCNC: 100 MG/DL — HIGH (ref 70–99)
GLUCOSE SERPL-MCNC: 98 MG/DL — SIGNIFICANT CHANGE UP (ref 70–99)
HCT VFR BLD CALC: 36.6 % — SIGNIFICANT CHANGE UP (ref 34.5–45)
HGB BLD-MCNC: 11.6 G/DL — SIGNIFICANT CHANGE UP (ref 11.5–15.5)
MAGNESIUM SERPL-MCNC: 1.9 MG/DL — SIGNIFICANT CHANGE UP (ref 1.6–2.6)
MCHC RBC-ENTMCNC: 28.9 PG — SIGNIFICANT CHANGE UP (ref 27–34)
MCHC RBC-ENTMCNC: 31.7 % — LOW (ref 32–36)
MCV RBC AUTO: 91 FL — SIGNIFICANT CHANGE UP (ref 80–100)
NRBC # FLD: 0 — SIGNIFICANT CHANGE UP
PHOSPHATE SERPL-MCNC: 3.2 MG/DL — SIGNIFICANT CHANGE UP (ref 2.5–4.5)
PLATELET # BLD AUTO: 170 K/UL — SIGNIFICANT CHANGE UP (ref 150–400)
PMV BLD: 12 FL — SIGNIFICANT CHANGE UP (ref 7–13)
POTASSIUM SERPL-MCNC: 2.9 MMOL/L — CRITICAL LOW (ref 3.5–5.3)
POTASSIUM SERPL-MCNC: 4.7 MMOL/L — SIGNIFICANT CHANGE UP (ref 3.5–5.3)
POTASSIUM SERPL-SCNC: 2.9 MMOL/L — CRITICAL LOW (ref 3.5–5.3)
POTASSIUM SERPL-SCNC: 4.7 MMOL/L — SIGNIFICANT CHANGE UP (ref 3.5–5.3)
RBC # BLD: 4.02 M/UL — SIGNIFICANT CHANGE UP (ref 3.8–5.2)
RBC # FLD: 13.4 % — SIGNIFICANT CHANGE UP (ref 10.3–14.5)
SODIUM SERPL-SCNC: 143 MMOL/L — SIGNIFICANT CHANGE UP (ref 135–145)
SODIUM SERPL-SCNC: 143 MMOL/L — SIGNIFICANT CHANGE UP (ref 135–145)
WBC # BLD: 3.86 K/UL — SIGNIFICANT CHANGE UP (ref 3.8–10.5)
WBC # FLD AUTO: 3.86 K/UL — SIGNIFICANT CHANGE UP (ref 3.8–10.5)

## 2018-08-27 PROCEDURE — 99239 HOSP IP/OBS DSCHRG MGMT >30: CPT

## 2018-08-27 RX ORDER — POTASSIUM CHLORIDE 20 MEQ
40 PACKET (EA) ORAL ONCE
Qty: 0 | Refills: 0 | Status: COMPLETED | OUTPATIENT
Start: 2018-08-27 | End: 2018-08-27

## 2018-08-27 RX ORDER — BUDESONIDE AND FORMOTEROL FUMARATE DIHYDRATE 160; 4.5 UG/1; UG/1
1 AEROSOL RESPIRATORY (INHALATION)
Qty: 2 | Refills: 0
Start: 2018-08-27 | End: 2018-09-09

## 2018-08-27 RX ORDER — POTASSIUM CHLORIDE 20 MEQ
10 PACKET (EA) ORAL
Qty: 0 | Refills: 0 | Status: COMPLETED | OUTPATIENT
Start: 2018-08-27 | End: 2018-08-27

## 2018-08-27 RX ORDER — POTASSIUM CHLORIDE 20 MEQ
20 PACKET (EA) ORAL
Qty: 0 | Refills: 0 | Status: DISCONTINUED | OUTPATIENT
Start: 2018-08-27 | End: 2018-08-27

## 2018-08-27 RX ORDER — BENZOCAINE AND MENTHOL 5; 1 G/100ML; G/100ML
1 LIQUID ORAL
Qty: 30 | Refills: 0
Start: 2018-08-27 | End: 2018-09-25

## 2018-08-27 RX ORDER — HYDROXYCHLOROQUINE SULFATE 200 MG
1 TABLET ORAL
Qty: 0 | Refills: 0 | COMMUNITY

## 2018-08-27 RX ORDER — IPRATROPIUM/ALBUTEROL SULFATE 18-103MCG
1 AEROSOL WITH ADAPTER (GRAM) INHALATION
Qty: 2 | Refills: 0
Start: 2018-08-27

## 2018-08-27 RX ADMIN — OXYCODONE AND ACETAMINOPHEN 1 TABLET(S): 5; 325 TABLET ORAL at 12:05

## 2018-08-27 RX ADMIN — Medication 100 MILLIEQUIVALENT(S): at 08:03

## 2018-08-27 RX ADMIN — Medication 100 MILLIEQUIVALENT(S): at 08:30

## 2018-08-27 RX ADMIN — Medication 150 MICROGRAM(S): at 06:12

## 2018-08-27 RX ADMIN — Medication 100 MILLIGRAM(S): at 12:05

## 2018-08-27 RX ADMIN — OXYCODONE AND ACETAMINOPHEN 1 TABLET(S): 5; 325 TABLET ORAL at 13:50

## 2018-08-27 RX ADMIN — MORPHINE SULFATE 15 MILLIGRAM(S): 50 CAPSULE, EXTENDED RELEASE ORAL at 06:12

## 2018-08-27 RX ADMIN — Medication 10 MILLIGRAM(S): at 06:12

## 2018-08-27 RX ADMIN — Medication 3 MILLILITER(S): at 03:31

## 2018-08-27 RX ADMIN — Medication 40 MILLIEQUIVALENT(S): at 08:02

## 2018-08-27 RX ADMIN — Medication 100 MILLIEQUIVALENT(S): at 09:31

## 2018-08-27 RX ADMIN — BUDESONIDE AND FORMOTEROL FUMARATE DIHYDRATE 2 PUFF(S): 160; 4.5 AEROSOL RESPIRATORY (INHALATION) at 09:30

## 2018-08-27 RX ADMIN — Medication 80 MILLIGRAM(S): at 12:05

## 2018-08-27 RX ADMIN — Medication 25 MILLIGRAM(S): at 06:12

## 2018-08-27 RX ADMIN — Medication 100 MILLIGRAM(S): at 06:14

## 2018-08-27 RX ADMIN — Medication 1 APPLICATION(S): at 06:11

## 2018-08-27 NOTE — DISCHARGE NOTE ADULT - CONDITIONS AT DISCHARGE
Patient is discharged , alert and oriented x 4, she is self sufficient.  Lesion on her Abdomen improving; she does have a cough still, no c/o shortness of breath or Chest pain.  Instructions for going home are given.

## 2018-08-27 NOTE — PROGRESS NOTE ADULT - SUBJECTIVE AND OBJECTIVE BOX
Patient is a 62y old  Female who presents with a chief complaint of Upper Respiratory Symptoms (25 Aug 2018 18:37)      SUBJECTIVE / OVERNIGHT EVENTS: No acute events overnight. Still endorsing productive cough, but denies any SOB, cp. Also endorsing diffuse joint pain is which is baseline, controlled with pain regimen     MEDICATIONS  (STANDING):  ALBUTerol/ipratropium for Nebulization 3 milliLiter(s) Nebulizer every 6 hours  buDESOnide  80 MICROgram(s)/formoterol 4.5 MICROgram(s) Inhaler 2 Puff(s) Inhalation two times a day  clotrimazole 1% Cream 1 Application(s) Topical two times a day  enalapril 10 milliGRAM(s) Oral daily  FLUoxetine 80 milliGRAM(s) Oral daily  levothyroxine 150 MICROGram(s) Oral daily  metoprolol succinate ER 25 milliGRAM(s) Oral daily  morphine ER Tablet 15 milliGRAM(s) Oral every 12 hours  potassium chloride  10 mEq/100 mL IVPB 10 milliEquivalent(s) IV Intermittent every 1 hour    MEDICATIONS  (PRN):  acetaminophen   Tablet 650 milliGRAM(s) Oral every 6 hours PRN For Temp greater than 38 C (100.4 F)  ALBUTerol/ipratropium for Nebulization 3 milliLiter(s) Nebulizer every 6 hours PRN Shortness of Breath and/or Wheezing  benzocaine 15 mG/menthol 3.6 mG Lozenge 1 Lozenge Oral three times a day PRN Sore Throat  benzonatate 100 milliGRAM(s) Oral three times a day PRN Cough  oxyCODONE    5 mG/acetaminophen 325 mG 1 Tablet(s) Oral every 6 hours PRN Severe Pain (7 - 10)      Vital Signs Last 24 Hrs  T(C): 36.7 (27 Aug 2018 06:03), Max: 37.3 (26 Aug 2018 20:55)  T(F): 98.1 (27 Aug 2018 06:03), Max: 99.2 (26 Aug 2018 20:55)  HR: 71 (27 Aug 2018 06:03) (70 - 81)  BP: 157/85 (27 Aug 2018 06:03) (109/41 - 157/85)  BP(mean): --  RR: 17 (27 Aug 2018 06:03) (17 - 19)  SpO2: 94% (27 Aug 2018 06:03) (92% - 99%)  CAPILLARY BLOOD GLUCOSE        I&O's Summary      PHYSICAL EXAM:  GENERAL: NAD, well-developed  HEAD:  Atraumatic, Normocephalic  EYES: EOMI, PERRLA, conjunctiva and sclera clear  NECK: Supple, No JVD  CHEST/LUNG: Clear to auscultation bilaterally;  mild end expiratory wheezing upper lung fields   HEART: Regular rate and rhythm; No murmurs, rubs, or gallops  ABDOMEN: Soft, Nontender, Nondistended; Bowel sounds present  EXTREMITIES:  2+ Peripheral Pulses, No clubbing, cyanosis, or edema  PSYCH: AAOx3  NEUROLOGY: non-focal  SKIN: No rashes or lesions    LABS:                        11.6   3.86  )-----------( 170      ( 27 Aug 2018 05:15 )             36.6     -    143  |  102  |  7   ----------------------------<  100<H>  2.9<LL>   |  27  |  0.80    Ca    8.8      27 Aug 2018 05:15  Phos  3.2       Mg     1.9                 Urinalysis Basic - ( 25 Aug 2018 09:47 )    Color: LIGHT YELLOW / Appearance: CLEAR / S.008 / pH: 5.5  Gluc: NEGATIVE / Ketone: MODERATE  / Bili: NEGATIVE / Urobili: NORMAL   Blood: NEGATIVE / Protein: NEGATIVE / Nitrite: NEGATIVE   Leuk Esterase: NEGATIVE / RBC: x / WBC x   Sq Epi: x / Non Sq Epi: x / Bacteria: x        RADIOLOGY & ADDITIONAL TESTS:    Imaging Personally Reviewed:    Consultant(s) Notes Reviewed:      Care Discussed with Consultants/Other Providers: Patient is a 62y old  Female who presents with a chief complaint of Upper Respiratory Symptoms (25 Aug 2018 18:37)      SUBJECTIVE / OVERNIGHT EVENTS: No acute events overnight. Still endorsing productive cough, but denies any SOB, cp. Also endorsing diffuse joint pain is which is baseline, controlled with pain regimen     MEDICATIONS  (STANDING):  ALBUTerol/ipratropium for Nebulization 3 milliLiter(s) Nebulizer every 6 hours  buDESOnide  80 MICROgram(s)/formoterol 4.5 MICROgram(s) Inhaler 2 Puff(s) Inhalation two times a day  clotrimazole 1% Cream 1 Application(s) Topical two times a day  enalapril 10 milliGRAM(s) Oral daily  FLUoxetine 80 milliGRAM(s) Oral daily  levothyroxine 150 MICROGram(s) Oral daily  metoprolol succinate ER 25 milliGRAM(s) Oral daily  morphine ER Tablet 15 milliGRAM(s) Oral every 12 hours  potassium chloride  10 mEq/100 mL IVPB 10 milliEquivalent(s) IV Intermittent every 1 hour    MEDICATIONS  (PRN):  acetaminophen   Tablet 650 milliGRAM(s) Oral every 6 hours PRN For Temp greater than 38 C (100.4 F)  ALBUTerol/ipratropium for Nebulization 3 milliLiter(s) Nebulizer every 6 hours PRN Shortness of Breath and/or Wheezing  benzocaine 15 mG/menthol 3.6 mG Lozenge 1 Lozenge Oral three times a day PRN Sore Throat  benzonatate 100 milliGRAM(s) Oral three times a day PRN Cough  oxyCODONE    5 mG/acetaminophen 325 mG 1 Tablet(s) Oral every 6 hours PRN Severe Pain (7 - 10)      Vital Signs Last 24 Hrs  T(C): 36.7 (27 Aug 2018 06:03), Max: 37.3 (26 Aug 2018 20:55)  T(F): 98.1 (27 Aug 2018 06:03), Max: 99.2 (26 Aug 2018 20:55)  HR: 71 (27 Aug 2018 06:03) (70 - 81)  BP: 157/85 (27 Aug 2018 06:03) (109/41 - 157/85)  BP(mean): --  RR: 17 (27 Aug 2018 06:03) (17 - 19)  SpO2: 94% (27 Aug 2018 06:03) (92% - 99%)  CAPILLARY BLOOD GLUCOSE        I&O's Summary      PHYSICAL EXAM:  GENERAL: NAD, well-developed  HEAD:  Atraumatic, Normocephalic  EYES: EOMI, PERRLA, conjunctiva and sclera clear  NECK: Supple, No JVD  CHEST/LUNG: Clear to auscultation bilaterally;  mild end expiratory wheezing upper lung fields   HEART: Regular rate and rhythm; No murmurs, rubs, or gallops  ABDOMEN: Soft, Nontender, Nondistended; Bowel sounds present  EXTREMITIES:  2+ Peripheral Pulses, No clubbing, cyanosis, or edema  PSYCH: AAOx3  NEUROLOGY: non-focal  SKIN: Numerous lesions on skin with no evidence of ulceration, warmth or fluctuation       LABS:                        11.6   3.86  )-----------( 170      ( 27 Aug 2018 05:15 )             36.6     08-27    143  |  102  |  7   ----------------------------<  100<H>  2.9<LL>   |  27  |  0.80    Ca    8.8      27 Aug 2018 05:15  Phos  3.2     -  Mg     1.9                 Urinalysis Basic - ( 25 Aug 2018 09:47 )    Color: LIGHT YELLOW / Appearance: CLEAR / S.008 / pH: 5.5  Gluc: NEGATIVE / Ketone: MODERATE  / Bili: NEGATIVE / Urobili: NORMAL   Blood: NEGATIVE / Protein: NEGATIVE / Nitrite: NEGATIVE   Leuk Esterase: NEGATIVE / RBC: x / WBC x   Sq Epi: x / Non Sq Epi: x / Bacteria: x        RADIOLOGY & ADDITIONAL TESTS:    Imaging Personally Reviewed:    Consultant(s) Notes Reviewed:      Care Discussed with Consultants/Other Providers: Patient is a 62y old  Female who presents with a chief complaint of Upper Respiratory Symptoms (25 Aug 2018 18:37)      SUBJECTIVE / OVERNIGHT EVENTS: No acute events overnight. Still endorsing productive cough, but denies any SOB, cp. Also endorsing diffuse joint pain is which is baseline, controlled with pain regimen     MEDICATIONS  (STANDING):  ALBUTerol/ipratropium for Nebulization 3 milliLiter(s) Nebulizer every 6 hours  buDESOnide  80 MICROgram(s)/formoterol 4.5 MICROgram(s) Inhaler 2 Puff(s) Inhalation two times a day  clotrimazole 1% Cream 1 Application(s) Topical two times a day  enalapril 10 milliGRAM(s) Oral daily  FLUoxetine 80 milliGRAM(s) Oral daily  levothyroxine 150 MICROGram(s) Oral daily  metoprolol succinate ER 25 milliGRAM(s) Oral daily  morphine ER Tablet 15 milliGRAM(s) Oral every 12 hours  potassium chloride  10 mEq/100 mL IVPB 10 milliEquivalent(s) IV Intermittent every 1 hour    MEDICATIONS  (PRN):  acetaminophen   Tablet 650 milliGRAM(s) Oral every 6 hours PRN For Temp greater than 38 C (100.4 F)  ALBUTerol/ipratropium for Nebulization 3 milliLiter(s) Nebulizer every 6 hours PRN Shortness of Breath and/or Wheezing  benzocaine 15 mG/menthol 3.6 mG Lozenge 1 Lozenge Oral three times a day PRN Sore Throat  benzonatate 100 milliGRAM(s) Oral three times a day PRN Cough  oxyCODONE    5 mG/acetaminophen 325 mG 1 Tablet(s) Oral every 6 hours PRN Severe Pain (7 - 10)      Vital Signs Last 24 Hrs  T(C): 36.7 (27 Aug 2018 06:03), Max: 37.3 (26 Aug 2018 20:55)  T(F): 98.1 (27 Aug 2018 06:03), Max: 99.2 (26 Aug 2018 20:55)  HR: 71 (27 Aug 2018 06:03) (70 - 81)  BP: 157/85 (27 Aug 2018 06:03) (109/41 - 157/85)  BP(mean): --  RR: 17 (27 Aug 2018 06:03) (17 - 19)  SpO2: 94% (27 Aug 2018 06:03) (92% - 99%)  CAPILLARY BLOOD GLUCOSE        I&O's Summary      PHYSICAL EXAM:  GENERAL: NAD, well-developed  HEAD:  Atraumatic, Normocephalic  EYES: EOMI, PERRLA, conjunctiva and sclera clear  NECK: Supple, No JVD  CHEST/LUNG:   mild end expiratory wheezing upper lung fields   HEART: Regular rate and rhythm; No murmurs, rubs, or gallops  ABDOMEN: Soft, Nontender, Nondistended; Bowel sounds present  EXTREMITIES:  2+ Peripheral Pulses, No clubbing, cyanosis, or edema  PSYCH: AAOx3  NEUROLOGY: non-focal  SKIN: Numerous lesions on skin with no evidence of ulceration, warmth or fluctuation       LABS:                        11.6   3.86  )-----------( 170      ( 27 Aug 2018 05:15 )             36.6     -    143  |  102  |  7   ----------------------------<  100<H>  2.9<LL>   |  27  |  0.80    Ca    8.8      27 Aug 2018 05:15  Phos  3.2       Mg     1.9                 Urinalysis Basic - ( 25 Aug 2018 09:47 )    Color: LIGHT YELLOW / Appearance: CLEAR / S.008 / pH: 5.5  Gluc: NEGATIVE / Ketone: MODERATE  / Bili: NEGATIVE / Urobili: NORMAL   Blood: NEGATIVE / Protein: NEGATIVE / Nitrite: NEGATIVE   Leuk Esterase: NEGATIVE / RBC: x / WBC x   Sq Epi: x / Non Sq Epi: x / Bacteria: x        RADIOLOGY & ADDITIONAL TESTS:    Imaging Personally Reviewed:    Consultant(s) Notes Reviewed:      Care Discussed with Consultants/Other Providers:

## 2018-08-27 NOTE — DISCHARGE NOTE ADULT - PLAN OF CARE
symptom management, follow up with primary care doctor within 1 week of discharge You were admitted to the hospital for a productive cough and fever and found to have a viral respiratory illness called entero/rhinovirus. This was the cause of all of your symptoms. Because of the viral illness you were having wheezing and shortness of breath relieved with inhalers. You were prescribed these inhalers for home to help with symptoms until your respiratory illness resolves. Please use these as prescribed and follow up with your primary care doctor within 1 weeks of discharge for further monitoring. follow up with primary care doctor within 1 week of discharge According to your pharmacy the last time your plaquenil was filled was in June. Please continue to take your plaquenil Please continue to take your home medications as prescribed (enalapril, metoprolol) and follow a low salt diet. Please follow up with your primary care doctor within 1 week of discharge for further monitoring and adjustment of your medications. Please continue to take your medications as prescribed. If you experience any increased symptoms, have any thoughts of hurting yourself or others please contact your doctor immediately or come to the hospital. It is unclear whether you should be on plaquenil for your lupus. According to your pharmacy and primary care doctor it was not prescribed since June 2018 and that was for a 1 month supply. Please follow up with your primary care doctor within 1 week regarding resuming your plaquenil and for a new prescription if needed. You had a low potassium level while in the hospital requiring you to receive potassium supplementation. please follow up with your primary care doctor within 1 week of discharge to ensure your potassium levels are within a normal range. If you experience any increased weakness please contact your doctor immediately.

## 2018-08-27 NOTE — DISCHARGE NOTE ADULT - PATIENT PORTAL LINK FT
You can access the [facility] Patient Portal, offered by [facility], by registering with the following website: [email or web address] no exudate/uvula midline/THROAT RED/NO VESICLES/ULCERS NO VESICLES/ULCERS/uvula midline/THROAT RED/NO DROOLING/no exudate/NO TONGUE ELEVATION/NO STRIDOR

## 2018-08-27 NOTE — PROGRESS NOTE ADULT - PROBLEM SELECTOR PLAN 1
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

## 2018-08-27 NOTE — PROGRESS NOTE ADULT - ATTENDING COMMENTS
Pt seen and examined. Case d/w housestaff and agree w/ resident note above. Pt w/ mild SOB and diffuse wheezing on exam. Continue symptomatic tx as above. May need duonebs arranged at home on d/c. Review med rec re: pain meds and plaquenil
Patient reports continued improvement in her symptoms. On exam she does have some mild expiratory wheezes b/l but patient is in no distress.    D/c planning for today. Patient to c/w nebs as outpt until symptoms improve. Care plan and f/u was discussed with patient. Further details are in discharge document. Spent 36 min in discharge planning and coordination.

## 2018-08-27 NOTE — PROGRESS NOTE ADULT - PROBLEM SELECTOR PLAN 7
- Regular Diet  - SCDs for DVT prophylaxis, encourage ambulation    Dispo: Continue with symptom management for viral PNA. Nothing to suggest bacterial etiology: CXR clear. c/w inhaled corticosteroids given hx of psychosis with prednisone. 5/325 percocet for pain at this time with morphine ER   Sukumar Reid MD - PGY1  Department of Internal Medicine  Pager - [phone]

## 2018-08-27 NOTE — DISCHARGE NOTE ADULT - CARE PROVIDER_API CALL
Stefani Chisholm), Internal Medicine  [address]  [place] [address]  Phone: [phone]  Fax: [phone]

## 2018-08-27 NOTE — DISCHARGE NOTE ADULT - HOSPITAL COURSE
61 y/o F, w/ SLE with chronic joint pain, hx of pyeoderma gangrenosum, HTN, Hypothyroid, Anxiety/Depression p/w two weeks upper respiratory symptoms (rhinorrhea, congestion) progressively worsening now with productive cough with yellow/green sputum, associated with subjective fever, increased fatigue. Patient had increased dyspnea and wheezing day PTA leading her to present to ED. Possible sick contact at home as her son with upper respiratory symptoms. Denied any chest pain, palpitations, headache, dizziness, abd pain, nausea, vomiting, diarrhea, dysuria, hematuria. No recent travel outside area. Found to have RVP+ for rhino/enterovirus. Sputum cultures grew normal respiratory david. Blood cultures negative. CXR showed clear lungs. Patient was observed off ab and did not spike any fevers or have an elevated white count. She was started on symbicort and duonebs for wheezing with improvement. Patient was d/c with symbicort and combivent prn for symptom control.     Course was complicated by hypokalemia replete with IV and PO potassium. Patient was continued on all of her home medications except plaquenel as it was unclear if patient should be on it as it was not refilled since June per patient's pharmacy. Patient expressed concerns regarding her home environment due to her son with developmental delay. SW discussed with patient options for help outside of hospital. Patient was HDS for discharge. 63 y/o F, w/ SLE with chronic joint pain, hx of pyeoderma gangrenosum, HTN, Hypothyroid, Anxiety/Depression p/w two weeks upper respiratory symptoms (rhinorrhea, congestion) progressively worsening now with productive cough with yellow/green sputum, associated with subjective fever, increased fatigue. Patient had increased dyspnea and wheezing day PTA leading her to present to ED. Possible sick contact at home as her son with upper respiratory symptoms. Denied any chest pain, palpitations, headache, dizziness, abd pain, nausea, vomiting, diarrhea, dysuria, hematuria. No recent travel outside area. Found to have RVP+ for rhino/enterovirus. Sputum cultures grew normal respiratory david. Blood cultures negative. CXR showed clear lungs. Patient was observed off ab and did not spike any fevers or have an elevated white count. She was started on symbicort and duonebs for wheezing with improvement. Patient was d/c with symbicort and combivent prn for symptom control.     Course was complicated by hypokalemia repleted with IV and PO potassium. Patient was continued on all of her home medications except plaquenel as it was unclear if patient should be on it as it was not refilled since June per patient's pharmacy. Patient expressed concerns regarding her home environment due to her son with developmental delay. SW discussed with patient options for help outside of hospital. Patient was HDS for discharge. 63 y/o F, w/ SLE with chronic joint pain, hx of pyeoderma gangrenosum, HTN, Hypothyroid, Anxiety/Depression p/w two weeks upper respiratory symptoms (rhinorrhea, congestion) progressively worsening now with productive cough with yellow/green sputum, associated with subjective fever, increased fatigue. Patient had increased dyspnea and wheezing day PTA leading her to present to ED. Possible sick contact at home as her son with upper respiratory symptoms. Denied any chest pain, palpitations, headache, dizziness, abd pain, nausea, vomiting, diarrhea, dysuria, hematuria. No recent travel outside area. Found to have RVP+ for rhino/enterovirus. Sputum cultures grew normal respiratory david. Blood cultures negative. CXR showed clear lungs. Patient was observed off ab and did not spike any fevers or have an elevated white count. She was started on symbicort and duonebs for wheezing with improvement. Patient was d/c with symbicort and combivent prn for symptom control.     Course was complicated by hypokalemia repleted with IV and PO potassium. Patient was continued on all of her home medications except plaquenel as it was unclear if patient should be on it as it was not refilled since June per patient's pharmacy. Patient expressed concerns regarding her home environment due to her son with developmental delay. SW discussed with patient options for help outside of hospital. Patient was HDS for discharge. .

## 2018-08-27 NOTE — DISCHARGE NOTE ADULT - MEDICATION SUMMARY - MEDICATIONS TO STOP TAKING
I will STOP taking the medications listed below when I get home from the hospital:  None I will STOP taking the medications listed below when I get home from the hospital:    Plaquenil 200 mg oral tablet  -- 1 tab(s) by mouth 2 times a day

## 2018-08-27 NOTE — PROGRESS NOTE ADULT - ASSESSMENT
63 y/o F, w/ SLE with chronic joint pain, hx of pyeoderma gangrenosum, HTN, Hypothyroid, Anxiety/Depression p/w two weeks upper respiratory symptoms now with worsening productive cough and fever 2/2 rhinovirus pharyngitis vs viral pneumonia. Superimposed bacterial Pneumonia is unlikely, given clear xray, normal wbc count.

## 2018-08-27 NOTE — PROGRESS NOTE ADULT - PROBLEM SELECTOR PLAN 6
- Regular Diet  - SCDs for DVT prophylaxis, encourage ambulation    Dispo: Continue with symptom management for viral PNA. Nothing to suggest bacterial etiology: CXR clear. c/w inhaled corticosteroids given hx of psychosis with prednisone. 5/325 percocet for pain at this time with morphine ER   Ralph Alston  PGY2  [phone] - k 2.9  - will replete k

## 2018-08-27 NOTE — DISCHARGE NOTE ADULT - MEDICATION SUMMARY - MEDICATIONS TO TAKE
I will START or STAY ON the medications listed below when I get home from the hospital:    HYDROcodone-acetaminophen 10 mg-325 mg oral tablet  -- 1 tab(s) by mouth every 8 hours, As Needed    [facility] Reference#[id number]  Rx Dispensed: 8/18/2018  Quantity: 90  Day Supply: 30  -- Indication: For Chronic Pain    morphine 15 mg/12 hr oral tablet, extended release  -- 1 tab(s) by mouth every 12 hours    [facility] Reference#[id number]  RxDispensed: 8/18/2018  Quantity: 60  Day Supply: 30  -- Indication: For Chronic Pain    enalapril 10 mg oral tablet  -- 1 tab(s) by mouth once a day  -- Indication: For HTN (hypertension)    clonazePAM 1 mg oral tablet  -- 1 tab(s) by mouth 4 times a day, As Needed    [facility] Reference# [id number]  RxDispensed: 7/24/2018  Quantity: 120  Day Supply: 30  -- Indication: For Anxiety/Depression    traZODone 50 mg oral tablet  -- 2 tab(s) by mouth once a day (at bedtime), As Needed  -- Indication: For Anxiety/Depression    PROzac 40 mg oral capsule  -- 2 cap(s) by mouth once a day  -- Indication: For Depression    Plaquenil 200 mg oral tablet  -- 1 tab(s) by mouth 2 times a day  -- Indication: For SLE (systemic lupus erythematosus)    Toprol-XL 25 mg oral tablet, extended release  -- 1 tab(s) by mouth once a day  -- Indication: For HTN (hypertension)    clotrimazole 1% topical cream  -- Apply on skin to affected area 2 times a day  -- Indication: For Pyoderma Gangrenosum    Synthroid 150 mcg (0.15 mg) oral tablet  -- 1 tab(s) by mouth once a day  -- Indication: For Hypothyroid I will START or STAY ON the medications listed below when I get home from the hospital:    HYDROcodone-acetaminophen 10 mg-325 mg oral tablet  -- 1 tab(s) by mouth every 8 hours, As Needed    [facility] Reference#[id number]  Rx Dispensed: 8/18/2018  Quantity: 90  Day Supply: 30  -- Indication: For Chronic Pain    morphine 15 mg/12 hr oral tablet, extended release  -- 1 tab(s) by mouth every 12 hours    [facility] Reference#[id number]  RxDispensed: 8/18/2018  Quantity: 60  Day Supply: 30  -- Indication: For Chronic Pain    enalapril 10 mg oral tablet  -- 1 tab(s) by mouth once a day  -- Indication: For HTN (hypertension)    clonazePAM 1 mg oral tablet  -- 1 tab(s) by mouth 4 times a day, As Needed    [facility] Reference# [id number]  RxDispensed: 7/24/2018  Quantity: 120  Day Supply: 30  -- Indication: For Anxiety/Depression    traZODone 50 mg oral tablet  -- 2 tab(s) by mouth once a day (at bedtime), As Needed  -- Indication: For Anxiety/Depression    PROzac 40 mg oral capsule  -- 2 cap(s) by mouth once a day  -- Indication: For Depression    Toprol-XL 25 mg oral tablet, extended release  -- 1 tab(s) by mouth once a day  -- Indication: For HTN (hypertension)    clotrimazole 1% topical cream  -- Apply on skin to affected area 2 times a day  -- Indication: For Pyoderma Gangrenosum    Synthroid 150 mcg (0.15 mg) oral tablet  -- 1 tab(s) by mouth once a day  -- Indication: For Hypothyroid

## 2018-08-27 NOTE — DISCHARGE NOTE ADULT - CARE PROVIDERS DIRECT ADDRESSES
[email or web address]

## 2018-08-28 DIAGNOSIS — R09.82 POSTNASAL DRIP: ICD-10-CM

## 2018-08-28 LAB — BACTERIA SPT RESP CULT: SIGNIFICANT CHANGE UP

## 2018-08-28 NOTE — DISCUSSION/SUMMARY
[Home] : patient was discharged to home [FreeTextEntry1] : Spoke to patient about recent hospitalization, states she is feeling better, but not completely. She was diagnosed with pneumonia, but not given any antibiotics due to her medical history. She was provided inhalers that do help. She has follow up 9/27/18 with pcp and declined to schedule sooner with another provider as she states that due to her complicated medical history she wants consistency with her physician. She also needs to follow up on plaquenil rx, she is requesting refill, but not previously prescribed from our office. As per discharge note, her pharmacy was also contacted who stated she has not filled rx since june 2017.

## 2018-08-30 ENCOUNTER — APPOINTMENT (OUTPATIENT)
Dept: RHEUMATOLOGY | Facility: CLINIC | Age: 63
End: 2018-08-30
Payer: COMMERCIAL

## 2018-08-30 VITALS
WEIGHT: 192 LBS | DIASTOLIC BLOOD PRESSURE: 91 MMHG | TEMPERATURE: 98.1 F | HEART RATE: 68 BPM | BODY MASS INDEX: 34.02 KG/M2 | SYSTOLIC BLOOD PRESSURE: 159 MMHG | OXYGEN SATURATION: 97 % | HEIGHT: 63 IN

## 2018-08-30 LAB
APPEARANCE: CLEAR
APTT BLD: 29.8 SEC
BACTERIA BLD CULT: SIGNIFICANT CHANGE UP
BACTERIA BLD CULT: SIGNIFICANT CHANGE UP
BACTERIA: NEGATIVE
BASOPHILS # BLD AUTO: 0.02 K/UL
BASOPHILS NFR BLD AUTO: 0.4 %
BILIRUBIN URINE: NEGATIVE
BLOOD URINE: NEGATIVE
COLOR: YELLOW
EOSINOPHIL # BLD AUTO: 0.14 K/UL
EOSINOPHIL NFR BLD AUTO: 3.1 %
GLUCOSE QUALITATIVE U: NEGATIVE MG/DL
HCT VFR BLD CALC: 39.7 %
HGB BLD-MCNC: 13 G/DL
HYALINE CASTS: 1 /LPF
IMM GRANULOCYTES NFR BLD AUTO: 0.2 %
KETONES URINE: NEGATIVE
LEUKOCYTE ESTERASE URINE: NEGATIVE
LYMPHOCYTES # BLD AUTO: 1.64 K/UL
LYMPHOCYTES NFR BLD AUTO: 36.4 %
MAN DIFF?: NORMAL
MCHC RBC-ENTMCNC: 30 PG
MCHC RBC-ENTMCNC: 32.7 GM/DL
MCV RBC AUTO: 91.5 FL
MICROSCOPIC-UA: NORMAL
MONOCYTES # BLD AUTO: 0.24 K/UL
MONOCYTES NFR BLD AUTO: 5.3 %
NEUTROPHILS # BLD AUTO: 2.46 K/UL
NEUTROPHILS NFR BLD AUTO: 54.6 %
NITRITE URINE: NEGATIVE
PH URINE: 6.5
PLATELET # BLD AUTO: 243 K/UL
PROTEIN URINE: NEGATIVE MG/DL
RBC # BLD: 4.34 M/UL
RBC # FLD: 14.2 %
RED BLOOD CELLS URINE: 0 /HPF
SPECIFIC GRAVITY URINE: 1.01
SQUAMOUS EPITHELIAL CELLS: 0 /HPF
UROBILINOGEN URINE: 1 MG/DL
WBC # FLD AUTO: 4.51 K/UL
WHITE BLOOD CELLS URINE: 0 /HPF

## 2018-08-30 PROCEDURE — 99205 OFFICE O/P NEW HI 60 MIN: CPT

## 2018-08-31 LAB
25(OH)D3 SERPL-MCNC: 16.1 NG/ML
ALBUMIN SERPL ELPH-MCNC: 4.1 G/DL
ALP BLD-CCNC: 66 U/L
ALT SERPL-CCNC: 20 U/L
ANA PAT FLD IF-IMP: ABNORMAL
ANA SER IF-ACNC: ABNORMAL
ANION GAP SERPL CALC-SCNC: 12 MMOL/L
AST SERPL-CCNC: 30 U/L
B2 GLYCOPROT1 AB SER QL: POSITIVE
BILIRUB SERPL-MCNC: 0.3 MG/DL
BUN SERPL-MCNC: 9 MG/DL
C3 SERPL-MCNC: 142 MG/DL
C4 SERPL-MCNC: 30 MG/DL
CALCIUM SERPL-MCNC: 9.3 MG/DL
CARDIOLIPIN AB SER IA-ACNC: NEGATIVE
CHLORIDE SERPL-SCNC: 100 MMOL/L
CK SERPL-CCNC: 135 U/L
CO2 SERPL-SCNC: 27 MMOL/L
CONFIRM: 26.2 SEC
CREAT SERPL-MCNC: 0.89 MG/DL
CREAT SPEC-SCNC: 31 MG/DL
CREAT/PROT UR: 0.1 RATIO
DRVVT IMM 1:2 NP PPP: NORMAL
DRVVT SCREEN TO CONFIRM RATIO: 1.1 RATIO
DSDNA AB SER-ACNC: <12 IU/ML
ENA RNP AB SER IA-ACNC: 0.2 AL
ENA SM AB SER IA-ACNC: <0.2 AL
ENA SS-A AB SER IA-ACNC: 4.6 AL
ENA SS-B AB SER IA-ACNC: <0.2 AL
GLUCOSE SERPL-MCNC: 79 MG/DL
POTASSIUM SERPL-SCNC: 4.7 MMOL/L
PROT SERPL-MCNC: 6.9 G/DL
PROT UR-MCNC: 4 MG/DL
SCREEN DRVVT: 35.7 SEC
SILICA CLOTTING TIME INTERPRETATION: NORMAL
SILICA CLOTTING TIME S/C: 1.03 RATIO
SODIUM SERPL-SCNC: 139 MMOL/L
TSH SERPL-ACNC: 0.33 UIU/ML

## 2018-09-02 LAB
PS IGA SER QL: <20 U/ML
PS IGG SER QL: <10 U/ML
PS IGM SER QL: <25 U/ML

## 2018-09-05 ENCOUNTER — APPOINTMENT (OUTPATIENT)
Dept: INTERNAL MEDICINE | Facility: CLINIC | Age: 63
End: 2018-09-05
Payer: COMMERCIAL

## 2018-09-05 VITALS
DIASTOLIC BLOOD PRESSURE: 70 MMHG | TEMPERATURE: 97.9 F | HEART RATE: 77 BPM | SYSTOLIC BLOOD PRESSURE: 115 MMHG | OXYGEN SATURATION: 97 %

## 2018-09-05 PROBLEM — R09.82 POST-NASAL DRAINAGE: Status: ACTIVE | Noted: 2018-09-05

## 2018-09-05 LAB
B2 GLYCOPROT1 IGA SERPL IA-ACNC: 7.8 SAU
B2 GLYCOPROT1 IGG SER-ACNC: <5 SGU
B2 GLYCOPROT1 IGM SER-ACNC: <5 SMU
CARDIOLIPIN IGM SER-MCNC: 13.2 MPL
CARDIOLIPIN IGM SER-MCNC: 5.8 GPL
DEPRECATED CARDIOLIPIN IGA SER: 8.2 APL

## 2018-09-05 PROCEDURE — 99495 TRANSJ CARE MGMT MOD F2F 14D: CPT

## 2018-09-05 RX ORDER — OMEPRAZOLE 40 MG/1
40 CAPSULE, DELAYED RELEASE ORAL
Qty: 30 | Refills: 3 | Status: DISCONTINUED | COMMUNITY
Start: 2018-03-08 | End: 2018-09-05

## 2018-09-05 RX ORDER — SODIUM SULFATE, POTASSIUM SULFATE, MAGNESIUM SULFATE 17.5; 3.13; 1.6 G/ML; G/ML; G/ML
17.5-3.13-1.6 SOLUTION, CONCENTRATE ORAL
Qty: 1 | Refills: 0 | Status: DISCONTINUED | COMMUNITY
Start: 2018-02-27 | End: 2018-09-05

## 2018-09-05 NOTE — HISTORY OF PRESENT ILLNESS
[FreeTextEntry1] : F/U AFTER HOSPITAL ADMISSION FOR RSV pneumonia \par  [de-identified] : \par \par date of admission- 8/25/18 \par date of discharge  8/27/18 \par \par \par pt was admitted for worsening cough , was diagnosed  wit pneumonia 2/2 RVP \par was discharged on duoneb and symbicort \par \par meds reconciled \par feels better but has poor appetite \par the P.G lesions have resolved to a great extent \par no chest pain or SOB \par \par Ct to have post - nasal drip \par pt also needs to f/u with GI for the pancreatic cyst

## 2018-09-05 NOTE — HISTORY OF PRESENT ILLNESS
[FreeTextEntry1] : F/U AFTER HOSPITAL ADMISSION FOR RSV pneumonia \par  [de-identified] : \par \par date of admission- 8/25/18 \par date of discharge  8/27/18 \par \par \par pt was admitted for worsening cough , was diagnosed  wit pneumonia 2/2 RVP \par was discharged on duoneb and symbicort \par \par meds reconciled \par feels better but has poor appetite \par the P.G lesions have resolved to a great extent \par no chest pain or SOB \par \par Ct to have post - nasal drip \par pt also needs to f/u with GI for the pancreatic cyst

## 2018-09-05 NOTE — ASSESSMENT
[FreeTextEntry1] : # pt is here following discharge \par - doing better but ct to have post nasal drip and cough \par - will start xyzal\par - ct flonase \par - ct inhalers \par \par f/u in 2 month \par mammo ordered \par \par pancreatic cyst- GI f/u \par ALSO NEEDS CRC screening - she is aware

## 2018-09-06 ENCOUNTER — MEDICATION RENEWAL (OUTPATIENT)
Age: 63
End: 2018-09-06

## 2018-09-14 ENCOUNTER — MEDICATION RENEWAL (OUTPATIENT)
Age: 63
End: 2018-09-14

## 2018-09-25 ENCOUNTER — APPOINTMENT (OUTPATIENT)
Dept: MAMMOGRAPHY | Facility: CLINIC | Age: 63
End: 2018-09-25

## 2018-09-26 ENCOUNTER — MEDICATION RENEWAL (OUTPATIENT)
Age: 63
End: 2018-09-26

## 2018-09-27 ENCOUNTER — APPOINTMENT (OUTPATIENT)
Dept: INTERNAL MEDICINE | Facility: CLINIC | Age: 63
End: 2018-09-27

## 2018-10-11 ENCOUNTER — MEDICATION RENEWAL (OUTPATIENT)
Age: 63
End: 2018-10-11

## 2018-10-30 ENCOUNTER — MEDICATION RENEWAL (OUTPATIENT)
Age: 63
End: 2018-10-30

## 2018-11-01 ENCOUNTER — APPOINTMENT (OUTPATIENT)
Dept: NEUROLOGY | Facility: CLINIC | Age: 63
End: 2018-11-01

## 2018-11-07 ENCOUNTER — INBOUND DOCUMENT (OUTPATIENT)
Age: 63
End: 2018-11-07

## 2018-11-08 ENCOUNTER — APPOINTMENT (OUTPATIENT)
Dept: INTERNAL MEDICINE | Facility: CLINIC | Age: 63
End: 2018-11-08
Payer: COMMERCIAL

## 2018-11-08 VITALS
DIASTOLIC BLOOD PRESSURE: 68 MMHG | OXYGEN SATURATION: 97 % | SYSTOLIC BLOOD PRESSURE: 118 MMHG | WEIGHT: 181 LBS | HEIGHT: 63 IN | HEART RATE: 82 BPM | TEMPERATURE: 98.2 F | BODY MASS INDEX: 32.07 KG/M2

## 2018-11-08 PROCEDURE — 99396 PREV VISIT EST AGE 40-64: CPT | Mod: 25

## 2018-11-08 PROCEDURE — 36415 COLL VENOUS BLD VENIPUNCTURE: CPT

## 2018-11-08 RX ORDER — IPRATROPIUM BROMIDE AND ALBUTEROL 20; 100 UG/1; UG/1
20-100 SPRAY, METERED RESPIRATORY (INHALATION)
Qty: 1 | Refills: 2 | Status: DISCONTINUED | COMMUNITY
Start: 2018-09-10 | End: 2018-11-08

## 2018-11-08 RX ORDER — BUDESONIDE AND FORMOTEROL FUMARATE DIHYDRATE 160; 4.5 UG/1; UG/1
160-4.5 AEROSOL RESPIRATORY (INHALATION) TWICE DAILY
Qty: 1 | Refills: 3 | Status: DISCONTINUED | COMMUNITY
Start: 2018-09-06 | End: 2018-11-08

## 2018-11-08 RX ORDER — FLUOXETINE HCL 10 MG
TABLET ORAL
Refills: 0 | Status: DISCONTINUED | COMMUNITY

## 2018-11-08 RX ORDER — ALBUTEROL SULFATE 90 UG/1
108 (90 BASE) AEROSOL, METERED RESPIRATORY (INHALATION) EVERY 6 HOURS
Qty: 1 | Refills: 0 | Status: DISCONTINUED | COMMUNITY
Start: 2018-09-06 | End: 2018-11-08
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
## 2018-11-08 NOTE — ASSESSMENT
[FreeTextEntry1] : # CPE \par \par - diet / exercise discussed \par - check lipid and A1C\par - Cannot get mammo bc lesion / MRI was not approved \par - UTD w/ colonoscopy- get report\par - needs BMD \par - needs pap \par - refuses all vaccines

## 2018-11-08 NOTE — PHYSICAL EXAM
[Well Nourished] : well nourished [Well Developed] : well developed [Normal Sclera/Conjunctiva] : normal sclera/conjunctiva [PERRL] : pupils equal round and reactive to light [Normal Outer Ear/Nose] : the outer ears and nose were normal in appearance [Normal Oropharynx] : the oropharynx was normal [No JVD] : no jugular venous distention [Supple] : supple [No Lymphadenopathy] : no lymphadenopathy [No Respiratory Distress] : no respiratory distress  [Clear to Auscultation] : lungs were clear to auscultation bilaterally [No Accessory Muscle Use] : no accessory muscle use [Normal Rate] : normal rate  [Regular Rhythm] : with a regular rhythm [Normal S1, S2] : normal S1 and S2 [No Murmur] : no murmur heard [No Varicosities] : no varicosities [No Edema] : there was no peripheral edema [No Extremity Clubbing/Cyanosis] : no extremity clubbing/cyanosis [Soft] : abdomen soft [Non Tender] : non-tender [Non-distended] : non-distended [No HSM] : no HSM [Normal Bowel Sounds] : normal bowel sounds [Normal Supraclavicular Nodes] : no supraclavicular lymphadenopathy [Normal Posterior Cervical Nodes] : no posterior cervical lymphadenopathy [Normal Anterior Cervical Nodes] : no anterior cervical lymphadenopathy [No CVA Tenderness] : no CVA  tenderness [No Spinal Tenderness] : no spinal tenderness [No Joint Swelling] : no joint swelling [Grossly Normal Strength/Tone] : grossly normal strength/tone [de-identified] : (+) ulcerated lesions on the torso/ arms/ legs  [de-identified] : anxious

## 2018-11-08 NOTE — HISTORY OF PRESENT ILLNESS
[FreeTextEntry1] : here for CPE \par \par  pt has a h/o Pyoderma Gangrenosum ( in the process of getting a new dermatologist) \par HTN - controlled\par hypothyroid\par ?SLE - mostly arthralgias/ no mucositis \par Depression - no SI / HI - son is autistic /  has anger issues \par ch pain in the ulcers \par h/o pancreatic cyst - following with Dr Powell - imaging planned

## 2018-11-09 LAB
25(OH)D3 SERPL-MCNC: 13.8 NG/ML
CHOLEST SERPL-MCNC: 225 MG/DL
CHOLEST/HDLC SERPL: 3.7 RATIO
HBA1C MFR BLD HPLC: 5.3 %
HDLC SERPL-MCNC: 61 MG/DL
LDLC SERPL CALC-MCNC: 137 MG/DL
TRIGL SERPL-MCNC: 136 MG/DL
TSH SERPL-ACNC: 0.92 UIU/ML

## 2018-12-06 ENCOUNTER — APPOINTMENT (OUTPATIENT)
Dept: OPHTHALMOLOGY | Facility: CLINIC | Age: 63
End: 2018-12-06
Payer: COMMERCIAL

## 2018-12-06 DIAGNOSIS — H35.373 PUCKERING OF MACULA, BILATERAL: ICD-10-CM

## 2018-12-06 DIAGNOSIS — H25.13 AGE-RELATED NUCLEAR CATARACT, BILATERAL: ICD-10-CM

## 2018-12-06 PROCEDURE — 92134 CPTRZ OPH DX IMG PST SGM RTA: CPT

## 2018-12-06 PROCEDURE — 92136 OPHTHALMIC BIOMETRY: CPT

## 2018-12-06 PROCEDURE — 92014 COMPRE OPH EXAM EST PT 1/>: CPT

## 2018-12-24 PROBLEM — M32.9 LUPUS: Status: RESOLVED | Noted: 2018-02-02 | Resolved: 2018-12-24

## 2019-01-18 ENCOUNTER — MEDICATION RENEWAL (OUTPATIENT)
Age: 64
End: 2019-01-18

## 2019-01-22 NOTE — DISCHARGE NOTE ADULT - BECAUSE OF A PHYSICAL, MENTAL OR EMOTIONAL CONDITION, DO YOU HAVE DIFFICULTY DOING  ERRANDS ALONE LIKE VISITING A DOCTOR'S OFFICE OR SHOPPING (15 YEARS AND OLDER)
Subjective:       Patient ID: Lukas Chance is a 72 y.o. male.    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  His wound care specialist at [facility] has left for another facility.  He needs a refill of his hydrofera blue dressings that he uses daily and have resulted in resolution of his right lower extremity ulcers and improvement in the left.  The patient discontinued his metoprolol and rosuvastatin because of orthostatic dizziness.  It did improve somewhat.  He also discontinue taking Lasix 20 mg but remains on spironolactone 100 mg daily.  He has had laser venoplasty done on the left leg to assist with his venous insufficiency and chronic stasis dermatitis.  It has resulted in some improvement and drying of the weeping areas.  We had a discussion of his blood pressure with low blood pressures currently.  He wishes to substitute HCTZ for the Lasix.  He still running close to low on the blood pressure so I would recommend we reduce the lisinopril and resume the Crestor which should have had no effect on the blood pressure.  He is agreeable to that.    He recently had his A1c which was 5.8, continuing to fall steadily over the last year.  He is having no episodes of hypoglycemia and is not currently taking any diabetic medications.  He is noted to have lost almost 10 lb since his last visit here.    Past Medical History:  3/1/2013: Anticoagulant long-term use  No date: Antithrombin 3 deficiency  No date: Cellulitis      Comment:  lower legs, venous stasis  No date: Coronary artery disease  5/15/2013: Diabetes mellitus type 2 in obese  No date:  "Hypertension  No date: Obesity  No date: LAKHWINDER (obstructive sleep apnea)  No date: Pulmonary embolism    Past Surgical History:  5/2/2011: COLONOSCOPY      Comment:  Dr. Miller, 9 polyps, recheck 5 year  No date: MULTIPLE TOOTH EXTRACTIONS  7/2015: RADIOFREQUENCY ABLATION      Comment:  bilateral   No date: TONSILLECTOMY    Current Outpatient Medications on File Prior to Visit:  acetaminophen (TYLENOL EXTRA STRENGTH) 500 MG tablet, Take 500 mg by mouth 2 (two) times daily as needed. , Disp: , Rfl:   furosemide (LASIX) 20 MG tablet, Take 1 tablet (20 mg total) by mouth once daily. (Patient taking differently: Take 20 mg by mouth daily as needed. ), Disp: 90 tablet, Rfl: 1  multivitamin (THERAGRAN) per tablet, Take 1 tablet by mouth once daily., Disp: , Rfl:   salmon oil/omega-3 fatty acids (SALMON OIL-1000 ORAL), Take 1 capsule by mouth once daily., Disp: , Rfl:   (DISCONTINUED) amoxicillin (AMOXIL) 875 MG tablet, Take 875 mg by mouth 2 (two) times daily., Disp: , Rfl:   (DISCONTINUED) ELIQUIS 5 mg Tab, TAKE ONE TABLET BY MOUTH TWICE DAILY, Disp: 180 tablet, Rfl: 2  (DISCONTINUED) lisinopril (PRINIVIL,ZESTRIL) 40 MG tablet, TAKE ONE TABLET BY MOUTH ONCE DAILY, Disp: 90 tablet, Rfl: 1  (DISCONTINUED) pva-gentian violet-methyl blue (HYDROFERA BLUE) 6 X 6 " Bndg, Apply topically once daily., Disp: , Rfl:   (DISCONTINUED) spironolactone (ALDACTONE) 100 MG tablet, Take 1 tablet (100 mg total) by mouth once daily., Disp: 90 tablet, Rfl: 0  (DISCONTINUED) acetaminophen-codeine 300-30mg (TYLENOL #3) 300-30 mg Tab, Take 1 tablet by mouth daily as needed. , Disp: , Rfl:   (DISCONTINUED) metoprolol succinate (TOPROL-XL) 100 MG 24 hr tablet, TAKE 1 TABLET BY MOUTH ONCE DAILY, Disp: 90 tablet, Rfl: 2  (DISCONTINUED) rosuvastatin (CRESTOR) 20 MG tablet, Take 1 tablet (20 mg total) by mouth once daily., Disp: 90 tablet, Rfl: 3  (DISCONTINUED) silver sulfADIAZINE 1% (SILVADENE) 1 % cream, Apply topically once daily., Disp: 85 g, Rfl: " 5    No current facility-administered medications on file prior to visit.           Review of Systems   Constitutional: Negative for activity change and unexpected weight change.   HENT: Negative for hearing loss, rhinorrhea and trouble swallowing.    Eyes: Negative for discharge and visual disturbance.   Respiratory: Negative for chest tightness and wheezing.    Cardiovascular: Negative for chest pain and palpitations.   Gastrointestinal: Negative for blood in stool, constipation, diarrhea and vomiting.   Endocrine: Negative for polydipsia and polyuria.   Genitourinary: Negative for difficulty urinating, hematuria and urgency.   Musculoskeletal: Positive for arthralgias and joint swelling. Negative for neck pain.   Skin: Positive for color change, rash and wound.   Neurological: Positive for weakness. Negative for headaches.   Psychiatric/Behavioral: Negative for confusion and dysphoric mood.       Objective:      Physical Exam   Constitutional: He is oriented to person, place, and time. He appears well-developed. No distress.   Blood pressure drops into the lower end of normal with orthostatic change  Morbidly obese with a BMI of 53.8 he is down 9.3 lb from his last visit with me July 17, 2018   Cardiovascular: Normal rate, regular rhythm and normal heart sounds. Exam reveals no gallop and no friction rub.   No murmur heard.  Pulmonary/Chest: Effort normal and breath sounds normal.   Musculoskeletal: He exhibits edema (1+ both lower extremities to the knee). He exhibits no tenderness or deformity.   Neurological: He is alert and oriented to person, place, and time.   Skin: He is not diaphoretic. There is erythema.        Psychiatric: He has a normal mood and affect. His behavior is normal. Judgment and thought content normal.   Nursing note and vitals reviewed.      Assessment:       1. Venous stasis ulcer, unspecified site, unspecified ulcer stage, unspecified whether varicose veins present    2. Stasis  "dermatitis of both legs    3. Hypertension, essential    4. Coagulopathy    5. Antithrombin 3 deficiency    6. Protein C deficiency    7. Hypertension associated with diabetes    8. Combined hyperlipidemia associated with type 2 diabetes mellitus    9. Morbid obesity with BMI of 50.0-59.9, adult    10. Hyperlipidemia, unspecified hyperlipidemia type    11. Type 2 diabetes, controlled, with neuropathy        Plan:       1. Venous stasis ulcer, unspecified site, unspecified ulcer stage, unspecified whether varicose veins present  Improving.  Continue local care.  Add lisinopril HCT and discontinue lisinopril 40 mg daily, continue spironolactone 100 daily, monitor blood pressure and will possibly add low-dose Toprol in the future if tolerated  - pva-gentian violet-methyl blue (HYDROFERA BLUE) 6 X 6 " Bndg; Change dressing daily  Dispense: 30 each; Refill: prn  - spironolactone (ALDACTONE) 100 MG tablet; Take 1 tablet (100 mg total) by mouth once daily.  Dispense: 90 tablet; Refill: 1    2. Stasis dermatitis of both legs  Improving, see above  - spironolactone (ALDACTONE) 100 MG tablet; Take 1 tablet (100 mg total) by mouth once daily.  Dispense: 90 tablet; Refill: 1    3. Hypertension, essential  See above  - lisinopril-hydrochlorothiazide (PRINZIDE,ZESTORETIC) 20-25 mg Tab; Take 1 tablet by mouth once daily.  Dispense: 90 tablet; Refill: 1  - Basic metabolic panel; Future    4. Coagulopathy  Stable and controlled with Eliquis  - ELIQUIS 5 mg Tab; Take 1 tablet (5 mg total) by mouth 2 (two) times daily.  Dispense: 180 tablet; Refill: 3    5. Antithrombin 3 deficiency  See above    6. Protein C deficiency  See above    7. Hypertension associated with diabetes  See above    8. Combined hyperlipidemia associated with type 2 diabetes mellitus  Lab Results   Component Value Date    CHOL 162 06/26/2018    CHOL 168 01/04/2017    CHOL 195 01/13/2016     Lab Results   Component Value Date    HDL 38 (L) 06/26/2018    HDL 42 " 01/04/2017    HDL 43 01/13/2016     Lab Results   Component Value Date    LDLCALC 105.4 06/26/2018    LDLCALC 108.2 01/04/2017    LDLCALC 115.6 01/13/2016     Lab Results   Component Value Date    TRIG 93 06/26/2018    TRIG 89 01/04/2017    TRIG 182 (H) 01/13/2016     Lab Results   Component Value Date    CHOLHDL 23.5 06/26/2018    CHOLHDL 25.0 01/04/2017    CHOLHDL 22.1 01/13/2016     Patient to resume taking Crestor 20 mg daily    9. Morbid obesity with BMI of 50.0-59.9, adult  Doing well with weight loss, continue program  The patient's BMI has been recorded in the chart. The patient has been provided educational materials regarding the benefits of attaining and maintaining a normal weight. We will continue to address and follow this issue during follow up visits.    10. Hyperlipidemia, unspecified hyperlipidemia type  - rosuvastatin (CRESTOR) 20 MG tablet; Take 1 tablet (20 mg total) by mouth once daily.  Dispense: 90 tablet; Refill: 3    11. Type 2 diabetes, controlled, with neuropathy  Lab Results   Component Value Date    HGBA1C 5.8 (H) 01/21/2019       - rosuvastatin (CRESTOR) 20 MG tablet; Take 1 tablet (20 mg total) by mouth once daily.  Dispense: 90 tablet; Refill: 3         Patient readiness: acceptance and barriers:none    During the course of the visit the patient was educated and counseled about the following:     Diabetes:  Discussed general issues about diabetes pathophysiology and management.  Discussed foot care.  Reminded to get yearly retinal exam.  Hypertension:   Medication: Discontinue lisinopril 40 mg daily and substitute lisinopril HCT 20-25 one daily.  Dietary sodium restriction.  Regular aerobic exercise.  Obesity:   General weight loss/lifestyle modification strategies discussed (elicit support from others; identify saboteurs; non-food rewards, etc).  Informal exercise measures discussed, e.g. taking stairs instead of elevator.  Regular aerobic exercise program discussed.    Goals:  Diabetes: Maintain Hemoglobin A1C below 7, Hypertension: Reduce Blood Pressure and Obesity: Reduce calorie intake and BMI    Did patient meet goals/outcomes: Yes    The following self management tools provided: blood pressure log  blood glucose log  excercise log    Patient Instructions (the written plan) was given to the patient/family.     Time spent with patient: 45 minutes             No

## 2019-01-30 ENCOUNTER — FORM ENCOUNTER (OUTPATIENT)
Age: 64
End: 2019-01-30

## 2019-01-31 ENCOUNTER — APPOINTMENT (OUTPATIENT)
Dept: MAMMOGRAPHY | Facility: IMAGING CENTER | Age: 64
End: 2019-01-31
Payer: COMMERCIAL

## 2019-01-31 ENCOUNTER — MEDICATION RENEWAL (OUTPATIENT)
Age: 64
End: 2019-01-31

## 2019-01-31 ENCOUNTER — OUTPATIENT (OUTPATIENT)
Dept: OUTPATIENT SERVICES | Facility: HOSPITAL | Age: 64
LOS: 1 days | End: 2019-01-31
Payer: COMMERCIAL

## 2019-01-31 DIAGNOSIS — Z98.890 OTHER SPECIFIED POSTPROCEDURAL STATES: Chronic | ICD-10-CM

## 2019-01-31 DIAGNOSIS — Z98.89 OTHER SPECIFIED POSTPROCEDURAL STATES: Chronic | ICD-10-CM

## 2019-01-31 DIAGNOSIS — Z00.00 ENCOUNTER FOR GENERAL ADULT MEDICAL EXAMINATION WITHOUT ABNORMAL FINDINGS: ICD-10-CM

## 2019-01-31 PROCEDURE — 77067 SCR MAMMO BI INCL CAD: CPT

## 2019-01-31 PROCEDURE — 77067 SCR MAMMO BI INCL CAD: CPT | Mod: 26

## 2019-01-31 PROCEDURE — 77063 BREAST TOMOSYNTHESIS BI: CPT

## 2019-01-31 PROCEDURE — 77063 BREAST TOMOSYNTHESIS BI: CPT | Mod: 26

## 2019-04-09 ENCOUNTER — RX RENEWAL (OUTPATIENT)
Age: 64
End: 2019-04-09

## 2019-04-11 ENCOUNTER — APPOINTMENT (OUTPATIENT)
Dept: OPHTHALMOLOGY | Facility: CLINIC | Age: 64
End: 2019-04-11

## 2019-05-01 ENCOUNTER — RX RENEWAL (OUTPATIENT)
Age: 64
End: 2019-05-01

## 2019-05-02 ENCOUNTER — LABORATORY RESULT (OUTPATIENT)
Age: 64
End: 2019-05-02

## 2019-05-02 ENCOUNTER — APPOINTMENT (OUTPATIENT)
Dept: INTERNAL MEDICINE | Facility: CLINIC | Age: 64
End: 2019-05-02
Payer: MEDICAID

## 2019-05-02 VITALS
OXYGEN SATURATION: 96 % | HEIGHT: 63 IN | HEART RATE: 82 BPM | DIASTOLIC BLOOD PRESSURE: 82 MMHG | SYSTOLIC BLOOD PRESSURE: 126 MMHG | TEMPERATURE: 98.5 F

## 2019-05-02 DIAGNOSIS — R30.0 DYSURIA: ICD-10-CM

## 2019-05-02 PROCEDURE — 36415 COLL VENOUS BLD VENIPUNCTURE: CPT

## 2019-05-02 PROCEDURE — 81003 URINALYSIS AUTO W/O SCOPE: CPT | Mod: QW

## 2019-05-02 PROCEDURE — 99214 OFFICE O/P EST MOD 30 MIN: CPT | Mod: 25

## 2019-05-02 NOTE — PHYSICAL EXAM
[Well Nourished] : well nourished [No Acute Distress] : no acute distress [Well Developed] : well developed [Normal Outer Ear/Nose] : the outer ears and nose were normal in appearance [Normal Oropharynx] : the oropharynx was normal [Supple] : supple [No JVD] : no jugular venous distention [No Lymphadenopathy] : no lymphadenopathy [No Respiratory Distress] : no respiratory distress  [Clear to Auscultation] : lungs were clear to auscultation bilaterally [No Accessory Muscle Use] : no accessory muscle use [Normal Rate] : normal rate  [Normal S1, S2] : normal S1 and S2 [Regular Rhythm] : with a regular rhythm [No Varicosities] : no varicosities [No Murmur] : no murmur heard [Soft] : abdomen soft [Non Tender] : non-tender [No HSM] : no HSM [Normal Bowel Sounds] : normal bowel sounds [No CVA Tenderness] : no CVA  tenderness [No Joint Swelling] : no joint swelling [Grossly Normal Strength/Tone] : grossly normal strength/tone

## 2019-05-02 NOTE — HISTORY OF PRESENT ILLNESS
[FreeTextEntry1] : f/u HTN / hypothyroid / depression , anxiety / SLE- arthralgia / pyoderma gangrenosum \par  [de-identified] : pt is here for follow up \par c/o worsening dysuria x 3 week \par no fever , no chills , no nausea/ vomiting \par \par mammo - negative \par no  SI / HI \par

## 2019-05-02 NOTE — ASSESSMENT
[FreeTextEntry1] : 1)  HTN \par - stable\par - ct meds \par - renal fx stable\par \par 2) HL \par - diet /exercise \par - refuses statin \par \par 3) hypothyroid \par - ct meds \par - check TSH \par \par 4) Check UA/ C/S \par (+) Nitrites - start cipro 500 NID X 5 day\par \par mammo / colonoscopy - UTD

## 2019-05-03 LAB
APPEARANCE: CLEAR
APPEARANCE: CLEAR
BILIRUB UR QL STRIP: NEGATIVE
BILIRUBIN URINE: NEGATIVE
BILIRUBIN URINE: NEGATIVE
BLOOD URINE: NEGATIVE
BLOOD URINE: NEGATIVE
CLARITY UR: CLEAR
COLLECTION METHOD: NORMAL
COLOR: ABNORMAL
COLOR: ABNORMAL
GLUCOSE QUALITATIVE U: NEGATIVE
GLUCOSE QUALITATIVE U: NEGATIVE
GLUCOSE UR-MCNC: NEGATIVE
HCG UR QL: 0.2 EU/DL
HGB UR QL STRIP.AUTO: NEGATIVE
KETONES UR-MCNC: NEGATIVE
KETONES URINE: NEGATIVE
KETONES URINE: NEGATIVE
LEUKOCYTE ESTERASE UR QL STRIP: NEGATIVE
LEUKOCYTE ESTERASE URINE: NEGATIVE
LEUKOCYTE ESTERASE URINE: NEGATIVE
NITRITE UR QL STRIP: POSITIVE
NITRITE URINE: NEGATIVE
NITRITE URINE: NEGATIVE
PH UR STRIP: 5
PH URINE: 6
PH URINE: 6
PROT UR STRIP-MCNC: NEGATIVE
PROTEIN URINE: NEGATIVE
PROTEIN URINE: NEGATIVE
SP GR UR STRIP: <=1.005
SPECIFIC GRAVITY URINE: 1.01
SPECIFIC GRAVITY URINE: 1.01
TSH SERPL-ACNC: 1.58 UIU/ML
UROBILINOGEN URINE: NORMAL
UROBILINOGEN URINE: NORMAL

## 2019-05-11 ENCOUNTER — RESULT CHARGE (OUTPATIENT)
Age: 64
End: 2019-05-11

## 2019-05-17 NOTE — PROGRESS NOTE ADULT - PROBLEM SELECTOR PROBLEM 4
Class 1 obesity due to excess calories without serious comorbidity in adult, unspecified BMI
no

## 2019-05-21 ENCOUNTER — OTHER (OUTPATIENT)
Age: 64
End: 2019-05-21

## 2019-06-19 ENCOUNTER — APPOINTMENT (OUTPATIENT)
Dept: GASTROENTEROLOGY | Facility: CLINIC | Age: 64
End: 2019-06-19

## 2019-06-21 ENCOUNTER — RX RENEWAL (OUTPATIENT)
Age: 64
End: 2019-06-21

## 2019-07-02 ENCOUNTER — APPOINTMENT (OUTPATIENT)
Dept: INTERNAL MEDICINE | Facility: CLINIC | Age: 64
End: 2019-07-02
Payer: MEDICAID

## 2019-07-02 VITALS
HEART RATE: 78 BPM | TEMPERATURE: 98 F | HEIGHT: 63 IN | OXYGEN SATURATION: 97 % | SYSTOLIC BLOOD PRESSURE: 130 MMHG | DIASTOLIC BLOOD PRESSURE: 80 MMHG

## 2019-07-02 PROCEDURE — 99214 OFFICE O/P EST MOD 30 MIN: CPT

## 2019-07-02 RX ORDER — CIPROFLOXACIN HYDROCHLORIDE 500 MG/1
500 TABLET, FILM COATED ORAL TWICE DAILY
Qty: 14 | Refills: 0 | Status: DISCONTINUED | COMMUNITY
Start: 2019-05-02 | End: 2019-07-02

## 2019-07-02 NOTE — PHYSICAL EXAM
[No Acute Distress] : no acute distress [Well Nourished] : well nourished [Well-Appearing] : well-appearing [Well Developed] : well developed [Normal Outer Ear/Nose] : the outer ears and nose were normal in appearance [Normal Oropharynx] : the oropharynx was normal [No Lymphadenopathy] : no lymphadenopathy [No JVD] : no jugular venous distention [Thyroid Normal, No Nodules] : the thyroid was normal and there were no nodules present [Supple] : supple [No Accessory Muscle Use] : no accessory muscle use [No Respiratory Distress] : no respiratory distress  [Clear to Auscultation] : lungs were clear to auscultation bilaterally [Normal Rate] : normal rate  [Normal S1, S2] : normal S1 and S2 [Regular Rhythm] : with a regular rhythm [No Murmur] : no murmur heard [Soft] : abdomen soft [No Edema] : there was no peripheral edema [Non Tender] : non-tender [No HSM] : no HSM [Normal Supraclavicular Nodes] : no supraclavicular lymphadenopathy [Normal Posterior Cervical Nodes] : no posterior cervical lymphadenopathy [Normal Bowel Sounds] : normal bowel sounds [Normal Anterior Cervical Nodes] : no anterior cervical lymphadenopathy

## 2019-07-02 NOTE — ASSESSMENT
[FreeTextEntry1] : 1) HTN \par - stable\par - ct meds \par - low salt diet \par \par 2) HL \par - ct diet \par \par 3) hypothyroidism \par - TSH - at goal\par - Ct meds \par \par 4)SLE-\par - on plaquenil \par \par 5) need GI f/u pancreatic cyst - has new GI doctor

## 2019-07-02 NOTE — HISTORY OF PRESENT ILLNESS
[FreeTextEntry1] : f/u HTN / HL / hypothyroidism \par  [de-identified] : pt is under tremendous stress bc housing situation \par she has been taking meds w/out any AE \par \par No chest pain or SOB \par Pt has been gaining some weight bc she started drinking again

## 2019-07-03 LAB
APPEARANCE: CLEAR
BILIRUBIN URINE: NEGATIVE
BLOOD URINE: NEGATIVE
COLOR: NORMAL
GLUCOSE QUALITATIVE U: NEGATIVE
KETONES URINE: NEGATIVE
LEUKOCYTE ESTERASE URINE: NEGATIVE
NITRITE URINE: NEGATIVE
PH URINE: 5.5
PROTEIN URINE: NORMAL
SPECIFIC GRAVITY URINE: 1.02
UROBILINOGEN URINE: NORMAL

## 2019-07-05 ENCOUNTER — INPATIENT (INPATIENT)
Facility: HOSPITAL | Age: 64
LOS: 0 days | Discharge: HOME | End: 2019-07-06
Attending: INTERNAL MEDICINE | Admitting: INTERNAL MEDICINE
Payer: MEDICAID

## 2019-07-05 VITALS
DIASTOLIC BLOOD PRESSURE: 146 MMHG | HEART RATE: 97 BPM | OXYGEN SATURATION: 96 % | SYSTOLIC BLOOD PRESSURE: 236 MMHG | TEMPERATURE: 98 F | RESPIRATION RATE: 18 BRPM

## 2019-07-05 DIAGNOSIS — M32.9 SYSTEMIC LUPUS ERYTHEMATOSUS, UNSPECIFIED: ICD-10-CM

## 2019-07-05 DIAGNOSIS — K52.9 NONINFECTIVE GASTROENTERITIS AND COLITIS, UNSPECIFIED: ICD-10-CM

## 2019-07-05 DIAGNOSIS — Z29.9 ENCOUNTER FOR PROPHYLACTIC MEASURES, UNSPECIFIED: ICD-10-CM

## 2019-07-05 DIAGNOSIS — Z98.89 OTHER SPECIFIED POSTPROCEDURAL STATES: Chronic | ICD-10-CM

## 2019-07-05 DIAGNOSIS — I10 ESSENTIAL (PRIMARY) HYPERTENSION: ICD-10-CM

## 2019-07-05 DIAGNOSIS — E03.8 OTHER SPECIFIED HYPOTHYROIDISM: ICD-10-CM

## 2019-07-05 DIAGNOSIS — Z98.890 OTHER SPECIFIED POSTPROCEDURAL STATES: Chronic | ICD-10-CM

## 2019-07-05 DIAGNOSIS — F32.9 MAJOR DEPRESSIVE DISORDER, SINGLE EPISODE, UNSPECIFIED: ICD-10-CM

## 2019-07-05 LAB
ALBUMIN SERPL ELPH-MCNC: 4.8 G/DL — SIGNIFICANT CHANGE UP (ref 3.3–5)
ALP SERPL-CCNC: 136 U/L — HIGH (ref 40–120)
ALT FLD-CCNC: 24 U/L — SIGNIFICANT CHANGE UP (ref 4–33)
ANION GAP SERPL CALC-SCNC: 16 MMO/L — HIGH (ref 7–14)
APPEARANCE UR: CLEAR — SIGNIFICANT CHANGE UP
APTT BLD: 27.8 SEC — SIGNIFICANT CHANGE UP (ref 27.5–36.3)
AST SERPL-CCNC: 31 U/L — SIGNIFICANT CHANGE UP (ref 4–32)
BACTERIA # UR AUTO: NEGATIVE — SIGNIFICANT CHANGE UP
BASE EXCESS BLDV CALC-SCNC: 3.8 MMOL/L — SIGNIFICANT CHANGE UP
BASE EXCESS BLDV CALC-SCNC: 6 MMOL/L — SIGNIFICANT CHANGE UP
BASOPHILS # BLD AUTO: 0.07 K/UL — SIGNIFICANT CHANGE UP (ref 0–0.2)
BASOPHILS NFR BLD AUTO: 0.6 % — SIGNIFICANT CHANGE UP (ref 0–2)
BILIRUB SERPL-MCNC: 0.6 MG/DL — SIGNIFICANT CHANGE UP (ref 0.2–1.2)
BILIRUB UR-MCNC: NEGATIVE — SIGNIFICANT CHANGE UP
BLOOD GAS VENOUS - CREATININE: 0.67 MG/DL — SIGNIFICANT CHANGE UP (ref 0.5–1.3)
BLOOD GAS VENOUS - CREATININE: 0.9 MG/DL — SIGNIFICANT CHANGE UP (ref 0.5–1.3)
BLOOD GAS VENOUS - FIO2: 21 — SIGNIFICANT CHANGE UP
BLOOD GAS VENOUS - FIO2: 21 — SIGNIFICANT CHANGE UP
BLOOD UR QL VISUAL: HIGH
BUN SERPL-MCNC: 14 MG/DL — SIGNIFICANT CHANGE UP (ref 7–23)
CALCIUM SERPL-MCNC: 10.3 MG/DL — SIGNIFICANT CHANGE UP (ref 8.4–10.5)
CHLORIDE BLDV-SCNC: 104 MMOL/L — SIGNIFICANT CHANGE UP (ref 96–108)
CHLORIDE BLDV-SCNC: 105 MMOL/L — SIGNIFICANT CHANGE UP (ref 96–108)
CHLORIDE SERPL-SCNC: 98 MMOL/L — SIGNIFICANT CHANGE UP (ref 98–107)
CO2 SERPL-SCNC: 28 MMOL/L — SIGNIFICANT CHANGE UP (ref 22–31)
COLOR SPEC: SIGNIFICANT CHANGE UP
CREAT SERPL-MCNC: 0.87 MG/DL — SIGNIFICANT CHANGE UP (ref 0.5–1.3)
EOSINOPHIL # BLD AUTO: 0.02 K/UL — SIGNIFICANT CHANGE UP (ref 0–0.5)
EOSINOPHIL NFR BLD AUTO: 0.2 % — SIGNIFICANT CHANGE UP (ref 0–6)
GAS PNL BLDV: 134 MMOL/L — LOW (ref 136–146)
GAS PNL BLDV: 139 MMOL/L — SIGNIFICANT CHANGE UP (ref 136–146)
GLUCOSE BLDV-MCNC: 118 MG/DL — HIGH (ref 70–99)
GLUCOSE BLDV-MCNC: 139 MG/DL — HIGH (ref 70–99)
GLUCOSE SERPL-MCNC: 118 MG/DL — HIGH (ref 70–99)
GLUCOSE UR-MCNC: NEGATIVE — SIGNIFICANT CHANGE UP
HCO3 BLDV-SCNC: 27 MMOL/L — SIGNIFICANT CHANGE UP (ref 20–27)
HCO3 BLDV-SCNC: 28 MMOL/L — HIGH (ref 20–27)
HCT VFR BLD CALC: 48.1 % — HIGH (ref 34.5–45)
HCT VFR BLDV CALC: 50.1 % — HIGH (ref 34.5–45)
HCT VFR BLDV CALC: 51.9 % — HIGH (ref 34.5–45)
HGB BLD-MCNC: 16 G/DL — HIGH (ref 11.5–15.5)
HGB BLDV-MCNC: 16.4 G/DL — HIGH (ref 11.5–15.5)
HGB BLDV-MCNC: 17 G/DL — HIGH (ref 11.5–15.5)
HYALINE CASTS # UR AUTO: NEGATIVE — SIGNIFICANT CHANGE UP
IMM GRANULOCYTES NFR BLD AUTO: 0.4 % — SIGNIFICANT CHANGE UP (ref 0–1.5)
INR BLD: 0.99 — SIGNIFICANT CHANGE UP (ref 0.88–1.17)
KETONES UR-MCNC: SIGNIFICANT CHANGE UP
LACTATE BLDV-MCNC: 1.9 MMOL/L — SIGNIFICANT CHANGE UP (ref 0.5–2)
LACTATE BLDV-MCNC: 2.3 MMOL/L — HIGH (ref 0.5–2)
LEUKOCYTE ESTERASE UR-ACNC: NEGATIVE — SIGNIFICANT CHANGE UP
LIDOCAIN IGE QN: 32.9 U/L — SIGNIFICANT CHANGE UP (ref 7–60)
LYMPHOCYTES # BLD AUTO: 19.9 % — SIGNIFICANT CHANGE UP (ref 13–44)
LYMPHOCYTES # BLD AUTO: 2.23 K/UL — SIGNIFICANT CHANGE UP (ref 1–3.3)
MCHC RBC-ENTMCNC: 29.9 PG — SIGNIFICANT CHANGE UP (ref 27–34)
MCHC RBC-ENTMCNC: 33.3 % — SIGNIFICANT CHANGE UP (ref 32–36)
MCV RBC AUTO: 89.7 FL — SIGNIFICANT CHANGE UP (ref 80–100)
MONOCYTES # BLD AUTO: 0.84 K/UL — SIGNIFICANT CHANGE UP (ref 0–0.9)
MONOCYTES NFR BLD AUTO: 7.5 % — SIGNIFICANT CHANGE UP (ref 2–14)
NEUTROPHILS # BLD AUTO: 7.98 K/UL — HIGH (ref 1.8–7.4)
NEUTROPHILS NFR BLD AUTO: 71.4 % — SIGNIFICANT CHANGE UP (ref 43–77)
NITRITE UR-MCNC: NEGATIVE — SIGNIFICANT CHANGE UP
NRBC # FLD: 0 K/UL — SIGNIFICANT CHANGE UP (ref 0–0)
PCO2 BLDV: 47 MMHG — SIGNIFICANT CHANGE UP (ref 41–51)
PCO2 BLDV: 48 MMHG — SIGNIFICANT CHANGE UP (ref 41–51)
PH BLDV: 7.39 PH — SIGNIFICANT CHANGE UP (ref 7.32–7.43)
PH BLDV: 7.43 PH — SIGNIFICANT CHANGE UP (ref 7.32–7.43)
PH UR: 6 — SIGNIFICANT CHANGE UP (ref 5–8)
PLATELET # BLD AUTO: 336 K/UL — SIGNIFICANT CHANGE UP (ref 150–400)
PMV BLD: 10.4 FL — SIGNIFICANT CHANGE UP (ref 7–13)
PO2 BLDV: 28 MMHG — LOW (ref 35–40)
PO2 BLDV: 48 MMHG — HIGH (ref 35–40)
POTASSIUM BLDV-SCNC: 3.6 MMOL/L — SIGNIFICANT CHANGE UP (ref 3.4–4.5)
POTASSIUM BLDV-SCNC: 4 MMOL/L — SIGNIFICANT CHANGE UP (ref 3.4–4.5)
POTASSIUM SERPL-MCNC: 4.3 MMOL/L — SIGNIFICANT CHANGE UP (ref 3.5–5.3)
POTASSIUM SERPL-SCNC: 4.3 MMOL/L — SIGNIFICANT CHANGE UP (ref 3.5–5.3)
PROT SERPL-MCNC: 8.5 G/DL — HIGH (ref 6–8.3)
PROT UR-MCNC: 100 — HIGH
PROTHROM AB SERPL-ACNC: 11.3 SEC — SIGNIFICANT CHANGE UP (ref 9.8–13.1)
RBC # BLD: 5.36 M/UL — HIGH (ref 3.8–5.2)
RBC # FLD: 13.9 % — SIGNIFICANT CHANGE UP (ref 10.3–14.5)
RBC CASTS # UR COMP ASSIST: HIGH (ref 0–?)
SAO2 % BLDV: 48.9 % — LOW (ref 60–85)
SAO2 % BLDV: 79.6 % — SIGNIFICANT CHANGE UP (ref 60–85)
SODIUM SERPL-SCNC: 142 MMOL/L — SIGNIFICANT CHANGE UP (ref 135–145)
SP GR SPEC: 1.01 — SIGNIFICANT CHANGE UP (ref 1–1.04)
SQUAMOUS # UR AUTO: SIGNIFICANT CHANGE UP
UROBILINOGEN FLD QL: NORMAL — SIGNIFICANT CHANGE UP
WBC # BLD: 11.19 K/UL — HIGH (ref 3.8–10.5)
WBC # FLD AUTO: 11.19 K/UL — HIGH (ref 3.8–10.5)
WBC UR QL: SIGNIFICANT CHANGE UP (ref 0–?)

## 2019-07-05 PROCEDURE — 99223 1ST HOSP IP/OBS HIGH 75: CPT | Mod: GC

## 2019-07-05 PROCEDURE — 74177 CT ABD & PELVIS W/CONTRAST: CPT | Mod: 26

## 2019-07-05 RX ORDER — SODIUM CHLORIDE 9 MG/ML
1000 INJECTION, SOLUTION INTRAVENOUS
Refills: 0 | Status: DISCONTINUED | OUTPATIENT
Start: 2019-07-05 | End: 2019-07-06

## 2019-07-05 RX ORDER — TRAMADOL HYDROCHLORIDE 50 MG/1
50 TABLET ORAL EVERY 6 HOURS
Refills: 0 | Status: DISCONTINUED | OUTPATIENT
Start: 2019-07-05 | End: 2019-07-06

## 2019-07-05 RX ORDER — MORPHINE SULFATE 50 MG/1
4 CAPSULE, EXTENDED RELEASE ORAL ONCE
Refills: 0 | Status: DISCONTINUED | OUTPATIENT
Start: 2019-07-05 | End: 2019-07-05

## 2019-07-05 RX ORDER — ACETAMINOPHEN 500 MG
650 TABLET ORAL EVERY 6 HOURS
Refills: 0 | Status: DISCONTINUED | OUTPATIENT
Start: 2019-07-05 | End: 2019-07-06

## 2019-07-05 RX ORDER — AMLODIPINE BESYLATE 2.5 MG/1
5 TABLET ORAL DAILY
Refills: 0 | Status: DISCONTINUED | OUTPATIENT
Start: 2019-07-05 | End: 2019-07-06

## 2019-07-05 RX ORDER — ONDANSETRON 8 MG/1
4 TABLET, FILM COATED ORAL ONCE
Refills: 0 | Status: COMPLETED | OUTPATIENT
Start: 2019-07-05 | End: 2019-07-05

## 2019-07-05 RX ORDER — METOCLOPRAMIDE HCL 10 MG
10 TABLET ORAL ONCE
Refills: 0 | Status: DISCONTINUED | OUTPATIENT
Start: 2019-07-05 | End: 2019-07-05

## 2019-07-05 RX ORDER — TRAZODONE HCL 50 MG
2 TABLET ORAL
Qty: 0 | Refills: 0 | DISCHARGE

## 2019-07-05 RX ORDER — LEVOTHYROXINE SODIUM 125 MCG
150 TABLET ORAL DAILY
Refills: 0 | Status: DISCONTINUED | OUTPATIENT
Start: 2019-07-05 | End: 2019-07-06

## 2019-07-05 RX ORDER — METOPROLOL TARTRATE 50 MG
5 TABLET ORAL ONCE
Refills: 0 | Status: COMPLETED | OUTPATIENT
Start: 2019-07-05 | End: 2019-07-05

## 2019-07-05 RX ORDER — HYDROMORPHONE HYDROCHLORIDE 2 MG/ML
0.5 INJECTION INTRAMUSCULAR; INTRAVENOUS; SUBCUTANEOUS ONCE
Refills: 0 | Status: DISCONTINUED | OUTPATIENT
Start: 2019-07-05 | End: 2019-07-05

## 2019-07-05 RX ORDER — METOPROLOL TARTRATE 50 MG
25 TABLET ORAL ONCE
Refills: 0 | Status: COMPLETED | OUTPATIENT
Start: 2019-07-05 | End: 2019-07-05

## 2019-07-05 RX ORDER — HYDROMORPHONE HYDROCHLORIDE 2 MG/ML
2 INJECTION INTRAMUSCULAR; INTRAVENOUS; SUBCUTANEOUS EVERY 8 HOURS
Refills: 0 | Status: DISCONTINUED | OUTPATIENT
Start: 2019-07-05 | End: 2019-07-06

## 2019-07-05 RX ORDER — HYDRALAZINE HCL 50 MG
5 TABLET ORAL ONCE
Refills: 0 | Status: COMPLETED | OUTPATIENT
Start: 2019-07-05 | End: 2019-07-05

## 2019-07-05 RX ORDER — METRONIDAZOLE 500 MG
500 TABLET ORAL ONCE
Refills: 0 | Status: COMPLETED | OUTPATIENT
Start: 2019-07-05 | End: 2019-07-05

## 2019-07-05 RX ORDER — SODIUM CHLORIDE 9 MG/ML
1000 INJECTION INTRAMUSCULAR; INTRAVENOUS; SUBCUTANEOUS ONCE
Refills: 0 | Status: COMPLETED | OUTPATIENT
Start: 2019-07-05 | End: 2019-07-05

## 2019-07-05 RX ORDER — FLUOXETINE HCL 10 MG
40 CAPSULE ORAL DAILY
Refills: 0 | Status: DISCONTINUED | OUTPATIENT
Start: 2019-07-05 | End: 2019-07-06

## 2019-07-05 RX ORDER — ENOXAPARIN SODIUM 100 MG/ML
40 INJECTION SUBCUTANEOUS EVERY 24 HOURS
Refills: 0 | Status: DISCONTINUED | OUTPATIENT
Start: 2019-07-05 | End: 2019-07-06

## 2019-07-05 RX ORDER — CLONAZEPAM 1 MG
1 TABLET ORAL
Refills: 0 | Status: DISCONTINUED | OUTPATIENT
Start: 2019-07-05 | End: 2019-07-06

## 2019-07-05 RX ORDER — METOPROLOL TARTRATE 50 MG
25 TABLET ORAL DAILY
Refills: 0 | Status: DISCONTINUED | OUTPATIENT
Start: 2019-07-06 | End: 2019-07-06

## 2019-07-05 RX ORDER — CIPROFLOXACIN LACTATE 400MG/40ML
400 VIAL (ML) INTRAVENOUS ONCE
Refills: 0 | Status: COMPLETED | OUTPATIENT
Start: 2019-07-05 | End: 2019-07-05

## 2019-07-05 RX ADMIN — HYDROMORPHONE HYDROCHLORIDE 0.5 MILLIGRAM(S): 2 INJECTION INTRAMUSCULAR; INTRAVENOUS; SUBCUTANEOUS at 18:05

## 2019-07-05 RX ADMIN — Medication 5 MILLIGRAM(S): at 18:16

## 2019-07-05 RX ADMIN — ONDANSETRON 4 MILLIGRAM(S): 8 TABLET, FILM COATED ORAL at 11:00

## 2019-07-05 RX ADMIN — Medication 5 MILLIGRAM(S): at 14:29

## 2019-07-05 RX ADMIN — MORPHINE SULFATE 4 MILLIGRAM(S): 50 CAPSULE, EXTENDED RELEASE ORAL at 10:58

## 2019-07-05 RX ADMIN — SODIUM CHLORIDE 1000 MILLILITER(S): 9 INJECTION INTRAMUSCULAR; INTRAVENOUS; SUBCUTANEOUS at 14:22

## 2019-07-05 RX ADMIN — MORPHINE SULFATE 4 MILLIGRAM(S): 50 CAPSULE, EXTENDED RELEASE ORAL at 11:15

## 2019-07-05 RX ADMIN — MORPHINE SULFATE 4 MILLIGRAM(S): 50 CAPSULE, EXTENDED RELEASE ORAL at 12:15

## 2019-07-05 RX ADMIN — HYDROMORPHONE HYDROCHLORIDE 2 MILLIGRAM(S): 2 INJECTION INTRAMUSCULAR; INTRAVENOUS; SUBCUTANEOUS at 20:44

## 2019-07-05 RX ADMIN — HYDROMORPHONE HYDROCHLORIDE 0.5 MILLIGRAM(S): 2 INJECTION INTRAMUSCULAR; INTRAVENOUS; SUBCUTANEOUS at 15:32

## 2019-07-05 RX ADMIN — HYDROMORPHONE HYDROCHLORIDE 0.5 MILLIGRAM(S): 2 INJECTION INTRAMUSCULAR; INTRAVENOUS; SUBCUTANEOUS at 14:29

## 2019-07-05 RX ADMIN — Medication 100 MILLIGRAM(S): at 18:05

## 2019-07-05 RX ADMIN — HYDROMORPHONE HYDROCHLORIDE 2 MILLIGRAM(S): 2 INJECTION INTRAMUSCULAR; INTRAVENOUS; SUBCUTANEOUS at 19:50

## 2019-07-05 RX ADMIN — Medication 200 MILLIGRAM(S): at 15:52

## 2019-07-05 RX ADMIN — TRAMADOL HYDROCHLORIDE 50 MILLIGRAM(S): 50 TABLET ORAL at 23:34

## 2019-07-05 RX ADMIN — MORPHINE SULFATE 4 MILLIGRAM(S): 50 CAPSULE, EXTENDED RELEASE ORAL at 11:53

## 2019-07-05 RX ADMIN — SODIUM CHLORIDE 1000 MILLILITER(S): 9 INJECTION INTRAMUSCULAR; INTRAVENOUS; SUBCUTANEOUS at 11:00

## 2019-07-05 RX ADMIN — Medication 25 MILLIGRAM(S): at 11:00

## 2019-07-05 RX ADMIN — SODIUM CHLORIDE 75 MILLILITER(S): 9 INJECTION, SOLUTION INTRAVENOUS at 23:28

## 2019-07-05 NOTE — H&P ADULT - NSHPSOCIALHISTORY_GEN_ALL_CORE
Lives with  and adult son (who has mental disability), denies tobacco/EtOH, sedentary lifestyle but does not use any device to assist with ambulation

## 2019-07-05 NOTE — H&P ADULT - PROBLEM SELECTOR PLAN 4
Hx of SLE, Scleroderma, pyogenic gangrenosum   - holding Plaquenil 400mg daily in setting of possible infection

## 2019-07-05 NOTE — H&P ADULT - ATTENDING COMMENTS
Pt with some relief of lower abd pain s/p dilaudid. No stool output since admission. CT abd showing uncomplicated transverse colitis. S/p cipro/flagyl. Will manage symptoms, hydrate IV tonight (given apparent hemoconcentration on CBC), and monitor oral intake.

## 2019-07-05 NOTE — ED ADULT TRIAGE NOTE - CHIEF COMPLAINT QUOTE
pt reports NVD that began last night, no longer vomiting but reports bloody diarrhea and generalized stabbing abd pain. hx of autoimmune diease and pancreatic cysts

## 2019-07-05 NOTE — H&P ADULT - NSICDXPASTMEDICALHX_GEN_ALL_CORE_FT
PAST MEDICAL HISTORY:  Depression     HTN (hypertension)     Hypothyroid     Pyoderma gangrenosum     SLE (systemic lupus erythematosus)     Steroid-induced psychosis with complication

## 2019-07-05 NOTE — ED ADULT NURSE NOTE - OBJECTIVE STATEMENT
pt alert,oriented x3. skin warm,dry.reports abd pains x past 3 days,describes as cramping. reports vomited x1 this am,undigegested food,reports passsed " viviana reaves eces dark bld yesterda y" md to evaluate,  pt medicated as ordered for pain,bp. md aware of bp. will continue to monitor

## 2019-07-05 NOTE — H&P ADULT - HISTORY OF PRESENT ILLNESS
62 yo F PMHx of SLE, scleroderma, Hashimoto's thyroiditis, Sjogren's, pyoderma gangrenosum, HTN presented with lower abdominal pain, and reported bloody BM for one day. States that she was in her usual state of health, until yesterday where she went to a BBQ, ate something "grotesque" which she immediately nauseous and vomited. Went to the bathroom and noticed "red pellets", but no jose blood, which has resolved since her admission. Additionally, reporting a headache, which feels like her typical migraines, endorses mild photophobia/phonophobia, denies diplopia. No recent travel, but her son was sick with the flu a few weeks ago. No fevers/chills, no diarrhea, no SOB, no chest pain.     In the ED  VS: Tmax 98F, HR 96-98, /146, RR 15-18, spO2 96-98% RA   Recieved 1L NS bolus, Zofran/Reglan, 4mg Iv morphinex2, lopressor 5mg, Tropol XL 25mg daily, 0.5mg dilaudid

## 2019-07-05 NOTE — H&P ADULT - PROBLEM SELECTOR PLAN 6
DVT: lovenox   Diet: DASH/TLC   Dispo: Pending PT shaista Lang MD SUNI  Internal Medicine PGY-2  Pager: [facility]: [phone]; [facility] [phone]

## 2019-07-05 NOTE — ED PROVIDER NOTE - PROGRESS NOTE DETAILS
Jc Montoya DO PGY2 - Sx improving with pain meds but requires repeat dosing. Remains hypertensive will give IV meds for bp control

## 2019-07-05 NOTE — ED ADULT NURSE NOTE - NSIMPLEMENTINTERV_GEN_ALL_ED
Implemented All Universal Safety Interventions:  [place] to call system. Call bell, personal items and telephone within reach. Instruct patient to call for assistance. Room bathroom lighting operational. Non-slip footwear when patient is off stretcher. Physically safe environment: no spills, clutter or unnecessary equipment. Stretcher in lowest position, wheels locked, appropriate side rails in place.

## 2019-07-05 NOTE — ED PROVIDER NOTE - ATTENDING CONTRIBUTION TO CARE
Hx of scleroderma, c/o abdominal pain, crampy diarrhea with blood, spotty, since yesterday afternoon. Currently with diffuse abd pain. Denies hx of colitis 236/138 P100 RR 18 HEENT sclera non icteric chest clear, abd diffuse tenderness without rebound or guarding. Imp: Abd pain blood in stool, HTN uncontrolled (denies CP sob, Plan Morphine for pain, BP control CBC CMP, CT of abdomen with contrast

## 2019-07-05 NOTE — H&P ADULT - NSHPREVIEWOFSYSTEMS_GEN_ALL_CORE
CONSTITUTIONAL: No fever, weight loss, or fatigue  EYES: +Photophobia   ENMT: No difficulty hearing, tinnitus, vertigo; No sinus or throat pain  RESPIRATORY: No cough, wheezing, chills or hemoptysis; No shortness of breath  CARDIOVASCULAR: No chest pain, palpitations, dizziness, or leg swelling  GASTROINTESTINAL: +Abdominal pain, +constipation, + nausea/vomiting   GENITOURINARY: No dysuria, frequency, hematuria, or incontinence  NEUROLOGICAL: +headaches   SKIN: No new rashes or lesions   LYMPH NODES: No enlarged glands  ENDOCRINE: No heat or cold intolerance; No polydipsia or polyuria  MUSCULOSKELETAL: No joint pain or swelling;   PSYCHIATRIC: Denies depression, anxiety  HEME/LYMPH: No easy bruising, or bleeding gums  ALLERGY AND IMMUNOLOGIC: No hives or eczema

## 2019-07-05 NOTE — H&P ADULT - PROBLEM SELECTOR PLAN 2
History of uncontrolled hypertension per patient, presenting with hypertensive emergency likely 2/2 pain  - Takes Tropol XL 25mg daily as outpatient will continue here  - pain control will with IV Dilaudid for now  - consider hydralazine 5mg IV prn for SBP <180 History of uncontrolled hypertension per patient, presenting with hypertensive emergency likely 2/2 pain  - Takes Tropol XL 25mg daily as outpatient will continue here  - pain control will with IV Dilaudid 0.5 mg for now  - consider hydralazine 5mg IV prn for SBP <180 History of uncontrolled hypertension per patient, presenting with hypertensive emergency likely 2/2 pain  - Takes Tropol XL 25mg daily as outpatient will continue here  - Appears to have long term uncontrolled htn will start amlodipine 5mg PO daily   - pain control will tramadol 50mg PO Q6hr prn, Dilaudid 2mg PO Q8hr   - consider hydralazine 5mg IV prn for SBP >180

## 2019-07-05 NOTE — H&P ADULT - PROBLEM SELECTOR PLAN 1
Presented with acute onset abdominal pain, and bloody BM   CT imaging consistent with transverse colitis, likely infectious, DDx: viral vs bacterial  S/P Cipro and flagyl in the ED, Sepsis criteria met with leukocytosis, tachycardia, and CT imaging.  - Would monitor off of abx for now   - r/o infection: Stool O/P, GI PCR, stool culture  - consider GI consult, s/p colonoscopy 3 months ago which was reportedly normal  - if febrile will restart Cipro/flagyl Presented with acute onset abdominal pain, and bloody BM   CT imaging consistent with transverse colitis, likely infectious, DDx: viral vs bacterial vs mesenteric ischemia   S/P Cipro and flagyl in the ED, Sepsis criteria met with leukocytosis, tachycardia, and CT imaging.  - Would monitor off of abx for now   - r/o infection: Stool O/P, GI PCR, stool culture  - consider GI consult, s/p colonoscopy 3 months ago which was reportedly normal  - if febrile will restart Cipro/flagyl  - Will trial food, If severe abdominal pain w/meals recheck lactate

## 2019-07-05 NOTE — ED PROVIDER NOTE - PHYSICAL EXAMINATION
Gen: Well appearing, NAD  Head: NCAT  HEENT: PERRL, MMM, normal conjunctiva, anicteric, neck supple  Lung: CTAB, no adventitious sounds  CV: RRR, no murmurs  Abd: soft, mildly ttp throughout abd, no rebound or guarding, no CVAT  MSK: No edema, no visible deformities  Neuro: Moving all extremity grossly  Skin: Warm and dry, no evidence of rash  Psych: normal mood and affect

## 2019-07-05 NOTE — H&P ADULT - ASSESSMENT
64 yo F PMHx of SLE, scleroderma, Hashimoto's thyroiditis, Sjogren's, pyoderma gangrenosum, HTN presented with lower abdominal pain, found to have colitis of the distal transverse colon.

## 2019-07-05 NOTE — H&P ADULT - NSHPLABSRESULTS_GEN_ALL_CORE
LABS:                        16.0   11.19 )-----------( 336      ( 05 Jul 2019 10:50 )             48.1     05 Jul 2019 10:50    142    |  98     |  14     ----------------------------<  118    4.3     |  28     |  0.87     Ca    10.3       05 Jul 2019 10:50    TPro  8.5    /  Alb  4.8    /  TBili  0.6    /  DBili  x      /  AST  31     /  ALT  24     /  AlkPhos  136    05 Jul 2019 10:50    PT/INR - ( 05 Jul 2019 10:50 )   PT: 11.3 SEC;   INR: 0.99     PTT - ( 05 Jul 2019 10:50 )  PTT:27.8 SEC    RADIOLOGY & ADDITIONAL TESTS:  < from: CT Abdomen and Pelvis w/ IV Cont (07.05.19 @ 12:46) >    FINDINGS:    LOWER CHEST: Within normal limits.    LIVER: Steatosis.  BILE DUCTS: Normal caliber.  GALLBLADDER: Within normal limits.  SPLEEN: Within normal limits.  PANCREAS: Within normal limits.  ADRENALS: Within normal limits.  KIDNEYS/URETERS: Within normal limits.    BLADDER: Within normal limits.  REPRODUCTIVE ORGANS: Uterus and adnexa within normal limits.    BOWEL: The distal transverse colitis. No bowel obstruction. Appendix is   normal.  PERITONEUM: No ascites.  VESSELS: Within normal limits.  RETROPERITONEUM: No lymphadenopathy.    ABDOMINAL WALL: Within normal limits.  BONES: Within normal limits.    IMPRESSION:     Distal transverse colitis.  < end of copied text >  Imaging Personally Reviewed:  [x] YES

## 2019-07-05 NOTE — ED PROVIDER NOTE - OBJECTIVE STATEMENT
64yo F pmx sle, sjogrens, hashimotos thyroiditis, htn p/w generalized abd pain, n/v/d since yesterday. Assc with several episodes of small amount of bloody diarrhea. States brbpr has resolved. No hx of similar sx. Last colonoscopy 3 months prior unremarkable. 62yo F pmx sle, sjogrens, hashimotos thyroiditis, htn p/w generalized abd pain, n/v/d since yesterday. Assc with gradual onset generalized headache, several episodes of small amount of bloody diarrhea. States brbpr has resolved. No hx of similar sx. Last colonoscopy 3 months prior unremarkable.

## 2019-07-06 ENCOUNTER — TRANSCRIPTION ENCOUNTER (OUTPATIENT)
Age: 64
End: 2019-07-06

## 2019-07-06 VITALS
RESPIRATION RATE: 18 BRPM | HEART RATE: 78 BPM | DIASTOLIC BLOOD PRESSURE: 48 MMHG | TEMPERATURE: 98 F | OXYGEN SATURATION: 94 % | SYSTOLIC BLOOD PRESSURE: 108 MMHG

## 2019-07-06 DIAGNOSIS — R51 HEADACHE: ICD-10-CM

## 2019-07-06 DIAGNOSIS — I16.0 HYPERTENSIVE URGENCY: ICD-10-CM

## 2019-07-06 LAB
ALBUMIN SERPL ELPH-MCNC: 3.8 G/DL — SIGNIFICANT CHANGE UP (ref 3.3–5)
ALP SERPL-CCNC: 99 U/L — SIGNIFICANT CHANGE UP (ref 40–120)
ALT FLD-CCNC: 17 U/L — SIGNIFICANT CHANGE UP (ref 4–33)
ANION GAP SERPL CALC-SCNC: 14 MMO/L — SIGNIFICANT CHANGE UP (ref 7–14)
AST SERPL-CCNC: 24 U/L — SIGNIFICANT CHANGE UP (ref 4–32)
BASOPHILS # BLD AUTO: 0.06 K/UL — SIGNIFICANT CHANGE UP (ref 0–0.2)
BASOPHILS NFR BLD AUTO: 0.8 % — SIGNIFICANT CHANGE UP (ref 0–2)
BILIRUB SERPL-MCNC: 0.6 MG/DL — SIGNIFICANT CHANGE UP (ref 0.2–1.2)
BUN SERPL-MCNC: 11 MG/DL — SIGNIFICANT CHANGE UP (ref 7–23)
CALCIUM SERPL-MCNC: 9.1 MG/DL — SIGNIFICANT CHANGE UP (ref 8.4–10.5)
CHLORIDE SERPL-SCNC: 99 MMOL/L — SIGNIFICANT CHANGE UP (ref 98–107)
CO2 SERPL-SCNC: 24 MMOL/L — SIGNIFICANT CHANGE UP (ref 22–31)
CREAT SERPL-MCNC: 0.75 MG/DL — SIGNIFICANT CHANGE UP (ref 0.5–1.3)
EOSINOPHIL # BLD AUTO: 0.14 K/UL — SIGNIFICANT CHANGE UP (ref 0–0.5)
EOSINOPHIL NFR BLD AUTO: 1.8 % — SIGNIFICANT CHANGE UP (ref 0–6)
GLUCOSE SERPL-MCNC: 93 MG/DL — SIGNIFICANT CHANGE UP (ref 70–99)
HBA1C BLD-MCNC: 4.7 % — SIGNIFICANT CHANGE UP (ref 4–5.6)
HCT VFR BLD CALC: 40.3 % — SIGNIFICANT CHANGE UP (ref 34.5–45)
HGB BLD-MCNC: 13.3 G/DL — SIGNIFICANT CHANGE UP (ref 11.5–15.5)
IMM GRANULOCYTES NFR BLD AUTO: 0.3 % — SIGNIFICANT CHANGE UP (ref 0–1.5)
LYMPHOCYTES # BLD AUTO: 2.27 K/UL — SIGNIFICANT CHANGE UP (ref 1–3.3)
LYMPHOCYTES # BLD AUTO: 28.9 % — SIGNIFICANT CHANGE UP (ref 13–44)
MAGNESIUM SERPL-MCNC: 1.7 MG/DL — SIGNIFICANT CHANGE UP (ref 1.6–2.6)
MCHC RBC-ENTMCNC: 30 PG — SIGNIFICANT CHANGE UP (ref 27–34)
MCHC RBC-ENTMCNC: 33 % — SIGNIFICANT CHANGE UP (ref 32–36)
MCV RBC AUTO: 91 FL — SIGNIFICANT CHANGE UP (ref 80–100)
MONOCYTES # BLD AUTO: 0.6 K/UL — SIGNIFICANT CHANGE UP (ref 0–0.9)
MONOCYTES NFR BLD AUTO: 7.6 % — SIGNIFICANT CHANGE UP (ref 2–14)
NEUTROPHILS # BLD AUTO: 4.77 K/UL — SIGNIFICANT CHANGE UP (ref 1.8–7.4)
NEUTROPHILS NFR BLD AUTO: 60.6 % — SIGNIFICANT CHANGE UP (ref 43–77)
NRBC # FLD: 0 K/UL — SIGNIFICANT CHANGE UP (ref 0–0)
PHOSPHATE SERPL-MCNC: 3.2 MG/DL — SIGNIFICANT CHANGE UP (ref 2.5–4.5)
PLATELET # BLD AUTO: 236 K/UL — SIGNIFICANT CHANGE UP (ref 150–400)
PMV BLD: 10.8 FL — SIGNIFICANT CHANGE UP (ref 7–13)
POTASSIUM SERPL-MCNC: 3.5 MMOL/L — SIGNIFICANT CHANGE UP (ref 3.5–5.3)
POTASSIUM SERPL-SCNC: 3.5 MMOL/L — SIGNIFICANT CHANGE UP (ref 3.5–5.3)
PROT SERPL-MCNC: 6.6 G/DL — SIGNIFICANT CHANGE UP (ref 6–8.3)
RBC # BLD: 4.43 M/UL — SIGNIFICANT CHANGE UP (ref 3.8–5.2)
RBC # FLD: 14.2 % — SIGNIFICANT CHANGE UP (ref 10.3–14.5)
SODIUM SERPL-SCNC: 137 MMOL/L — SIGNIFICANT CHANGE UP (ref 135–145)
WBC # BLD: 7.86 K/UL — SIGNIFICANT CHANGE UP (ref 3.8–10.5)
WBC # FLD AUTO: 7.86 K/UL — SIGNIFICANT CHANGE UP (ref 3.8–10.5)

## 2019-07-06 PROCEDURE — 99239 HOSP IP/OBS DSCHRG MGMT >30: CPT | Mod: GC

## 2019-07-06 RX ORDER — HYDROXYCHLOROQUINE SULFATE 200 MG
400 TABLET ORAL DAILY
Refills: 0 | Status: DISCONTINUED | OUTPATIENT
Start: 2019-07-06 | End: 2019-07-06

## 2019-07-06 RX ORDER — DIPHENHYDRAMINE HCL 50 MG
25 CAPSULE ORAL ONCE
Refills: 0 | Status: COMPLETED | OUTPATIENT
Start: 2019-07-06 | End: 2019-07-06

## 2019-07-06 RX ORDER — AMLODIPINE BESYLATE 2.5 MG/1
1 TABLET ORAL
Qty: 0 | Refills: 0 | DISCHARGE
Start: 2019-07-06

## 2019-07-06 RX ORDER — KETOROLAC TROMETHAMINE 30 MG/ML
15 SYRINGE (ML) INJECTION ONCE
Refills: 0 | Status: DISCONTINUED | OUTPATIENT
Start: 2019-07-06 | End: 2019-07-06

## 2019-07-06 RX ORDER — AMLODIPINE BESYLATE 2.5 MG/1
1 TABLET ORAL
Qty: 30 | Refills: 0
Start: 2019-07-06 | End: 2019-08-04

## 2019-07-06 RX ORDER — METOCLOPRAMIDE HCL 10 MG
10 TABLET ORAL ONCE
Refills: 0 | Status: COMPLETED | OUTPATIENT
Start: 2019-07-06 | End: 2019-07-06

## 2019-07-06 RX ADMIN — Medication 25 MILLIGRAM(S): at 07:09

## 2019-07-06 RX ADMIN — Medication 10 MILLIGRAM(S): at 12:03

## 2019-07-06 RX ADMIN — AMLODIPINE BESYLATE 5 MILLIGRAM(S): 2.5 TABLET ORAL at 07:09

## 2019-07-06 RX ADMIN — HYDROMORPHONE HYDROCHLORIDE 2 MILLIGRAM(S): 2 INJECTION INTRAMUSCULAR; INTRAVENOUS; SUBCUTANEOUS at 04:48

## 2019-07-06 RX ADMIN — Medication 400 MILLIGRAM(S): at 13:10

## 2019-07-06 RX ADMIN — Medication 40 MILLIGRAM(S): at 12:04

## 2019-07-06 RX ADMIN — TRAMADOL HYDROCHLORIDE 50 MILLIGRAM(S): 50 TABLET ORAL at 08:02

## 2019-07-06 RX ADMIN — Medication 25 MILLIGRAM(S): at 12:03

## 2019-07-06 RX ADMIN — HYDROMORPHONE HYDROCHLORIDE 2 MILLIGRAM(S): 2 INJECTION INTRAMUSCULAR; INTRAVENOUS; SUBCUTANEOUS at 03:30

## 2019-07-06 RX ADMIN — Medication 15 MILLIGRAM(S): at 12:05

## 2019-07-06 RX ADMIN — TRAMADOL HYDROCHLORIDE 50 MILLIGRAM(S): 50 TABLET ORAL at 07:05

## 2019-07-06 RX ADMIN — TRAMADOL HYDROCHLORIDE 50 MILLIGRAM(S): 50 TABLET ORAL at 00:12

## 2019-07-06 RX ADMIN — Medication 150 MICROGRAM(S): at 07:09

## 2019-07-06 RX ADMIN — ENOXAPARIN SODIUM 40 MILLIGRAM(S): 100 INJECTION SUBCUTANEOUS at 07:01

## 2019-07-06 RX ADMIN — Medication 15 MILLIGRAM(S): at 11:50

## 2019-07-06 NOTE — PROGRESS NOTE ADULT - PROBLEM SELECTOR PLAN 2
History of uncontrolled hypertension per patient, with episodes of SBP >200 when experiencing severe pain. Presenting with BP of 236/146 and severe headache and nausea. S/p metoprolol hypertensive emergency likely 2/2 pain  - Takes Tropol XL 25mg daily as outpatient will continue here  - Appears to have long term uncontrolled htn will start amlodipine 5mg PO daily   - pain control will tramadol 50mg PO Q6hr prn, Dilaudid 2mg PO Q8hr   - consider hydralazine 5mg IV prn for SBP >180 History of uncontrolled hypertension per patient, with episodes of SBP >200 when experiencing severe pain. Presenting with BP of 236/146 and severe headache and nausea. S/p metoprolol hypertensive emergency likely 2/2 pain  - Takes Tropol XL 25mg daily as outpatient will continue here  - Appears to have long term uncontrolled htn will start amlodipine 5mg PO daily   - consider hydralazine 5mg IV prn for SBP >180

## 2019-07-06 NOTE — PROGRESS NOTE ADULT - PROBLEM SELECTOR PROBLEM 4
Systemic lupus erythematosus, unspecified SLE type, unspecified organ involvement status Major depressive disorder, remission status unspecified, unspecified whether recurrent

## 2019-07-06 NOTE — PROGRESS NOTE ADULT - SUBJECTIVE AND OBJECTIVE BOX
SUMMARY: 64 yo F PMHx of SLE, scleroderma, Hashimoto's thyroiditis, Sjogren's, pyoderma gangrenosum, HTN presented with lower abdominal pain, found to have hypertensive urgency and colitis of the distal transverse colon.     SUBJECTIVE/OVERNIGHT EVENTS: No overnight events. This morning, endorses >10/10 L-sided headache that radiates to the rest of her head, also w/ photophobia and N after eating breakfast. Asking for IV dilaudid for pain, saying PO dilaudid/morphine/tramadol aren't working for her. Denies diarrhea since yesterday in the ED. She rates her current generalized abdominal pain as 4/10. Denies F/C/V. Endorses dizziness unchanged from her baseline and recent fatigue.     OBJECTIVE:  Vital Signs Last 24 Hrs  T(C): 36.7 (06 Jul 2019 08:34), Max: 36.7 (05 Jul 2019 12:00)  T(F): 98 (06 Jul 2019 08:34), Max: 98 (05 Jul 2019 12:00)  HR: 74 (06 Jul 2019 08:34) (70 - 99)  BP: 133/65 (06 Jul 2019 08:34) (130/74 - 236/126)  BP(mean): --  RR: 18 (06 Jul 2019 08:34) (14 - 18)  SpO2: 100% (06 Jul 2019 08:34) (96% - 100%)    PHYSICAL EXAM    GEN: NAD, alert, well-nourished  HEENT: NCAT, sclera anicteric, clear conjunctiva, PERRL, EOMI, nares patent, no rhinorrhea, no pharyngeal erythema or exudate, no LAD, MMM  NECK: supple, FROM, no JVD  LUNGS: CTAB, no wheezes/rales/rhonchi, good inspiratory effort  HEART: RRR, +S1 and S2, no M/R/G  ABDOMEN: soft, mild distention, tender to palpation throughout, +BS  EXTREMITIES: 2+ pulses in all extremities, WWP, FROM x4, trace b/l LE edema  SKIN: normal turgor, no lesions or rashes  NEURO: alert & oriented x 3, follows commands  PSYCH: at times tangential, labile SUMMARY: 62 yo F PMHx of SLE, scleroderma, Hashimoto's thyroiditis, Sjogren's, pyoderma gangrenosum, HTN presented with lower abdominal pain, found to have hypertensive urgency and colitis of the distal transverse colon.     SUBJECTIVE/OVERNIGHT EVENTS: No overnight events. This morning, endorses >10/10 L-sided headache that radiates to the rest of her head, also w/ photophobia and N after eating breakfast. Asking for IV dilaudid for pain, saying PO dilaudid/morphine/tramadol aren't working for her. Denies diarrhea since yesterday in the ED. She rates her current generalized abdominal pain as 4/10. Denies F/C/V. Endorses dizziness unchanged from her baseline and recent fatigue.     OBJECTIVE:  Vital Signs Last 24 Hrs  T(C): 36.7 (06 Jul 2019 08:34), Max: 36.7 (05 Jul 2019 12:00)  T(F): 98 (06 Jul 2019 08:34), Max: 98 (05 Jul 2019 12:00)  HR: 74 (06 Jul 2019 08:34) (70 - 99)  BP: 133/65 (06 Jul 2019 08:34) (130/74 - 236/126)  BP(mean): --  RR: 18 (06 Jul 2019 08:34) (14 - 18)  SpO2: 100% (06 Jul 2019 08:34) (96% - 100%)    PHYSICAL EXAM    GEN: NAD, alert, well-nourished  HEENT: NCAT, sclera anicteric, clear conjunctiva, PERRL, EOMI, nares patent, no rhinorrhea, no pharyngeal erythema or exudate, no LAD, MMM  NECK: supple, FROM, no JVD  LUNGS: CTAB, no wheezes/rales/rhonchi, good inspiratory effort  HEART: RRR, +S1 and S2, no M/R/G  ABDOMEN: soft, mild distention, tender to palpation throughout, +BS  EXTREMITIES: 2+ pulses in all extremities, WWP, FROM x4, trace b/l LE edema  SKIN: normal turgor, no lesions or rashes  NEURO: alert & oriented x 3, follows commands  PSYCH: at times tangential, labile                          13.3   7.86  )-----------( 236      ( 06 Jul 2019 07:45 )             40.3   07-06    137  |  99  |  11  ----------------------------<  93  3.5   |  24  |  0.75    Ca    9.1      06 Jul 2019 07:45  Phos  3.2     07-06  Mg     1.7     07-06    TPro  6.6  /  Alb  3.8  /  TBili  0.6  /  DBili  x   /  AST  24  /  ALT  17  /  AlkPhos  99  07-06 SUMMARY: 62 yo F PMHx of SLE, scleroderma, Hashimoto's thyroiditis, Sjogren's, pyoderma gangrenosum, HTN presented with lower abdominal pain, found to have hypertensive urgency and colitis of the distal transverse colon.     SUBJECTIVE/OVERNIGHT EVENTS: No overnight events. This morning, endorses >10/10 L-sided headache that radiates to the rest of her head, also w/ photophobia and N after eating breakfast. Asking for IV dilaudid for pain, saying PO dilaudid/morphine/tramadol aren't working for her. Denies diarrhea since yesterday in the ED. She has had improved abdominal pain and is tolerating food well. Denies F/C/V. Feels ready to go home.  OBJECTIVE:  Vital Signs Last 24 Hrs  T(C): 36.7 (06 Jul 2019 08:34), Max: 36.7 (05 Jul 2019 12:00)  T(F): 98 (06 Jul 2019 08:34), Max: 98 (05 Jul 2019 12:00)  HR: 74 (06 Jul 2019 08:34) (70 - 99)  BP: 133/65 (06 Jul 2019 08:34) (130/74 - 236/126)  BP(mean): --  RR: 18 (06 Jul 2019 08:34) (14 - 18)  SpO2: 100% (06 Jul 2019 08:34) (96% - 100%)    PHYSICAL EXAM    GEN: NAD, alert, well-nourished  HEENT: NCAT, sclera anicteric, clear conjunctiva, PERRL, EOMI, nares patent, no rhinorrhea, no pharyngeal erythema or exudate, no LAD, MMM  NECK: supple, FROM, no JVD  LUNGS: CTAB, no wheezes/rales/rhonchi, good inspiratory effort  HEART: RRR, +S1 and S2, no M/R/G  ABDOMEN: soft, mild distention, tender to palpation throughout, +BS  EXTREMITIES: 2+ pulses in all extremities, WWP, FROM x4, trace b/l LE edema  SKIN: normal turgor, no lesions or rashes  NEURO: alert & oriented x 3, follows commands  PSYCH: at times tangential, labile                          13.3   7.86  )-----------( 236      ( 06 Jul 2019 07:45 )             40.3   07-06    137  |  99  |  11  ----------------------------<  93  3.5   |  24  |  0.75    Ca    9.1      06 Jul 2019 07:45  Phos  3.2     07-06  Mg     1.7     07-06    TPro  6.6  /  Alb  3.8  /  TBili  0.6  /  DBili  x   /  AST  24  /  ALT  17  /  AlkPhos  99  07-06 SUMMARY: 62 yo F PMHx of SLE, scleroderma, Hashimoto's thyroiditis, Sjogren's, pyoderma gangrenosum, HTN presented with lower abdominal pain, found to have hypertensive urgency and colitis of the distal transverse colon.     SUBJECTIVE/OVERNIGHT EVENTS: No overnight events. This morning, endorses >10/10 L-sided headache that radiates to the rest of her head, also w/ photophobia and N after eating breakfast. Asking for IV dilaudid for pain, saying PO dilaudid/morphine/tramadol aren't working for her. Denies diarrhea since yesterday in the ED. She has had improved abdominal pain and is tolerating food well. Denies F/C/V. Feels ready to go home.    OBJECTIVE:  Vital Signs Last 24 Hrs  T(C): 36.7 (06 Jul 2019 08:34), Max: 36.7 (05 Jul 2019 12:00)  T(F): 98 (06 Jul 2019 08:34), Max: 98 (05 Jul 2019 12:00)  HR: 74 (06 Jul 2019 08:34) (70 - 99)  BP: 133/65 (06 Jul 2019 08:34) (130/74 - 236/126)  BP(mean): --  RR: 18 (06 Jul 2019 08:34) (14 - 18)  SpO2: 100% (06 Jul 2019 08:34) (96% - 100%)    PHYSICAL EXAM    GEN: NAD, alert, well-nourished  HEENT: NCAT, sclera anicteric, clear conjunctiva, PERRL, EOMI, nares patent, no rhinorrhea, no pharyngeal erythema or exudate, no LAD, MMM  NECK: supple, FROM, no JVD  LUNGS: CTAB, no wheezes/rales/rhonchi, good inspiratory effort  HEART: RRR, +S1 and S2, no M/R/G  ABDOMEN: soft, mild distention, tender to palpation throughout, +BS  EXTREMITIES: 2+ pulses in all extremities, WWP, FROM x4, trace b/l LE edema  SKIN: normal turgor, no lesions or rashes  NEURO: alert & oriented x 3, follows commands  PSYCH: at times tangential, labile                          13.3   7.86  )-----------( 236      ( 06 Jul 2019 07:45 )             40.3   07-06    137  |  99  |  11  ----------------------------<  93  3.5   |  24  |  0.75    Ca    9.1      06 Jul 2019 07:45  Phos  3.2     07-06  Mg     1.7     07-06    TPro  6.6  /  Alb  3.8  /  TBili  0.6  /  DBili  x   /  AST  24  /  ALT  17  /  AlkPhos  99  07-06

## 2019-07-06 NOTE — PROGRESS NOTE ADULT - PROBLEM SELECTOR PLAN 6
DVT: lovenox   Diet: DASH/TLC   Dispo: Pending PT shaista Lang MD SUNI  Internal Medicine PGY-2  Pager: [facility]: [phone]; [facility] [phone] - c/w levothyroxine 150mcg daily

## 2019-07-06 NOTE — DISCHARGE NOTE NURSING/CASE MANAGEMENT/SOCIAL WORK - NSDCDPATPORTLINK_GEN_ALL_CORE
You can access the [facility] Patient Portal, offered by [facility], by registering with the following website: [email or web address]

## 2019-07-06 NOTE — PROGRESS NOTE ADULT - PROBLEM SELECTOR PROBLEM 5
Hypothyroidism due to Hashimoto's thyroiditis Systemic lupus erythematosus, unspecified SLE type, unspecified organ involvement status

## 2019-07-06 NOTE — DISCHARGE NOTE PROVIDER - NSDCCPTREATMENT_GEN_ALL_CORE_FT
PRINCIPAL PROCEDURE  Procedure: Abdomen CT  Findings and Treatment: FINDINGS:  LOWER CHEST: Within normal limits.  LIVER: Steatosis.  BILE DUCTS: Normal caliber.  GALLBLADDER: Within normal limits.  SPLEEN: Within normal limits.  PANCREAS: Within normal limits.  ADRENALS: Within normal limits.  KIDNEYS/URETERS: Within normal limits.  BLADDER: Within normal limits.  REPRODUCTIVE ORGANS: Uterus and adnexa within normal limits.  BOWEL: The distal transverse colitis. No bowel obstruction. Appendix is   normal.  PERITONEUM: No ascites.  VESSELS: Within normal limits.  RETROPERITONEUM: No lymphadenopathy.    ABDOMINAL WALL: Within normal limits.  BONES: Within normal limits.  IMPRESSION:   Distal transverse colitis.

## 2019-07-06 NOTE — PROGRESS NOTE ADULT - ASSESSMENT
62 yo F PMHx of SLE, scleroderma, Hashimoto's thyroiditis, Sjogren's, pyoderma gangrenosum, HTN, and depression presented with acute-onset lower abdominal pain, bloody diarrhea, nausea, and severe headache. Found to have a BP of 236/146, CT showing distal transverse colitis. Findings suspicious for infectious colitis with concomitant severe hypertension.

## 2019-07-06 NOTE — DISCHARGE NOTE PROVIDER - NSDCCPCAREPLAN_GEN_ALL_CORE_FT
PRINCIPAL DISCHARGE DIAGNOSIS  Diagnosis: Colitis  Assessment and Plan of Treatment: You came to the hospital with abdominal pain and bloody stool. You received a CT scan of your abdomen that showed colitis. This is inflammation of your large bowel. It has many causes; we most suspect there is an infectious cause, likely due to food poisoning. We treated you with one dose of antibiotics and monitored your vital signs and bloodwork. Your symptoms improved and we feel you are clinically stable to be discharged home. When you go home, please continue to stay well-hydrated. If you begin to have fever, severe bloody stools or diarrhea, or vomiting, please contact your doctor immediately. Please follow-up with your primary care doctor within the next week.        SECONDARY DISCHARGE DIAGNOSES  Diagnosis: Hypertensive urgency  Assessment and Plan of Treatment: You came to the hospital with abdominal pain and bloody stool. You have a history of high blood pressure. You were found to have a blood pressure of 236/146 on arrival to the emergency department. We diagnosed you with hypertensive urgency, which means your blood pressure was dangerously high. You received metoprolol, hydralazine, and amlodipine for your blood pressure. After this, your blood pressures dropped to normal ranges of 130's/80's. Please follow-up with your primary care doctor within the next week.    Diagnosis: Acute nonintractable headache, unspecified headache type  Assessment and Plan of Treatment: You came to the hospital with abdominal pain and bloody stool. You also had a severe headache and you have a history of migraine headaches. You received multiple doses of IV dilaudid and morphine as well as oral dilaudid and tramadol. The IV medications helped the pain but the oral medications did not. You were given the medications Reglan, Benadryl, and Toradol through your IV for immediate treatment of your migraine, which resolved your pain.  Please follow-up with your primary care doctor within the next week.    Diagnosis: Systemic lupus erythematosus, unspecified SLE type, unspecified organ involvement status  Assessment and Plan of Treatment: You came to the hospital with abdominal pain and bloody stool. You have a history of lupus. While you were in the hospital, we continued you on your home medication of hydroxychloroquine. You remained asymptomatic from your lupus. Please follow-up with your primary care doctor within the next week.    Diagnosis: Hypothyroidism due to Hashimoto's thyroiditis  Assessment and Plan of Treatment: You came to the hospital with abdominal pain and bloody stool. You have a history of hypothyroidism. While you were in the hospital, we continued you on your home medication of Synthroid. You remained asymptomatic from your hypothyroidism. Please follow-up with your primary care doctor within the next week.    Diagnosis: Major depressive disorder, remission status unspecified, unspecified whether recurrent  Assessment and Plan of Treatment: You came to the hospital with abdominal pain and bloody stool. You have a history of depression. While you were in the hospital, we continued you on your home medication of Prozac. Please follow-up with your primary care doctor within the next week.

## 2019-07-06 NOTE — PROGRESS NOTE ADULT - PROBLEM SELECTOR PLAN 3
- c/w prozac 80mg qhs S/p multiple doses of IV dilaudid and morphine in the ED. Reports HA is 10/10 and uncontrolled with PO narcotics. Asking for IV dilaudid, reports history of Vicodin addiction. Reports extensive history of migraine headaches.  - will give reglan 10mg IV, benadryl 25mg IV, and toradol 15mg IV for  of migraine headache  - will reassess pain

## 2019-07-06 NOTE — PROGRESS NOTE ADULT - ATTENDING COMMENTS
Headache aborted with migraine cocktail as above.    BP now at goal. Suspect hypertensive urgency secondary to severe pain in [place] patient with acute colitis, which was likely viral. No fevers reported. Pain has subsided. Reports no diarrhea. BP now at goal. Patient is stable for discharge with outpatient f/u.    I spent 35 minutes counseling this patient and coordinating their discharge.

## 2019-07-06 NOTE — PROGRESS NOTE ADULT - PROBLEM SELECTOR PLAN 5
- c/w levothyroxine 150mcg daily Hx of SLE, Scleroderma, pyogenic gangrenosum   - continue home Plaquenil 400mg daily

## 2019-07-06 NOTE — DISCHARGE NOTE PROVIDER - CARE PROVIDER_API CALL
Stefani Chisholm)  Internal Medicine  [address], [address]  [place] [address]  Phone: [phone]  Fax: [phone]  Follow Up Time:

## 2019-07-06 NOTE — PROGRESS NOTE ADULT - PROBLEM SELECTOR PLAN 1
Presented with acute onset abdominal pain and bloody BM x10 w/o fever or chills.   CT imaging consistent with transverse colitis, likely infectious. DDx: bacterial vs. parasitic vs. ischemic vs. IBD. S/P Cipro and flagyl in the ED. On admission, met sepsis criteria with leukocytosis, tachycardia; now resolved. H&H stable.  - Would monitor off of abx for now   - w/u infectious cause: Stool O/P, GI PCR, stool culture  - if febrile will restart Cipro/flagyl  - Will trial food  - Zofran 4mg IV PRN for nausea

## 2019-07-06 NOTE — DISCHARGE NOTE NURSING/CASE MANAGEMENT/SOCIAL WORK - NSDCPNINST_GEN_ALL_CORE
Patient A&Ox4, vs wnl, patient stable for discharged, Patient discharged to home this  afternoon, no c/o pain, no distress noted.

## 2019-07-06 NOTE — DISCHARGE NOTE PROVIDER - HOSPITAL COURSE
64 yo F PMHx of SLE, scleroderma, Hashimoto's thyroiditis, Sjogren's, pyoderma gangrenosum, HTN presented with lower abdominal pain, and reported bloody BM for one day. States that she was in her usual state of health, until yesterday where she went to a BBQ, ate something "grotesque" which she immediately nauseous and vomited. Went to the bathroom and noticed "red pellets", but no jose blood, which has resolved since her admission. Additionally, reporting a headache, which feels like her typical migraines, endorses mild photophobia/phonophobia, denies diplopia. No recent travel, but her son was sick with the flu a few weeks ago. No fevers/chills, no diarrhea, no SOB, no chest pain.         In the ED    VS: Tmax 98F, HR 96-98, /146, RR 15-18, spO2 96-98% RA. Received 1L NS bolus, Zofran/Reglan, 4mg Iv morphinex2, lopressor 5mg, Tropol XL 25mg daily, 0.5mg dilaudid. Found to have leukocytosis to 11.19, UA with moderate blood (11-25 RBC's) and 100 protein. CT A/P showing distal transverse colitis and steatotic liver.         Hospital Course:    Pt was admitted to the medicine service for further management. BP's on arrival had downtrended to 133/88. Discontinued antibiotics. Given Reglan, Benadryl, and Toradol for persistent migraine with complete resolution of headache. Abdominal pain resolved and the pt did not have recurrence of her bloody stool. Leukocytosis resolved on AM labs.         SW consult 64 yo F PMHx of SLE, scleroderma, Hashimoto's thyroiditis, Sjogren's, pyoderma gangrenosum, HTN presented with lower abdominal pain, and reported bloody BM for one day. States that she was in her usual state of health, until yesterday where she went to a BBQ, ate something "grotesque" which she immediately nauseous and vomited. Went to the bathroom and noticed "red pellets", but no jose blood, which has resolved since her admission. Additionally, reporting a headache, which feels like her typical migraines, endorses mild photophobia/phonophobia, denies diplopia. No recent travel, but her son was sick with the flu a few weeks ago. No fevers/chills, no diarrhea, no SOB, no chest pain.         In the ED    VS: Tmax 98F, HR 96-98, /146, RR 15-18, spO2 96-98% RA. Received 1L NS bolus, Zofran/Reglan, 4mg Iv morphinex2, lopressor 5mg, Tropol XL 25mg daily, 0.5mg dilaudid. Found to have leukocytosis to 11.19, UA with moderate blood (11-25 RBC's) and 100 protein. CT A/P showing distal transverse colitis and steatotic liver.         Hospital Course:    Pt was admitted to the medicine service for further management. BP's on arrival had downtrended to 133/88. Discontinued antibiotics. Given Reglan, Benadryl, and Toradol for persistent migraine with complete resolution of headache. Abdominal pain resolved and the pt did not have recurrence of her bloody stool. Leukocytosis resolved on AM labs. Clinically improved and stable for discharge. Pt feels safe to go home. 64 yo F PMHx of SLE, scleroderma, Hashimoto's thyroiditis, Sjogren's, pyoderma gangrenosum, HTN presented with lower abdominal pain, and reported bloody BM for one day. States that she was in her usual state of health, until yesterday where she went to a BBQ, ate something "grotesque" which she immediately nauseous and vomited. Went to the bathroom and noticed "red pellets", but no jose blood, which has resolved since her admission. Additionally, reporting a headache, which feels like her typical migraines, endorses mild photophobia/phonophobia, denies diplopia. No recent travel, but her son was sick with the flu a few weeks ago. No fevers/chills, no diarrhea, no SOB, no chest pain.         In the ED    VS: Tmax 98F, HR 96-98, /146, RR 15-18, spO2 96-98% RA. Received 1L NS bolus, Zofran/Reglan, 4mg Iv morphinex2, lopressor 5mg, Tropol XL 25mg daily, 0.5mg dilaudid. Found to have leukocytosis to 11.19, UA with moderate blood (11-25 RBC's) and 100 protein. CT A/P showing distal transverse colitis and steatotic liver.         Hospital Course:    Pt was admitted to the medicine service for further management. BP's on arrival had downtrended to 133/88. Discontinued antibiotics. Given Reglan, Benadryl, and Toradol for persistent migraine with complete resolution of headache. Abdominal pain resolved and the pt did not have recurrence of her bloody stool. Leukocytosis resolved on AM labs. Clinically improved and stable for discharge. Pt had positive screen for depression and for feeling unsafe at home. She was offered the opportunity to speak with social work. Upon exploration of feeling unsafe at home, pt says she feels unsafe due to financial rent obligation, however strongly denied that she was at risk for physical harm. Pt feels safe to go home. 64 yo F PMHx of SLE, scleroderma, Hashimoto's thyroiditis, Sjogren's, pyoderma gangrenosum, HTN presented with lower abdominal pain, and reported bloody BM for one day. States that she was in her usual state of health, until yesterday where she went to a BBQ, ate something "grotesque" which she immediately nauseous and vomited. Went to the bathroom and noticed "red pellets", but no jose blood, which has resolved since her admission. Additionally, reporting a headache, which feels like her typical migraines, endorses mild photophobia/phonophobia, denies diplopia. No recent travel, but her son was sick with the flu a few weeks ago. No fevers/chills, no diarrhea, no SOB, no chest pain.         In the ED    VS: Tmax 98F, HR 96-98, /146, RR 15-18, spO2 96-98% RA. Received 1L NS bolus, Zofran/Reglan, 4mg Iv morphinex2, lopressor 5mg, Tropol XL 25mg daily, 0.5mg dilaudid. Found to have leukocytosis to 11.19, UA with moderate blood (11-25 RBC's) and 100 protein. CT A/P showing distal transverse colitis and steatotic liver.         Hospital Course:    Pt was admitted to the medicine service for further management. BP's on arrival had downtrended to 133/88. Antibiotics were discontinued. Abdominal pain resolved and the pt did not have recurrence of her bloody stool. Leukocytosis resolved on AM labs. She likely had transient viral gastroenteritis.        The patient was given Reglan, Benadryl, and Toradol for persistent migraine with complete resolution of headache.         Of note, pt had positive screen for depression and for feeling unsafe at home. She was offered the opportunity to speak with social work. Upon exploration of feeling unsafe at home, pt says she feels unsafe due to financial rent obligation. She strongly denied that she was at risk for physical harm. Pt feels safe to go home.

## 2019-07-06 NOTE — PROGRESS NOTE ADULT - PROBLEM SELECTOR PLAN 4
Hx of SLE, Scleroderma, pyogenic gangrenosum   - holding Plaquenil 400mg daily in setting of possible infection - c/w prozac 80mg qhs  - endorses difficult social situation: not feeling safe at home, housing instability, history of psychological trauma, depressed mood. SW consulted - c/w prozac 80mg qhs  - endorses difficult social situation, has been drinking more alcohol than usual lately. Feels safe at home, denies abuse from her , but is worried about making rent next month.

## 2019-07-06 NOTE — PROGRESS NOTE ADULT - PROBLEM SELECTOR PLAN 7
DVT: lovenox   Diet: DASH/TLC   Dispo: pending return of appetite and tolerating PO     Elly Figueroa, Sub-Intern  [facility] [employer] at [facility]  Pager [facility]: [phone]

## 2019-07-06 NOTE — PROGRESS NOTE ADULT - PROBLEM SELECTOR PROBLEM 3
Major depressive disorder, remission status unspecified, unspecified whether recurrent Acute nonintractable headache, unspecified headache type

## 2019-07-08 NOTE — DISCUSSION/SUMMARY
[FreeTextEntry1] : Spoke to pt, states she is still feeling stomach upset from hospitalization, however bp is better ranging in the 150s systolic, denies headache. While in hospital, states her enalapril was d/c and changed to amlodipine 5mg daily. She scheduled visit for follow up 7/9/19 and has gastro visit for 7/31/19, will reach out sooner as needed for questions or concerns, [Home] : patient was discharged to home

## 2019-07-09 ENCOUNTER — APPOINTMENT (OUTPATIENT)
Dept: INTERNAL MEDICINE | Facility: CLINIC | Age: 64
End: 2019-07-09
Payer: MEDICAID

## 2019-07-09 VITALS
HEIGHT: 63 IN | SYSTOLIC BLOOD PRESSURE: 132 MMHG | DIASTOLIC BLOOD PRESSURE: 84 MMHG | TEMPERATURE: 98.5 F | OXYGEN SATURATION: 97 % | BODY MASS INDEX: 32.07 KG/M2 | WEIGHT: 181 LBS | HEART RATE: 72 BPM

## 2019-07-09 PROCEDURE — 99496 TRANSJ CARE MGMT HIGH F2F 7D: CPT | Mod: 25

## 2019-07-09 PROCEDURE — 36415 COLL VENOUS BLD VENIPUNCTURE: CPT

## 2019-07-09 NOTE — REVIEW OF SYSTEMS
[Nausea] : nausea [Negative] : Respiratory [Constipation] : no constipation [Vomiting] : no vomiting [Heartburn] : no heartburn [FreeTextEntry7] : see hpi

## 2019-07-09 NOTE — ASSESSMENT
[FreeTextEntry1] : s/p acust viral Gastro enteritis \par Hospital records reviewed \par - repeat CBC \par - Ct abd - colitis \par - colonoscope 11/2018 reviewed \par - advised krysta diet \par start omeprazole 40 daily \par - hydration , avodi spicy oily and greasy food \par \par HTN- BP readings confirmed 130/82 \par --no cp sob palpitation or dizzy spells , no leg swelling \par -continue current medications - will go back to enalapril 10 \par - educated low salt diet , avoid canned , processed and fast food ,chips , bagged items ,  start exercise daily 30- 40 minutes  , loose weight \par -f/u 4- 6 weeks check BP\par

## 2019-07-09 NOTE — PHYSICAL EXAM
[Soft] : abdomen soft [Normal Bowel Sounds] : normal bowel sounds [Normal] : no rash [de-identified] : mild diffuse abd tenderness no guarding

## 2019-07-10 LAB
BASOPHILS # BLD AUTO: 0.06 K/UL
BASOPHILS NFR BLD AUTO: 0.9 %
EOSINOPHIL # BLD AUTO: 0.23 K/UL
EOSINOPHIL NFR BLD AUTO: 3.4 %
HCT VFR BLD CALC: 42.6 %
HGB BLD-MCNC: 14 G/DL
IMM GRANULOCYTES NFR BLD AUTO: 0.1 %
LYMPHOCYTES # BLD AUTO: 2.28 K/UL
LYMPHOCYTES NFR BLD AUTO: 33.5 %
MAN DIFF?: NORMAL
MCHC RBC-ENTMCNC: 30.8 PG
MCHC RBC-ENTMCNC: 32.9 GM/DL
MCV RBC AUTO: 93.8 FL
MONOCYTES # BLD AUTO: 0.4 K/UL
MONOCYTES NFR BLD AUTO: 5.9 %
NEUTROPHILS # BLD AUTO: 3.83 K/UL
NEUTROPHILS NFR BLD AUTO: 56.2 %
PLATELET # BLD AUTO: 260 K/UL
RBC # BLD: 4.54 M/UL
RBC # FLD: 13.8 %
WBC # FLD AUTO: 6.81 K/UL

## 2019-07-11 ENCOUNTER — MEDICATION RENEWAL (OUTPATIENT)
Age: 64
End: 2019-07-11

## 2019-07-11 RX ORDER — OMEPRAZOLE 40 MG/1
40 CAPSULE, DELAYED RELEASE ORAL
Qty: 30 | Refills: 1 | Status: DISCONTINUED | COMMUNITY
Start: 2019-07-09 | End: 2019-07-11

## 2019-07-31 ENCOUNTER — APPOINTMENT (OUTPATIENT)
Dept: GASTROENTEROLOGY | Facility: CLINIC | Age: 64
End: 2019-07-31
Payer: MEDICAID

## 2019-07-31 VITALS
BODY MASS INDEX: 32.07 KG/M2 | RESPIRATION RATE: 17 BRPM | DIASTOLIC BLOOD PRESSURE: 70 MMHG | TEMPERATURE: 97.5 F | OXYGEN SATURATION: 98 % | HEIGHT: 63 IN | HEART RATE: 63 BPM | SYSTOLIC BLOOD PRESSURE: 110 MMHG | WEIGHT: 181 LBS

## 2019-07-31 PROCEDURE — 99203 OFFICE O/P NEW LOW 30 MIN: CPT

## 2019-07-31 NOTE — CONSULT LETTER
[Dear  ___] : Dear  [unfilled], [Courtesy Letter:] : I had the pleasure of seeing your patient, [unfilled], in my office today. [Please see my note below.] : Please see my note below. [Sincerely,] : Sincerely, [Consult Closing:] : Thank you very much for allowing me to participate in the care of this patient.  If you have any questions, please do not hesitate to contact me. [FreeTextEntry2] : Stefani Chisholm MD\par [address] [place] [address], [address]\[place] [address] \par Fax:  [phone]  [FreeTextEntry3] : --\par Alexis Caldera MD\par Director of Endoscopy\par [facility]\par Associate Professor of Medicine\par [facility] [employer] [facility] \par [facility]\par Phone: [phone]\par Fax: [phone]\par Email: [email or web address]\par

## 2019-07-31 NOTE — ASSESSMENT
[FreeTextEntry1] : 62yo F pmh SLE, Sjogrens, Hashimoto's thyroiditis, HTN, Depression, pyoderma gangrenosum presents for evaluation of pancreatic cysts. \par \par \par Plan:\par \par Advised MRI Abdomen for surveillance of pancreatic cysts, and will call patient with results and plan of care.

## 2019-07-31 NOTE — PHYSICAL EXAM
[General Appearance - Alert] : alert [General Appearance - In No Acute Distress] : in no acute distress [Sclera] : the sclera and conjunctiva were normal [PERRL With Normal Accommodation] : pupils were equal in size, round, and reactive to light [Extraocular Movements] : extraocular movements were intact [Outer Ear] : the ears and nose were normal in appearance [Oropharynx] : the oropharynx was normal [Auscultation Breath Sounds / Voice Sounds] : lungs were clear to auscultation bilaterally [Heart Rate And Rhythm] : heart rate was normal and rhythm regular [Heart Sounds] : normal S1 and S2 [Heart Sounds Gallop] : no gallops [Murmurs] : no murmurs [Heart Sounds Pericardial Friction Rub] : no pericardial rub [Abdomen Soft] : soft [Bowel Sounds] : normal bowel sounds [Abdomen Tenderness] : non-tender [Abdomen Mass (___ Cm)] : no abdominal mass palpated [] : no hepato-splenomegaly [Abnormal Walk] : normal gait [Skin Color & Pigmentation] : normal skin color and pigmentation [Oriented To Time, Place, And Person] : oriented to person, place, and time

## 2019-07-31 NOTE — HISTORY OF PRESENT ILLNESS
[de-identified] : 64yo F pmh SLE, Sjogrens, Hashimoto's thyroiditis, HTN, Depression, pyoderma gangrenosum presents for evaluation of pancreatic cysts. She states she has had pancreatic cysts for over ten years and was followed by  with serial endoscopies, however he does not take her insurance anymore. She did not bring records with her, however states that her last EUS was 2-3 yrs ago and she remembers he stated there was interval growth of the cysts. Her mother was diagnosed with pancreatic cancer at age 78 and passed away shortly after. \par \par Patient also adds that she was admitted to [facility] for elevated blood pressure and abdominal pain with brbpr after a fourth of july BBQ. Her CT noted colitis in transverse colon. Since then, she c/o occasional diffuse abdominal pain, but denies blood in stool.

## 2019-08-01 ENCOUNTER — OUTPATIENT (OUTPATIENT)
Dept: OUTPATIENT SERVICES | Facility: HOSPITAL | Age: 64
LOS: 1 days | End: 2019-08-01
Payer: MEDICAID

## 2019-08-01 DIAGNOSIS — Z98.89 OTHER SPECIFIED POSTPROCEDURAL STATES: Chronic | ICD-10-CM

## 2019-08-01 DIAGNOSIS — Z98.890 OTHER SPECIFIED POSTPROCEDURAL STATES: Chronic | ICD-10-CM

## 2019-08-01 PROCEDURE — G9001: CPT

## 2019-08-18 ENCOUNTER — RX RENEWAL (OUTPATIENT)
Age: 64
End: 2019-08-18

## 2019-08-19 ENCOUNTER — RX RENEWAL (OUTPATIENT)
Age: 64
End: 2019-08-19

## 2019-08-19 DIAGNOSIS — Z71.89 OTHER SPECIFIED COUNSELING: ICD-10-CM

## 2019-08-20 ENCOUNTER — RX RENEWAL (OUTPATIENT)
Age: 64
End: 2019-08-20

## 2019-09-11 ENCOUNTER — RX RENEWAL (OUTPATIENT)
Age: 64
End: 2019-09-11

## 2019-10-04 ENCOUNTER — RX RENEWAL (OUTPATIENT)
Age: 64
End: 2019-10-04

## 2019-10-04 ENCOUNTER — APPOINTMENT (OUTPATIENT)
Dept: INTERNAL MEDICINE | Facility: CLINIC | Age: 64
End: 2019-10-04

## 2019-12-13 ENCOUNTER — APPOINTMENT (OUTPATIENT)
Dept: INTERNAL MEDICINE | Facility: CLINIC | Age: 64
End: 2019-12-13

## 2019-12-16 ENCOUNTER — NON-APPOINTMENT (OUTPATIENT)
Age: 64
End: 2019-12-16

## 2019-12-16 ENCOUNTER — APPOINTMENT (OUTPATIENT)
Dept: INTERNAL MEDICINE | Facility: CLINIC | Age: 64
End: 2019-12-16
Payer: MEDICAID

## 2019-12-16 VITALS
SYSTOLIC BLOOD PRESSURE: 152 MMHG | DIASTOLIC BLOOD PRESSURE: 98 MMHG | TEMPERATURE: 97.9 F | OXYGEN SATURATION: 98 % | HEART RATE: 81 BPM | RESPIRATION RATE: 16 BRPM

## 2019-12-16 DIAGNOSIS — B97.89 ACUTE UPPER RESPIRATORY INFECTION, UNSPECIFIED: ICD-10-CM

## 2019-12-16 DIAGNOSIS — R06.09 OTHER FORMS OF DYSPNEA: ICD-10-CM

## 2019-12-16 DIAGNOSIS — S05.02XA INJURY OF CONJUNCTIVA AND CORNEAL ABRASION W/OUT FOREIGN BODY, LEFT EYE, INITIAL ENCOUNTER: ICD-10-CM

## 2019-12-16 DIAGNOSIS — N61.1 ABSCESS OF THE BREAST AND NIPPLE: ICD-10-CM

## 2019-12-16 DIAGNOSIS — Z87.01 PERSONAL HISTORY OF PNEUMONIA (RECURRENT): ICD-10-CM

## 2019-12-16 DIAGNOSIS — J06.9 ACUTE UPPER RESPIRATORY INFECTION, UNSPECIFIED: ICD-10-CM

## 2019-12-16 PROCEDURE — 93000 ELECTROCARDIOGRAM COMPLETE: CPT

## 2019-12-16 PROCEDURE — 99213 OFFICE O/P EST LOW 20 MIN: CPT | Mod: 25

## 2019-12-16 RX ORDER — HALOBETASOL PROPIONATE 0.5 MG/G
0.05 OINTMENT TOPICAL TWICE DAILY
Qty: 2 | Refills: 2 | Status: DISCONTINUED | COMMUNITY
Start: 2018-04-13 | End: 2019-12-16

## 2019-12-16 RX ORDER — OMEPRAZOLE 20 MG/1
20 CAPSULE, DELAYED RELEASE ORAL
Qty: 60 | Refills: 0 | Status: DISCONTINUED | COMMUNITY
Start: 2019-07-11 | End: 2019-12-16

## 2019-12-16 RX ORDER — OXYCODONE HYDROCHLORIDE AND ACETAMINOPHEN 10; 325 MG/1; MG/1
TABLET ORAL
Refills: 0 | Status: DISCONTINUED | COMMUNITY
End: 2019-12-16

## 2019-12-16 RX ORDER — MUPIROCIN 20 MG/G
2 OINTMENT TOPICAL
Qty: 1 | Refills: 0 | Status: DISCONTINUED | COMMUNITY
Start: 2018-05-23 | End: 2019-12-16

## 2019-12-16 RX ORDER — OMEPRAZOLE 20 MG/1
20 CAPSULE, DELAYED RELEASE ORAL DAILY
Qty: 28 | Refills: 2 | Status: COMPLETED | COMMUNITY
Start: 2019-12-16 | End: 2020-03-09

## 2019-12-16 RX ORDER — MORPHINE SULFATE 15 MG/1
15 TABLET, FILM COATED, EXTENDED RELEASE ORAL
Refills: 0 | Status: DISCONTINUED | COMMUNITY
End: 2019-12-16

## 2019-12-16 NOTE — REVIEW OF SYSTEMS
[Fever] : no fever [Chills] : no chills [Night Sweats] : no night sweats [Recent Change In Weight] : ~T recent weight change [Chest Pain] : chest pain [Leg Claudication] : no leg claudication [Palpitations] : no palpitations [Lower Ext Edema] : no lower extremity edema [Paroxysmal Nocturnal Dyspnea] : no paroxysmal nocturnal dyspnea [Orthopnea] : no orthopnea [Wheezing] : no wheezing [Cough] : cough [Dyspnea on Exertion] : dyspnea on exertion [Abdominal Pain] : abdominal pain [Nausea] : no nausea [Constipation] : constipation [Diarrhea] : diarrhea [Vomiting] : vomiting [Heartburn] : heartburn [Melena] : no melena [Dysuria] : no dysuria [Hematuria] : no hematuria [Negative] : Genitourinary [FreeTextEntry2] : weight gain  [FreeTextEntry7] : see HPI

## 2019-12-16 NOTE — HISTORY OF PRESENT ILLNESS
[FreeTextEntry8] : \carol Presents with c/o reflux and vomiting, which has been occurring since she was seen in the hospital with vomiting and bloody stool in mid July (5 months ago) and diagnosed with colitis.  Treated with antibiotics.  Bloody stool and vomiting resolved, but GI symptoms never completed resolved.  \par She has lots of reflus and sometimes regurgitation of bile and/or vomiting of food contents.  Abdominal discomfort and bloating after nearly every meal.  Bowel habits have always been irregular - alternates constipation with diarrhea, but more so diarrhea recently.  \carol Has had a constant sore throat, often worse after eating.    \carol Feels she has gained lots of weight, but decline weight today.  Denies fever/chills, night sweats.  \carol Significant life stressors recently - currently homeless, living in hotels with 32 y.o. son with cognitive delays as well as her , was in a shelter for a few days.  \carol Has been eating lots of microwavable food recently d/t living situation.  \carol Mood is very down, scheduled for a follow up with her psychiatrist today.  \carol Has been feeling short of breath with activity and occasional chest pain.  \carol

## 2019-12-16 NOTE — PHYSICAL EXAM
[No Carotid Bruits] : no carotid bruits [Soft] : abdomen soft [No Edema] : there was no peripheral edema [Normal Bowel Sounds] : normal bowel sounds [Normal] : no CVA or spinal tenderness [de-identified] : epigastric tenderness

## 2019-12-16 NOTE — ASSESSMENT
[FreeTextEntry1] : GI distress:\par Check blood and stool tests\par GI follow up needed - scheduled for next month\par Was not able to get surveillance MRI as ordered d/t sudden life events, reordered today\par \par Dyspnea on exertion:\par EKG today - NSR, nl axis, no ischemia, unchanged from prior \par \par Depression, anxiety:\par Scheduled for psych follow up today\par Continues Klonopin and prozac\par

## 2020-01-22 ENCOUNTER — APPOINTMENT (OUTPATIENT)
Dept: GASTROENTEROLOGY | Facility: CLINIC | Age: 65
End: 2020-01-22

## 2020-02-01 NOTE — ED ADULT NURSE NOTE - BP NONINVASIVE SYSTOLIC (MM HG)
[facility]    cc:         JORDAN Bermudez D.O.    PREOPERATIVE DIAGNOSIS:  INFECTED CYST BACK OF THE NECK, 3 CM.    POSTOPERATIVE DIAGNOSIS:  Infected cyst back of the neck, 3 cm.    PROCEDURE:  EXCISION OF INFECTED CYST 2 CM BACK OF THE NECK.    ANESTHESIA:  LOCAL.    SURGEON:  DYLAN ESCUDERO M.D.    DESCRIPTION OF PROCEDURE:  After routine preparation, the patient was prepped and draped in prone position.  Region of the infected cyst at back of the neck was infiltrated with 1% lidocaine with Neut.  After adequate anesthesia, elliptical incision was made.  Flaps were elevated.  Mass was removed, which mainly infected cyst material and cyst wall resected.  There was no visible cyst residual or cyst wall noted in the base of the wound.  Hemostasis was obtained, packed  with Surgicel.  Pressure dressing was applied.  The patient tolerated the procedure well under local anesthesia, left the operating room to recovery room in good condition.     Case was done as an outpatient, local anesthesia.    _______________________________________LUIS JOE M.D.                  MRN#:  [id number]                                         ACCT#:  [id number]                                         DATE OF SURGERY:  0                                         2/01/2020                                           ROOM:                                            [facility]:  [facility]  DICTATED BY: Dylan Escudero M.D.  DD: 02/01/2020 DT: 02/01/2020 TD: 10:51 TT: 12:51 K#: [id number]                           REPORT OF OPERATION   201

## 2020-02-03 ENCOUNTER — APPOINTMENT (OUTPATIENT)
Dept: MRI IMAGING | Facility: HOSPITAL | Age: 65
End: 2020-02-03
Payer: MEDICAID

## 2020-02-03 ENCOUNTER — OUTPATIENT (OUTPATIENT)
Dept: OUTPATIENT SERVICES | Facility: HOSPITAL | Age: 65
LOS: 1 days | End: 2020-02-03
Payer: MEDICAID

## 2020-02-03 DIAGNOSIS — K86.2 CYST OF PANCREAS: ICD-10-CM

## 2020-02-03 DIAGNOSIS — Z98.89 OTHER SPECIFIED POSTPROCEDURAL STATES: Chronic | ICD-10-CM

## 2020-02-03 DIAGNOSIS — Z98.890 OTHER SPECIFIED POSTPROCEDURAL STATES: Chronic | ICD-10-CM

## 2020-02-03 PROCEDURE — 74183 MRI ABD W/O CNTR FLWD CNTR: CPT

## 2020-02-03 PROCEDURE — 74183 MRI ABD W/O CNTR FLWD CNTR: CPT | Mod: 26

## 2020-02-03 PROCEDURE — 76376 3D RENDER W/INTRP POSTPROCES: CPT

## 2020-02-03 PROCEDURE — 76376 3D RENDER W/INTRP POSTPROCES: CPT | Mod: 26

## 2020-02-03 PROCEDURE — A9579: CPT

## 2020-02-05 ENCOUNTER — APPOINTMENT (OUTPATIENT)
Dept: GASTROENTEROLOGY | Facility: CLINIC | Age: 65
End: 2020-02-05

## 2020-02-19 ENCOUNTER — FORM ENCOUNTER (OUTPATIENT)
Age: 65
End: 2020-02-19

## 2020-02-20 ENCOUNTER — OUTPATIENT (OUTPATIENT)
Dept: OUTPATIENT SERVICES | Facility: HOSPITAL | Age: 65
LOS: 1 days | End: 2020-02-20
Payer: MEDICAID

## 2020-02-20 ENCOUNTER — APPOINTMENT (OUTPATIENT)
Dept: MAMMOGRAPHY | Facility: CLINIC | Age: 65
End: 2020-02-20
Payer: MEDICAID

## 2020-02-20 DIAGNOSIS — Z00.00 ENCOUNTER FOR GENERAL ADULT MEDICAL EXAMINATION WITHOUT ABNORMAL FINDINGS: ICD-10-CM

## 2020-02-20 DIAGNOSIS — Z98.890 OTHER SPECIFIED POSTPROCEDURAL STATES: Chronic | ICD-10-CM

## 2020-02-20 DIAGNOSIS — Z98.89 OTHER SPECIFIED POSTPROCEDURAL STATES: Chronic | ICD-10-CM

## 2020-02-20 PROCEDURE — 77067 SCR MAMMO BI INCL CAD: CPT | Mod: 26

## 2020-02-20 PROCEDURE — 77067 SCR MAMMO BI INCL CAD: CPT

## 2020-02-20 PROCEDURE — 77063 BREAST TOMOSYNTHESIS BI: CPT | Mod: 26

## 2020-02-20 PROCEDURE — 77063 BREAST TOMOSYNTHESIS BI: CPT

## 2020-04-15 RX ORDER — LEVOTHYROXINE SODIUM 150 UG/1
150 TABLET ORAL
Qty: 90 | Refills: 1 | Status: DISCONTINUED | COMMUNITY
End: 2020-04-15

## 2020-04-17 DIAGNOSIS — M79.10 MYALGIA, UNSPECIFIED SITE: ICD-10-CM

## 2020-04-17 NOTE — PLAN
[FreeTextEntry1] : 63 yo female with HTN, SLE, Sjogrens, pyoderma, colitis with a few days of more acute back pain (see prior notes), now with chills, myalgias, chest congestion and new loss of bladder function\par \par concern for infectious process (covid?  cystitis/pyelonephritis (patient states it is not as she knows when she has this symptom)? infectious colitis?  skin infection given reports of open wounds?.  New bladder incontinence vs. weakness+ urge incontince leading to accidents?\par Given her multiple comorbidities and unclear source of symptoms, age, advised ED evaluation. She refuses at this time. \par \par I advised that I am unclear of the etiology of her symptoms and further evaluation is warranted she again refuses.  \par \par UC another option, there is one nearby, but does not feel she needs to go at this time and is not up to it, but will consider\par \carol Has her  at home who is montoring her.  \par I will call her in the morning and check in regardless.

## 2020-04-17 NOTE — HISTORY OF PRESENT ILLNESS
[FreeTextEntry1] : Subjective fevers, does not have thermometer\par Body aches severe\par Dizziness\par Whistlling when she breaths, some cough\par Feels beat up\par \par MIL passed from COVID last week, but has not been in contact with her. no other sick contacts\par Now states she has had these symptoms for days, though had not mentioned in our previous conversations  \par \par Of note she feels as if she no longer has control of her bladder? clarifies that she is just too tired to get up.  Bowels are unchanged.  States she has no pain, urgency, frequency.\par \par As mentioned, she has active pyoderma lesions and her autoimmune diseases are "flaring"\par \par \par \par

## 2020-04-18 ENCOUNTER — LABORATORY RESULT (OUTPATIENT)
Age: 65
End: 2020-04-18

## 2020-04-18 ENCOUNTER — APPOINTMENT (OUTPATIENT)
Dept: DISASTER EMERGENCY | Facility: CLINIC | Age: 65
End: 2020-04-18
Payer: COMMERCIAL

## 2020-04-18 VITALS
OXYGEN SATURATION: 96 % | RESPIRATION RATE: 15 BRPM | TEMPERATURE: 99.8 F | HEART RATE: 80 BPM | SYSTOLIC BLOOD PRESSURE: 142 MMHG | DIASTOLIC BLOOD PRESSURE: 96 MMHG

## 2020-04-18 PROCEDURE — 99213 OFFICE O/P EST LOW 20 MIN: CPT

## 2020-04-18 NOTE — ADDENDUM
[FreeTextEntry1] : Patient asked to follow up with her PCP concerning her symptoms.  Patient presents afebrile and hemodynamically stable.  NAD at rest.  Covid-19 testing performed this afternoon.,

## 2020-04-18 NOTE — HISTORY OF PRESENT ILLNESS
[Patient presents to the office today for COVID-19 evaluation and testing.] : Patient presents to the office today for COVID-19 evaluation and testing. [Patient has been pre-screened by RN at call center for appointment today with our facility.] : Patient has been pre-screened by RN at call center for appointment today with our facility. [FreeTextEntry1] : 63 yo F w/ PMH HTN, pyoderma gangrenosum, possible SLK depression who presents for covid 19 testing.  Patient reports symptoms of wheezing, coughing, fever for the last three days.  Patient also reports myalgia.  Patient is not sure of sick contacts.  Patient denies chest pain, palpitations, nausea or vomiting. [] : mild dizziness on standing [None Known] : none known [Age >= 60 years] : age >= 60 years [None] : none [Clear] : clear [Good Air Entry] : good air entry [Normal O2 sat at rest] : normal O2 sat at rest [Grossly normal, interacts, not tired or weak] : grossly normal, interacts, not tired or weak [COVID-19 testing ordered and specimen obtained] : COVID-19 testing ordered and specimen obtained [Discharged with current Quarantine instructions and advised of signs of worsening illness.] : Patient discharged with current quarantine instructions and advised of signs of worsening illness. Patient told to seek emergent care if symptoms occur.

## 2020-04-18 NOTE — HISTORY OF PRESENT ILLNESS
[FreeTextEntry1] : Spoke with patient\par \par Reports she has an appointment at urgent care at 1pm\par \par Will follow.

## 2020-04-20 NOTE — PLAN
[FreeTextEntry1] : Advised careful monitoring of temperature, symptoms, especially SOB/cough. Should limit contact with others, increase hydration and rest, tylenol for fever, myalgia. Should call if symptoms worsen or if questions arise. will follow up by phone in 24 hours.\par

## 2020-04-20 NOTE — HISTORY OF PRESENT ILLNESS
[FreeTextEntry1] : Spoke with patient\par \par COVID test was negative\par \par Still has body aches\par \par Urine incontinence has resolved. \par \par Still has a lot of back pain.  \par \par Temp was 99.8 at [facility], O2 96%.   [Yes] : yes [No] : no difficulty breathing [None] : none [Phone/Virtual visit schedule to occur within 24 hours] : Phone/virtual visit schedule to occur within 24 hours [Stable] : stable [TextBox_8] : 4 [TextBox_13] : subjective [TextBox_28] : body aches

## 2020-04-21 NOTE — PLAN
[FreeTextEntry1] : 63 y/o female with sub fevers, neg covid test, myalgias sore throat x 5 days\par \par main c/o back pain, advised continued OTC tylenol, takes klonopin regularly as well (would like to avoid opioids)\par \par Advised careful monitoring of temperature, symptoms, especially SOB/cough. Should limit contact with others, increase hydration and rest, tylenol for fever, myalgia. Should call if symptoms worsen or if questions arise. will follow up by phone in 24 hours.\par

## 2020-04-21 NOTE — HISTORY OF PRESENT ILLNESS
[FreeTextEntry1] : Has some dizziness\par \par Back pain is main symptom\par \par Not sure if she has temperature\par \par Breathing is okay\par \par Feels the same.

## 2020-04-22 NOTE — PLAN
[FreeTextEntry1] : 65 y/o female, stable\par \par Back pain:  advised tylenol, heat, stretching\par opiods not recommended\par \par Covid infection (neg testing): advised patient to get at thermometer\par no breathing problems at this time\par \par Advised derm consult via [employer] for pyoderma.  patient refuses and says that won't help.

## 2020-04-22 NOTE — HISTORY OF PRESENT ILLNESS
[FreeTextEntry1] : Reports she feels "shitty"\par \par Back pain is unbearablem would like more percocet\par \par still has vertigo for a few seconds when she turns over.\par \par still feels feverish, but does not have a thermometer\par \par States her PG lesions are acting up.

## 2020-06-01 NOTE — PATIENT PROFILE ADULT - NSPROGENDIFFINTUB_GEN_A_NUR
Left message for patient to return phone call.  Patient needs to schedule 6 month f/u with Dr. Wakefield or THUY Malloy.  Please delete in basket message.  
never intubated

## 2020-06-24 ENCOUNTER — RX RENEWAL (OUTPATIENT)
Age: 65
End: 2020-06-24

## 2020-07-07 NOTE — PATIENT PROFILE ADULT. - NS PRO PT RIGHT SUPPORT PERSON
[FreeTextEntry1] : From a cardiac standpoint, Dr. Juarez remains clinically stable denying chest pain, shortness of breath, palpitations, lightheadedness, or syncope.  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  
Declines

## 2020-12-16 PROBLEM — Z12.11 ENCOUNTER FOR SCREENING COLONOSCOPY: Status: RESOLVED | Noted: 2018-02-26 | Resolved: 2020-12-16

## 2021-01-22 ENCOUNTER — APPOINTMENT (OUTPATIENT)
Dept: INTERNAL MEDICINE | Facility: CLINIC | Age: 66
End: 2021-01-22
Payer: SELF-PAY

## 2021-01-22 VITALS
SYSTOLIC BLOOD PRESSURE: 140 MMHG | OXYGEN SATURATION: 98 % | HEART RATE: 82 BPM | TEMPERATURE: 97.9 F | DIASTOLIC BLOOD PRESSURE: 85 MMHG

## 2021-01-22 DIAGNOSIS — K52.9 NONINFECTIVE GASTROENTERITIS AND COLITIS, UNSPECIFIED: ICD-10-CM

## 2021-01-22 PROCEDURE — 99213 OFFICE O/P EST LOW 20 MIN: CPT

## 2021-01-22 NOTE — ASSESSMENT
[FreeTextEntry1] : Dermatitis, pyoderma:\par Continues derm follow up\par \par Anxiety:\par Provided with office phone number listed online for Dr. Ruggiero.\par Refill of Klonopin provided, istop checked\par \par HM:\par Follow up for CPE with PCP once she has insurance\par No labs today as is self-pay\par \par Has [employer] to help with meds\par

## 2021-01-22 NOTE — PHYSICAL EXAM
[No Carotid Bruits] : no carotid bruits [No Edema] : there was no peripheral edema [Normal Supraclavicular Nodes] : no supraclavicular lymphadenopathy [Normal Posterior Cervical Nodes] : no posterior cervical lymphadenopathy [Normal Anterior Cervical Nodes] : no anterior cervical lymphadenopathy [Normal] : affect was normal and insight and judgment were intact [de-identified] : pyoderma lesions on right abdomen and breasts, dermatitis rash on neck, around left eye and chest

## 2021-01-22 NOTE — REVIEW OF SYSTEMS
[Fever] : no fever [Chills] : no chills [Fatigue] : no fatigue [Night Sweats] : no night sweats [Chest Pain] : no chest pain [Palpitations] : no palpitations [Lower Ext Edema] : no lower extremity edema [Orthopnea] : no orthopnea [Shortness Of Breath] : no shortness of breath [Wheezing] : no wheezing [Cough] : no cough [Dyspnea on Exertion] : no dyspnea on exertion [Abdominal Pain] : no abdominal pain [Nausea] : no nausea [Vomiting] : no vomiting [Itching] : Itching [Skin Rash] : skin rash [Anxiety] : anxiety [Depression] : no depression [Negative] : Neurological [FreeTextEntry7] : h/o colitis, frequent loose stools

## 2021-01-22 NOTE — HISTORY OF PRESENT ILLNESS
[FreeTextEntry8] : Seen by derm yesterday for dermatitis on neck, face, chest.  Started topical steroid yesterday (clobetasol for chest, OTC hydrocortisone for eye).  Extremely itchy.  Started a few weeks ago.  Unsure of cause.  Had started Neosporin, but that may have aggravated.  \par H/o pyoderma.  \par \par Psychiatrist retired (Dr. Lebron Graham), has been trying to find a new one.  Has placed calls to one recommended by PCP, but awaiting call back from his office (Dr. Mateo Butler).  [phone], office number [phone].  Had previously been on Klonopin, no meds since yesterday.  Had been taking up to 4mg daily, using more sparingly recently.  Also continues fluvoxamine.   Home life has been more stable recently.  Has become financially solvent again, has a new home that she loves.   and son are doing well.  Her ongoing medical issues are the main source of anxiety at present.  \par \par Currently no health insurance.   on medicare, son lives with them, he has medicaid.  Patient no longer qualifies for medicaid.  Has been researching, but has not decided on plan.  \par \par GI symptoms are ok.  Frequent BMs.

## 2021-01-29 NOTE — ED PROVIDER NOTE - CROS ED ROS STATEMENT
Blood pressure is well controlled and sodium is just about normal     Blood pressure medications remain the same  Making progress as far as her scapula and rib fractures  Continue with therapy  Decreased prednisone to 7 mg daily for 2 weeks, then 6 mg for 2 weeks, then 5 mg and decrease by 1 mg every month  Suspect the fat pad near the left shoulder will gradually resolve as the prednisone is tapered  Recheck in 2 months 
all other ROS negative except as per HPI

## 2021-02-22 RX ORDER — ERGOCALCIFEROL 1.25 MG/1
1.25 MG CAPSULE, LIQUID FILLED ORAL
Qty: 12 | Refills: 0 | Status: DISCONTINUED | COMMUNITY
Start: 2018-11-09 | End: 2021-02-22

## 2021-02-22 RX ORDER — LEVOCETIRIZINE DIHYDROCHLORIDE 5 MG/1
5 TABLET ORAL DAILY
Qty: 30 | Refills: 0 | Status: DISCONTINUED | COMMUNITY
Start: 2018-09-05 | End: 2021-02-22

## 2021-02-22 RX ORDER — IBUPROFEN 600 MG/1
600 TABLET, FILM COATED ORAL 3 TIMES DAILY
Qty: 30 | Refills: 0 | Status: DISCONTINUED | COMMUNITY
Start: 2020-04-15 | End: 2021-02-22

## 2021-02-22 RX ORDER — OXYCODONE AND ACETAMINOPHEN 5; 325 MG/1; MG/1
5-325 TABLET ORAL
Qty: 10 | Refills: 0 | Status: DISCONTINUED | COMMUNITY
Start: 2020-04-16 | End: 2021-02-22

## 2021-02-22 RX ORDER — HYDROXYCHLOROQUINE SULFATE 200 MG/1
TABLET ORAL
Refills: 0 | Status: DISCONTINUED | COMMUNITY
End: 2021-02-22

## 2021-02-24 ENCOUNTER — NON-APPOINTMENT (OUTPATIENT)
Age: 66
End: 2021-02-24

## 2021-03-04 NOTE — CONSULT NOTE ADULT - SUBJECTIVE AND OBJECTIVE BOX
Last office visit: 2-26-21  Next office visit: 7-22-21     Disp Refills Start End    telmisartan (MICARDIS) 80 MG tablet 90 tablet 0 12/10/2020     Sig - Route: Take 1 tablet by mouth daily. - Oral    Sent to pharmacy as: Telmisartan 80 MG Oral Tablet (MICARDIS       Disp Refills Start End    rOPINIRole (Requip) 1 MG tablet 90 tablet 0 12/10/2020     Sig - Route: Take 1 tablet by mouth nightly. - Oral       Disp Refills Start End    omeprazole (PriLOSEC) 40 MG capsule 90 capsule 0 12/10/2020     Sig - Route: Take 1 capsule by mouth daily. - Oral       Disp Refills Start End    omega-3 acid ethyl esters (Lovaza) 1 g capsule 360 capsule 0 12/10/2020     Sig - Route: Take 2 capsules by mouth 2 times daily. - Oral       Disp Refills Start End    magnesium oxide (MAG-OX) 400 MG tablet 90 tablet 0 12/10/2020     Sig - Route: Take 1 tablet by mouth daily. - Oral       Disp Refills Start End    glipiZIDE (glipiZIDE XL) 10 MG 24 hr tablet 60 tablet 0 2/9/2021     Sig: Take 1 tablet by mouth twice daily.       Disp Refills Start End    carvedilol (COREG) 6.25 MG tablet 180 tablet 0 12/10/2020     Sig - Route: Take 1 tablet by mouth 2 times daily (with meals). - Oral       Disp Refills Start End    Fenofibrate 200 MG Cap 90 capsule 0 12/10/2020     Sig - Route: Take 200 mg by mouth daily. - Oral       Disp Refills Start End    metFORMIN (GLUCOPHAGE-XR) 500 MG 24 hr tablet 120 tablet 0 2/9/2021     Sig - Route: Take 2 tablets by mouth 2 times daily (with meals). - Oral      Hemoglobin A1C (%)   Date Value   02/26/2021 6.8 (H)     BP Readings from Last 1 Encounters:   02/26/21 128/80     Cholesterol (mg/dL)   Date Value   11/24/2020 121     HDL (mg/dL)   Date Value   11/24/2020 35 (L)     Cholesterol/ HDL Ratio (no units)   Date Value   11/24/2020 3.5     Triglycerides (mg/dL)   Date Value   11/24/2020 138     LDL (mg/dL)   Date Value   11/24/2020 58                please see full dictated note to follow  patient seen in january for admission, and also at wound center  currently recieving kenalog injections and clobetasol to numerous wounds(full description to follow)  patient has closed/ healed some from january, of note the sternal wound and a large portion of breast/chest wall wounds  though painful no obvious spreading cellulitis, normal wbc, consider sed rate, lupus labs/  gfr 62 on plaquenil, off steroids  wound continue current dermatology management, consider rheum consult  dvt negative on er report/ dvt prophylaxis

## 2021-03-09 ENCOUNTER — APPOINTMENT (OUTPATIENT)
Dept: INTERNAL MEDICINE | Facility: CLINIC | Age: 66
End: 2021-03-09
Payer: SELF-PAY

## 2021-03-09 ENCOUNTER — NON-APPOINTMENT (OUTPATIENT)
Age: 66
End: 2021-03-09

## 2021-03-09 VITALS — SYSTOLIC BLOOD PRESSURE: 154 MMHG | DIASTOLIC BLOOD PRESSURE: 92 MMHG

## 2021-03-09 VITALS
DIASTOLIC BLOOD PRESSURE: 90 MMHG | TEMPERATURE: 97.9 F | OXYGEN SATURATION: 98 % | SYSTOLIC BLOOD PRESSURE: 150 MMHG | HEART RATE: 80 BPM

## 2021-03-09 DIAGNOSIS — L97.922 NON-PRESSURE CHRONIC ULCER OF UNSPECIFIED PART OF LEFT LOWER LEG WITH FAT LAYER EXPOSED: ICD-10-CM

## 2021-03-09 DIAGNOSIS — L98.492 NON-PRESSURE CHRONIC ULCER OF SKIN OF OTHER SITES WITH FAT LAYER EXPOSED: ICD-10-CM

## 2021-03-09 DIAGNOSIS — S52.502A UNSPECIFIED FRACTURE OF THE LOWER END OF LEFT RADIUS, INITIAL ENCOUNTER FOR CLOSED FRACTURE: ICD-10-CM

## 2021-03-09 DIAGNOSIS — S52.502D UNSPECIFIED FRACTURE OF THE LOWER END OF LEFT RADIUS, SUBSEQUENT ENCOUNTER FOR CLOSED FRACTURE WITH ROUTINE HEALING: ICD-10-CM

## 2021-03-09 DIAGNOSIS — R50.9 FEVER, UNSPECIFIED: ICD-10-CM

## 2021-03-09 DIAGNOSIS — K21.9 GASTRO-ESOPHAGEAL REFLUX DISEASE W/OUT ESOPHAGITIS: ICD-10-CM

## 2021-03-09 DIAGNOSIS — Z20.822 CONTACT WITH AND (SUSPECTED) EXPOSURE TO COVID-19: ICD-10-CM

## 2021-03-09 DIAGNOSIS — Z87.2 PERSONAL HISTORY OF DISEASES OF THE SKIN AND SUBCUTANEOUS TISSUE: ICD-10-CM

## 2021-03-09 PROCEDURE — 99213 OFFICE O/P EST LOW 20 MIN: CPT

## 2021-03-09 NOTE — HISTORY OF PRESENT ILLNESS
[de-identified] : Presents for follow up anxiety.  Needs refill on clonazepam.  \par Still does not have health insurance.  Working with  to choose a plan.  \par Once insurance established plans to scheduled with psych, derm, PCP, GI, rheum. \par \par Pyoderma has been flaring.  We discussed seeing a new dermatologist over the phone last week, with whom she has scheduled.  \par Currently using topicals to soothe it.  \par \par Does not want to do labs d/t lack of insurance. \par Reports adherence with her daily medications - levothyroxine, fluoxetine, enalapril, metoprolol.  \par Not needing omeprazole now.  \par \par H/o colitis.  Still has watery stools. Still regurgitates.  \par \par Trying to eat healthy, happy to have a kitchen to cook in.  Does not exercise.  \par No alcohol, no illicit drugs.  \par \par Helping to manage son's needs (33 y.o.), he is doing overall well.    \par  is working. Both patient and  are getting SS benefits.  Living in a comfortable home.  \par \par Scheduled for COVID vaccine on 4/4/21.

## 2021-03-09 NOTE — PHYSICAL EXAM
[Normal] : normal rate, regular rhythm, normal S1 and S2 and no murmur heard [No Carotid Bruits] : no carotid bruits [No Edema] : there was no peripheral edema [Soft] : abdomen soft [No HSM] : no HSM [No Rash] : no rash [de-identified] : diffusely tender  [de-identified] : healing pyoderma lesions on left breast, and lower abdomen

## 2021-03-09 NOTE — ASSESSMENT
[FreeTextEntry1] : Hypertension:\par Discussed increasing BP meds, but prefers not to do blood work d/t insurance issues.  \par Will work on getting daily exercise with a goal of 30 minutes of brisk walking daily.  \par \par Pyoderma: \par Using topicals to soothe it\par Has scheduled with derm\par Continues Plaquenil\par \par SLE:\par Continues Plaquenil\par Needs opthalmology eval, rheum follow up\par \par Colitis:\par Needs GI follow up, awaiting insurance\par \par Anxiety:\par Doing fairly well, worse d/t pain of pyoderma lesions\par Continues fluoxetine\par Will continues clonazepam, discussed trying to taper/ use sparingly.  She will try.  Aware that in-office visit is needed for refill. \par Needs new psychiatrist, awaiting to establish health insurance.  \par \par Strongly encouraged to establish health insurance.  Other health maintenance measures currently on hold.  \par

## 2021-03-09 NOTE — REVIEW OF SYSTEMS
[Fever] : no fever [Chills] : no chills [Night Sweats] : no night sweats [Recent Change In Weight] : ~T no recent weight change [Chest Pain] : no chest pain [Palpitations] : no palpitations [Lower Ext Edema] : no lower extremity edema [Orthopnea] : no orthopnea [Paroxysmal Nocturnal Dyspnea] : no paroxysmal nocturnal dyspnea [Shortness Of Breath] : no shortness of breath [Wheezing] : no wheezing [Cough] : no cough [Dyspnea on Exertion] : dyspnea on exertion [Nausea] : no nausea [Diarrhea] : diarrhea [Vomiting] : vomiting [Heartburn] : heartburn [Negative] : Respiratory [FreeTextEntry6] : e

## 2021-03-16 ENCOUNTER — APPOINTMENT (OUTPATIENT)
Dept: DERMATOLOGY | Facility: CLINIC | Age: 66
End: 2021-03-16
Payer: SELF-PAY

## 2021-03-16 PROCEDURE — 99214 OFFICE O/P EST MOD 30 MIN: CPT

## 2021-04-12 ENCOUNTER — APPOINTMENT (OUTPATIENT)
Dept: INTERNAL MEDICINE | Facility: CLINIC | Age: 66
End: 2021-04-12
Payer: SELF-PAY

## 2021-04-12 VITALS
TEMPERATURE: 98 F | OXYGEN SATURATION: 95 % | DIASTOLIC BLOOD PRESSURE: 86 MMHG | SYSTOLIC BLOOD PRESSURE: 120 MMHG | HEART RATE: 93 BPM

## 2021-04-12 PROCEDURE — 99213 OFFICE O/P EST LOW 20 MIN: CPT

## 2021-04-13 NOTE — HISTORY OF PRESENT ILLNESS
[de-identified] : Presents for med refill.  \par \par Saw derm.  Advised to do Vaseline along with lidocaine around the edges.  Stopped with the Vaseline as seemed to actually prevent healing.  Not sure the lidocaine helped.  Feels the the sores are overall worse.  Incredibly painful.   \par \par Paid for health insurance today.  Starts May 1.  \par \par Continues medications.\par \par Other than skin lesions feels well. \par

## 2021-04-13 NOTE — REVIEW OF SYSTEMS
[Negative] : Psychiatric [Fever] : no fever [Chills] : no chills [Fatigue] : no fatigue [Night Sweats] : no night sweats [Chest Pain] : no chest pain [Palpitations] : no palpitations [Lower Ext Edema] : no lower extremity edema [Orthopnea] : no orthopnea [Paroxysmal Nocturnal Dyspnea] : no paroxysmal nocturnal dyspnea [Wheezing] : no wheezing [Cough] : no cough [Dyspnea on Exertion] : no dyspnea on exertion [de-identified] : skin ulcerations on breasts and abdomen  [de-identified] : anxiety controlled

## 2021-04-13 NOTE — ASSESSMENT
[FreeTextEntry1] : Anxiety:\par Continues Fluoxetine\par Continues clonazepam, refilled today, istop checked.   Again discussed and encouraged tapering, feels she cannot at this time do to her skin condition. \par Now has established insurance, will look into finding a psychiatrist\par \par Hypertension:\par Controlled, continues meds\par \par Skin lesions/ulcerations:\par Saw derm, felt not to be pyoderma\par Overall worse, intensely painful\par Has derm follow up\par Discussed pain management and wound care eval once insurance is effective\par

## 2021-04-13 NOTE — PHYSICAL EXAM
[Normal] : normal rate, regular rhythm, normal S1 and S2 and no murmur heard [Normal Supraclavicular Nodes] : no supraclavicular lymphadenopathy [Normal Axillary Nodes] : no axillary lymphadenopathy [Normal Posterior Cervical Nodes] : no posterior cervical lymphadenopathy [Normal Anterior Cervical Nodes] : no anterior cervical lymphadenopathy [de-identified] : red, scabbed, dry ulcerations to breasts and abdomen

## 2021-04-21 ENCOUNTER — APPOINTMENT (OUTPATIENT)
Dept: DERMATOLOGY | Facility: CLINIC | Age: 66
End: 2021-04-21

## 2021-04-22 ENCOUNTER — RX RENEWAL (OUTPATIENT)
Age: 66
End: 2021-04-22

## 2021-04-23 ENCOUNTER — RX RENEWAL (OUTPATIENT)
Age: 66
End: 2021-04-23

## 2021-05-10 ENCOUNTER — NON-APPOINTMENT (OUTPATIENT)
Age: 66
End: 2021-05-10

## 2021-05-20 ENCOUNTER — NON-APPOINTMENT (OUTPATIENT)
Age: 66
End: 2021-05-20

## 2021-05-20 ENCOUNTER — APPOINTMENT (OUTPATIENT)
Dept: INTERNAL MEDICINE | Facility: CLINIC | Age: 66
End: 2021-05-20
Payer: COMMERCIAL

## 2021-05-20 VITALS
SYSTOLIC BLOOD PRESSURE: 120 MMHG | DIASTOLIC BLOOD PRESSURE: 90 MMHG | HEART RATE: 88 BPM | HEIGHT: 63 IN | TEMPERATURE: 99 F

## 2021-05-20 PROCEDURE — 99214 OFFICE O/P EST MOD 30 MIN: CPT | Mod: 25

## 2021-05-20 NOTE — HISTORY OF PRESENT ILLNESS
[FreeTextEntry1] : pt missed appt at 1-20 and came in at 1:50 \par Since there was no time for CPE , an acute visit was conducted \par she has been coughing x 1 week \par fever (+)\par she denies any SOB \par has had both Covid shots \par she does not smoke\par c/o pain when coughing or taking a deep breath posteriorly on the R side \par \par anxiety- ct to be an issue \par reviewed NP notes from last visit \par she is currently on QID Klonipin - will try to taper \par \par her Pyoderma lesions are also acting up causing a lot of anxiety \par she needs f/u appt w/ all her speicialist

## 2021-05-20 NOTE — REVIEW OF SYSTEMS
[Fever] : fever [Fatigue] : fatigue [Cough] : cough [Anxiety] : anxiety [Negative] : Genitourinary [de-identified] : PG lesions

## 2021-05-20 NOTE — ASSESSMENT
[FreeTextEntry1] : 1) HTN \par - Ct meds \par - check labs\par \par 2) Hypothyroid\par - check TSH \par \par 3) Check ISTOP\par - RF klonipin \par - f/u in 1 month \par \par 4) Cough / fever  /pleuritic chest pain \par - CXR ordered \par - will start Z horacio \par \par

## 2021-05-20 NOTE — PHYSICAL EXAM
[Clear to Auscultation] : lungs were clear to auscultation bilaterally [Normal] : normal rate, regular rhythm, normal S1 and S2 and no murmur heard

## 2021-05-21 ENCOUNTER — NON-APPOINTMENT (OUTPATIENT)
Age: 66
End: 2021-05-21

## 2021-05-21 LAB
ALBUMIN SERPL ELPH-MCNC: 4.3 G/DL
ALP BLD-CCNC: 123 U/L
ALT SERPL-CCNC: 19 U/L
ANION GAP SERPL CALC-SCNC: 15 MMOL/L
AST SERPL-CCNC: 25 U/L
BASOPHILS # BLD AUTO: 0.05 K/UL
BASOPHILS NFR BLD AUTO: 0.7 %
BILIRUB SERPL-MCNC: 0.5 MG/DL
BUN SERPL-MCNC: 10 MG/DL
CALCIUM SERPL-MCNC: 9.3 MG/DL
CHLORIDE SERPL-SCNC: 103 MMOL/L
CHOLEST SERPL-MCNC: 254 MG/DL
CO2 SERPL-SCNC: 22 MMOL/L
CREAT SERPL-MCNC: 1.23 MG/DL
EOSINOPHIL # BLD AUTO: 0.1 K/UL
EOSINOPHIL NFR BLD AUTO: 1.5 %
ESTIMATED AVERAGE GLUCOSE: 105 MG/DL
GLUCOSE SERPL-MCNC: 95 MG/DL
HBA1C MFR BLD HPLC: 5.3 %
HCT VFR BLD CALC: 45.7 %
HDLC SERPL-MCNC: 82 MG/DL
HGB BLD-MCNC: 14.3 G/DL
IMM GRANULOCYTES NFR BLD AUTO: 0.3 %
LDLC SERPL CALC-MCNC: 151 MG/DL
LYMPHOCYTES # BLD AUTO: 1.26 K/UL
LYMPHOCYTES NFR BLD AUTO: 18.8 %
MAN DIFF?: NORMAL
MCHC RBC-ENTMCNC: 28.1 PG
MCHC RBC-ENTMCNC: 31.3 GM/DL
MCV RBC AUTO: 90 FL
MONOCYTES # BLD AUTO: 0.64 K/UL
MONOCYTES NFR BLD AUTO: 9.5 %
NEUTROPHILS # BLD AUTO: 4.64 K/UL
NEUTROPHILS NFR BLD AUTO: 69.2 %
NONHDLC SERPL-MCNC: 172 MG/DL
PLATELET # BLD AUTO: 273 K/UL
POTASSIUM SERPL-SCNC: 4.4 MMOL/L
PROT SERPL-MCNC: 7.5 G/DL
RBC # BLD: 5.08 M/UL
RBC # FLD: 14.3 %
SODIUM SERPL-SCNC: 140 MMOL/L
TRIGL SERPL-MCNC: 105 MG/DL
TSH SERPL-ACNC: 1.45 UIU/ML
WBC # FLD AUTO: 6.71 K/UL

## 2021-05-24 ENCOUNTER — OUTPATIENT (OUTPATIENT)
Dept: OUTPATIENT SERVICES | Facility: HOSPITAL | Age: 66
LOS: 1 days | Discharge: HOME | End: 2021-05-24
Payer: MEDICAID

## 2021-05-24 DIAGNOSIS — Z98.89 OTHER SPECIFIED POSTPROCEDURAL STATES: Chronic | ICD-10-CM

## 2021-05-24 DIAGNOSIS — R05 COUGH: ICD-10-CM

## 2021-05-24 DIAGNOSIS — Z98.890 OTHER SPECIFIED POSTPROCEDURAL STATES: Chronic | ICD-10-CM

## 2021-05-24 PROCEDURE — 71046 X-RAY EXAM CHEST 2 VIEWS: CPT | Mod: 26

## 2021-05-26 ENCOUNTER — NON-APPOINTMENT (OUTPATIENT)
Age: 66
End: 2021-05-26

## 2021-06-09 ENCOUNTER — RX RENEWAL (OUTPATIENT)
Age: 66
End: 2021-06-09

## 2021-07-08 ENCOUNTER — APPOINTMENT (OUTPATIENT)
Dept: INTERNAL MEDICINE | Facility: CLINIC | Age: 66
End: 2021-07-08
Payer: COMMERCIAL

## 2021-07-08 ENCOUNTER — NON-APPOINTMENT (OUTPATIENT)
Age: 66
End: 2021-07-08

## 2021-07-08 VITALS
HEART RATE: 87 BPM | TEMPERATURE: 97.9 F | OXYGEN SATURATION: 98 % | DIASTOLIC BLOOD PRESSURE: 82 MMHG | BODY MASS INDEX: 40.21 KG/M2 | SYSTOLIC BLOOD PRESSURE: 144 MMHG | WEIGHT: 227 LBS

## 2021-07-08 DIAGNOSIS — Z00.00 ENCOUNTER FOR GENERAL ADULT MEDICAL EXAMINATION W/OUT ABNORMAL FINDINGS: ICD-10-CM

## 2021-07-08 DIAGNOSIS — Z79.899 OTHER LONG TERM (CURRENT) DRUG THERAPY: ICD-10-CM

## 2021-07-08 PROCEDURE — 99396 PREV VISIT EST AGE 40-64: CPT

## 2021-07-08 RX ORDER — AZITHROMYCIN 250 MG/1
250 TABLET, FILM COATED ORAL
Qty: 5 | Refills: 0 | Status: DISCONTINUED | COMMUNITY
Start: 2021-05-20 | End: 2021-07-08

## 2021-07-08 RX ORDER — BENZONATATE 200 MG/1
200 CAPSULE ORAL 3 TIMES DAILY
Qty: 21 | Refills: 0 | Status: DISCONTINUED | COMMUNITY
Start: 2021-05-21 | End: 2021-07-08

## 2021-07-08 NOTE — HEALTH RISK ASSESSMENT
[Fair] :  ~his/her~ mood as fair [] : No [Yes] : Yes [Monthly or less (1 pt)] : Monthly or less (1 point) [1] : 2) Feeling down, depressed, or hopeless for several days (1) [de-identified] : none [de-identified] : regular  [de-identified] : follows w/ psych  [Change in mental status noted] : No change in mental status noted [Financial] : financial [With Family] : lives with family [Unemployed] : unemployed [] :  [Sexually Active] : not sexually active [Feels Safe at Home] : Feels safe at home [Reports changes in hearing] : Reports no changes in hearing [Reports changes in vision] : Reports no changes in vision [Reports changes in dental health] : Reports no changes in dental health [Smoke Detector] : smoke detector [Carbon Monoxide Detector] : carbon monoxide detector [Safety elements used in home] : safety elements used in home [MammogramDate] : 2020 [ColonoscopyDate] : 2018 [ColonoscopyComments] : IH

## 2021-07-08 NOTE — HISTORY OF PRESENT ILLNESS
[FreeTextEntry1] : CPE \par \par  her main issue is the ongoing pain due to P.G \par she ct to have anxiety- controlled on the  SSRI/ Klonipin \par \par \par LABS \par 5/20/21\par Hb 14.3\par \par Cr 1.23\par TSH 1.4\par A1C 5.3\par cannot tolerate statin

## 2021-07-08 NOTE — REVIEW OF SYSTEMS
[Fatigue] : fatigue [Vision Problems] : vision problems [Nasal Discharge] : nasal discharge [Joint Pain] : joint pain [Muscle Pain] : muscle pain [Skin Rash] : skin rash [Insomnia] : insomnia [Anxiety] : anxiety [Depression] : depression [Negative] : Neurological [FreeTextEntry3] : NEEDS OPTHO F/U

## 2021-07-08 NOTE — ASSESSMENT
[FreeTextEntry1] : 1) CPE \par \par - diet/ exercise  discussed\par -  , A1C 5.3\par - UTD colonoscopy \par - mammo- needed - will try to get MRI bc pt cannot have mammo due to pain from P.G \par - BMD needed \par \par 2) HTN / hypothyroid \par - ct meds \par - low salt / low fat diet \par \par 3) anxiety \par - adv psych eval\par \par 4) Needs optho follow up bc HCQ tx \par \par 5) pain management referral for ch pain 2/2 PG \par \par \par

## 2021-08-16 ENCOUNTER — OUTPATIENT (OUTPATIENT)
Dept: OUTPATIENT SERVICES | Facility: HOSPITAL | Age: 66
LOS: 1 days | End: 2021-08-16
Payer: COMMERCIAL

## 2021-08-16 ENCOUNTER — APPOINTMENT (OUTPATIENT)
Dept: MRI IMAGING | Facility: CLINIC | Age: 66
End: 2021-08-16
Payer: COMMERCIAL

## 2021-08-16 DIAGNOSIS — Z00.8 ENCOUNTER FOR OTHER GENERAL EXAMINATION: ICD-10-CM

## 2021-08-16 DIAGNOSIS — Z98.890 OTHER SPECIFIED POSTPROCEDURAL STATES: Chronic | ICD-10-CM

## 2021-08-16 DIAGNOSIS — Z98.89 OTHER SPECIFIED POSTPROCEDURAL STATES: Chronic | ICD-10-CM

## 2021-08-16 PROCEDURE — C8908: CPT

## 2021-08-16 PROCEDURE — 77049 MRI BREAST C-+ W/CAD BI: CPT | Mod: 26

## 2021-08-16 PROCEDURE — C8937: CPT

## 2021-08-16 PROCEDURE — A9585: CPT

## 2021-09-01 ENCOUNTER — RX RENEWAL (OUTPATIENT)
Age: 66
End: 2021-09-01

## 2021-09-09 ENCOUNTER — APPOINTMENT (OUTPATIENT)
Dept: DERMATOLOGY | Facility: CLINIC | Age: 66
End: 2021-09-09
Payer: COMMERCIAL

## 2021-09-09 DIAGNOSIS — R21 RASH AND OTHER NONSPECIFIC SKIN ERUPTION: ICD-10-CM

## 2021-09-09 PROCEDURE — 99213 OFFICE O/P EST LOW 20 MIN: CPT

## 2021-09-16 ENCOUNTER — APPOINTMENT (OUTPATIENT)
Dept: PAIN MANAGEMENT | Facility: CLINIC | Age: 66
End: 2021-09-16
Payer: COMMERCIAL

## 2021-09-16 VITALS — HEIGHT: 63 IN | BODY MASS INDEX: 35.44 KG/M2 | WEIGHT: 200 LBS

## 2021-09-16 PROCEDURE — 99204 OFFICE O/P NEW MOD 45 MIN: CPT | Mod: 95

## 2021-09-16 RX ORDER — COLD-HOT PACK
600 EACH MISCELLANEOUS
Qty: 1 | Refills: 6 | Status: DISCONTINUED | COMMUNITY
Start: 2021-09-09 | End: 2021-09-16

## 2021-09-16 NOTE — PHYSICAL EXAM
[de-identified] : Constitutional: Well-developed, in no acute distress\par \par Psychiatric: Appropriate mood and affect, oriented to time, place, person, and situation\par

## 2021-09-16 NOTE — ASSESSMENT
[FreeTextEntry1] : 65 yof w/ PMH of Hashimotos, Sjogrens (+SSA, +APL labs) and history of pyoderma gangrenosum (treated by Júnior Scott in past with thalidomide) presents w/ chronic pain from skin lesions for consultation.\par \par In my opinion I would recommend starting neuropathic pain medication, we will start gabapentin 300 mg TID. I would recommend against long term opioid therapy in this patient due to lack of efficacy for long term care and significant risk, particularly with her current use of benzodiazepines.\par \par Continue topical medications.\par \par I highly recommend she see a rheumatologist as soon as possible, referral offered but she already has an appointment set up. \par \par RTC prn. \par

## 2021-09-16 NOTE — REVIEW OF SYSTEMS
[Back Pain] : back pain [Joint Pain] : joint pain [Negative] : Heme/Lymph [Neck Pain] : no neck pain [Joint Stiffness] : no joint stiffness

## 2021-09-16 NOTE — CONSULT LETTER
[Dear  ___] : Dear  [unfilled], [Consult Letter:] : I had the pleasure of evaluating your patient, [unfilled]. [Please see my note below.] : Please see my note below. [Consult Closing:] : Thank you very much for allowing me to participate in the care of this patient.  If you have any questions, please do not hesitate to contact me. [Sincerely,] : Sincerely, [FreeTextEntry3] : Kemal Ness MD\par

## 2021-09-16 NOTE — HISTORY OF PRESENT ILLNESS
[___ yrs] : [unfilled] year(s) ago [Constant] : constant [10] : a maximum pain level of 10/10 [Sharp] : sharp [Aching] : aching [Throbbing] : throbbing [Shooting] : shooting [10 (pain as bad as you can imagine)] : 1. What number best describes your pain on average in the past week? 10/10 pain [10 (completely interferes)] : 3. What number best describes how, during the past week, pain has interfered with your general activity? 10/10 pain [FreeTextEntry1] : Verbal consent was given by/on: Afsaneh Roberts on 09/16/21\par \par Patient location: Home\par \par Physician location: Office\par \par Reason for Telehealth visit:\par \par This service took place using a two way audio and visual platform. The patient and Dr. Ness were both able to see each other and communicate through video. There were no barriers to communication. Greater then 50% of the time spent in the encounter involved counseling and coordination of care. \par \par 65 yof w/ PMH of Hashimotos, Sjogrens (+SSA, +APL labs) and history of pyoderma gangrenosum (treated by Júnior Scott in past with thalidomide) presents w/ chronic pain from skin lesions. She reports that she has had flares over 19 years which come and go. She reports that she has seven autoimmune diseases and does not see any rheumatologist. She is using steroid creams with minimal relief. Quality of life is impaired. There has been a severe exacerbation of the patient's chronic pain\par \par Time spent on visit: 30 minutes\par \par  [FreeTextEntry7] : Lesions on breast and stomach  [FreeTextEntry3] : sun exposure [FreeTextEntry4] : n/a [FreeTextEntry2] : 30

## 2021-09-25 ENCOUNTER — RX RENEWAL (OUTPATIENT)
Age: 66
End: 2021-09-25

## 2021-09-29 ENCOUNTER — APPOINTMENT (OUTPATIENT)
Dept: RHEUMATOLOGY | Facility: CLINIC | Age: 66
End: 2021-09-29

## 2021-11-04 ENCOUNTER — RX RENEWAL (OUTPATIENT)
Age: 66
End: 2021-11-04

## 2021-11-11 ENCOUNTER — APPOINTMENT (OUTPATIENT)
Dept: RHEUMATOLOGY | Facility: CLINIC | Age: 66
End: 2021-11-11
Payer: COMMERCIAL

## 2021-11-11 VITALS
HEART RATE: 64 BPM | OXYGEN SATURATION: 96 % | SYSTOLIC BLOOD PRESSURE: 116 MMHG | DIASTOLIC BLOOD PRESSURE: 77 MMHG | WEIGHT: 210 LBS | BODY MASS INDEX: 37.21 KG/M2 | TEMPERATURE: 96.6 F | HEIGHT: 63 IN

## 2021-11-11 PROCEDURE — 99204 OFFICE O/P NEW MOD 45 MIN: CPT

## 2021-11-11 RX ORDER — LIDOCAINE 5 G/100G
5 OINTMENT TOPICAL
Qty: 35.44 | Refills: 1 | Status: DISCONTINUED | COMMUNITY
Start: 2021-03-16 | End: 2021-11-11

## 2021-11-11 RX ORDER — FLUOXETINE HYDROCHLORIDE 40 MG/1
40 CAPSULE ORAL
Refills: 0 | Status: DISCONTINUED | COMMUNITY
End: 2021-11-11

## 2021-11-11 RX ORDER — LEVOTHYROXINE SODIUM 150 UG/1
150 TABLET ORAL
Refills: 0 | Status: DISCONTINUED | COMMUNITY
End: 2021-11-11

## 2021-11-19 LAB
ALBUMIN MFR SERPL ELPH: 54.5 %
ALBUMIN SERPL ELPH-MCNC: 4.3 G/DL
ALBUMIN SERPL-MCNC: 3.9 G/DL
ALBUMIN/GLOB SERPL: 1.2 RATIO
ALP BLD-CCNC: 116 U/L
ALPHA1 GLOB MFR SERPL ELPH: 4.5 %
ALPHA1 GLOB SERPL ELPH-MCNC: 0.3 G/DL
ALPHA2 GLOB MFR SERPL ELPH: 11.6 %
ALPHA2 GLOB SERPL ELPH-MCNC: 0.8 G/DL
ALT SERPL-CCNC: 19 U/L
AMYLASE/CREAT SERPL: 109 U/L
ANA PAT FLD IF-IMP: ABNORMAL
ANA SER IF-ACNC: ABNORMAL
ANION GAP SERPL CALC-SCNC: 16 MMOL/L
AST SERPL-CCNC: 21 U/L
B-GLOBULIN MFR SERPL ELPH: 12.6 %
B-GLOBULIN SERPL ELPH-MCNC: 0.9 G/DL
BILIRUB SERPL-MCNC: 0.3 MG/DL
BUN SERPL-MCNC: 14 MG/DL
C3 SERPL-MCNC: 147 MG/DL
C4 SERPL-MCNC: 27 MG/DL
CALCIUM SERPL-MCNC: 9.1 MG/DL
CCP AB SER IA-ACNC: <8 UNITS
CHLORIDE SERPL-SCNC: 103 MMOL/L
CO2 SERPL-SCNC: 18 MMOL/L
CREAT SERPL-MCNC: 0.99 MG/DL
CRP SERPL-MCNC: 5 MG/L
DEPRECATED KAPPA LC FREE/LAMBDA SER: 1.13 RATIO
DSDNA AB SER-ACNC: 15 IU/ML
ENA RNP AB SER IA-ACNC: 0.2 AL
ENA SM AB SER IA-ACNC: <0.2 AL
ENA SS-A AB SER IA-ACNC: 5.1 AL
ENA SS-B AB SER IA-ACNC: <0.2 AL
GAMMA GLOB FLD ELPH-MCNC: 1.2 G/DL
GAMMA GLOB MFR SERPL ELPH: 16.8 %
GLUCOSE SERPL-MCNC: 94 MG/DL
IGA SER QL IEP: 269 MG/DL
IGG SER QL IEP: 1179 MG/DL
IGM SER QL IEP: 207 MG/DL
INTERPRETATION SERPL IEP-IMP: NORMAL
KAPPA LC CSF-MCNC: 1.64 MG/DL
KAPPA LC SERPL-MCNC: 1.86 MG/DL
LPL SERPL-CCNC: 39 U/L
M PROTEIN SPEC IFE-MCNC: NORMAL
POTASSIUM SERPL-SCNC: 4.4 MMOL/L
PROT SERPL-MCNC: 7.1 G/DL
RF+CCP IGG SER-IMP: NEGATIVE
RHEUMATOID FACT SER QL: <10 IU/ML
SODIUM SERPL-SCNC: 138 MMOL/L

## 2021-11-30 ENCOUNTER — APPOINTMENT (OUTPATIENT)
Dept: RHEUMATOLOGY | Facility: CLINIC | Age: 66
End: 2021-11-30
Payer: COMMERCIAL

## 2021-11-30 VITALS
HEIGHT: 63 IN | RESPIRATION RATE: 16 BRPM | TEMPERATURE: 97.2 F | WEIGHT: 218 LBS | OXYGEN SATURATION: 96 % | DIASTOLIC BLOOD PRESSURE: 103 MMHG | HEART RATE: 90 BPM | SYSTOLIC BLOOD PRESSURE: 169 MMHG | BODY MASS INDEX: 38.62 KG/M2

## 2021-11-30 DIAGNOSIS — M54.32 SCIATICA, RIGHT SIDE: ICD-10-CM

## 2021-11-30 DIAGNOSIS — M54.31 SCIATICA, RIGHT SIDE: ICD-10-CM

## 2021-11-30 PROCEDURE — 99214 OFFICE O/P EST MOD 30 MIN: CPT

## 2021-12-07 LAB — DRUG ABUSE PANEL-9, SERUM: NORMAL

## 2021-12-11 ENCOUNTER — APPOINTMENT (OUTPATIENT)
Dept: INTERNAL MEDICINE | Facility: CLINIC | Age: 66
End: 2021-12-11
Payer: COMMERCIAL

## 2021-12-11 VITALS
DIASTOLIC BLOOD PRESSURE: 68 MMHG | TEMPERATURE: 98.3 F | SYSTOLIC BLOOD PRESSURE: 136 MMHG | HEART RATE: 88 BPM | OXYGEN SATURATION: 96 %

## 2021-12-11 PROCEDURE — 99214 OFFICE O/P EST MOD 30 MIN: CPT

## 2021-12-11 NOTE — ASSESSMENT
[FreeTextEntry1] : 1) HTN \par - stable\par - ct meds\par \par 2) HL\par - refuses to take meds \par - low fat diet discussed \par \par 3) hypothyroid \par - ct meds \par - TSH at goal \par \par 4) anxiety \par - RF  the klonipin - 0.5 TID - will try to taper \par - ISTOP checked \par - will have to give written rx bc issues w/ the authentication device

## 2021-12-11 NOTE — REVIEW OF SYSTEMS
[Joint Pain] : joint pain [Joint Stiffness] : joint stiffness [Joint Swelling] : joint swelling [Muscle Pain] : muscle pain [Negative] : Gastrointestinal

## 2021-12-11 NOTE — PHYSICAL EXAM
[Normal Sclera/Conjunctiva] : normal sclera/conjunctiva [Normal Outer Ear/Nose] : the outer ears and nose were normal in appearance [No JVD] : no jugular venous distention [Normal] : soft, non-tender, non-distended, no masses palpated, no HSM and normal bowel sounds

## 2021-12-11 NOTE — HISTORY OF PRESENT ILLNESS
[FreeTextEntry1] : F/U HTN / HL/ hypothyroid / Sjogren \par reviewed the rheum note\par \par pt has been started on Percocet by DR Ritchie for the ulcerative lesions\par \par she denies any chest pain or SOB \par \par  Labs reviewed \par Cr 0.9 \par

## 2021-12-13 ENCOUNTER — APPOINTMENT (OUTPATIENT)
Dept: PAIN MANAGEMENT | Facility: CLINIC | Age: 66
End: 2021-12-13
Payer: COMMERCIAL

## 2021-12-13 VITALS
SYSTOLIC BLOOD PRESSURE: 190 MMHG | BODY MASS INDEX: 38.62 KG/M2 | DIASTOLIC BLOOD PRESSURE: 99 MMHG | WEIGHT: 218 LBS | HEIGHT: 63 IN | HEART RATE: 102 BPM

## 2021-12-13 PROCEDURE — 99204 OFFICE O/P NEW MOD 45 MIN: CPT

## 2021-12-13 NOTE — PHYSICAL EXAM
[General Appearance - Alert] : alert [Affect] : the affect was normal [Person] : oriented to person [Place] : oriented to place [Time] : oriented to time [Cranial Nerves Oculomotor (III)] : extraocular motion intact [Cranial Nerves Facial (VII)] : face symmetrical [Sclera] : the sclera and conjunctiva were normal [Outer Ear] : the ears and nose were normal in appearance [Neck Appearance] : the appearance of the neck was normal [] : no respiratory distress

## 2021-12-13 NOTE — ASSESSMENT
[FreeTextEntry1] : \par Chronic pain syndrome\par PG.\par \par Would recommend an MRI LS spine to ro structural pathology\par UDT done. . \par  reviewed. \par \par In past, patient took demerol, fioricet and \par \par Patient being followed by internist for klonopin. \par HO drug over use many times... however, she states this is only since PG. \par Patient gives me ok to speak to her internist regarding her history

## 2021-12-13 NOTE — HISTORY OF PRESENT ILLNESS
[FreeTextEntry1] : Patient has ho HTN, Hypothyroid,  PG 18 years ago, Hashimotos, Lupus, Sjogrens, presents for chronic pain evaluation. The pain is mostly involving her breasts and stomach and left leg associated with open lesions. The pain is described as a chronic  stabbing sensation,  \par \par Medications: Percocet used up 120 tablets in one week this past week (one month supply). Prior to this was getting tylenol with codeine #3 no eftect. Three years ago was in pain management in [place]. For the subsequent 3 years was taking care of it by "clear will". Recently changed to gabapentin 600 mgs tid. Prozac 80 mgs. Klonopin 1.5 mgs qd; \par \par She does her own dressing changes but is going to see a wound care specialist this week.  \par \par On Plaquinil \par She has a special needs son.

## 2021-12-16 ENCOUNTER — APPOINTMENT (OUTPATIENT)
Dept: WOUND CARE | Facility: CLINIC | Age: 66
End: 2021-12-16

## 2021-12-17 ENCOUNTER — NON-APPOINTMENT (OUTPATIENT)
Age: 66
End: 2021-12-17

## 2021-12-17 DIAGNOSIS — Z11.59 ENCOUNTER FOR SCREENING FOR OTHER VIRAL DISEASES: ICD-10-CM

## 2021-12-17 RX ORDER — OXYCODONE AND ACETAMINOPHEN 5; 325 MG/1; MG/1
5-325 TABLET ORAL
Qty: 120 | Refills: 0 | Status: DISCONTINUED | COMMUNITY
Start: 2021-12-07 | End: 2021-12-17

## 2021-12-23 ENCOUNTER — APPOINTMENT (OUTPATIENT)
Dept: RADIOLOGY | Facility: HOSPITAL | Age: 66
End: 2021-12-23

## 2021-12-23 ENCOUNTER — OUTPATIENT (OUTPATIENT)
Dept: OUTPATIENT SERVICES | Facility: HOSPITAL | Age: 66
LOS: 1 days | Discharge: HOME | End: 2021-12-23
Payer: MEDICAID

## 2021-12-23 ENCOUNTER — RESULT REVIEW (OUTPATIENT)
Age: 66
End: 2021-12-23

## 2021-12-23 DIAGNOSIS — M54.31 SCIATICA, RIGHT SIDE: ICD-10-CM

## 2021-12-23 DIAGNOSIS — Z98.89 OTHER SPECIFIED POSTPROCEDURAL STATES: Chronic | ICD-10-CM

## 2021-12-23 DIAGNOSIS — Z98.890 OTHER SPECIFIED POSTPROCEDURAL STATES: Chronic | ICD-10-CM

## 2021-12-23 PROCEDURE — 72070 X-RAY EXAM THORAC SPINE 2VWS: CPT | Mod: 26

## 2021-12-23 PROCEDURE — 72100 X-RAY EXAM L-S SPINE 2/3 VWS: CPT | Mod: 26

## 2022-01-01 NOTE — PROGRESS NOTE ADULT - PROBLEM SELECTOR PLAN 1
currently on prednisone taper and dapsone 100 mg daily per dermatologist, pt reports worsening since treatment  - c/w dapsone 100 mg daily  - s/p solumedrol 1 g in ED  - dermatology consulted; f/u recs  - c/w pain control with morphine / percocet/ acetaminophen for mild/moderate/severe pain  - wound care consult 47.5

## 2022-01-31 ENCOUNTER — APPOINTMENT (OUTPATIENT)
Dept: WOUND CARE | Facility: CLINIC | Age: 67
End: 2022-01-31
Payer: COMMERCIAL

## 2022-01-31 PROCEDURE — 99204 OFFICE O/P NEW MOD 45 MIN: CPT

## 2022-01-31 RX ORDER — METHOTREXATE 2.5 MG/1
2.5 TABLET ORAL
Qty: 12 | Refills: 3 | Status: DISCONTINUED | COMMUNITY
Start: 2021-11-30 | End: 2022-01-31

## 2022-01-31 RX ORDER — CLONAZEPAM 1 MG/1
1 TABLET ORAL
Qty: 90 | Refills: 0 | Status: DISCONTINUED | COMMUNITY
Start: 2021-08-12

## 2022-01-31 RX ORDER — GABAPENTIN 300 MG/1
300 CAPSULE ORAL 3 TIMES DAILY
Qty: 90 | Refills: 0 | Status: DISCONTINUED | COMMUNITY
Start: 2021-09-16 | End: 2022-01-31

## 2022-01-31 RX ORDER — ACETAMINOPHEN AND CODEINE 300; 30 MG/1; MG/1
300-30 TABLET ORAL
Qty: 90 | Refills: 0 | Status: DISCONTINUED | COMMUNITY
Start: 2021-11-08

## 2022-01-31 RX ORDER — ACETAMINOPHEN AND CODEINE PHOSPHATE 300; 15 MG/1; MG/1
300-15 TABLET ORAL
Qty: 36 | Refills: 0 | Status: DISCONTINUED | COMMUNITY
Start: 2021-12-26

## 2022-01-31 RX ORDER — AMOXICILLIN 500 MG/1
500 CAPSULE ORAL
Qty: 21 | Refills: 0 | Status: DISCONTINUED | COMMUNITY
Start: 2021-12-26

## 2022-01-31 RX ORDER — CALCIUM CARBONATE/VITAMIN D3 600MG-62.5
600 CAPSULE ORAL
Qty: 60 | Refills: 0 | Status: DISCONTINUED | COMMUNITY
Start: 2021-09-09

## 2022-01-31 NOTE — HISTORY OF PRESENT ILLNESS
[FreeTextEntry1] : 06/2018\par 62F is here with her  today for nonhealing multiple wounds. s/p biopsy and culture neg for significant findings. Etiology unclear.. In past months, treated as PG, though no recent improvement on ILK or clobetasol. She treats the wounds herself because of insurance issues. 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 She reports she was diagnosed with PG 15 years ago, had been on pred [id number], as well as thalidomide, tx'ed by Dr. Scott. Recent flaring in past year. She had seen her dermatologist in November who started her on dapsone and prednisone, however the lesions continued to worsen, so she was admitted to the hospital in January 2018,\par History of abdominal keloid where one of the wounds are currently. She has no other complaints or associated problems. States that she gained significant weight from the prednisone\par \par 1/31/22\par Pt states wounds has been not healing. Currently painful. She applies Lidocaine.\par Previously on prednisone, topical steroids and injections. Minimal improvement. \par Follows Dr. Scott rheumatologist. Previously in MTX not tolerated well.\par Plan: advised to apply Medhoney to non PG wounds and hydrogel. Lesions are very dry. Applied hydrogel on affected areas. Instructed need to moisturize skin.\par

## 2022-01-31 NOTE — PHYSICAL EXAM
[JVD] : no jugular venous distention  [Normal Breath Sounds] : Normal breath sounds [Abdomen Tenderness] : ~T ~M No abdominal tenderness [Skin Ulcer] : ulcer [Alert] : alert [Oriented to Person] : oriented to person [Oriented to Place] : oriented to place [Oriented to Time] : oriented to time [Calm] : calm [de-identified] : NAD, ambulatory [de-identified] : AT [de-identified] : supple [de-identified] : soft [de-identified] : multiple ulcers with desiccation; tender, no erythema

## 2022-02-01 ENCOUNTER — APPOINTMENT (OUTPATIENT)
Dept: RHEUMATOLOGY | Facility: CLINIC | Age: 67
End: 2022-02-01
Payer: COMMERCIAL

## 2022-02-01 VITALS
DIASTOLIC BLOOD PRESSURE: 85 MMHG | OXYGEN SATURATION: 98 % | TEMPERATURE: 97.2 F | HEART RATE: 96 BPM | HEIGHT: 63 IN | SYSTOLIC BLOOD PRESSURE: 145 MMHG

## 2022-02-01 DIAGNOSIS — L98.499 NON-PRESSURE CHRONIC ULCER OF SKIN OF OTHER SITES WITH UNSPECIFIED SEVERITY: ICD-10-CM

## 2022-02-01 PROCEDURE — 99214 OFFICE O/P EST MOD 30 MIN: CPT

## 2022-02-02 PROBLEM — L98.499 SKIN ULCER, CHRONIC: Status: ACTIVE | Noted: 2021-09-09

## 2022-02-07 ENCOUNTER — APPOINTMENT (OUTPATIENT)
Dept: WOUND CARE | Facility: CLINIC | Age: 67
End: 2022-02-07
Payer: COMMERCIAL

## 2022-02-07 PROCEDURE — 99213 OFFICE O/P EST LOW 20 MIN: CPT | Mod: 95

## 2022-02-08 NOTE — PLAN
[FreeTextEntry1] : 2/7/22\par Plan - c/w hydrogel,start and  apply adaptic so gauze does not adhere to wound bed\par follow up next week in office

## 2022-02-08 NOTE — HISTORY OF PRESENT ILLNESS
[Home] : at home, [unfilled] , at the time of the visit. [Medical Office: ([facility])___] : at the medical office located in  [Verbal consent obtained from patient] : the patient, [unfilled] [FreeTextEntry4] : layla np [FreeTextEntry1] : 06/2018\par 62F is here with her  today for nonhealing multiple wounds. s/p biopsy and culture neg for significant findings. Etiology unclear.. In past months, treated as PG, though no recent improvement on ILK or clobetasol. She treats the wounds herself because of insurance issues. 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 She reports she was diagnosed with PG 15 years ago, had been on pred 5617-6535, as well as thalidomide, tx'ed by Dr. Scott. Recent flaring in past year. She had seen her dermatologist in November who started her on dapsone and prednisone, however the lesions continued to worsen, so she was admitted to the hospital in January 2018,\par History of abdominal keloid where one of the wounds are currently. She has no other complaints or associated problems. States that she gained significant weight from the prednisone\par \par 1/31/22\par Pt states wounds has been not healing. Currently painful. She applies Lidocaine.\par Previously on prednisone, topical steroids and injections. Minimal improvement. \par Follows Dr. Scott rheumatologist. Previously in MTX not tolerated well.\par Plan: advised to apply Medhoney to non PG wounds and hydrogel. Lesions are very dry. Applied hydrogel on affected areas. Instructed need to moisturize skin.\par

## 2022-02-08 NOTE — PHYSICAL EXAM
[Normal Breath Sounds] : Normal breath sounds [Skin Ulcer] : ulcer [Alert] : alert [Oriented to Person] : oriented to person [Oriented to Place] : oriented to place [Oriented to Time] : oriented to time [Calm] : calm [JVD] : no jugular venous distention  [Abdomen Tenderness] : ~T ~M No abdominal tenderness [de-identified] : NAD, ambulatory [de-identified] : AT [de-identified] : supple [de-identified] : soft [de-identified] : multiple ulcers with desiccation; tender, no erythema

## 2022-02-08 NOTE — ASSESSMENT
[FreeTextEntry1] : \par 1/31/22\par 67 yo F PMH of SLE (in remission, no medications) pyoderma gangrenosum biopsy previously proven but no path available, h/o Hashimoto thyroiditis, & Sjogren's. She reports she was diagnosed with PG 20 years ago.\par Pt states wounds has been not healing. Currently painful. She applies Lidocaine.\par Previously on prednisone, topical steroids and injections. Minimal improvement. \par Follows Dr. Scott rheumatologist. Previously in MTX not tolerated well.\par Multiple wounds diferrent healing stage. Pyoderma gangrenosum. No clinical signs of active infection. \par Plan: advised to apply Medhoney to non PG wounds and hydrogel. Lesions are very dry. Applied hydrogel on affected areas. Instructed need to moisturize skin.\par Next appointment TeleHealth \par \par 2/7/22\par right and left breast wounds, abdomen superficial clean and pink, currently using hydrogel/gauze but gauze is sticking and re-opening wounds when removed

## 2022-02-11 NOTE — PATIENT PROFILE ADULT. - DOES PATIENT HAVE ADVANCE DIRECTIVE
----- Message from PB Toure CNP sent at 2/11/2022  4:00 PM EST -----  Discussed INR with patient via My Chart message (see message thread that starts on 1/31/22)  Electronically signed by PB Toure CNP on 2/11/2022 at 3:59 PM No

## 2022-03-12 ENCOUNTER — APPOINTMENT (OUTPATIENT)
Dept: INTERNAL MEDICINE | Facility: CLINIC | Age: 67
End: 2022-03-12
Payer: COMMERCIAL

## 2022-03-12 VITALS
TEMPERATURE: 97.6 F | HEIGHT: 63 IN | SYSTOLIC BLOOD PRESSURE: 146 MMHG | OXYGEN SATURATION: 97 % | DIASTOLIC BLOOD PRESSURE: 94 MMHG | WEIGHT: 220 LBS | BODY MASS INDEX: 38.98 KG/M2 | HEART RATE: 81 BPM

## 2022-03-12 DIAGNOSIS — L98.491 NON-PRESSURE CHRONIC ULCER OF SKIN OF OTHER SITES LIMITED TO BREAKDOWN OF SKIN: ICD-10-CM

## 2022-03-12 PROCEDURE — 99214 OFFICE O/P EST MOD 30 MIN: CPT

## 2022-03-12 NOTE — ASSESSMENT
[FreeTextEntry1] : 1)  HTN \par - suboptimal control\par - adv low salt diet \par - f/u in 3 mo\par \par 2) hypothyroid \par - check TSH \par \par 3) Anxiety \par - ct SSRI\par - Checked ISTOP - RF Klonipin \par \par 4) mammo \par - ordered \par \par 5) adv to see opth\par - needs to hold off of the HCQ

## 2022-03-12 NOTE — HISTORY OF PRESENT ILLNESS
[FreeTextEntry1] : f/u HTN / hypothyroid/ anxiety / poss P.G\par \par  no chest pain or SOB \par \par ct to have generalized body aches , reviewed note from rheum / pain management \par \par she also reports that she has " fractured field of vision " \par on HCQ  for a long time \par \par the breast lesions are getting better

## 2022-03-12 NOTE — PHYSICAL EXAM
[Normal Sclera/Conjunctiva] : normal sclera/conjunctiva [No Edema] : there was no peripheral edema [Normal] : soft, non-tender, non-distended, no masses palpated, no HSM and normal bowel sounds [de-identified] : ulcers on breast

## 2022-03-12 NOTE — REVIEW OF SYSTEMS
[Fatigue] : fatigue [Joint Pain] : joint pain [Joint Stiffness] : joint stiffness [Negative] : Respiratory

## 2022-03-14 ENCOUNTER — APPOINTMENT (OUTPATIENT)
Dept: WOUND CARE | Facility: CLINIC | Age: 67
End: 2022-03-14

## 2022-03-15 LAB
ANION GAP SERPL CALC-SCNC: 19 MMOL/L
BUN SERPL-MCNC: 15 MG/DL
CALCIUM SERPL-MCNC: 9.5 MG/DL
CHLORIDE SERPL-SCNC: 107 MMOL/L
CHOLEST SERPL-MCNC: 247 MG/DL
CO2 SERPL-SCNC: 16 MMOL/L
CREAT SERPL-MCNC: 0.85 MG/DL
EGFR: 76 ML/MIN/1.73M2
GLUCOSE SERPL-MCNC: 98 MG/DL
HDLC SERPL-MCNC: 82 MG/DL
LDLC SERPL CALC-MCNC: 143 MG/DL
NONHDLC SERPL-MCNC: 165 MG/DL
POTASSIUM SERPL-SCNC: 4.5 MMOL/L
SODIUM SERPL-SCNC: 142 MMOL/L
TRIGL SERPL-MCNC: 110 MG/DL
TSH SERPL-ACNC: 0.08 UIU/ML

## 2022-03-16 ENCOUNTER — NON-APPOINTMENT (OUTPATIENT)
Age: 67
End: 2022-03-16

## 2022-03-24 ENCOUNTER — APPOINTMENT (OUTPATIENT)
Dept: WOUND CARE | Facility: CLINIC | Age: 67
End: 2022-03-24
Payer: COMMERCIAL

## 2022-03-24 DIAGNOSIS — S71.101A UNSPECIFIED OPEN WOUND, RIGHT THIGH, INITIAL ENCOUNTER: ICD-10-CM

## 2022-03-24 DIAGNOSIS — Z87.828 PERSONAL HISTORY OF OTHER (HEALED) PHYSICAL INJURY AND TRAUMA: ICD-10-CM

## 2022-03-24 PROCEDURE — 99423 OL DIG E/M SVC 21+ MIN: CPT

## 2022-03-25 PROBLEM — S71.101A OPEN WOUND OF RIGHT THIGH, INITIAL ENCOUNTER: Status: ACTIVE | Noted: 2022-03-25

## 2022-03-25 PROBLEM — Z87.828 HEALED WOUND: Status: ACTIVE | Noted: 2022-03-25

## 2022-03-25 NOTE — HISTORY OF PRESENT ILLNESS
[Home] : at home, [unfilled] , at the time of the visit. [Medical Office: ([facility])___] : at the medical office located in  [Verbal consent obtained from patient] : the patient, [unfilled] [FreeTextEntry4] : layla np [FreeTextEntry1] : 06/2018\par 62F is here with her  today for nonhealing multiple wounds. s/p biopsy and culture neg for significant findings. Etiology unclear.. In past months, treated as PG, though no recent improvement on ILK or clobetasol. She treats the wounds herself because of insurance issues. 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 She reports she was diagnosed with PG 15 years ago, had been on pred 4734-5815, as well as thalidomide, tx'ed by Dr. Scott. Recent flaring in past year. She had seen her dermatologist in November who started her on dapsone and prednisone, however the lesions continued to worsen, so she was admitted to the hospital in January 2018,\par History of abdominal keloid where one of the wounds are currently. She has no other complaints or associated problems. States that she gained significant weight from the prednisone\par \par 1/31/22\par Pt states wounds has been not healing. Currently painful. She applies Lidocaine.\par Previously on prednisone, topical steroids and injections. Minimal improvement. \par Follows Dr. Scott rheumatologist. Previously in MTX not tolerated well.\par Plan: advised to apply Medhoney to non PG wounds and hydrogel. Lesions are very dry. Applied hydrogel on affected areas. Instructed need to moisturize skin.\par

## 2022-03-25 NOTE — PHYSICAL EXAM
[Normal Breath Sounds] : Normal breath sounds [Skin Ulcer] : ulcer [Alert] : alert [Oriented to Person] : oriented to person [Oriented to Place] : oriented to place [Oriented to Time] : oriented to time [Calm] : calm [JVD] : no jugular venous distention  [Abdomen Tenderness] : ~T ~M No abdominal tenderness [de-identified] : NAD, ambulatory [de-identified] : AT [de-identified] : supple [de-identified] : soft [de-identified] : multiple ulcers with desiccation; tender, no erythema

## 2022-03-25 NOTE — PLAN
[FreeTextEntry1] : 3/24/22\par Plan - has supplies\par will c/w hydrogel/adaptic\par will ask for sample of BLT numbing cream to see if it aides with pain management\par follow up - pt will call

## 2022-03-25 NOTE — ASSESSMENT
[FreeTextEntry1] : \par 1/31/22\par 67 yo F PMH of SLE (in remission, no medications) pyoderma gangrenosum biopsy previously proven but no path available, h/o Hashimoto thyroiditis, & Sjogren's. She reports she was diagnosed with PG 20 years ago.\par Pt states wounds has been not healing. Currently painful. She applies Lidocaine.\par Previously on prednisone, topical steroids and injections. Minimal improvement. \par Follows Dr. Scott rheumatologist. Previously in MTX not tolerated well.\par Multiple wounds diferrent healing stage. Pyoderma gangrenosum. No clinical signs of active infection. \par Plan: advised to apply Medhoney to non PG wounds and hydrogel. Lesions are very dry. Applied hydrogel on affected areas. Instructed need to moisturize skin.\par Next appointment TeleHealth \par \par 2/7/22\par right and left breast wounds, abdomen superficial clean and pink, currently using hydrogel/gauze but gauze is sticking and re-opening wounds when removed\par \par 3/24/22\par right & left breast wounds are healed\par abdomen is clean and pink, hourglass, uses adaptic and hydrogel,4x4/hypafix, likes the adaptic\par right posterior thigh open, clean and pink, using adaptic,\par buttock superficial, clean - usually does not cover

## 2022-03-28 NOTE — PATIENT PROFILE ADULT. - MEDICATIONS BROUGHT TO HOSPITAL, PROFILE
"[facility] [[id number]]  Chief Complaint   Patient presents with     Consult     Hairloss/ Hair isn't growing.     Initial Ht 3' 8.96\" (114.2 cm)   Wt 44 lb 15.6 oz (20.4 kg)   BMI 15.64 kg/m   Estimated body mass index is 15.64 kg/m  as calculated from the following:    Height as of this encounter: 3' 8.96\" (114.2 cm).    Weight as of this encounter: 44 lb 15.6 oz (20.4 kg).  Medication Reconciliation: complete     Diomedes Green CMA        "
no

## 2022-04-05 ENCOUNTER — RX RENEWAL (OUTPATIENT)
Age: 67
End: 2022-04-05

## 2022-04-05 ENCOUNTER — APPOINTMENT (OUTPATIENT)
Dept: RHEUMATOLOGY | Facility: CLINIC | Age: 67
End: 2022-04-05
Payer: COMMERCIAL

## 2022-04-05 VITALS
DIASTOLIC BLOOD PRESSURE: 117 MMHG | BODY MASS INDEX: 39.87 KG/M2 | TEMPERATURE: 97.3 F | WEIGHT: 225 LBS | OXYGEN SATURATION: 98 % | HEART RATE: 89 BPM | HEIGHT: 63 IN | SYSTOLIC BLOOD PRESSURE: 166 MMHG

## 2022-04-05 DIAGNOSIS — G89.29 DORSALGIA, UNSPECIFIED: ICD-10-CM

## 2022-04-05 DIAGNOSIS — M54.9 DORSALGIA, UNSPECIFIED: ICD-10-CM

## 2022-04-05 PROCEDURE — 99214 OFFICE O/P EST MOD 30 MIN: CPT

## 2022-04-11 PROBLEM — Z11.59 SCREENING FOR VIRAL DISEASE: Status: ACTIVE | Noted: 2021-12-17

## 2022-04-19 ENCOUNTER — APPOINTMENT (OUTPATIENT)
Dept: NEUROLOGY | Facility: CLINIC | Age: 67
End: 2022-04-19
Payer: COMMERCIAL

## 2022-04-19 VITALS
WEIGHT: 220 LBS | SYSTOLIC BLOOD PRESSURE: 140 MMHG | HEIGHT: 63 IN | DIASTOLIC BLOOD PRESSURE: 90 MMHG | BODY MASS INDEX: 38.98 KG/M2 | HEART RATE: 86 BPM

## 2022-04-19 PROCEDURE — 99205 OFFICE O/P NEW HI 60 MIN: CPT

## 2022-04-19 RX ORDER — METHYLPREDNISOLONE 4 MG/1
4 TABLET ORAL
Qty: 1 | Refills: 3 | Status: DISCONTINUED | COMMUNITY
Start: 2022-03-25 | End: 2022-04-19

## 2022-04-19 RX ORDER — OMEPRAZOLE 20 MG/1
20 CAPSULE, DELAYED RELEASE ORAL DAILY
Qty: 90 | Refills: 1 | Status: DISCONTINUED | COMMUNITY
Start: 2020-04-15 | End: 2022-04-19

## 2022-04-20 NOTE — ASSESSMENT
[FreeTextEntry1] : I suspect her history of weight gain (BMI now 38.97) snoring and awakening not rested is all consistent with obstructive sleep apnea and she will be referred to sleep specialist.  Benzodiazepine dependence may also contribute to her memory disorder.  She has been under chronic stress and would benefit from psychologic counseling to better deal and cope with her stressful situations.  Also encouraged appropriate exercise and a weight loss diet.  She was reassured that she does not have a neurodegenerative disorder.\par \par Return for follow-up after sleep disorder evaluation studies.

## 2022-04-20 NOTE — PHYSICAL EXAM
[FreeTextEntry1] : She is alert and oriented.  Euthymic.  No aphasia.  Short-term recall impaired with distraction but intact without.  Able to spell in reverse spelling of a 5 letter word.  Fund of information appropriate.  No dyscalculia.  Visual fields are full to confrontation.  Fundi reveal sharp disc margins.  Pupils are equal and constrict to light.  Extraocular movements intact..  Symmetric.  Hearing intact to finger rub.  Tongue protrudes in midline.  Neck is supple.  No bruits heard.  Heart sounds are normal.  No murmurs.  No focal weakness.  Tendon reflexes active and symmetric.  Vibratory sensation diminished distally.  Gait and coordination intact.

## 2022-04-20 NOTE — REVIEW OF SYSTEMS
[Eyesight Problems] : eyesight problems [Arthralgias] : arthralgias [As Noted in HPI] : as noted in HPI [Skin Lesions] : skin lesion [Negative] : Heme/Lymph [FreeTextEntry3] : Scheduled for right vitrectomy

## 2022-04-20 NOTE — CONSULT LETTER
[Dear  ___] : Dear ~NINFA, [Consult Letter:] : I had the pleasure of evaluating your patient, [unfilled]. [Please see my note below.] : Please see my note below. [Consult Closing:] : Thank you very much for allowing me to participate in the care of this patient.  If you have any questions, please do not hesitate to contact me. [Sincerely,] : Sincerely, [FreeTextEntry2] : Mira Hinojosa, NP

## 2022-04-20 NOTE — HISTORY OF PRESENT ILLNESS
[FreeTextEntry1] : 66-year-old woman with history of Hashimoto's thyroiditis, lupus, Sjogren's syndrome and pyoderma granulosum referred for evaluation of complaints of memory loss.  Review of our electronic health record reveals patient complained to Dr. Joseph 4 years ago word finding difficulties and concern about her memory.\par \par No known family history of dementia.\par \par \par \par She reports a history of significant psychologic stress producing anxiety and poor sleep.  She has been dependent on clonazepam.  Previously on opiates for pain management.\par \par She awakens feeling not rested.   confirms that she snores.  She has gained approximately 40 pounds in the last 3 years.\par \par List of medications reviewed.  She takes folic acid.  Vitamin B12 level has not been assessed.

## 2022-04-29 ENCOUNTER — APPOINTMENT (OUTPATIENT)
Dept: INTERNAL MEDICINE | Facility: CLINIC | Age: 67
End: 2022-04-29
Payer: COMMERCIAL

## 2022-04-29 VITALS
WEIGHT: 220 LBS | OXYGEN SATURATION: 98 % | BODY MASS INDEX: 38.98 KG/M2 | SYSTOLIC BLOOD PRESSURE: 167 MMHG | HEART RATE: 91 BPM | HEIGHT: 63 IN | DIASTOLIC BLOOD PRESSURE: 107 MMHG | TEMPERATURE: 98.1 F

## 2022-04-29 PROCEDURE — 99214 OFFICE O/P EST MOD 30 MIN: CPT

## 2022-04-29 NOTE — ASSESSMENT
[FreeTextEntry1] : panic episode and severe  anxiety - now withdrawal as she ran out of her Klonipin 1 week early \par -Denies homicidal or suicidal ideas or thoughts\par -Discussed with patient in detail side effects of all medications\par -advised to see psych f/u for assessment of  meds dependence -wanted me to increase dose to 4 x a day \par _rheum consult reviewed from 4/5/22 - Drug seeking behavior - BEnzo dependence with running out of supply earlier than next refill \par - informed pt I cannot give her refill for 1 month -or change her dose -  I can given her supply until her PCP returns next week she should discuss her treatment plan and dose adjustments then \par ISTOP checked #[id number] \par -last refill valium 4/27/22 5mg # 2 tabs \par -last refill Clonazepam 0.5 #90 4/9/22 - ran out 2 weeks early \par

## 2022-04-29 NOTE — HISTORY OF PRESENT ILLNESS
[FreeTextEntry8] : 65 yo F PMHx of SLE, scleroderma, Hashimoto's thyroiditis, Sjogren's, pyoderma gangrenosum, HTN, severe anxiety seen toady ad acute \par \par HX anxiety -on flupxitine 40 BID , and klonipin 0.5 po TID - she is under immense stress for last week after seeing her ophtho who wants to do surgery on her rt eye - she is having panic episodes and was taking klonipin 4 x daily and ran out before she is diue 5/8th refill \par -she cannot get another refill until her PMD renews with a change in dose \par -she is stressed due to her  also and personal health condition \par - now she is restless - and going through withdrawal -need rx to last till next week so she can discuss with her PMD \par -has not been seeing a psych

## 2022-05-08 ENCOUNTER — RX RENEWAL (OUTPATIENT)
Age: 67
End: 2022-05-08

## 2022-05-12 ENCOUNTER — APPOINTMENT (OUTPATIENT)
Dept: GASTROENTEROLOGY | Facility: CLINIC | Age: 67
End: 2022-05-12
Payer: COMMERCIAL

## 2022-05-12 VITALS
SYSTOLIC BLOOD PRESSURE: 123 MMHG | TEMPERATURE: 98.7 F | DIASTOLIC BLOOD PRESSURE: 70 MMHG | HEART RATE: 88 BPM | HEIGHT: 63 IN | OXYGEN SATURATION: 97 %

## 2022-05-12 DIAGNOSIS — Z87.19 PERSONAL HISTORY OF OTHER DISEASES OF THE DIGESTIVE SYSTEM: ICD-10-CM

## 2022-05-12 DIAGNOSIS — R19.8 OTHER SPECIFIED SYMPTOMS AND SIGNS INVOLVING THE DIGESTIVE SYSTEM AND ABDOMEN: ICD-10-CM

## 2022-05-12 PROCEDURE — 99214 OFFICE O/P EST MOD 30 MIN: CPT

## 2022-05-12 NOTE — PHYSICAL EXAM
[General Appearance - Alert] : alert [General Appearance - In No Acute Distress] : in no acute distress [Sclera] : the sclera and conjunctiva were normal [Extraocular Movements] : extraocular movements were intact [] : no respiratory distress [Abdomen Soft] : soft [Abdomen Tenderness] : non-tender [Oriented To Time, Place, And Person] : oriented to person, place, and time [Impaired Insight] : insight and judgment were intact [FreeTextEntry1] : + mutiple healing wounds on abdomen

## 2022-05-12 NOTE — HISTORY OF PRESENT ILLNESS
[de-identified] : 66F w pmhx of SLE, scleroderma, hashimotos, sjogrens, pyoderma gangrenosum (on breast and abdomen, treated w/ plaquenil and methotrexate previously and now on leflunomide), pancreatic cysts, HTN, referred to GI for abnormal bowel movements. \par \par States she has had diarrhea, urgency and frequency for about 1 year. Has 5-6 watery bowel movements per day, usually after eating. Stool is watery, brown, nonbloody. Denies abd pain, n/v, weight loss. \par \par In 2019 was admitted to [facility] w/ vomiting and ?hematochezia after eating at a BBQ. CT at that time showed distal transverse colitis. She was treated symptomatically and discharged home. THought to be viral gastroenteritis at the time. \par \par \par Followed previously w GI Dr Isaías Powell. \par Saw Dr Caldera in 2019 for pancreatic cysts.\par \par Colonoscopy 2018 - mild diverticulosis, normal colon, internal hemorrhoids. \par \par MR 2020 - few scattered tiny 2-3mm cysts, previously noted 6mm pancratic body cyst no longer identified. No pancreatic ductal dilatation\par \par \par

## 2022-05-12 NOTE — ASSESSMENT
[FreeTextEntry1] : 66F w pmhx of SLE, scleroderma, hashimotos, sjogrens, pyoderma gangrenosum (on breast and abdomen, treated w/ plaquenil and methotrexate previously and now on leflunomide), pancreatic cysts, chronic pain, referred to GI for abnormal bowel movements. Reporting diarrhea, urgency, frequency (up to 5-6x per day) x 1 yr. No hematochezia, melena, abdominal pain, n/v, weight loss. No fhx of GI malignancy or IBD. \par \par - Wide differential for current symptoms including med related (leflulonmide is new x 6 mos) vs IBS vs celiac vs IBD vs microscopic colitis vs other \par - Prior records reviewed --> "Colitis" on ct in 2019 during hospitalization for vomiting + ?hematochezia after a BBQ likely infectious colitis \par - Last colonoscopy reviewed --> 2018 - mild diverticulosis, normal colon, internal hemorrhoids. \par - Check CRP and fecal calprotectin \par - check stool studies \par - check celiac serologies\par - Schedule colonoscopy for intraluminal evaluation. Patient would like to defer for some time until after her upcoming optho procedure \par - Imaging for panc cyst reviewed, last done in 2020 w MR showing few scattered tiny 2-3mm cysts, previously noted 6mm pancratic body cyst no longer identified. No pancreatic ductal dilatation. Will order repeat MR for surveillance. \par \par RTC after blood/stool studies \par \par

## 2022-05-17 ENCOUNTER — RX RENEWAL (OUTPATIENT)
Age: 67
End: 2022-05-17

## 2022-05-26 ENCOUNTER — NON-APPOINTMENT (OUTPATIENT)
Age: 67
End: 2022-05-26

## 2022-05-27 ENCOUNTER — APPOINTMENT (OUTPATIENT)
Dept: WOUND CARE | Facility: CLINIC | Age: 67
End: 2022-05-27
Payer: COMMERCIAL

## 2022-05-27 PROCEDURE — 99214 OFFICE O/P EST MOD 30 MIN: CPT | Mod: 95

## 2022-05-27 NOTE — PLAN
[FreeTextEntry1] : 5/27/22\par Plan - follow up derm.\par advised for puritic issue to take zyrtec/allegra, not as sedating as benedryl, take on daily basis\par open areas, - has wound gel/adaptic, has worked in past\par will try muprocin to 1 area and see if this helps/cover\par strongly advised to follow up with dentist\par follow up TEB

## 2022-05-27 NOTE — PHYSICAL EXAM
[Normal Breath Sounds] : Normal breath sounds [Skin Ulcer] : ulcer [Alert] : alert [Oriented to Person] : oriented to person [Oriented to Place] : oriented to place [Oriented to Time] : oriented to time [Calm] : calm [JVD] : no jugular venous distention  [Abdomen Tenderness] : ~T ~M No abdominal tenderness [de-identified] : NAD, ambulatory [de-identified] : AT [de-identified] : supple [de-identified] : soft [de-identified] : multiple ulcers with desiccation; tender, no erythema

## 2022-05-27 NOTE — ASSESSMENT
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 She reports she was diagnosed with PG 20 years ago.\par Pt states wounds has been not healing. Currently painful. She applies Lidocaine.\par Previously on prednisone, topical steroids and injections. Minimal improvement. \par Follows Dr. Scott rheumatologist. Previously in MTX not tolerated well.\par Multiple wounds diferrent healing stage. Pyoderma gangrenosum. No clinical signs of active infection. \par Plan: advised to apply Medhoney to non PG wounds and hydrogel. Lesions are very dry. Applied hydrogel on affected areas. Instructed need to moisturize skin.\par Next appointment TeleHealth \par \par 2/7/22\par right and left breast wounds, abdomen superficial clean and pink, currently using hydrogel/gauze but gauze is sticking and re-opening wounds when removed\par \par 3/24/22\par right & left breast wounds are healed\par abdomen is clean and pink, hourglass, uses adaptic and hydrogel,4x4/hypafix, likes the adaptic\par right posterior thigh open, clean and pink, using adaptic,\par buttock superficial, clean - usually does not cover\par 5/27/22\par pictures seen and in real time\par c/o pruritic areas on abdomen/trunk/breasts, has been itching, some wounds being made worse by scratching, some wounds are dry, some are open\par has not seen derm. as follow up\par reports a crown broke in mouth and has not seen dentist\par needs eye surgery

## 2022-05-27 NOTE — HISTORY OF PRESENT ILLNESS
[Home] : at home, [unfilled] , at the time of the visit. [Medical Office: ([facility])___] : at the medical office located in  [Verbal consent obtained from patient] : the patient, [unfilled] [FreeTextEntry4] : layla np [FreeTextEntry1] : 06/2018\par 62F is here with her  today for nonhealing multiple wounds. s/p biopsy and culture neg for significant findings. Etiology unclear.. In past months, treated as PG, though no recent improvement on ILK or clobetasol. She treats the wounds herself because of insurance issues. 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 She reports she was diagnosed with PG 15 years ago, had been on pred 6075-1651, as well as thalidomide, tx'ed by Dr. Scott. Recent flaring in past year. She had seen her dermatologist in November who started her on dapsone and prednisone, however the lesions continued to worsen, so she was admitted to the hospital in January 2018,\par History of abdominal keloid where one of the wounds are currently. She has no other complaints or associated problems. States that she gained significant weight from the prednisone\par \par 1/31/22\par Pt states wounds has been not healing. Currently painful. She applies Lidocaine.\par Previously on prednisone, topical steroids and injections. Minimal improvement. \par Follows Dr. Scott rheumatologist. Previously in MTX not tolerated well.\par Plan: advised to apply Medhoney to non PG wounds and hydrogel. Lesions are very dry. Applied hydrogel on affected areas. Instructed need to moisturize skin.\par

## 2022-06-01 ENCOUNTER — NON-APPOINTMENT (OUTPATIENT)
Age: 67
End: 2022-06-01

## 2022-06-02 ENCOUNTER — APPOINTMENT (OUTPATIENT)
Dept: INTERNAL MEDICINE | Facility: CLINIC | Age: 67
End: 2022-06-02
Payer: COMMERCIAL

## 2022-06-02 ENCOUNTER — RESULT REVIEW (OUTPATIENT)
Age: 67
End: 2022-06-02

## 2022-06-02 VITALS
DIASTOLIC BLOOD PRESSURE: 102 MMHG | OXYGEN SATURATION: 96 % | SYSTOLIC BLOOD PRESSURE: 163 MMHG | HEART RATE: 76 BPM | HEIGHT: 63 IN | TEMPERATURE: 97.8 F

## 2022-06-02 PROCEDURE — 99214 OFFICE O/P EST MOD 30 MIN: CPT

## 2022-06-02 NOTE — ASSESSMENT
[FreeTextEntry1] : 1) hypothyroid \par - check TSH \par \par 2) L knee pain \par - start diclofenac 50 TID \par - X-ray ordered \par - P.T \par - she had PUD in the past ----will start PPI  w/ it . Pt  has h/o opioid addiction , does poorly on prednisone -- cannot tolerate - makes her agitated \par - discussed R/ B / AE \par

## 2022-06-02 NOTE — HISTORY OF PRESENT ILLNESS
[FreeTextEntry8] : pt is here bc worsening pain   in the L knee and the lower back \par had fallen last month and had hurt knee \par she has minimal swelling \par \par no instability of the knee , but putting weight on it is very painful \par \par her BP is elevated today \par she reports 7/10 pain

## 2022-06-02 NOTE — PHYSICAL EXAM
[Normal Sclera/Conjunctiva] : normal sclera/conjunctiva [Normal Outer Ear/Nose] : the outer ears and nose were normal in appearance [Normal] : normal rate, regular rhythm, normal S1 and S2 and no murmur heard [de-identified] : minimal swelling L knee

## 2022-06-03 ENCOUNTER — APPOINTMENT (OUTPATIENT)
Dept: RADIOLOGY | Facility: CLINIC | Age: 67
End: 2022-06-03

## 2022-06-03 LAB
BASOPHILS # BLD AUTO: 0.09 K/UL
BASOPHILS NFR BLD AUTO: 1.3 %
EOSINOPHIL # BLD AUTO: 0.56 K/UL
EOSINOPHIL NFR BLD AUTO: 7.8 %
HCT VFR BLD CALC: 45.6 %
HGB BLD-MCNC: 13.6 G/DL
IMM GRANULOCYTES NFR BLD AUTO: 0.3 %
LYMPHOCYTES # BLD AUTO: 1.87 K/UL
LYMPHOCYTES NFR BLD AUTO: 26.1 %
MAN DIFF?: NORMAL
MCHC RBC-ENTMCNC: 27.8 PG
MCHC RBC-ENTMCNC: 29.8 GM/DL
MCV RBC AUTO: 93.1 FL
MONOCYTES # BLD AUTO: 0.57 K/UL
MONOCYTES NFR BLD AUTO: 7.9 %
NEUTROPHILS # BLD AUTO: 4.06 K/UL
NEUTROPHILS NFR BLD AUTO: 56.6 %
PLATELET # BLD AUTO: 303 K/UL
RBC # BLD: 4.9 M/UL
RBC # FLD: 14.2 %
TSH SERPL-ACNC: 0.42 UIU/ML
WBC # FLD AUTO: 7.17 K/UL

## 2022-06-06 ENCOUNTER — OUTPATIENT (OUTPATIENT)
Dept: OUTPATIENT SERVICES | Facility: HOSPITAL | Age: 67
LOS: 1 days | Discharge: HOME | End: 2022-06-06
Payer: MEDICAID

## 2022-06-06 DIAGNOSIS — M06.9 RHEUMATOID ARTHRITIS, UNSPECIFIED: ICD-10-CM

## 2022-06-06 DIAGNOSIS — Z98.89 OTHER SPECIFIED POSTPROCEDURAL STATES: Chronic | ICD-10-CM

## 2022-06-06 DIAGNOSIS — Z98.890 OTHER SPECIFIED POSTPROCEDURAL STATES: Chronic | ICD-10-CM

## 2022-06-06 PROCEDURE — 73560 X-RAY EXAM OF KNEE 1 OR 2: CPT | Mod: 26,LT

## 2022-06-10 ENCOUNTER — NON-APPOINTMENT (OUTPATIENT)
Age: 67
End: 2022-06-10

## 2022-06-17 ENCOUNTER — EMERGENCY (EMERGENCY)
Facility: HOSPITAL | Age: 67
LOS: 0 days | Discharge: HOME | End: 2022-06-17
Payer: COMMERCIAL

## 2022-06-17 VITALS
SYSTOLIC BLOOD PRESSURE: 125 MMHG | OXYGEN SATURATION: 95 % | DIASTOLIC BLOOD PRESSURE: 85 MMHG | HEART RATE: 78 BPM | TEMPERATURE: 98 F | RESPIRATION RATE: 18 BRPM

## 2022-06-17 VITALS
SYSTOLIC BLOOD PRESSURE: 171 MMHG | DIASTOLIC BLOOD PRESSURE: 87 MMHG | RESPIRATION RATE: 16 BRPM | HEIGHT: 63 IN | OXYGEN SATURATION: 95 % | HEART RATE: 96 BPM | WEIGHT: 220.02 LBS | TEMPERATURE: 98 F

## 2022-06-17 DIAGNOSIS — E06.3 AUTOIMMUNE THYROIDITIS: ICD-10-CM

## 2022-06-17 DIAGNOSIS — M25.561 PAIN IN RIGHT KNEE: ICD-10-CM

## 2022-06-17 DIAGNOSIS — Z98.89 OTHER SPECIFIED POSTPROCEDURAL STATES: Chronic | ICD-10-CM
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
## 2022-06-17 DIAGNOSIS — M25.551 PAIN IN RIGHT HIP: ICD-10-CM

## 2022-06-17 DIAGNOSIS — I10 ESSENTIAL (PRIMARY) HYPERTENSION: ICD-10-CM

## 2022-06-17 DIAGNOSIS — Z88.8 ALLERGY STATUS TO OTHER DRUGS, MEDICAMENTS AND BIOLOGICAL SUBSTANCES: ICD-10-CM
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
## 2022-06-17 DIAGNOSIS — M25.562 PAIN IN LEFT KNEE: ICD-10-CM

## 2022-06-17 DIAGNOSIS — M54.50 LOW BACK PAIN, UNSPECIFIED: ICD-10-CM

## 2022-06-17 DIAGNOSIS — Z98.890 OTHER SPECIFIED POSTPROCEDURAL STATES: Chronic | ICD-10-CM

## 2022-06-17 DIAGNOSIS — F32.A DEPRESSION, UNSPECIFIED: ICD-10-CM

## 2022-06-17 DIAGNOSIS — M79.661 PAIN IN RIGHT LOWER LEG: ICD-10-CM

## 2022-06-17 DIAGNOSIS — M19.90 UNSPECIFIED OSTEOARTHRITIS, UNSPECIFIED SITE: ICD-10-CM

## 2022-06-17 DIAGNOSIS — E03.9 HYPOTHYROIDISM, UNSPECIFIED: ICD-10-CM

## 2022-06-17 DIAGNOSIS — M79.662 PAIN IN LEFT LOWER LEG: ICD-10-CM

## 2022-06-17 LAB
ALBUMIN SERPL ELPH-MCNC: 3.1 G/DL — LOW (ref 3.3–5)
ALP SERPL-CCNC: 83 U/L — SIGNIFICANT CHANGE UP (ref 40–120)
ALT FLD-CCNC: 39 U/L — SIGNIFICANT CHANGE UP (ref 12–78)
ANION GAP SERPL CALC-SCNC: 8 MMOL/L — SIGNIFICANT CHANGE UP (ref 5–17)
AST SERPL-CCNC: 51 U/L — HIGH (ref 15–37)
BASOPHILS # BLD AUTO: 0.05 K/UL — SIGNIFICANT CHANGE UP (ref 0–0.2)
BASOPHILS NFR BLD AUTO: 0.7 % — SIGNIFICANT CHANGE UP (ref 0–2)
BILIRUB SERPL-MCNC: 0.7 MG/DL — SIGNIFICANT CHANGE UP (ref 0.2–1.2)
BUN SERPL-MCNC: 7 MG/DL — SIGNIFICANT CHANGE UP (ref 7–23)
CALCIUM SERPL-MCNC: 9.1 MG/DL — SIGNIFICANT CHANGE UP (ref 8.5–10.1)
CHLORIDE SERPL-SCNC: 105 MMOL/L — SIGNIFICANT CHANGE UP (ref 96–108)
CO2 SERPL-SCNC: 29 MMOL/L — SIGNIFICANT CHANGE UP (ref 22–31)
CREAT SERPL-MCNC: 0.78 MG/DL — SIGNIFICANT CHANGE UP (ref 0.5–1.3)
EGFR: 85 ML/MIN/1.73M2 — SIGNIFICANT CHANGE UP
EOSINOPHIL # BLD AUTO: 0.17 K/UL — SIGNIFICANT CHANGE UP (ref 0–0.5)
EOSINOPHIL NFR BLD AUTO: 2.5 % — SIGNIFICANT CHANGE UP (ref 0–6)
GLUCOSE SERPL-MCNC: 93 MG/DL — SIGNIFICANT CHANGE UP (ref 70–99)
HCT VFR BLD CALC: 41 % — SIGNIFICANT CHANGE UP (ref 34.5–45)
HGB BLD-MCNC: 13.2 G/DL — SIGNIFICANT CHANGE UP (ref 11.5–15.5)
IMM GRANULOCYTES NFR BLD AUTO: 0.3 % — SIGNIFICANT CHANGE UP (ref 0–1.5)
LYMPHOCYTES # BLD AUTO: 1.34 K/UL — SIGNIFICANT CHANGE UP (ref 1–3.3)
LYMPHOCYTES # BLD AUTO: 20 % — SIGNIFICANT CHANGE UP (ref 13–44)
MCHC RBC-ENTMCNC: 29 PG — SIGNIFICANT CHANGE UP (ref 27–34)
MCHC RBC-ENTMCNC: 32.2 G/DL — SIGNIFICANT CHANGE UP (ref 32–36)
MCV RBC AUTO: 90.1 FL — SIGNIFICANT CHANGE UP (ref 80–100)
MONOCYTES # BLD AUTO: 0.62 K/UL — SIGNIFICANT CHANGE UP (ref 0–0.9)
MONOCYTES NFR BLD AUTO: 9.3 % — SIGNIFICANT CHANGE UP (ref 2–14)
NEUTROPHILS # BLD AUTO: 4.49 K/UL — SIGNIFICANT CHANGE UP (ref 1.8–7.4)
NEUTROPHILS NFR BLD AUTO: 67.2 % — SIGNIFICANT CHANGE UP (ref 43–77)
NRBC # BLD: 0 /100 WBCS — SIGNIFICANT CHANGE UP (ref 0–0)
PLATELET # BLD AUTO: 253 K/UL — SIGNIFICANT CHANGE UP (ref 150–400)
POTASSIUM SERPL-MCNC: 3.9 MMOL/L — SIGNIFICANT CHANGE UP (ref 3.5–5.3)
POTASSIUM SERPL-SCNC: 3.9 MMOL/L — SIGNIFICANT CHANGE UP (ref 3.5–5.3)
PROT SERPL-MCNC: 7.3 GM/DL — SIGNIFICANT CHANGE UP (ref 6–8.3)
RBC # BLD: 4.55 M/UL — SIGNIFICANT CHANGE UP (ref 3.8–5.2)
RBC # FLD: 14.6 % — HIGH (ref 10.3–14.5)
SODIUM SERPL-SCNC: 142 MMOL/L — SIGNIFICANT CHANGE UP (ref 135–145)
WBC # BLD: 6.69 K/UL — SIGNIFICANT CHANGE UP (ref 3.8–10.5)
WBC # FLD AUTO: 6.69 K/UL — SIGNIFICANT CHANGE UP (ref 3.8–10.5)

## 2022-06-17 PROCEDURE — 93970 EXTREMITY STUDY: CPT | Mod: 26

## 2022-06-17 PROCEDURE — 99285 EMERGENCY DEPT VISIT HI MDM: CPT

## 2022-06-17 RX ORDER — KETOROLAC TROMETHAMINE 30 MG/ML
15 SYRINGE (ML) INJECTION ONCE
Refills: 0 | Status: DISCONTINUED | OUTPATIENT
Start: 2022-06-17 | End: 2022-06-17

## 2022-06-17 RX ORDER — MORPHINE SULFATE 50 MG/1
4 CAPSULE, EXTENDED RELEASE ORAL ONCE
Refills: 0 | Status: DISCONTINUED | OUTPATIENT
Start: 2022-06-17 | End: 2022-06-17

## 2022-06-17 RX ORDER — OXYCODONE HYDROCHLORIDE 5 MG/1
1 TABLET ORAL
Qty: 6 | Refills: 0
Start: 2022-06-17 | End: 2022-06-18

## 2022-06-17 RX ADMIN — Medication 15 MILLIGRAM(S): at 16:00

## 2022-06-17 RX ADMIN — MORPHINE SULFATE 4 MILLIGRAM(S): 50 CAPSULE, EXTENDED RELEASE ORAL at 14:55

## 2022-06-17 RX ADMIN — MORPHINE SULFATE 4 MILLIGRAM(S): 50 CAPSULE, EXTENDED RELEASE ORAL at 14:33

## 2022-06-17 RX ADMIN — MORPHINE SULFATE 4 MILLIGRAM(S): 50 CAPSULE, EXTENDED RELEASE ORAL at 14:28

## 2022-06-17 RX ADMIN — Medication 15 MILLIGRAM(S): at 15:44

## 2022-06-17 NOTE — ED PROVIDER NOTE - PROGRESS NOTE DETAILS
LALITA Owusu: pain improved from 10/10 to 5/10, requesting additional pain medications. LALITA Owusu: pain improved, US neg, will dc with PMD, pain management referral. Oxycodone sent to bridge severe pain. Strict return precautions provided.

## 2022-06-17 NOTE — ED PROVIDER NOTE - PATIENT PORTAL LINK FT
You can access the FollowMyHealth Patient Portal offered by [facility] by registering at the following website: [email or web address]. By joining [facility]’s FollowMyHealth portal, you will also be able to view your health information using other applications (apps) compatible with our system.

## 2022-06-17 NOTE — ED PROVIDER NOTE - NSFOLLOWUPINSTRUCTIONS_ED_ALL_ED_FT
Today you were seen in the ER back/hip/leg pain.     Please see below for a copy of your results.     Take Motrin 600 mg every 8 hours as needed for moderate pain-- take with food..    Take Tylenol 650mg (Two 325 mg pills) every 4-6 hours as needed for pain.    A prescription for Oxycodone was sent to the pharmacy- read all instructions and take as directed for severe pain.    Rest, Ice, Elevate injured area. Avoid heavy lifting    Wear ace wrap as discussed.     Follow-up with pain management, rheumatologist, Orthopedics, See referred doctor. Call today / next business day for close, prompt follow-up.    CALL [phone] to schedule an appointment with pain management at [address] [place] [address], [address] [address].    Return to Emergency room for any worsening or persistent pain, weakness, numbness, fever, color change to extremity, or any other concerning symptoms.    Advance activity as tolerated.     Continue all previously prescribed medications as directed unless otherwise instructed.     Follow up with your primary care physician in 48-72 hours- bring copies of your results.

## 2022-06-17 NOTE — ED ADULT NURSE NOTE - ED STAT RN HANDOFF DETAILS
Assuming patient's care for coverage. Report received from RN and patient informed during rounding. Assessment available on KBC. Will continue to monitor. c/o R hip pain. morphine given for pain.

## 2022-06-17 NOTE — ED ADULT NURSE NOTE - NSIMPLEMENTINTERV_GEN_ALL_ED
Implemented All Universal Safety Interventions:  [employer] to call system. Call bell, personal items and telephone within reach. Instruct patient to call for assistance. Room bathroom lighting operational. Non-slip footwear when patient is off stretcher. Physically safe environment: no spills, clutter or unnecessary equipment. Stretcher in lowest position, wheels locked, appropriate side rails in place.

## 2022-06-17 NOTE — ED ADULT NURSE NOTE - OBJECTIVE STATEMENT
A&OX3. patient states right hip pain with left knee pain for 2 weeks. patient ambulatory but 10/10 pain denies numbness/ tingling. PMH lupus, HTN

## 2022-06-17 NOTE — ED PROVIDER NOTE - NSFOLLOWUPCLINICS_GEN_ALL_ED_FT
[facility] Orthopedic [facility]  Orthopedic Surgery  [address], [address]rd & [address] [place] [address]  Phone: [phone]  Fax:     [facility]  Orthopedic Surgery  [address] [address]  Phone: [phone]  Fax:

## 2022-06-17 NOTE — ED PROVIDER NOTE - CARE PROVIDER_API CALL
Juanpablo Escalera)  Orthopaedic Surgery  [address]  [place] [address]  Phone: [phone]  Fax: [phone]  Follow Up Time:

## 2022-06-17 NOTE — ED PROVIDER NOTE - CLINICAL SUMMARY MEDICAL DECISION MAKING FREE TEXT BOX
66y Female with PMHx of HTN, SLE, arthritis, Sjogren's, Hashimoto's, presents to the ER for hip pain. Reports atraumatic R sided lower back, hip and knee pain and left knee pain x 1 week. +difficulty ambulating. +numbness, tinging on R side. Denies recent trauma, injury, heavy lifting. Denies fever, chills, rash, chest pain, SOB, saddle anesthesia, urinary or bowel incontinence or retention. Seen by PMD/rheumatologist, had XR with arthritis. Needs pain management follow up. Vital sign stable, full ROM, bilateral calf tenderness, neurovascularly intact. Will check US, meds, reassess.

## 2022-06-17 NOTE — ED PROVIDER NOTE - NS ED ROS FT
Constitutional: (-) Fever, (-) Chills  Skin: (-) Rashes  Eyes: (-) Visual changes, (-) Discharge, (-) Redness  Ears: (-) Hearing loss, (-)Tinnitus, (-) Ear pain  Nose: (-) Nasal congestion, (-) Runny nose  Mouth/Throat: (-) Sore throat  CV: (-) Chest pain  Resp: (-) Cough, (-) Shortness of breath, (-) Dyspnea on Exertion, (-) Wheezing  GI: (-) Abdominal pain, (-) Nausea, (-) Vomiting, (-) Diarrhea  : (-) Dysuria   MSK: (+) Myalgias, (+)Back pain  Neuro: (-) Headache

## 2022-06-17 NOTE — ED PROVIDER NOTE - CARE PROVIDERS DIRECT ADDRESSES
Please get me your vaccination history - pneumonia vaccines likely done at that time.   No new symptoms  As per heme   Pt taking Revlimid and Decadron at home and is taking per pt.   ,DirectAddress_Unknown

## 2022-06-17 NOTE — ED PROVIDER NOTE - OBJECTIVE STATEMENT
66y Female with PMHx of HTN, SLE, arthritis, Sjogren's, Hashimoto's, presents to the ER for hip pain. Patient reports atraumatic R sided hip and knee pain x 1 week and left knee pain x 1 week. States pain got worse last night, pain 10/10, pain worse at night, difficulty ambulating. Pain relieved with laying down. Minimal improvement after NSAIDs, last dose     +numbness, tinging on R side. Denies recent trauma, injury, heavy lifting. Denies fever, chills, rash, chest pain, SOB, saddle anesthesia, urinary or bowel incontinence or retention. Seen by PMD/rheumatologist, had XR with arthritis. Advised to follow up with pain management, requesting follow up. 66y Female with PMHx of HTN, SLE, arthritis, Sjogren's, Hashimoto's, presents to the ER for hip pain. Patient reports atraumatic R sided hip and knee pain x 1 week and left knee pain x 1 week. States pain got worse last night, pain 10/10, pain worse at night, difficulty ambulating. Pain relieved with laying down. Minimal improvement after NSAIDs, last dose 6 hours. +numbness, tinging on R side. Denies recent trauma, injury, heavy lifting. Denies fever, chills, rash, chest pain, SOB, saddle anesthesia, urinary or bowel incontinence or retention. Seen by PMD/rheumatologist, had XR with arthritis. Advised to follow up with pain management, requesting follow up.

## 2022-07-05 ENCOUNTER — RX RENEWAL (OUTPATIENT)
Age: 67
End: 2022-07-05

## 2022-07-12 ENCOUNTER — APPOINTMENT (OUTPATIENT)
Dept: RHEUMATOLOGY | Facility: CLINIC | Age: 67
End: 2022-07-12

## 2022-07-12 VITALS
HEIGHT: 63 IN | HEART RATE: 96 BPM | OXYGEN SATURATION: 96 % | DIASTOLIC BLOOD PRESSURE: 101 MMHG | TEMPERATURE: 97.3 F | SYSTOLIC BLOOD PRESSURE: 158 MMHG

## 2022-07-12 DIAGNOSIS — L98.499 NON-PRESSURE CHRONIC ULCER OF SKIN OF OTHER SITES WITH UNSPECIFIED SEVERITY: ICD-10-CM

## 2022-07-12 PROCEDURE — 99214 OFFICE O/P EST MOD 30 MIN: CPT

## 2022-07-12 RX ORDER — OXYCODONE HYDROCHLORIDE 5 MG/1
5 CAPSULE ORAL
Qty: 6 | Refills: 0 | Status: DISCONTINUED | COMMUNITY
Start: 2022-06-17 | End: 2022-07-12

## 2022-07-13 ENCOUNTER — NON-APPOINTMENT (OUTPATIENT)
Age: 67
End: 2022-07-13

## 2022-07-13 DIAGNOSIS — U07.1 COVID-19: ICD-10-CM

## 2022-07-13 PROBLEM — L98.499 SKIN ULCER, UNSPECIFIED ULCER STAGE: Status: ACTIVE | Noted: 2018-05-18

## 2022-07-14 NOTE — PLAN
Has been doing okay for the most part.  Taking Meloxicam prn every other day.  Still does all of the normal activities.  [facility] ambulatory encounter  FAMILY PRACTICE OFFICE VISIT    CHIEF COMPLAINT:    Chief Complaint   Patient presents with   • Physical       SUBJECTIVE:  Osorio Loredo is a 68 year old male who presented requesting evaluation for routine annual physical exam/wellness exam.  Overall, he is feeling well.  He currently denies any headache, lightheadedness, dizziness, chest pain, shortness of breath, dyspnea exertion, palpitations.  He denies abdominal pain, nausea, vomiting, stool changes, urinary symptoms.  He does complain of right hip pain.  He is considering what to do about this.  He has seen Orthopedics in the past.  He has considered cortisone injection but decided to hold off.  He is now taking an anti-inflammatory and Tylenol but the hip pain is starting to get worse.  He is still doing all of his normal activities including playing tennis, walking, bike riding, cross-country skiing, etcetera.  His right shoulder used to be an issue, however, it is actually improving.  He has no other specific complaints or concerns today.    Review of systems:   All other systems are reviewed and are negative except as documented in the history of present illness.     OBJECTIVE:  PROBLEM LIST:   Patient Active Problem List   Diagnosis   • Family history of colon cancer   • Primary osteoarthritis of right hip   • History of DVT of lower extremity       PAST HISTORIES:   I have reviewed the past medical history, family history, social history, medications and allergies listed in the medical record as obtained by my nursing staff and support staff and agree with their documentation.    PHYSICAL EXAM:   Visit Vitals  /70 (BP Location: LUE - Left upper extremity, Patient Position: Sitting, Cuff Size: Regular)   Pulse 76   Ht 6' 2\" (1.88 m)   Wt 77.1 kg (170 lb)   SpO2 99%   BMI 21.83 kg/m²       General  Appearance:  Alert, cooperative, no distress, appears stated age.  Head:  Normocephalic, without obvious abnormality, atraumatic.  Eyes:  Pupils equal, round and reactive to light, conjunctivae/corneas clear, extraocular movements intact, fundi benign, both eyes.   Ears:  Normal tympanic membranes and external ear canals, both ears.  Nose:  Nares normal, septum midline, mucosa normal, no drainage or sinus tenderness.  Throat:  Lips, mucosa, and tongue normal; teeth and gums normal.  Neck:  Supple, symmetrical, trachea midline, no adenopathy.  Thyroid has no enlargement/tenderness/nodules; no carotid bruit or jugular venous distention.  Back:  Symmetric, no curvature, range of motion normal, no costovertebral angle tenderness.  Lungs:  Clear to auscultation bilaterally, respirations unlabored.  Chest wall:  No tenderness or deformity.  Heart:  Regular rate and rhythm, S1 and S2 normal, no murmur, rub or gallop.  Abdomen:  Soft, non-tender, bowel sounds active in all four quadrants, no masses, no organomegaly.  Extremities:  Normal, atraumatic, no cyanosis or edema.  Pulses:  2+ and symmetric all extremities.  Skin:  Skin color, texture, turgor normal, no rashes or lesions.  Lymph nodes:  Cervical, supraclavicular, and axillary nodes normal.  Neurologic:  Cranial nerves II-XII intact. Normal strength, sensation and reflexes throughout.  Genitalia:  Normal male without lesion, discharge or tenderness.  Rectal:  Normal tone, normal prostate, no masses or tenderness. Prostate smooth and nontender.    LAB RESULTS:   No lab tests performed today    ASSESSMENT:   1. Annual physical exam        PLAN:   Orders Placed This Encounter   • CBC with Automated Differential   • Basic Metabolic Panel   • Hepatic Function Panel   • Lipid Panel With Reflex   • PSA   • hydroCORTisone-pramoxine (ANALPRAM-HC) 2.5-1 % rectal cream       Check labs today.  Continue with his current medications and treatments.  We discussed routine  preventative measures and routine recommendations for immunizations.  I had a lengthy discussion with the patient regarding his right hip arthritis.  It would appear that it is not a matter of if he will need a hip replacement it is a matter of when he will need a hip replacement.  He can continue doing the anti inflammatory like he is doing.  He could consider cortisone injection.  He could consider hip replacement at any point.  I would recommend that he touch base with his orthopedic surgeon at some point to discuss treatment options.  Follow-up with me in 1 year and sooner as needed.    No follow-ups on file.    Instructions provided as documented in the after visit summary.    The patient indicated understanding of the diagnosis and agreed with the plan of care.            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 She reports she was diagnosed with PG 20 years ago.\par Pt states wounds has been not healing. Currently painful. She applies Lidocaine.\par Previously on prednisone, topical steroids and injections. Minimal improvement. \par Follows Dr. Scott rheumatologist. Previously in MTX not tolerated well.\par Multiple wounds diferrent healing stage. Pyoderma gangrenosum. No clinical signs of active infection. \par Plan: advised to apply Medhoney to non PG wounds and hydrogel. Lesions are very dry. Applied hydrogel on affected areas. Instructed need to moisturize skin.\par Next appointment TeleHealth

## 2022-07-26 ENCOUNTER — TRANSCRIPTION ENCOUNTER (OUTPATIENT)
Age: 67
End: 2022-07-26

## 2022-08-08 ENCOUNTER — RX RENEWAL (OUTPATIENT)
Age: 67
End: 2022-08-08

## 2022-08-17 ENCOUNTER — EMERGENCY (EMERGENCY)
Facility: HOSPITAL | Age: 67
LOS: 0 days | Discharge: HOME | End: 2022-08-17
Attending: STUDENT IN AN ORGANIZED HEALTH CARE EDUCATION/TRAINING PROGRAM

## 2022-08-17 VITALS
OXYGEN SATURATION: 99 % | DIASTOLIC BLOOD PRESSURE: 80 MMHG | TEMPERATURE: 97 F | RESPIRATION RATE: 16 BRPM | SYSTOLIC BLOOD PRESSURE: 140 MMHG | HEART RATE: 90 BPM

## 2022-08-17 VITALS
TEMPERATURE: 98 F | WEIGHT: 210.1 LBS | DIASTOLIC BLOOD PRESSURE: 96 MMHG | SYSTOLIC BLOOD PRESSURE: 159 MMHG | HEIGHT: 63 IN | HEART RATE: 100 BPM | OXYGEN SATURATION: 99 % | RESPIRATION RATE: 18 BRPM

## 2022-08-17 DIAGNOSIS — Z98.89 OTHER SPECIFIED POSTPROCEDURAL STATES: Chronic | ICD-10-CM

## 2022-08-17 DIAGNOSIS — R21 RASH AND OTHER NONSPECIFIC SKIN ERUPTION: ICD-10-CM

## 2022-08-17 DIAGNOSIS — Z98.890 OTHER SPECIFIED POSTPROCEDURAL STATES: Chronic | ICD-10-CM

## 2022-08-17 DIAGNOSIS — M32.9 SYSTEMIC LUPUS ERYTHEMATOSUS, UNSPECIFIED: ICD-10-CM

## 2022-08-17 DIAGNOSIS — E03.9 HYPOTHYROIDISM, UNSPECIFIED: ICD-10-CM

## 2022-08-17 DIAGNOSIS — I10 ESSENTIAL (PRIMARY) HYPERTENSION: ICD-10-CM

## 2022-08-17 DIAGNOSIS — E06.3 AUTOIMMUNE THYROIDITIS: ICD-10-CM

## 2022-08-17 DIAGNOSIS — Z86.16 PERSONAL HISTORY OF COVID-19: ICD-10-CM

## 2022-08-17 DIAGNOSIS — Z88.8 ALLERGY STATUS TO OTHER DRUGS, MEDICAMENTS AND BIOLOGICAL SUBSTANCES STATUS: ICD-10-CM

## 2022-08-17 DIAGNOSIS — F32.A DEPRESSION, UNSPECIFIED: ICD-10-CM

## 2022-08-17 LAB
ALBUMIN SERPL ELPH-MCNC: 2.9 G/DL — LOW (ref 3.3–5)
ALP SERPL-CCNC: 98 U/L — SIGNIFICANT CHANGE UP (ref 40–120)
ALT FLD-CCNC: 19 U/L — SIGNIFICANT CHANGE UP (ref 12–78)
ANION GAP SERPL CALC-SCNC: 3 MMOL/L — LOW (ref 5–17)
APTT BLD: 26.5 SEC — LOW (ref 27.5–35.5)
AST SERPL-CCNC: 19 U/L — SIGNIFICANT CHANGE UP (ref 15–37)
BASOPHILS # BLD AUTO: 0.08 K/UL — SIGNIFICANT CHANGE UP (ref 0–0.2)
BASOPHILS NFR BLD AUTO: 1.1 % — SIGNIFICANT CHANGE UP (ref 0–2)
BILIRUB SERPL-MCNC: 0.4 MG/DL — SIGNIFICANT CHANGE UP (ref 0.2–1.2)
BUN SERPL-MCNC: 9 MG/DL — SIGNIFICANT CHANGE UP (ref 7–23)
CALCIUM SERPL-MCNC: 8.7 MG/DL — SIGNIFICANT CHANGE UP (ref 8.5–10.1)
CHLORIDE SERPL-SCNC: 111 MMOL/L — HIGH (ref 96–108)
CO2 SERPL-SCNC: 26 MMOL/L — SIGNIFICANT CHANGE UP (ref 22–31)
CREAT SERPL-MCNC: 0.85 MG/DL — SIGNIFICANT CHANGE UP (ref 0.5–1.3)
CRP SERPL-MCNC: 7 MG/L — HIGH
EGFR: 76 ML/MIN/1.73M2 — SIGNIFICANT CHANGE UP
EOSINOPHIL # BLD AUTO: 0.29 K/UL — SIGNIFICANT CHANGE UP (ref 0–0.5)
EOSINOPHIL NFR BLD AUTO: 3.8 % — SIGNIFICANT CHANGE UP (ref 0–6)
ERYTHROCYTE [SEDIMENTATION RATE] IN BLOOD: 42 MM/HR — HIGH (ref 0–20)
GLUCOSE SERPL-MCNC: 88 MG/DL — SIGNIFICANT CHANGE UP (ref 70–99)
HCT VFR BLD CALC: 39.8 % — SIGNIFICANT CHANGE UP (ref 34.5–45)
HGB BLD-MCNC: 12.9 G/DL — SIGNIFICANT CHANGE UP (ref 11.5–15.5)
IMM GRANULOCYTES NFR BLD AUTO: 0.7 % — SIGNIFICANT CHANGE UP (ref 0–1.5)
INR BLD: 1.05 RATIO — SIGNIFICANT CHANGE UP (ref 0.88–1.16)
LACTATE SERPL-SCNC: 1.1 MMOL/L — SIGNIFICANT CHANGE UP (ref 0.7–2)
LYMPHOCYTES # BLD AUTO: 1.85 K/UL — SIGNIFICANT CHANGE UP (ref 1–3.3)
LYMPHOCYTES # BLD AUTO: 24.4 % — SIGNIFICANT CHANGE UP (ref 13–44)
MCHC RBC-ENTMCNC: 29.2 PG — SIGNIFICANT CHANGE UP (ref 27–34)
MCHC RBC-ENTMCNC: 32.4 G/DL — SIGNIFICANT CHANGE UP (ref 32–36)
MCV RBC AUTO: 90 FL — SIGNIFICANT CHANGE UP (ref 80–100)
MONOCYTES # BLD AUTO: 0.74 K/UL — SIGNIFICANT CHANGE UP (ref 0–0.9)
MONOCYTES NFR BLD AUTO: 9.8 % — SIGNIFICANT CHANGE UP (ref 2–14)
NEUTROPHILS # BLD AUTO: 4.57 K/UL — SIGNIFICANT CHANGE UP (ref 1.8–7.4)
NEUTROPHILS NFR BLD AUTO: 60.2 % — SIGNIFICANT CHANGE UP (ref 43–77)
NRBC # BLD: 0 /100 WBCS — SIGNIFICANT CHANGE UP (ref 0–0)
PLATELET # BLD AUTO: 289 K/UL — SIGNIFICANT CHANGE UP (ref 150–400)
POTASSIUM SERPL-MCNC: 4.1 MMOL/L — SIGNIFICANT CHANGE UP (ref 3.5–5.3)
POTASSIUM SERPL-SCNC: 4.1 MMOL/L — SIGNIFICANT CHANGE UP (ref 3.5–5.3)
PROT SERPL-MCNC: 7.2 GM/DL — SIGNIFICANT CHANGE UP (ref 6–8.3)
PROTHROM AB SERPL-ACNC: 12.6 SEC — SIGNIFICANT CHANGE UP (ref 10.5–13.4)
RBC # BLD: 4.42 M/UL — SIGNIFICANT CHANGE UP (ref 3.8–5.2)
RBC # FLD: 14 % — SIGNIFICANT CHANGE UP (ref 10.3–14.5)
SODIUM SERPL-SCNC: 140 MMOL/L — SIGNIFICANT CHANGE UP (ref 135–145)
WBC # BLD: 7.58 K/UL — SIGNIFICANT CHANGE UP (ref 3.8–10.5)
WBC # FLD AUTO: 7.58 K/UL — SIGNIFICANT CHANGE UP (ref 3.8–10.5)

## 2022-08-17 PROCEDURE — 99285 EMERGENCY DEPT VISIT HI MDM: CPT

## 2022-08-17 RX ORDER — MORPHINE SULFATE 50 MG/1
2 CAPSULE, EXTENDED RELEASE ORAL ONCE
Refills: 0 | Status: DISCONTINUED | OUTPATIENT
Start: 2022-08-17 | End: 2022-08-17

## 2022-08-17 RX ORDER — OXYCODONE AND ACETAMINOPHEN 5; 325 MG/1; MG/1
1 TABLET ORAL
Qty: 10 | Refills: 0
Start: 2022-08-17 | End: 2022-08-21

## 2022-08-17 RX ORDER — MORPHINE SULFATE 50 MG/1
4 CAPSULE, EXTENDED RELEASE ORAL ONCE
Refills: 0 | Status: DISCONTINUED | OUTPATIENT
Start: 2022-08-17 | End: 2022-08-17

## 2022-08-17 RX ADMIN — MORPHINE SULFATE 4 MILLIGRAM(S): 50 CAPSULE, EXTENDED RELEASE ORAL at 14:26

## 2022-08-17 RX ADMIN — MORPHINE SULFATE 2 MILLIGRAM(S): 50 CAPSULE, EXTENDED RELEASE ORAL at 12:46

## 2022-08-17 NOTE — ED PROVIDER NOTE - OBJECTIVE STATEMENT
65 y/o F w/PMH of lupus Hashimoto's, sciatica, pyogenic granuloma, colitis, Sjogren's syndrome who presents to the ED today for   wound check. Pt states she has had increased flares from her pyogenic granuloma for the past 2 weeks of her right breast, right lower abdomen, right buttock, which is extremely painful. Reports lesions of the right breast and states worsening flares yesterday and today. PMD called and told pt to present to the ED. Pt c/o fevers and chills. Denies body aches, neck pain, abdominal pain, headache, nausea, vomit. No recent travels or sick contacts. COVID positive 2 months ago.    PMH lupus Hashimoto's, sciatica, pyoderma granuloma, colitis, Sjogren's syndrome, PSHx Cseciton, eye surgery, Allergy to Compazine. Denies any pain meds. 66F w/ PMH Lupus, Hashimoto's, pyogenic granuloma, Sjogren's, pyogenic granuloma, sciatica pw wound check, r/o infection. Pt reports hx pyogenic granuloma flares, current flare x 2wks, rash to R breast, RLQ, R thigh, R buttocks, worsening pain x 2 days. Pt w/o pain medications at home. PMD called and advised ED visit to r/o infection. Denies F/C, h/a, dizziness,  CP, SOB, cough, abd pain, N/V/D/C, UTI sx, LE swelling. Denies recent travel, sick contacts. Pt had COVID 8wks ago.     PMH / PSH / meds / allergies as per EMR.

## 2022-08-17 NOTE — ED PROVIDER NOTE - PHYSICAL EXAMINATION
GEN: Awake, alert, interactive, NAD.  HEAD AND NECK: NC/AT. Airway patent. Neck supple.   EYES:  Clear b/l. EOMI. PERRL.   ENT: Moist mucus membranes.   CARDIAC: Regular rate, regular rhythm. No evident pedal edema.    RESP/CHEST: Normal respiratory effort with no use of accessory muscles or retractions. Clear throughout on auscultation.  ABD: soft, non-distended, non-tender. No rebound, no guarding.   BACK: No midline spinal TTP. No CVAT.   EXTREMITIES: Moving all extremities with no apparent deformities.   SKIN: Warm, dry, intact normal color. No rash. numerous ulcerated lesion to the right breast RLQ with minimal surrounding erythema no purulent d/c or overlying warmth.  NEURO: AOx3, CN II-XII grossly intact, no focal deficits.   PSYCH: Appropriate mood and affect. GEN: Awake, alert, interactive, NAD.  HEAD AND NECK: NC/AT. Airway patent. Neck supple.   EYES:  Clear b/l. EOMI. PERRL.   ENT: Moist mucus membranes.   CARDIAC: Regular rate, regular rhythm. No evident pedal edema.    RESP/CHEST: Normal respiratory effort with no use of accessory muscles or retractions. Clear throughout on auscultation.  ABD: Soft, non-distended, non-tender. No rebound, no guarding.   BACK: No midline spinal TTP. No CVAT.   EXTREMITIES: Moving all extremities with no apparent deformities.   SKIN: + numerous dime-sized ulcerated lesions to the R breast / RLQ w/ minimal surrounding erythema, no purulent d/c or overlying warmth.  NEURO: AOx3, CN II-XII grossly intact, no focal deficits.   PSYCH: Appropriate mood and affect.

## 2022-08-17 NOTE — ED PROVIDER NOTE - PROGRESS NOTE DETAILS
sees [facility] wound care, appt w/ rheum on tues, pain improved. W/u w/o significant abnormalities. Pain well controlled. Pt sees [facility] wound care, has appt w/ Rheum scheduled for Tues. Stable for d/c home. Given script Percocet. Return signs / symptoms d/w pt. She understands / agrees w/ this plan.

## 2022-08-17 NOTE — ED PROVIDER NOTE - CLINICAL SUMMARY MEDICAL DECISION MAKING FREE TEXT BOX
67 y/o F with multiple autoimmune conditions who presents to the ED today for flare for the past 2-3 weeks AFVSS NAD.  Plan: CBC CMP lactate ESR CRP, blood cultures, pain control, reevaluate. 66F w/ multiple autoimmune conditions pw pyogenic granuloma flare for the past 2-3 weeks, worsening. AF, VSS. Well appearing, in NAD. Plan: CBC, CMP, lactate, ESR, CRP, blood cultures, pain control, re-evaluate.

## 2022-08-17 NOTE — ED ADULT NURSE NOTE - OBJECTIVE STATEMENT
Patient states she has chronic recurring necrotic lesions due to autoimmune disease process, that started flaring up x 3 weeks ago.  Lesions to breast, abdomen, buttock, and leg.

## 2022-08-17 NOTE — ED ADULT TRIAGE NOTE - CHIEF COMPLAINT QUOTE
pt complaining of multiple lesions on right breast, abdomen, legs and buttocks for 3 weeks. history of  an auto-immune diease called pyoderma granulosum. pt complain of severe pain and possible infection of  the lesions.

## 2022-08-17 NOTE — ED PROVIDER NOTE - PATIENT PORTAL LINK FT
You can access the FollowMyHealth Patient Portal offered by [facility] by registering at the following website: [email or web address]. By joining [facility]’s FollowMyHealth portal, you will also be able to view your health information using other applications (apps) compatible with our system.

## 2022-08-19 NOTE — ED PROVIDER NOTE - IV ALTEPLASE EXCL ABS HIDDEN
Resting in bed.   Complaining of some muscle cramping to buttocks.   VERA drain 30ml overnight.   Will give one time 750mg Robaxin IV. Continue 750mg Robaxin TID PO PRN.   Place VERA drain to gravity.   PT/OT  SCDs.     Jhony Alvarez, PAGERSON  Neurosurgery   Rounding for Dr. Aquino   show

## 2022-08-22 LAB
CULTURE RESULTS: SIGNIFICANT CHANGE UP
CULTURE RESULTS: SIGNIFICANT CHANGE UP
SPECIMEN SOURCE: SIGNIFICANT CHANGE UP
SPECIMEN SOURCE: SIGNIFICANT CHANGE UP

## 2022-08-23 ENCOUNTER — APPOINTMENT (OUTPATIENT)
Dept: RHEUMATOLOGY | Facility: CLINIC | Age: 67
End: 2022-08-23

## 2022-08-23 VITALS
HEART RATE: 81 BPM | HEIGHT: 63 IN | SYSTOLIC BLOOD PRESSURE: 126 MMHG | WEIGHT: 220 LBS | OXYGEN SATURATION: 96 % | DIASTOLIC BLOOD PRESSURE: 80 MMHG | BODY MASS INDEX: 38.98 KG/M2 | TEMPERATURE: 97.2 F

## 2022-08-23 DIAGNOSIS — Z87.2 PERSONAL HISTORY OF DISEASES OF THE SKIN AND SUBCUTANEOUS TISSUE: ICD-10-CM

## 2022-08-23 DIAGNOSIS — M35.00 SICCA SYNDROME, UNSPECIFIED: ICD-10-CM

## 2022-08-23 DIAGNOSIS — L98.499 NON-PRESSURE CHRONIC ULCER OF SKIN OF OTHER SITES WITH UNSPECIFIED SEVERITY: ICD-10-CM

## 2022-08-23 DIAGNOSIS — G89.29 OTHER CHRONIC PAIN: ICD-10-CM

## 2022-08-23 PROCEDURE — 99213 OFFICE O/P EST LOW 20 MIN: CPT

## 2022-08-24 PROBLEM — L98.499: Status: ACTIVE | Noted: 2018-06-01

## 2022-08-24 PROBLEM — M35.00 HISTORY OF SJOGREN'S DISEASE: Status: RESOLVED | Noted: 2018-08-30 | Resolved: 2022-08-24

## 2022-08-24 PROBLEM — G89.29 CHRONIC PAIN: Status: ACTIVE | Noted: 2022-08-24

## 2022-09-01 ENCOUNTER — APPOINTMENT (OUTPATIENT)
Dept: WOUND CARE | Facility: CLINIC | Age: 67
End: 2022-09-01

## 2022-09-01 DIAGNOSIS — S31.109A UNSPECIFIED OPEN WOUND OF ABDOMINAL WALL, UNSPECIFIED QUADRANT W/OUT PENETRATION INTO PERITONEAL CAVITY, INITIAL ENCOUNTER: ICD-10-CM

## 2022-09-01 DIAGNOSIS — S21.009A UNSPECIFIED OPEN WOUND OF UNSPECIFIED BREAST, INITIAL ENCOUNTER: ICD-10-CM

## 2022-09-01 PROCEDURE — 99214 OFFICE O/P EST MOD 30 MIN: CPT | Mod: 95

## 2022-09-02 PROBLEM — S31.109A MULTIPLE OPEN WOUNDS OF ABDOMEN: Status: ACTIVE | Noted: 2022-05-27

## 2022-09-02 PROBLEM — S21.009A BREAST WOUND: Status: ACTIVE | Noted: 2021-12-07

## 2022-09-02 NOTE — ASSESSMENT
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 She reports she was diagnosed with PG 20 years ago.\par Pt states wounds has been not healing. Currently painful. She applies Lidocaine.\par Previously on prednisone, topical steroids and injections. Minimal improvement. \par Follows Dr. Scott rheumatologist. Previously in MTX not tolerated well.\par Multiple wounds diferrent healing stage. Pyoderma gangrenosum. No clinical signs of active infection. \par Plan: advised to apply Medhoney to non PG wounds and hydrogel. Lesions are very dry. Applied hydrogel on affected areas. Instructed need to moisturize skin.\par Next appointment TeleHealth \par \par 2/7/22\par right and left breast wounds, abdomen superficial clean and pink, currently using hydrogel/gauze but gauze is sticking and re-opening wounds when removed\par \par 3/24/22\par right & left breast wounds are healed\par abdomen is clean and pink, hourglass, uses adaptic and hydrogel,4x4/hypafix, likes the adaptic\par right posterior thigh open, clean and pink, using adaptic,\par buttock superficial, clean - usually does not cover\par 5/27/22\par pictures seen and in real time\par c/o pruritic areas on abdomen/trunk/breasts, has been itching, some wounds being made worse by scratching, some wounds are dry, some are open\par has not seen derm. as follow up\par reports a crown broke in mouth and has not seen dentist\par needs eye surgery\par 9/1/22\par telehealth today, images sent\par abdomen - with 2 wounds, circular, dry, concave\par breast wounds , superficial, reports they started as first feeling "tight" then wounds developed, most appear as drying\par feels hardened area on right breast, has not had a recent mammography or gyn exam

## 2022-09-02 NOTE — HISTORY OF PRESENT ILLNESS
[Home] : at home, [unfilled] , at the time of the visit. [Medical Office: ([facility])___] : at the medical office located in  [Verbal consent obtained from patient] : the patient, [unfilled] [FreeTextEntry1] : 06/2018\par 62F is here with her  today for nonhealing multiple wounds. s/p biopsy and culture neg for significant findings. Etiology unclear.. In past months, treated as PG, though no recent improvement on ILK or clobetasol. She treats the wounds herself because of insurance issues. 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 She reports she was diagnosed with PG 15 years ago, had been on pred [id number], as well as thalidomide, tx'ed by Dr. Scott. Recent flaring in past year. She had seen her dermatologist in November who started her on dapsone and prednisone, however the lesions continued to worsen, so she was admitted to the hospital in January 2018,\par History of abdominal keloid where one of the wounds are currently. She has no other complaints or associated problems. States that she gained significant weight from the prednisone\par \par 1/31/22\par Pt states wounds has been not healing. Currently painful. She applies Lidocaine.\par Previously on prednisone, topical steroids and injections. Minimal improvement. \par Follows Dr. Scott rheumatologist. Previously in MTX not tolerated well.\par Plan: advised to apply Medhoney to non PG wounds and hydrogel. Lesions are very dry. Applied hydrogel on affected areas. Instructed need to moisturize skin.\par  [FreeTextEntry4] : layla np

## 2022-09-02 NOTE — PLAN
[FreeTextEntry1] : 9/1/22\par Plan - supplies ordered\par discussed using prontosan as antimicrobial cleanser, to soak all wounds before dressing\par abdomen - santyl ordered from [facility] pharmacy, to apply/aquacel/4x4/hypafix\par breast wounds - adaptic (non adherence) stop using hydrogel, either aqaucel over or gauze\par follow up 2-3 weeks\par will order prontosan online\par follow up gyn. for exam and mammogram

## 2022-09-02 NOTE — PHYSICAL EXAM
[Normal Breath Sounds] : Normal breath sounds [Skin Ulcer] : ulcer [Alert] : alert [Oriented to Person] : oriented to person [Oriented to Place] : oriented to place [Oriented to Time] : oriented to time [Calm] : calm [JVD] : no jugular venous distention  [Abdomen Tenderness] : ~T ~M No abdominal tenderness [de-identified] : NAD, ambulatory [de-identified] : AT [de-identified] : supple [de-identified] : soft [de-identified] : multiple ulcers with desiccation; tender, no erythema

## 2022-09-15 ENCOUNTER — APPOINTMENT (OUTPATIENT)
Dept: PHYSICAL MEDICINE AND REHAB | Facility: CLINIC | Age: 67
End: 2022-09-15

## 2022-09-16 ENCOUNTER — RX RENEWAL (OUTPATIENT)
Age: 67
End: 2022-09-16

## 2022-09-27 ENCOUNTER — RX RENEWAL (OUTPATIENT)
Age: 67
End: 2022-09-27

## 2022-10-17 ENCOUNTER — RX RENEWAL (OUTPATIENT)
Age: 67
End: 2022-10-17

## 2022-10-31 ENCOUNTER — APPOINTMENT (OUTPATIENT)
Dept: PAIN MANAGEMENT | Facility: CLINIC | Age: 67
End: 2022-10-31

## 2022-11-02 ENCOUNTER — RX RENEWAL (OUTPATIENT)
Age: 67
End: 2022-11-02

## 2022-11-02 DIAGNOSIS — R68.2 DRY MOUTH, UNSPECIFIED: ICD-10-CM

## 2022-11-08 ENCOUNTER — OUTPATIENT (OUTPATIENT)
Dept: OUTPATIENT SERVICES | Facility: HOSPITAL | Age: 67
LOS: 1 days | End: 2022-11-08
Payer: MEDICARE

## 2022-11-08 ENCOUNTER — APPOINTMENT (OUTPATIENT)
Dept: MRI IMAGING | Facility: IMAGING CENTER | Age: 67
End: 2022-11-08

## 2022-11-08 DIAGNOSIS — Z98.890 OTHER SPECIFIED POSTPROCEDURAL STATES: Chronic | ICD-10-CM

## 2022-11-08 DIAGNOSIS — Z98.89 OTHER SPECIFIED POSTPROCEDURAL STATES: Chronic | ICD-10-CM

## 2022-11-08 DIAGNOSIS — Z00.8 ENCOUNTER FOR OTHER GENERAL EXAMINATION: ICD-10-CM

## 2022-11-08 PROCEDURE — 77049 MRI BREAST C-+ W/CAD BI: CPT | Mod: 26

## 2022-11-08 PROCEDURE — C8908: CPT

## 2022-11-08 PROCEDURE — A9585: CPT

## 2022-11-08 PROCEDURE — C8937: CPT

## 2022-11-09 DIAGNOSIS — K76.89 OTHER SPECIFIED DISEASES OF LIVER: ICD-10-CM

## 2022-11-10 ENCOUNTER — APPOINTMENT (OUTPATIENT)
Dept: INTERNAL MEDICINE | Facility: CLINIC | Age: 67
End: 2022-11-10

## 2022-11-10 VITALS
SYSTOLIC BLOOD PRESSURE: 116 MMHG | HEIGHT: 63 IN | TEMPERATURE: 97.5 F | WEIGHT: 200 LBS | BODY MASS INDEX: 35.44 KG/M2 | DIASTOLIC BLOOD PRESSURE: 78 MMHG | HEART RATE: 71 BPM | OXYGEN SATURATION: 97 %

## 2022-11-10 PROCEDURE — 99214 OFFICE O/P EST MOD 30 MIN: CPT

## 2022-11-10 RX ORDER — LEFLUNOMIDE 10 MG/1
10 TABLET, FILM COATED ORAL
Qty: 60 | Refills: 3 | Status: DISCONTINUED | COMMUNITY
Start: 2022-02-01 | End: 2022-11-10

## 2022-11-10 RX ORDER — NIRMATRELVIR AND RITONAVIR 150-100 MG
10 X 150 MG & KIT ORAL
Qty: 10 | Refills: 0 | Status: DISCONTINUED | COMMUNITY
Start: 2022-07-13 | End: 2022-11-10

## 2022-11-10 RX ORDER — NIRMATRELVIR AND RITONAVIR 300-100 MG
20 X 150 MG & KIT ORAL
Qty: 30 | Refills: 0 | Status: DISCONTINUED | COMMUNITY
Start: 2022-07-13

## 2022-11-10 NOTE — ASSESSMENT
[FreeTextEntry1] : 1) HTN \par - stable\par - ct neds \par \par 2) HL\par - ct  meds \par - check lipid and LFT \par \par 3) hypothyroid \par - check TSH \par \par \par 4) F/U  GI \par - ? liver cyst \par - pancreatic cyst---- GI eval\par \par 5) SLE / OA \par - Ct plaquanil \par \par 6) refused flu shot \par \par \par 7) memory issues \par - reviewed the neuro note \par - sleep study again advised

## 2022-11-10 NOTE — REVIEW OF SYSTEMS
[Recent Change In Weight] : ~T recent weight change [Diarrhea] : diarrhea [Negative] : Respiratory [FreeTextEntry2] : Lost 20 Ibs - cut out soda/ cookie

## 2022-11-10 NOTE — HISTORY OF PRESENT ILLNESS
[FreeTextEntry1] : F.U \par \par HTN/ HL / hypothyroid / pancreatic cyst ( has not followed on that ) \par \par taking meds \par pt has diarrhea - not followed w/ MRI / GI \par \par  no chest pain or SOB \par \par I reviewed the neuro and GI  notes  and  lab results from previous evaluation \par

## 2022-11-11 LAB
ALBUMIN SERPL ELPH-MCNC: 4.5 G/DL
ALP BLD-CCNC: 114 U/L
ALT SERPL-CCNC: 23 U/L
ANION GAP SERPL CALC-SCNC: 12 MMOL/L
AST SERPL-CCNC: 24 U/L
BILIRUB SERPL-MCNC: 0.6 MG/DL
BUN SERPL-MCNC: 16 MG/DL
CALCIUM SERPL-MCNC: 9.7 MG/DL
CHLORIDE SERPL-SCNC: 101 MMOL/L
CHOLEST SERPL-MCNC: 204 MG/DL
CO2 SERPL-SCNC: 26 MMOL/L
CREAT SERPL-MCNC: 0.95 MG/DL
EGFR: 67 ML/MIN/1.73M2
GLUCOSE SERPL-MCNC: 86 MG/DL
HDLC SERPL-MCNC: 84 MG/DL
LDLC SERPL CALC-MCNC: 103 MG/DL
NONHDLC SERPL-MCNC: 120 MG/DL
POTASSIUM SERPL-SCNC: 4.8 MMOL/L
PROT SERPL-MCNC: 7.5 G/DL
SODIUM SERPL-SCNC: 139 MMOL/L
TRIGL SERPL-MCNC: 85 MG/DL
TSH SERPL-ACNC: 0.48 UIU/ML

## 2022-12-02 ENCOUNTER — RX RENEWAL (OUTPATIENT)
Age: 67
End: 2022-12-02

## 2022-12-20 ENCOUNTER — RX RENEWAL (OUTPATIENT)
Age: 67
End: 2022-12-20

## 2022-12-20 DIAGNOSIS — M19.90 UNSPECIFIED OSTEOARTHRITIS, UNSPECIFIED SITE: ICD-10-CM

## 2022-12-21 RX ORDER — LIDOCAINE 5 G/100G
5 OINTMENT TOPICAL
Qty: 1 | Refills: 1 | Status: DISCONTINUED | COMMUNITY
Start: 2021-09-09 | End: 2022-12-21

## 2022-12-21 RX ORDER — ALPRAZOLAM 0.5 MG/1
0.5 TABLET ORAL
Qty: 15 | Refills: 0 | Status: DISCONTINUED | COMMUNITY
Start: 2022-04-29 | End: 2022-12-21

## 2022-12-21 RX ORDER — CLOTRIMAZOLE 10 MG/G
1 CREAM TOPICAL
Qty: 1 | Refills: 1 | Status: DISCONTINUED | COMMUNITY
Start: 2018-08-09 | End: 2022-12-21

## 2022-12-22 ENCOUNTER — NON-APPOINTMENT (OUTPATIENT)
Age: 67
End: 2022-12-22

## 2023-01-13 ENCOUNTER — NON-APPOINTMENT (OUTPATIENT)
Age: 68
End: 2023-01-13

## 2023-01-25 ENCOUNTER — APPOINTMENT (OUTPATIENT)
Dept: GASTROENTEROLOGY | Facility: CLINIC | Age: 68
End: 2023-01-25
Payer: MEDICARE

## 2023-01-25 VITALS
DIASTOLIC BLOOD PRESSURE: 59 MMHG | SYSTOLIC BLOOD PRESSURE: 116 MMHG | BODY MASS INDEX: 35.44 KG/M2 | HEIGHT: 63 IN | HEART RATE: 70 BPM | WEIGHT: 200 LBS

## 2023-01-25 DIAGNOSIS — K76.9 LIVER DISEASE, UNSPECIFIED: ICD-10-CM

## 2023-01-25 DIAGNOSIS — K58.9 IRRITABLE BOWEL SYNDROME W/OUT DIARRHEA: ICD-10-CM

## 2023-01-25 DIAGNOSIS — E66.9 OBESITY, UNSPECIFIED: ICD-10-CM

## 2023-01-25 DIAGNOSIS — R93.5 ABNORMAL FINDINGS ON DIAGNOSTIC IMAGING OF OTHER ABDOMINAL REGIONS, INCLUDING RETROPERITONEUM: ICD-10-CM

## 2023-01-25 DIAGNOSIS — Z80.0 FAMILY HISTORY OF MALIGNANT NEOPLASM OF DIGESTIVE ORGANS: ICD-10-CM

## 2023-01-25 DIAGNOSIS — R15.2 FECAL URGENCY: ICD-10-CM

## 2023-01-25 DIAGNOSIS — K57.30 DIVERTICULOSIS OF LARGE INTESTINE W/OUT PERFORATION OR ABSCESS W/OUT BLEEDING: ICD-10-CM

## 2023-01-25 PROCEDURE — 99204 OFFICE O/P NEW MOD 45 MIN: CPT

## 2023-01-25 RX ORDER — LIDOCAINE/TETRACAINE/BENZOCAIN 10-10-20 %
OINTMENT (GRAM) TOPICAL
Qty: 1 | Refills: 3 | Status: DISCONTINUED | COMMUNITY
Start: 2022-03-24 | End: 2023-01-25

## 2023-01-25 RX ORDER — DOXYCYCLINE HYCLATE 100 MG/1
100 CAPSULE ORAL
Qty: 28 | Refills: 0 | Status: DISCONTINUED | COMMUNITY
Start: 2022-05-31 | End: 2023-01-25

## 2023-01-25 RX ORDER — OMEPRAZOLE 40 MG/1
40 CAPSULE, DELAYED RELEASE ORAL
Qty: 30 | Refills: 1 | Status: DISCONTINUED | COMMUNITY
Start: 2022-06-02 | End: 2023-01-25

## 2023-01-25 RX ORDER — MUPIROCIN 2 G/100G
2 CREAM TOPICAL
Qty: 1 | Refills: 2 | Status: DISCONTINUED | COMMUNITY
Start: 2022-05-27 | End: 2023-01-25

## 2023-01-25 RX ORDER — GABAPENTIN 600 MG/1
600 TABLET, COATED ORAL
Qty: 90 | Refills: 2 | Status: DISCONTINUED | COMMUNITY
Start: 2021-11-30 | End: 2023-01-25

## 2023-01-25 RX ORDER — DICLOFENAC SODIUM 50 MG/1
50 TABLET, DELAYED RELEASE ORAL
Qty: 10 | Refills: 0 | Status: DISCONTINUED | COMMUNITY
Start: 2022-06-02 | End: 2023-01-25

## 2023-01-25 RX ORDER — CLOBETASOL PROPIONATE 0.5 MG/G
0.05 OINTMENT TOPICAL
Qty: 60 | Refills: 0 | Status: DISCONTINUED | COMMUNITY
Start: 2022-05-31 | End: 2023-01-25

## 2023-01-25 RX ORDER — TRIAMCINOLONE ACETONIDE 1 MG/G
0.1 CREAM TOPICAL
Qty: 80 | Refills: 0 | Status: DISCONTINUED | COMMUNITY
Start: 2022-05-31 | End: 2023-01-25

## 2023-01-25 RX ORDER — COLLAGENASE SANTYL 250 [ARB'U]/G
250 OINTMENT TOPICAL DAILY
Qty: 1 | Refills: 0 | Status: DISCONTINUED | COMMUNITY
Start: 2022-09-02 | End: 2023-01-25

## 2023-01-25 RX ORDER — MOXIFLOXACIN OPHTHALMIC 5 MG/ML
0.5 SOLUTION/ DROPS OPHTHALMIC
Qty: 3 | Refills: 0 | Status: DISCONTINUED | COMMUNITY
Start: 2022-06-01 | End: 2023-01-25

## 2023-01-25 RX ORDER — DIAZEPAM 5 MG/1
5 TABLET ORAL
Qty: 60 | Refills: 0 | Status: DISCONTINUED | COMMUNITY
Start: 2022-05-27 | End: 2023-01-25

## 2023-01-25 NOTE — REASON FOR VISIT
[Consultation] : a consultation visit [FreeTextEntry1] : pancreatic lesions and liver lesions. Uses the bathroom up to 15 times a day

## 2023-01-25 NOTE — REVIEW OF SYSTEMS
[Feeling Poorly] : feeling poorly [Arthralgias (joint pain)] : arthralgias [Skin Lesions] : skin lesion [Negative] : Heme/Lymph [As Noted in HPI] : as noted in HPI [FreeTextEntry2] : horrible sciatica  [FreeTextEntry3] : eye sight problems  [FreeTextEntry9] : limb pain  [de-identified] : pyoderma granulosem

## 2023-01-25 NOTE — ASSESSMENT
[FreeTextEntry1] : 1.  Pancreatic cysts (and probable hepatic cysts), family history of pancreatic cancer (mother)--rule out IPMN/premalignant.\par 2.  Longstanding erratic bowels, with frequent loose stools, flatulence, postprandial urgency; history of acute, self-limited colitis , with only left-sided diverticulosis and hemorrhoids at last colonoscopy 2018--at increased risk for bacterial overgrowth.  Possible IBD (assessing with pyoderma gangrenosum), celiac disease, pancreatic insufficiency.  Some of her symptoms could be related to her rheumatologic conditions, diet, medications.\par 3.  Obesity.\par 4.  Pyoderma gangrenosum.\par 5.  Hashimoto's thyroiditis.\par 6.  History of SLE's, scleroderma, Sjogren's.\par 7.  Hypertension.\par 8.  Hyperlipidemia.\par 9.  Hypothyroidism.\par 10.  DJD of spine/sciatica.\par 11.  Status post , myomectomy, hand surgery.\par 12.  Allergic to Compazine, "fruit".\par \par Plan:\par 1.  Extensive medical records have been reviewed.\par 2.  MRCP/MRI abdomen to reassess pancreatic cysts (and liver lesions).  We will follow-up with her after the MRI results are available.\par 3.  Check celiac sprue panel with next bloodwork.  If abnormal, EGD with small bowel biopsies should be repeated.\par 4.  Consider short course of Xifaxan for the possibility of bacterial overgrowth.  She would like to hold off for now.\par 5.  Consider repeat colonoscopy later this year, pending clinical course.\par 6.  She was advised that I would not be prescribing Klonopin for her, and to obtain Psychiatry referral from PMD.

## 2023-01-25 NOTE — HISTORY OF PRESENT ILLNESS
[FreeTextEntry1] : Christine presents for consolidation of GI care, voicing concerns about previously noted pancreatic cysts.  Years ago, she was found to have 12 x 6 mm cyst in the pancreatic neck.  Last MRI  revealed a few scattered 2-3 mm cysts, without ductal dilatation.  At breast MRI 2022, several scattered sub-cm indeterminate hepatic lesions were noted (not definitively seen on prior MRI 2021).  Her mother had  of pancreatic cancer at age 77.  Christine had been diagnosed with probable infectious colitis on CT scan  (pain, vomiting, loose stools after a barbecue).  She reports frequent (up to 8) stools in the morning, along with postprandial urgency and excessive flatulence.  She also notes occasional regurgitation.  In the remote past, she took a lot of Senokot.  She mentioned that she drinks a lot of diet soda.  Last colonoscopy 2018 revealed left-sided diverticulosis and hemorrhoids.  EGD  revealed gastritis.  She has history of pyoderma gangrenosum (on breast and abdomen), SLE/scleroderma, Hashimoto's, and Sjogren's.  She mentioned that she had gained a lot of of weight, which she attributed to steroids for her autoimmune conditions, and the aging process.  Currently, her chief complaint is severe sciatica, for which she may go to the ER after this morning's consultation.  She also requested Klonopin Rx and referral to  psychiatrist.

## 2023-01-25 NOTE — CONSULT LETTER
[Dear  ___] : Dear  [unfilled], [Consult Letter:] : I had the pleasure of evaluating your patient, [unfilled]. [Please see my note below.] : Please see my note below. [Consult Closing:] : Thank you very much for allowing me to participate in the care of this patient.  If you have any questions, please do not hesitate to contact me. [Sincerely,] : Sincerely, [FreeTextEntry3] : Heraclio Jacobsen M.D.\par

## 2023-01-25 NOTE — PHYSICAL EXAM
[Well Developed] : well developed [Well Nourished] : well nourished [Obese (BMI >= 30)] : obese (BMI >= 30) [None] : no edema [Bowel Sounds] : normal bowel sounds [Abdomen Tenderness] : non-tender [No Masses] : no abdominal mass palpated [Abdomen Soft] : soft [] : no hepatosplenomegaly [Patient Refused] : rectal exam was refused by the patient [Inguinal Lymph Nodes Enlarged Bilaterally] : no inguinal lymphadenopathy [Normal] : oriented to person, place, and time [de-identified] : complains of sciatica, looks uncomfortable [de-identified] : surgical scars, striae [de-identified] : scattered "unroofed" blisters with surrounding erythema

## 2023-02-01 ENCOUNTER — APPOINTMENT (OUTPATIENT)
Dept: INTERNAL MEDICINE | Facility: CLINIC | Age: 68
End: 2023-02-01

## 2023-02-15 ENCOUNTER — NON-APPOINTMENT (OUTPATIENT)
Age: 68
End: 2023-02-15

## 2023-02-22 ENCOUNTER — NON-APPOINTMENT (OUTPATIENT)
Age: 68
End: 2023-02-22

## 2023-03-08 ENCOUNTER — NON-APPOINTMENT (OUTPATIENT)
Age: 68
End: 2023-03-08

## 2023-03-10 NOTE — ED PROVIDER NOTE - PSH
Advised patient of Doctor's note below. Patient verbalized understanding. Pt declining PT at this time, stated will do some exercises at home. No further questions at this time. S/P     S/P myomectomy

## 2023-03-16 ENCOUNTER — LABORATORY RESULT (OUTPATIENT)
Age: 68
End: 2023-03-16

## 2023-03-16 ENCOUNTER — APPOINTMENT (OUTPATIENT)
Dept: INTERNAL MEDICINE | Facility: CLINIC | Age: 68
End: 2023-03-16
Payer: MEDICARE

## 2023-03-16 DIAGNOSIS — R05.9 COUGH, UNSPECIFIED: ICD-10-CM

## 2023-03-16 PROCEDURE — 99495 TRANSJ CARE MGMT MOD F2F 14D: CPT

## 2023-03-16 NOTE — PHYSICAL EXAM
[No Acute Distress] : no acute distress [Well Nourished] : well nourished [Normal Sclera/Conjunctiva] : normal sclera/conjunctiva [Normal Outer Ear/Nose] : the outer ears and nose were normal in appearance [No Edema] : there was no peripheral edema [Normal] : soft, non-tender, non-distended, no masses palpated, no HSM and normal bowel sounds

## 2023-03-16 NOTE — ASSESSMENT
[FreeTextEntry1] : 1) HTN/ HL\par \par - ct meds \par - f/u in 3 month\par \par 2) Back pain\par - ct w/ pain management \par - f/u surgery \par \par 3) hypothyroid\par  -ct synthroid\par -check TSH \par \par 4) GI F/U \par -needs MRCP \par \par 5)  URI\par  - check resp panel \par - CXR ordered - significant wheezing \par albuterol  Episcopal/Hindu considerations

## 2023-03-17 LAB
ALBUMIN SERPL ELPH-MCNC: 4.1 G/DL
ALP BLD-CCNC: 70 U/L
ALT SERPL-CCNC: 19 U/L
ANION GAP SERPL CALC-SCNC: 14 MMOL/L
AST SERPL-CCNC: 21 U/L
BASOPHILS # BLD AUTO: 0 K/UL
BASOPHILS NFR BLD AUTO: 0 %
BILIRUB SERPL-MCNC: 0.6 MG/DL
BUN SERPL-MCNC: 7 MG/DL
CALCIUM SERPL-MCNC: 9.3 MG/DL
CHLORIDE SERPL-SCNC: 99 MMOL/L
CO2 SERPL-SCNC: 24 MMOL/L
CREAT SERPL-MCNC: 0.77 MG/DL
EGFR: 86 ML/MIN/1.73M2
EOSINOPHIL # BLD AUTO: 0.06 K/UL
EOSINOPHIL NFR BLD AUTO: 0.9 %
FLUAV H1 2009 PAND RNA SPEC QL NAA+PROBE: DETECTED
GLUCOSE SERPL-MCNC: 90 MG/DL
HCT VFR BLD CALC: 38.9 %
HGB BLD-MCNC: 12.2 G/DL
LYMPHOCYTES # BLD AUTO: 1.95 K/UL
LYMPHOCYTES NFR BLD AUTO: 27.8 %
MAN DIFF?: NORMAL
MCHC RBC-ENTMCNC: 30.3 PG
MCHC RBC-ENTMCNC: 31.4 GM/DL
MCV RBC AUTO: 96.5 FL
MONOCYTES # BLD AUTO: 0.32 K/UL
MONOCYTES NFR BLD AUTO: 4.6 %
NEUTROPHILS # BLD AUTO: 4.67 K/UL
NEUTROPHILS NFR BLD AUTO: 63 %
PLATELET # BLD AUTO: 181 K/UL
POTASSIUM SERPL-SCNC: 4.4 MMOL/L
PROT SERPL-MCNC: 6.9 G/DL
RAPID RVP RESULT: DETECTED
RBC # BLD: 4.03 M/UL
RBC # FLD: 14.3 %
SARS-COV-2 RNA PNL RESP NAA+PROBE: NOT DETECTED
SODIUM SERPL-SCNC: 137 MMOL/L
TSH SERPL-ACNC: 0.95 UIU/ML
WBC # FLD AUTO: 7 K/UL

## 2023-03-19 ENCOUNTER — APPOINTMENT (OUTPATIENT)
Dept: RADIOLOGY | Facility: IMAGING CENTER | Age: 68
End: 2023-03-19

## 2023-03-27 ENCOUNTER — APPOINTMENT (OUTPATIENT)
Dept: RADIOLOGY | Facility: CLINIC | Age: 68
End: 2023-03-27
Payer: MEDICARE

## 2023-03-27 ENCOUNTER — OUTPATIENT (OUTPATIENT)
Dept: OUTPATIENT SERVICES | Facility: HOSPITAL | Age: 68
LOS: 1 days | End: 2023-03-27
Payer: MEDICARE

## 2023-03-27 DIAGNOSIS — R05.9 COUGH, UNSPECIFIED: ICD-10-CM

## 2023-03-27 DIAGNOSIS — Z98.89 OTHER SPECIFIED POSTPROCEDURAL STATES: Chronic | ICD-10-CM

## 2023-03-27 DIAGNOSIS — Z98.890 OTHER SPECIFIED POSTPROCEDURAL STATES: Chronic | ICD-10-CM

## 2023-03-27 PROCEDURE — 71046 X-RAY EXAM CHEST 2 VIEWS: CPT

## 2023-03-27 PROCEDURE — 71046 X-RAY EXAM CHEST 2 VIEWS: CPT | Mod: 26

## 2023-03-30 ENCOUNTER — APPOINTMENT (OUTPATIENT)
Dept: RHEUMATOLOGY | Facility: CLINIC | Age: 68
End: 2023-03-30

## 2023-05-24 ENCOUNTER — APPOINTMENT (OUTPATIENT)
Dept: INTERNAL MEDICINE | Facility: CLINIC | Age: 68
End: 2023-05-24

## 2023-05-24 ENCOUNTER — OUTPATIENT (OUTPATIENT)
Dept: OUTPATIENT SERVICES | Facility: HOSPITAL | Age: 68
LOS: 1 days | End: 2023-05-24
Payer: MEDICARE

## 2023-05-24 ENCOUNTER — APPOINTMENT (OUTPATIENT)
Dept: RADIOLOGY | Facility: IMAGING CENTER | Age: 68
End: 2023-05-24
Payer: MEDICARE

## 2023-05-24 DIAGNOSIS — M54.9 DORSALGIA, UNSPECIFIED: ICD-10-CM

## 2023-05-24 DIAGNOSIS — Z98.890 OTHER SPECIFIED POSTPROCEDURAL STATES: Chronic | ICD-10-CM

## 2023-05-24 DIAGNOSIS — Z98.89 OTHER SPECIFIED POSTPROCEDURAL STATES: Chronic | ICD-10-CM

## 2023-05-24 PROCEDURE — 72100 X-RAY EXAM L-S SPINE 2/3 VWS: CPT

## 2023-05-24 PROCEDURE — 72100 X-RAY EXAM L-S SPINE 2/3 VWS: CPT | Mod: 26

## 2023-05-25 ENCOUNTER — APPOINTMENT (OUTPATIENT)
Dept: RHEUMATOLOGY | Facility: CLINIC | Age: 68
End: 2023-05-25
Payer: MEDICARE

## 2023-05-25 VITALS
DIASTOLIC BLOOD PRESSURE: 90 MMHG | RESPIRATION RATE: 16 BRPM | HEART RATE: 80 BPM | TEMPERATURE: 97.1 F | SYSTOLIC BLOOD PRESSURE: 151 MMHG | WEIGHT: 180 LBS | HEIGHT: 63 IN | OXYGEN SATURATION: 98 % | BODY MASS INDEX: 31.89 KG/M2

## 2023-05-25 PROCEDURE — 99213 OFFICE O/P EST LOW 20 MIN: CPT

## 2023-05-25 RX ORDER — HYDROXYCHLOROQUINE SULFATE 200 MG/1
200 TABLET, FILM COATED ORAL
Qty: 180 | Refills: 1 | Status: DISCONTINUED | COMMUNITY
Start: 2018-10-11 | End: 2023-05-25

## 2023-05-25 RX ORDER — PILOCARPINE HYDROCHLORIDE 5 MG/1
5 TABLET, FILM COATED ORAL 3 TIMES DAILY
Qty: 90 | Refills: 4 | Status: DISCONTINUED | COMMUNITY
Start: 2018-03-22 | End: 2023-05-25

## 2023-05-25 NOTE — ASSESSMENT
[FreeTextEntry1] : 65 year-old female \par \par [facility] dermatology -  Dr. Preciado\par \par 1. Pyoderma gangrenosus (?) with ongoing open wounds over bilateral breasts and abdomen - last biopsy with chronic inflammatory changes  - non-specific- leflunomide gave her diarrhea -  has multiple new wounds. Case discussed with Dr. Preciado - dermatologist - it appears that her wounds are likely traumatic in nature and self inflicted\par stopped HCQ -  no new lesions - healed and scarred one\par 2. chronic pain/fibromyalgia - -given 1 month supply of oxycodone in 11/2021 - patient reports taking the entire month supply in 1 week - no more narcotics - also seen by pain management - hx of drug use in the past -  continue with gabapentin takes sometimes 4G - needs to stay under 3600 mg \par 4. left sciatica pain  - referral to physiatry - did not make an appt\par 5. memory issue - referral to neuro\par 6. colitis - seen by GI X1 - but no f/u made\par 7. positive SSA with no sign of Sjogren;s  \par 8. lumbar DDD - pending seen specialist at [facility]\par \par I reviewed previous labs results with patients.\par Diagnosis and Prognosis discussed\par Continue with current medications\par medications refilled\par education provided on\par F/u 2 months\par \par

## 2023-05-25 NOTE — DATA REVIEWED
[FreeTextEntry1] : There is multi-focal dermal enhancement seen in the medial breasts, most pronounced in the right lower inner quadrant and left upper inner quadrant, with associated T2 hyperintense signal (20222:95 and 163). These findings correlate with patient's known ulcerative skin lesions.\par * There is bilateral lymphadenopathy likely reactive given skin findings. For reference on the right measuring 1.9 x 1.4 cm and on the left measuring 1.8 x 1.2 cm (3:138).\par * There is no significant internal mammary lymphadenopathy.\par \par IMPRESSION:\par 1. No MRI evidence of malignancy.\par 2. Bilateral multifocal medial breast dermal enhancement/T2 hyperintense signal, correlating with patient's known recent history of bilateral breast chronic ulcerative lesions.\par 3. Bilateral axillary lymphadenopathy, reactive in etiology.

## 2023-05-25 NOTE — PHYSICAL EXAM
[General Appearance - Alert] : alert [General Appearance - In No Acute Distress] : in no acute distress [Neck Appearance] : the appearance of the neck was normal [Neck Cervical Mass (___cm)] : no neck mass was observed [Jugular Venous Distention Increased] : there was no jugular-venous distention [Thyroid Diffuse Enlargement] : the thyroid was not enlarged [Thyroid Nodule] : there were no palpable thyroid nodules [Auscultation Breath Sounds / Voice Sounds] : lungs were clear to auscultation bilaterally [Heart Rate And Rhythm] : heart rate was normal and rhythm regular [Heart Sounds] : normal S1 and S2 [Heart Sounds Gallop] : no gallops [Murmurs] : no murmurs [Heart Sounds Pericardial Friction Rub] : no pericardial rub [Full Pulse] : the pedal pulses are present [Edema] : there was no peripheral edema [Bowel Sounds] : normal bowel sounds [Abdomen Soft] : soft [Abdomen Tenderness] : non-tender [] : no hepato-splenomegaly [Abdomen Mass (___ Cm)] : no abdominal mass palpated [Cervical Lymph Nodes Enlarged Posterior Bilaterally] : posterior cervical [Cervical Lymph Nodes Enlarged Anterior Bilaterally] : anterior cervical [No CVA Tenderness] : no ~M costovertebral angle tenderness [No Spinal Tenderness] : no spinal tenderness [Deep Tendon Reflexes (DTR)] : deep tendon reflexes were 2+ and symmetric [Sensation] : the sensory exam was normal to light touch and pinprick [No Focal Deficits] : no focal deficits [Oriented To Time, Place, And Person] : oriented to person, place, and time [Impaired Insight] : insight and judgment were intact [Affect] : the affect was normal [FreeTextEntry1] : multiple open areas of skin over bilateral breasts and abdomen - new lesions over the right breast and abdomen

## 2023-05-25 NOTE — HISTORY OF PRESENT ILLNESS
[___ Month(s) Ago] : [unfilled] month(s) ago [FreeTextEntry1] : readmitted to [facility] for severe back pain being managed by pain management - but no longer seen the practitioner\par had 2 epidurals\par reports that pain is severe and "she no longer wants to live like this" \par still taking gabapentin 800 mg three times dailly\par  - pending appt with spine specialist at [facility]\par has lost 40 lbs without diet\par \par few open lesions - mostly healed

## 2023-06-22 ENCOUNTER — APPOINTMENT (OUTPATIENT)
Dept: MRI IMAGING | Facility: CLINIC | Age: 68
End: 2023-06-22
Payer: MEDICARE

## 2023-06-22 ENCOUNTER — OUTPATIENT (OUTPATIENT)
Dept: OUTPATIENT SERVICES | Facility: HOSPITAL | Age: 68
LOS: 1 days | End: 2023-06-22
Payer: MEDICARE

## 2023-06-22 DIAGNOSIS — K86.2 CYST OF PANCREAS: ICD-10-CM

## 2023-06-22 DIAGNOSIS — Z98.890 OTHER SPECIFIED POSTPROCEDURAL STATES: Chronic | ICD-10-CM

## 2023-06-22 DIAGNOSIS — Z98.89 OTHER SPECIFIED POSTPROCEDURAL STATES: Chronic | ICD-10-CM

## 2023-06-22 DIAGNOSIS — K76.9 LIVER DISEASE, UNSPECIFIED: ICD-10-CM

## 2023-06-22 DIAGNOSIS — R93.5 ABNORMAL FINDINGS ON DIAGNOSTIC IMAGING OF OTHER ABDOMINAL REGIONS, INCLUDING RETROPERITONEUM: ICD-10-CM

## 2023-06-22 PROCEDURE — A9585: CPT

## 2023-06-22 PROCEDURE — 74183 MRI ABD W/O CNTR FLWD CNTR: CPT

## 2023-06-22 PROCEDURE — 74183 MRI ABD W/O CNTR FLWD CNTR: CPT | Mod: 26,MH

## 2023-06-27 ENCOUNTER — APPOINTMENT (OUTPATIENT)
Dept: ORTHOPEDIC SURGERY | Facility: CLINIC | Age: 68
End: 2023-06-27
Payer: MEDICARE

## 2023-06-27 ENCOUNTER — NON-APPOINTMENT (OUTPATIENT)
Age: 68
End: 2023-06-27

## 2023-06-27 VITALS
DIASTOLIC BLOOD PRESSURE: 79 MMHG | WEIGHT: 180 LBS | BODY MASS INDEX: 31.89 KG/M2 | HEART RATE: 60 BPM | HEIGHT: 63 IN | SYSTOLIC BLOOD PRESSURE: 124 MMHG

## 2023-06-27 PROCEDURE — 99204 OFFICE O/P NEW MOD 45 MIN: CPT | Mod: 25

## 2023-06-27 PROCEDURE — 73564 X-RAY EXAM KNEE 4 OR MORE: CPT | Mod: 50

## 2023-06-27 PROCEDURE — 20610 DRAIN/INJ JOINT/BURSA W/O US: CPT | Mod: LT

## 2023-06-27 PROCEDURE — 73030 X-RAY EXAM OF SHOULDER: CPT | Mod: LT

## 2023-06-27 RX ADMIN — METHYLPREDNISOLONE ACETATE 1 MG/ML: 40 INJECTION, SUSPENSION INTRA-ARTICULAR; INTRALESIONAL; INTRAMUSCULAR; SOFT TISSUE at 00:00

## 2023-06-27 RX ADMIN — LIDOCAINE HYDROCHLORIDE 4 %: 5 INJECTION, SOLUTION INFILTRATION; PERINEURAL at 00:00

## 2023-06-28 NOTE — HISTORY OF PRESENT ILLNESS
[de-identified] : CALLI NOYOLA  is an 67 year-old female presents today with a chief complaint of left shoulder and bilateral knee pain knee pain. Patient describes onset over the last number of months, but it may be going on for longer. \par \par Patient points to the anterior and lateral aspect of the left shoulder and the medial and lateral aspect of bilateral knee as maximum point of tenderness. Pain is typically 7 in knees and 10 in shoulder/10 in severity, dull and achy but sometimes sharp in quality with certain activities. Symptoms in left shoulder exacerbated by overactivity and overhead activity.  Regarding the knees symptoms are are exacerbated by activity, or even walking/standing. They are improved with rest, and ice. Patient denies any radiation of symptoms beyond the surrounding area. Hip and ankle appear to be largely unrelated.  She has no neck pain and no numbness or weakness in the left arm\par \par To address the pathology, they have tried OTC medications/NSAIDs with some relief, even though short lasting. Injections [] been attempted. Exercise therapy has had only temporary relief but has helped maintain greater than 50 % range of motion. Things have gotten bad enough that patient will need assistive devices like the banister for going up and down stairs. They may need a cane. \par \par At this point the patient is quite frustrated by their condition. As duration of symptoms exceeds [], they are here for further evaluation and discussion of possible diagnoses and management. They deny any further trauma. No fever or chills, no signs of infection. Today, patient does not state any other associated signs or complains outside of those described. \par \par A complete review of symptoms as well as past medical/surgical history, medications, allergies, social and family history, and other details of HPI and exam were reviewed per first visit intake form and updated accordingly. Additional and more relevant details are noted in further detail today.\par

## 2023-06-28 NOTE — PROCEDURE
[Injection] : Injection [Left] : of the left [GH Joint] : glenohumeral joint [Joint Pain] : joint pain [Osteoarthritis] : osteoarthritis [Bleeding] : bleeding [Infection] : infection [Allergic Reaction] : allergic reaction [Patient] : patient [Risk] : risk [Benefits] : benefits [Alternatives] : alternatives [Verbal Consent Obtained] : verbal consent was obtained prior to the procedure [Ethyl Chloride Spray] : ethyl chloride spray was used as a topical anesthetic [Posterior] : posterior [22] : a 22-gauge [1% Lidocaine___(mL)] : [unfilled] mL of 1% Lidocaine [Methylpred. 40mg/mL___(mL)] : [unfilled] mL 40mg/mL methylprednisolone [Bandage Applied] : a bandage [Tolerated Well] : The patient tolerated the procedure well [None] : none

## 2023-06-28 NOTE — DISCUSSION/SUMMARY
[de-identified] : I discussed nonoperative treatment options for their bilateral knee and left shoulder osteoarthritis. We discussed nonoperative treatments options including activity modification, therapy, bracing, nonsteroidal anti-inflammatory medications, and injections. The patient would like to continue with nonoperative treatment and would like to proceed with activity modification and weight loss, physical therapy for her knees.  She was given injections for her shoulder pain as it was more bothersome to her today and I would like her to see one of my shoulder colleagues.\par  \par I discussed with them that I often prescribe an anti-inflammatory that should be taken once a day with meals to decrease pain and expedite symptom relief. They should not take this while also taking Aleve (Naprosyn), Motrin/ Advil (Ibuprofen), Toradol (ketoralac). They must stop taking it if they develops stomach pain, increased bleeding or bruising and they should follow-up with their primary care doctor for routine blood work including kidney function to monitor its effect. While it Is not a habit-forming substance, it should only  be taken as needed and to discontinue use once symptoms have resolved.  She has an upcoming surgery at [facility] for her back and will prefer to not take any medications at this time.\par \par We discussed that when nonoperative treatment is no longer successful and their pain is severe enough that it is affecting their ability to perform activities of daily living, a joint replacement may help them.\par \par My cumulative time spent on this patient’s visit included: Preparation for the visit, review of the medical records, review of pertinent diagnostic studies, examination and counseling of the patient on the above diagnosis, treatment plan and prognosis, orders of diagnostic tests, medications and/or appropriate procedures and documentation in the medical records of today’s visit.\par \par

## 2023-06-28 NOTE — PHYSICAL EXAM
[de-identified] : General Appearance / Station: Well developed, well nourished, in no acute distress \par Orientation: Oriented to person, place, and time\par Gait & Station: Ambulates without assistive device\par Neurologic: Normal leg sensation \par Cardiovascular: Warm extremity \par Lymphatics: No lymphedema \par Generalized Ligament Laxity: Normal \par Stiffness: Normal \par \par LUMBAR SPINE:\par Nontender  at lumbar spine\par Straight leg raise: Negative \par Motor: 5/5 motor L2-S1\par Sensation: Intact  L2-S1\par \par RIGHT HIP:\par Range of motion: Painless  internal and external rotation of the hip.\par Strength: Within Normal Limits \par Palpation: Nontender  at greater trochanter. Nontender  at SI joint\par Stinchfield: Negative \par FADIR: Negative \par JARED: Negative \par \par SYMPTOMATIC RIGHT KNEE:\par Alignment: Neutral \par Skin: normal\par Effusion: none .\par Quadriceps: normal .\par Range of motion: symmetrical but painful .\par PF crepitus: 1+.\par PF apprehension: none .\par Patella / Patella Tendon: nontender .\par Lachman's: negative \par Valgus @ 30°: negative.\par Varus @ 30°: negative.\par Posterior drawer: negative.\par Palpation: TENDER AT MEDIAL AND LATERAL JOINT LINE.\par Meniscus signs: MEDIAL JOINT LINE\par \par SYMPTOMATIC LEFT KNEE:\par Alignment: Valgus\par Skin: normal\par Effusion: none .\par Quadriceps: normal .\par Range of motion: symmetrical .\par PF crepitus: none .\par PF apprehension: none .\par Patella / Patella Tendon: nontender .\par Lachman's: negative \par Valgus @ 30°: negative.\par Varus @ 30°: negative.\par Posterior drawer: negative.\par Palpation: Medial lateral\par Meniscus signs: Medial and lateral\par \par Constitutional: Well-nourished, well-developed, No acute distress \par Respiratory:  Good respiratory effort, no SOB \par Psychiatric: Pleasant and normal affect, alert and oriented x3 Skin: Clean dry and intact \par B/L UE Musculoskeletal: normal except where as noted in regional exam   \par \par Left Shoulder: \par APPEARANCE: no marked deformities, no swelling or malalignment \par POSITIVE TENDERNESS: supraspinatus, long head biceps tendon \par NONTENDER:  infraspinatus, teres minor. biceps. anterior and posterior capsule. AC joint.  \par ROM: full with mild painful arc past 60 degrees, no scapular winging or dyskinesia present \par RESISTIVE TESTING: MMT 4+/5 ER, Flexion and Empty can, 5/5 IR. painless 5/5 resisted ext, horizontal abd/add  \par SPECIAL TESTS: + Garcia and Neers, mildly + cross arm adduction, + Speeds, neg Hartley's, neg Drop Arm, neg Apprehension.\par  [de-identified] : Imagin views of the left shoulder including internal and external rotation and axillary views were obtained today that show no obvious fracture, or dislocation. There are degenerative changes seen on the axillary view There is no malalignment. No obvious osseous abnormality. Otherwise unremarkable.\par \par Imaging: Standing 4 views of thebilateral knee show bilateral knee arthritis worse in the medial and lateral compartment with loss of joint space, subchondral sclerosis, marginal osteophyte formations. There are no signs of fracture. There is no knee effusion. The soft tissues appear unremarkable\par

## 2023-06-30 RX ORDER — LIDOCAINE HYDROCHLORIDE 5 MG/ML
0.5 INJECTION, SOLUTION INFILTRATION; PERINEURAL
Refills: 0 | Status: COMPLETED | OUTPATIENT
Start: 2023-06-27

## 2023-06-30 RX ORDER — METHYLPRED ACET/NACL,ISO-OS/PF 40 MG/ML
40 VIAL (ML) INJECTION
Qty: 1 | Refills: 0 | Status: COMPLETED | OUTPATIENT
Start: 2023-06-27

## 2023-07-14 ENCOUNTER — RX RENEWAL (OUTPATIENT)
Age: 68
End: 2023-07-14

## 2023-07-18 ENCOUNTER — APPOINTMENT (OUTPATIENT)
Dept: INTERNAL MEDICINE | Facility: CLINIC | Age: 68
End: 2023-07-18
Payer: MEDICARE

## 2023-07-18 VITALS
DIASTOLIC BLOOD PRESSURE: 77 MMHG | HEIGHT: 60 IN | HEART RATE: 63 BPM | RESPIRATION RATE: 15 BRPM | TEMPERATURE: 98 F | WEIGHT: 180 LBS | OXYGEN SATURATION: 96 % | BODY MASS INDEX: 35.34 KG/M2 | SYSTOLIC BLOOD PRESSURE: 110 MMHG

## 2023-07-18 DIAGNOSIS — I10 ESSENTIAL (PRIMARY) HYPERTENSION: ICD-10-CM

## 2023-07-18 PROCEDURE — 99214 OFFICE O/P EST MOD 30 MIN: CPT | Mod: 25

## 2023-07-18 PROCEDURE — 93000 ELECTROCARDIOGRAM COMPLETE: CPT

## 2023-07-18 RX ORDER — BENZONATATE 100 MG/1
100 CAPSULE ORAL
Qty: 21 | Refills: 0 | Status: DISCONTINUED | COMMUNITY
Start: 2023-03-16 | End: 2023-07-18

## 2023-07-18 NOTE — HISTORY OF PRESENT ILLNESS
[FreeTextEntry1] : f.u \par \par HTN / HL/ hypothyroid \par scheduled for eye surgery \par she has no chest pain or SOB \par \par later in August , she is going for back surgery \par \par anxiety - on SSRI \par

## 2023-07-18 NOTE — PHYSICAL EXAM
[No Acute Distress] : no acute distress [Well Nourished] : well nourished [Normal Voice/Communication] : normal voice/communication [Normal Sclera/Conjunctiva] : normal sclera/conjunctiva [No Edema] : there was no peripheral edema [Normal] : soft, non-tender, non-distended, no masses palpated, no HSM and normal bowel sounds

## 2023-07-18 NOTE — ASSESSMENT
[FreeTextEntry1] : 1)  HTN\par - stable\par - ct meds \par - low salt diet \par \par 2) HL\par - Ct meds\par - lipid at goal\par \par 3) hypothyroid \par - TSH  within range \par \par 4) anxiety\par - ct SSRI \par -no SI / HI\par \par 5) Pre- op eval \par - no contraindication to surgery\par - EKG \par \par 6) Drop in H/H \par - repeat H/H \par - check ferritin

## 2023-07-18 NOTE — REVIEW OF SYSTEMS
[Recent Change In Weight] : ~T recent weight change [Negative] : Gastrointestinal [FreeTextEntry2] : lost weight after she lost her teeth /has cut back intake drastically

## 2023-07-19 LAB
FERRITIN SERPL-MCNC: 81 NG/ML
FOLATE SERPL-MCNC: 8.6 NG/ML
HCT VFR BLD CALC: 44.1 %
HGB BLD-MCNC: 13.7 G/DL
VIT B12 SERPL-MCNC: 448 PG/ML

## 2023-09-17 ENCOUNTER — NON-APPOINTMENT (OUTPATIENT)
Age: 68
End: 2023-09-17

## 2023-10-27 ENCOUNTER — NON-APPOINTMENT (OUTPATIENT)
Age: 68
End: 2023-10-27

## 2023-11-01 ENCOUNTER — APPOINTMENT (OUTPATIENT)
Dept: INTERNAL MEDICINE | Facility: CLINIC | Age: 68
End: 2023-11-01
Payer: MEDICARE

## 2023-11-01 DIAGNOSIS — F11.90 OPIOID USE, UNSPECIFIED, UNCOMPLICATED: ICD-10-CM

## 2023-11-01 DIAGNOSIS — M54.9 DORSALGIA, UNSPECIFIED: ICD-10-CM

## 2023-11-01 DIAGNOSIS — G89.29 DORSALGIA, UNSPECIFIED: ICD-10-CM

## 2023-11-01 DIAGNOSIS — R19.4 CHANGE IN BOWEL HABIT: ICD-10-CM

## 2023-11-01 DIAGNOSIS — E78.5 HYPERLIPIDEMIA, UNSPECIFIED: ICD-10-CM

## 2023-11-01 PROCEDURE — 99213 OFFICE O/P EST LOW 20 MIN: CPT | Mod: 95

## 2023-11-08 NOTE — PATIENT PROFILE ADULT. - FALL HARM RISK
other/weakness Dermal Autograft Text: The defect edges were debeveled with a #15 scalpel blade.  Given the location of the defect, shape of the defect and the proximity to free margins a dermal autograft was deemed most appropriate.  Using a sterile surgical marker, the primary defect shape was transferred to the donor site. The area thus outlined was incised deep to adipose tissue with a #15 scalpel blade.  The harvested graft was then trimmed of adipose and epidermal tissue until only dermis was left.  The skin graft was then placed in the primary defect and oriented appropriately.

## 2023-11-16 ENCOUNTER — INPATIENT (INPATIENT)
Facility: HOSPITAL | Age: 68
LOS: 2 days | Discharge: HOME | End: 2023-11-19
Attending: HOSPITALIST | Admitting: HOSPITALIST
Payer: MEDICARE

## 2023-11-16 VITALS
SYSTOLIC BLOOD PRESSURE: 180 MMHG | WEIGHT: 179.9 LBS | HEART RATE: 86 BPM | OXYGEN SATURATION: 97 % | DIASTOLIC BLOOD PRESSURE: 106 MMHG | TEMPERATURE: 98 F | HEIGHT: 63 IN | RESPIRATION RATE: 20 BRPM

## 2023-11-16 DIAGNOSIS — I10 ESSENTIAL (PRIMARY) HYPERTENSION: ICD-10-CM

## 2023-11-16 DIAGNOSIS — E03.9 HYPOTHYROIDISM, UNSPECIFIED: ICD-10-CM

## 2023-11-16 DIAGNOSIS — M35.00 SJOGREN SYNDROME, UNSPECIFIED: ICD-10-CM

## 2023-11-16 DIAGNOSIS — F32.9 MAJOR DEPRESSIVE DISORDER, SINGLE EPISODE, UNSPECIFIED: ICD-10-CM

## 2023-11-16 DIAGNOSIS — Z98.89 OTHER SPECIFIED POSTPROCEDURAL STATES: Chronic | ICD-10-CM

## 2023-11-16 DIAGNOSIS — Z98.890 OTHER SPECIFIED POSTPROCEDURAL STATES: Chronic | ICD-10-CM

## 2023-11-16 DIAGNOSIS — E78.5 HYPERLIPIDEMIA, UNSPECIFIED: ICD-10-CM

## 2023-11-16 DIAGNOSIS — R56.9 UNSPECIFIED CONVULSIONS: ICD-10-CM

## 2023-11-16 LAB
ALBUMIN SERPL ELPH-MCNC: 3.3 G/DL — SIGNIFICANT CHANGE UP (ref 3.3–5)
ALBUMIN SERPL ELPH-MCNC: 3.3 G/DL — SIGNIFICANT CHANGE UP (ref 3.3–5)
ALP SERPL-CCNC: 126 U/L — HIGH (ref 40–120)
ALP SERPL-CCNC: 126 U/L — HIGH (ref 40–120)
ALT FLD-CCNC: 24 U/L — SIGNIFICANT CHANGE UP (ref 12–78)
ALT FLD-CCNC: 24 U/L — SIGNIFICANT CHANGE UP (ref 12–78)
ANION GAP SERPL CALC-SCNC: 6 MMOL/L — SIGNIFICANT CHANGE UP (ref 5–17)
ANION GAP SERPL CALC-SCNC: 6 MMOL/L — SIGNIFICANT CHANGE UP (ref 5–17)
APPEARANCE UR: CLEAR — SIGNIFICANT CHANGE UP
APPEARANCE UR: CLEAR — SIGNIFICANT CHANGE UP
AST SERPL-CCNC: 18 U/L — SIGNIFICANT CHANGE UP (ref 15–37)
AST SERPL-CCNC: 18 U/L — SIGNIFICANT CHANGE UP (ref 15–37)
BASOPHILS # BLD AUTO: 0.05 K/UL — SIGNIFICANT CHANGE UP (ref 0–0.2)
BASOPHILS # BLD AUTO: 0.05 K/UL — SIGNIFICANT CHANGE UP (ref 0–0.2)
BASOPHILS NFR BLD AUTO: 0.5 % — SIGNIFICANT CHANGE UP (ref 0–2)
BASOPHILS NFR BLD AUTO: 0.5 % — SIGNIFICANT CHANGE UP (ref 0–2)
BILIRUB SERPL-MCNC: 0.6 MG/DL — SIGNIFICANT CHANGE UP (ref 0.2–1.2)
BILIRUB SERPL-MCNC: 0.6 MG/DL — SIGNIFICANT CHANGE UP (ref 0.2–1.2)
BILIRUB UR-MCNC: NEGATIVE — SIGNIFICANT CHANGE UP
BILIRUB UR-MCNC: NEGATIVE — SIGNIFICANT CHANGE UP
BUN SERPL-MCNC: 10 MG/DL — SIGNIFICANT CHANGE UP (ref 7–23)
BUN SERPL-MCNC: 10 MG/DL — SIGNIFICANT CHANGE UP (ref 7–23)
CALCIUM SERPL-MCNC: 8.6 MG/DL — SIGNIFICANT CHANGE UP (ref 8.5–10.1)
CALCIUM SERPL-MCNC: 8.6 MG/DL — SIGNIFICANT CHANGE UP (ref 8.5–10.1)
CHLORIDE SERPL-SCNC: 104 MMOL/L — SIGNIFICANT CHANGE UP (ref 96–108)
CHLORIDE SERPL-SCNC: 104 MMOL/L — SIGNIFICANT CHANGE UP (ref 96–108)
CK SERPL-CCNC: 63 U/L — SIGNIFICANT CHANGE UP (ref 26–192)
CK SERPL-CCNC: 63 U/L — SIGNIFICANT CHANGE UP (ref 26–192)
CO2 SERPL-SCNC: 26 MMOL/L — SIGNIFICANT CHANGE UP (ref 22–31)
CO2 SERPL-SCNC: 26 MMOL/L — SIGNIFICANT CHANGE UP (ref 22–31)
COLOR SPEC: YELLOW — SIGNIFICANT CHANGE UP
COLOR SPEC: YELLOW — SIGNIFICANT CHANGE UP
COMMENT - URINE: SIGNIFICANT CHANGE UP
COMMENT - URINE: SIGNIFICANT CHANGE UP
CREAT SERPL-MCNC: 0.71 MG/DL — SIGNIFICANT CHANGE UP (ref 0.5–1.3)
CREAT SERPL-MCNC: 0.71 MG/DL — SIGNIFICANT CHANGE UP (ref 0.5–1.3)
DIFF PNL FLD: NEGATIVE — SIGNIFICANT CHANGE UP
DIFF PNL FLD: NEGATIVE — SIGNIFICANT CHANGE UP
EGFR: 94 ML/MIN/1.73M2 — SIGNIFICANT CHANGE UP
EGFR: 94 ML/MIN/1.73M2 — SIGNIFICANT CHANGE UP
EOSINOPHIL # BLD AUTO: 0.13 K/UL — SIGNIFICANT CHANGE UP (ref 0–0.5)
EOSINOPHIL # BLD AUTO: 0.13 K/UL — SIGNIFICANT CHANGE UP (ref 0–0.5)
EOSINOPHIL NFR BLD AUTO: 1.4 % — SIGNIFICANT CHANGE UP (ref 0–6)
EOSINOPHIL NFR BLD AUTO: 1.4 % — SIGNIFICANT CHANGE UP (ref 0–6)
EPI CELLS # UR: SIGNIFICANT CHANGE UP
EPI CELLS # UR: SIGNIFICANT CHANGE UP
GLUCOSE SERPL-MCNC: 108 MG/DL — HIGH (ref 70–99)
GLUCOSE SERPL-MCNC: 108 MG/DL — HIGH (ref 70–99)
GLUCOSE UR QL: NEGATIVE MG/DL — SIGNIFICANT CHANGE UP
GLUCOSE UR QL: NEGATIVE MG/DL — SIGNIFICANT CHANGE UP
HCT VFR BLD CALC: 38.8 % — SIGNIFICANT CHANGE UP (ref 34.5–45)
HCT VFR BLD CALC: 38.8 % — SIGNIFICANT CHANGE UP (ref 34.5–45)
HGB BLD-MCNC: 12.3 G/DL — SIGNIFICANT CHANGE UP (ref 11.5–15.5)
HGB BLD-MCNC: 12.3 G/DL — SIGNIFICANT CHANGE UP (ref 11.5–15.5)
IMM GRANULOCYTES NFR BLD AUTO: 0.2 % — SIGNIFICANT CHANGE UP (ref 0–0.9)
IMM GRANULOCYTES NFR BLD AUTO: 0.2 % — SIGNIFICANT CHANGE UP (ref 0–0.9)
KETONES UR-MCNC: NEGATIVE MG/DL — SIGNIFICANT CHANGE UP
KETONES UR-MCNC: NEGATIVE MG/DL — SIGNIFICANT CHANGE UP
LACTATE SERPL-SCNC: 0.8 MMOL/L — SIGNIFICANT CHANGE UP (ref 0.7–2)
LACTATE SERPL-SCNC: 0.8 MMOL/L — SIGNIFICANT CHANGE UP (ref 0.7–2)
LACTATE SERPL-SCNC: 2.9 MMOL/L — HIGH (ref 0.7–2)
LACTATE SERPL-SCNC: 2.9 MMOL/L — HIGH (ref 0.7–2)
LEUKOCYTE ESTERASE UR-ACNC: NEGATIVE — SIGNIFICANT CHANGE UP
LEUKOCYTE ESTERASE UR-ACNC: NEGATIVE — SIGNIFICANT CHANGE UP
LYMPHOCYTES # BLD AUTO: 1.27 K/UL — SIGNIFICANT CHANGE UP (ref 1–3.3)
LYMPHOCYTES # BLD AUTO: 1.27 K/UL — SIGNIFICANT CHANGE UP (ref 1–3.3)
LYMPHOCYTES # BLD AUTO: 13.6 % — SIGNIFICANT CHANGE UP (ref 13–44)
LYMPHOCYTES # BLD AUTO: 13.6 % — SIGNIFICANT CHANGE UP (ref 13–44)
MCHC RBC-ENTMCNC: 27 PG — SIGNIFICANT CHANGE UP (ref 27–34)
MCHC RBC-ENTMCNC: 27 PG — SIGNIFICANT CHANGE UP (ref 27–34)
MCHC RBC-ENTMCNC: 31.7 G/DL — LOW (ref 32–36)
MCHC RBC-ENTMCNC: 31.7 G/DL — LOW (ref 32–36)
MCV RBC AUTO: 85.3 FL — SIGNIFICANT CHANGE UP (ref 80–100)
MCV RBC AUTO: 85.3 FL — SIGNIFICANT CHANGE UP (ref 80–100)
MONOCYTES # BLD AUTO: 0.55 K/UL — SIGNIFICANT CHANGE UP (ref 0–0.9)
MONOCYTES # BLD AUTO: 0.55 K/UL — SIGNIFICANT CHANGE UP (ref 0–0.9)
MONOCYTES NFR BLD AUTO: 5.9 % — SIGNIFICANT CHANGE UP (ref 2–14)
MONOCYTES NFR BLD AUTO: 5.9 % — SIGNIFICANT CHANGE UP (ref 2–14)
NEUTROPHILS # BLD AUTO: 7.32 K/UL — SIGNIFICANT CHANGE UP (ref 1.8–7.4)
NEUTROPHILS # BLD AUTO: 7.32 K/UL — SIGNIFICANT CHANGE UP (ref 1.8–7.4)
NEUTROPHILS NFR BLD AUTO: 78.4 % — HIGH (ref 43–77)
NEUTROPHILS NFR BLD AUTO: 78.4 % — HIGH (ref 43–77)
NITRITE UR-MCNC: NEGATIVE — SIGNIFICANT CHANGE UP
NITRITE UR-MCNC: NEGATIVE — SIGNIFICANT CHANGE UP
NRBC # BLD: 0 /100 WBCS — SIGNIFICANT CHANGE UP (ref 0–0)
NRBC # BLD: 0 /100 WBCS — SIGNIFICANT CHANGE UP (ref 0–0)
PH UR: 5.5 — SIGNIFICANT CHANGE UP (ref 5–8)
PH UR: 5.5 — SIGNIFICANT CHANGE UP (ref 5–8)
PLATELET # BLD AUTO: 299 K/UL — SIGNIFICANT CHANGE UP (ref 150–400)
PLATELET # BLD AUTO: 299 K/UL — SIGNIFICANT CHANGE UP (ref 150–400)
POTASSIUM SERPL-MCNC: 3.7 MMOL/L — SIGNIFICANT CHANGE UP (ref 3.5–5.3)
POTASSIUM SERPL-MCNC: 3.7 MMOL/L — SIGNIFICANT CHANGE UP (ref 3.5–5.3)
POTASSIUM SERPL-SCNC: 3.7 MMOL/L — SIGNIFICANT CHANGE UP (ref 3.5–5.3)
POTASSIUM SERPL-SCNC: 3.7 MMOL/L — SIGNIFICANT CHANGE UP (ref 3.5–5.3)
PROT SERPL-MCNC: 7.2 GM/DL — SIGNIFICANT CHANGE UP (ref 6–8.3)
PROT SERPL-MCNC: 7.2 GM/DL — SIGNIFICANT CHANGE UP (ref 6–8.3)
PROT UR-MCNC: SIGNIFICANT CHANGE UP MG/DL
PROT UR-MCNC: SIGNIFICANT CHANGE UP MG/DL
RBC # BLD: 4.55 M/UL — SIGNIFICANT CHANGE UP (ref 3.8–5.2)
RBC # BLD: 4.55 M/UL — SIGNIFICANT CHANGE UP (ref 3.8–5.2)
RBC # FLD: 13.7 % — SIGNIFICANT CHANGE UP (ref 10.3–14.5)
RBC # FLD: 13.7 % — SIGNIFICANT CHANGE UP (ref 10.3–14.5)
RBC CASTS # UR COMP ASSIST: SIGNIFICANT CHANGE UP /HPF (ref 0–4)
RBC CASTS # UR COMP ASSIST: SIGNIFICANT CHANGE UP /HPF (ref 0–4)
SODIUM SERPL-SCNC: 136 MMOL/L — SIGNIFICANT CHANGE UP (ref 135–145)
SODIUM SERPL-SCNC: 136 MMOL/L — SIGNIFICANT CHANGE UP (ref 135–145)
SP GR SPEC: 1.03 — SIGNIFICANT CHANGE UP (ref 1–1.03)
SP GR SPEC: 1.03 — SIGNIFICANT CHANGE UP (ref 1–1.03)
UROBILINOGEN FLD QL: 0.2 MG/DL — SIGNIFICANT CHANGE UP (ref 0.2–1)
UROBILINOGEN FLD QL: 0.2 MG/DL — SIGNIFICANT CHANGE UP (ref 0.2–1)
WBC # BLD: 9.34 K/UL — SIGNIFICANT CHANGE UP (ref 3.8–10.5)
WBC # BLD: 9.34 K/UL — SIGNIFICANT CHANGE UP (ref 3.8–10.5)
WBC # FLD AUTO: 9.34 K/UL — SIGNIFICANT CHANGE UP (ref 3.8–10.5)
WBC # FLD AUTO: 9.34 K/UL — SIGNIFICANT CHANGE UP (ref 3.8–10.5)
WBC UR QL: SIGNIFICANT CHANGE UP /HPF (ref 0–5)
WBC UR QL: SIGNIFICANT CHANGE UP /HPF (ref 0–5)

## 2023-11-16 PROCEDURE — 71045 X-RAY EXAM CHEST 1 VIEW: CPT | Mod: 26

## 2023-11-16 PROCEDURE — 99285 EMERGENCY DEPT VISIT HI MDM: CPT | Mod: FS

## 2023-11-16 PROCEDURE — 93010 ELECTROCARDIOGRAM REPORT: CPT

## 2023-11-16 PROCEDURE — 99223 1ST HOSP IP/OBS HIGH 75: CPT

## 2023-11-16 PROCEDURE — 70450 CT HEAD/BRAIN W/O DYE: CPT | Mod: 26,MA

## 2023-11-16 RX ORDER — LEVOTHYROXINE SODIUM 125 MCG
137 TABLET ORAL DAILY
Refills: 0 | Status: DISCONTINUED | OUTPATIENT
Start: 2023-11-16 | End: 2023-11-16

## 2023-11-16 RX ORDER — OXYCODONE HYDROCHLORIDE 5 MG/1
5 TABLET ORAL EVERY 8 HOURS
Refills: 0 | Status: DISCONTINUED | OUTPATIENT
Start: 2023-11-16 | End: 2023-11-19

## 2023-11-16 RX ORDER — ACETAMINOPHEN 500 MG
1000 TABLET ORAL ONCE
Refills: 0 | Status: COMPLETED | OUTPATIENT
Start: 2023-11-16 | End: 2023-11-16

## 2023-11-16 RX ORDER — METOPROLOL TARTRATE 50 MG
25 TABLET ORAL DAILY
Refills: 0 | Status: DISCONTINUED | OUTPATIENT
Start: 2023-11-16 | End: 2023-11-19

## 2023-11-16 RX ORDER — CLONAZEPAM 1 MG
1 TABLET ORAL
Qty: 0 | Refills: 0 | DISCHARGE

## 2023-11-16 RX ORDER — HYDROXYCHLOROQUINE SULFATE 200 MG
2 TABLET ORAL
Qty: 0 | Refills: 0 | DISCHARGE

## 2023-11-16 RX ORDER — LISINOPRIL 2.5 MG/1
20 TABLET ORAL DAILY
Refills: 0 | Status: DISCONTINUED | OUTPATIENT
Start: 2023-11-16 | End: 2023-11-19

## 2023-11-16 RX ORDER — FLUOXETINE HCL 10 MG
80 CAPSULE ORAL DAILY
Refills: 0 | Status: DISCONTINUED | OUTPATIENT
Start: 2023-11-16 | End: 2023-11-19

## 2023-11-16 RX ORDER — ATORVASTATIN CALCIUM 80 MG/1
20 TABLET, FILM COATED ORAL AT BEDTIME
Refills: 0 | Status: DISCONTINUED | OUTPATIENT
Start: 2023-11-16 | End: 2023-11-19

## 2023-11-16 RX ORDER — LANOLIN ALCOHOL/MO/W.PET/CERES
3 CREAM (GRAM) TOPICAL AT BEDTIME
Refills: 0 | Status: DISCONTINUED | OUTPATIENT
Start: 2023-11-16 | End: 2023-11-19

## 2023-11-16 RX ORDER — FOLIC ACID 0.8 MG
1 TABLET ORAL DAILY
Refills: 0 | Status: DISCONTINUED | OUTPATIENT
Start: 2023-11-16 | End: 2023-11-19

## 2023-11-16 RX ORDER — LEVOTHYROXINE SODIUM 125 MCG
137 TABLET ORAL DAILY
Refills: 0 | Status: DISCONTINUED | OUTPATIENT
Start: 2023-11-16 | End: 2023-11-19

## 2023-11-16 RX ORDER — SENNA PLUS 8.6 MG/1
2 TABLET ORAL AT BEDTIME
Refills: 0 | Status: DISCONTINUED | OUTPATIENT
Start: 2023-11-16 | End: 2023-11-19

## 2023-11-16 RX ORDER — ENOXAPARIN SODIUM 100 MG/ML
40 INJECTION SUBCUTANEOUS EVERY 24 HOURS
Refills: 0 | Status: DISCONTINUED | OUTPATIENT
Start: 2023-11-16 | End: 2023-11-19

## 2023-11-16 RX ORDER — ACETAMINOPHEN 500 MG
650 TABLET ORAL EVERY 6 HOURS
Refills: 0 | Status: DISCONTINUED | OUTPATIENT
Start: 2023-11-16 | End: 2023-11-19

## 2023-11-16 RX ORDER — GABAPENTIN 400 MG/1
800 CAPSULE ORAL THREE TIMES A DAY
Refills: 0 | Status: DISCONTINUED | OUTPATIENT
Start: 2023-11-16 | End: 2023-11-19

## 2023-11-16 RX ORDER — SODIUM CHLORIDE 9 MG/ML
1000 INJECTION INTRAMUSCULAR; INTRAVENOUS; SUBCUTANEOUS ONCE
Refills: 0 | Status: COMPLETED | OUTPATIENT
Start: 2023-11-16 | End: 2023-11-16

## 2023-11-16 RX ORDER — GABAPENTIN 400 MG/1
800 CAPSULE ORAL THREE TIMES A DAY
Refills: 0 | Status: DISCONTINUED | OUTPATIENT
Start: 2023-11-16 | End: 2023-11-16

## 2023-11-16 RX ORDER — MORPHINE SULFATE 50 MG/1
2 CAPSULE, EXTENDED RELEASE ORAL ONCE
Refills: 0 | Status: DISCONTINUED | OUTPATIENT
Start: 2023-11-16 | End: 2023-11-16

## 2023-11-16 RX ORDER — CLONAZEPAM 1 MG
1 TABLET ORAL ONCE
Refills: 0 | Status: DISCONTINUED | OUTPATIENT
Start: 2023-11-16 | End: 2023-11-16

## 2023-11-16 RX ORDER — LEVOTHYROXINE SODIUM 125 MCG
1 TABLET ORAL
Qty: 0 | Refills: 0 | DISCHARGE

## 2023-11-16 RX ORDER — MORPHINE SULFATE 50 MG/1
4 CAPSULE, EXTENDED RELEASE ORAL ONCE
Refills: 0 | Status: DISCONTINUED | OUTPATIENT
Start: 2023-11-16 | End: 2023-11-16

## 2023-11-16 RX ADMIN — SENNA PLUS 2 TABLET(S): 8.6 TABLET ORAL at 21:09

## 2023-11-16 RX ADMIN — Medication 400 MILLIGRAM(S): at 10:30

## 2023-11-16 RX ADMIN — SODIUM CHLORIDE 1000 MILLILITER(S): 9 INJECTION INTRAMUSCULAR; INTRAVENOUS; SUBCUTANEOUS at 10:30

## 2023-11-16 RX ADMIN — MORPHINE SULFATE 2 MILLIGRAM(S): 50 CAPSULE, EXTENDED RELEASE ORAL at 16:55

## 2023-11-16 RX ADMIN — Medication 1000 MILLIGRAM(S): at 11:00

## 2023-11-16 RX ADMIN — MORPHINE SULFATE 2 MILLIGRAM(S): 50 CAPSULE, EXTENDED RELEASE ORAL at 17:40

## 2023-11-16 RX ADMIN — ATORVASTATIN CALCIUM 20 MILLIGRAM(S): 80 TABLET, FILM COATED ORAL at 21:09

## 2023-11-16 RX ADMIN — MORPHINE SULFATE 4 MILLIGRAM(S): 50 CAPSULE, EXTENDED RELEASE ORAL at 11:17

## 2023-11-16 RX ADMIN — GABAPENTIN 800 MILLIGRAM(S): 400 CAPSULE ORAL at 21:10

## 2023-11-16 RX ADMIN — Medication 1 MILLIGRAM(S): at 11:19

## 2023-11-16 RX ADMIN — MORPHINE SULFATE 4 MILLIGRAM(S): 50 CAPSULE, EXTENDED RELEASE ORAL at 11:47

## 2023-11-16 NOTE — H&P ADULT - HISTORY OF PRESENT ILLNESS
67 years old female with h/o HTN, HLD, hypothyroidism, Sjogren's syndrome, L spine surgery ( 08/2023), h/o lupus, h/o pyoderma gangrenosum was brought in to ED with seizure concern. Today at around 8:30AM, patient was noted to have seizure like activity by , whole body shaking, lasted for 2 minutes and associated with confusion. Patient also had tongue biting. No h/o seizure. Patient reported significant sleep loss recently due to low back pain. No fever or chills  Mildly hypertensive, afebrile, sat well at RA. EKG with NSR, QTc 468. No leukocytosis, plt 299, Cr 0.71, glucose 108, lactate 2.9    SH: no toxic habits  FH: Father-lymphoma, CHF, Mother- pancreatic cancer

## 2023-11-16 NOTE — ED PROVIDER NOTE - NS ED ATTENDING STATEMENT MOD
This was a shared visit with the VICKI. I reviewed and verified the documentation and independently performed the documented:

## 2023-11-16 NOTE — ED ADULT NURSE NOTE - ED STAT RN HANDOFF DETAILS
REPORT GIVEN TO HINA GONZALEZ. UPDATED ON STATUS AND POC. PT AOX4, VSS, PIV PATENT AND INTACT. WILL PREPARE FOR TRANSFER.

## 2023-11-16 NOTE — ED PROVIDER NOTE - CLINICAL SUMMARY MEDICAL DECISION MAKING FREE TEXT BOX
66F w/ PMH Lupus, Hashimoto's, Pyogenic Granuloma, Sjogren's, Sciatica who presents to ED with new-onset "seizure-like" activity x 2 hours ago. Pt's  at bedside providing collateral history - states that around 8 AM, pt was lying down in bed when she began having "full body shaking" and pt's "eyes rolled back", pt was not responding to pt's  verbal prompting during this time, took about 2 minutes before pt returned to normal and began to answer questions again, pt does not recall event, no head injury/trauma. Pt with no history of seizures in the past, no recent illness, no known ill contacts, no recent travel, no recent head injury/trauma or falls. Patient currently afebrile, hemodynamically stable, spO2 97%. Based on history and physical, differentials include but are not limited to viral illness, electrolyte abnormality, anemia, infection (UTI, pneumonia), new onset seizure, ICH/mass. Plan to assess patient for acute pathology as listed above with labs, EKG, CXR, CT Head. Will administer medications for symptomatic relief, follow-up on results, and reassess. 66F w/ PMH Lupus, Hashimoto's, Pyogenic Granuloma, Sjogren's, Sciatica who presents to ED with new-onset "seizure-like" activity x 2 hours ago. Pt's  at bedside providing collateral history - states that around 8 AM, pt was lying down in bed when she began having "full body shaking" and pt's "eyes rolled back", pt was not responding to pt's  verbal prompting during this time, took about 2 minutes before pt returned to normal and began to answer questions again, pt does not recall event, no head injury/trauma. Pt with no history of seizures in the past, no recent illness, no known ill contacts, no recent travel, no recent head injury/trauma or falls. Patient currently afebrile, hemodynamically stable, spO2 97%. Based on history and physical, differentials include but are not limited to viral illness, electrolyte abnormality, anemia, infection (UTI, pneumonia), new onset seizure, ICH/mass. Plan to assess patient for acute pathology as listed above with labs, EKG, CXR, CT Head. Will administer medications for symptomatic relief, follow-up on results, and reassess. Patient likely to be admitted for further workup/evaluation of new-onset seizures.

## 2023-11-16 NOTE — ED ADULT NURSE NOTE - NSFALLRISKINTERV_ED_ALL_ED
Assistance OOB with selected safe patient handling equipment if applicable/Assistance with ambulation/Communicate fall risk and risk factors to all staff, patient, and family/Monitor gait and stability/Provide patient with walking aids/Provide visual cue: yellow wristband, yellow gown, etc/Reinforce activity limits and safety measures with patient and family/Call bell, personal items and telephone in reach/Instruct patient to call for assistance before getting out of bed/chair/stretcher/Non-slip footwear applied when patient is off stretcher/[place] to call system/Physically safe environment - no spills, clutter or unnecessary equipment/Purposeful Proactive Rounding/Room/bathroom lighting operational, light cord in reach
[Follow-up Visit ___] : a follow-up visit  for [unfilled]

## 2023-11-16 NOTE — ED PROVIDER NOTE - ATTENDING APP SHARED VISIT CONTRIBUTION OF CARE
Dichter: Pt seen w/ PA, 67F PMH Lupus, Sjogren's, anxiety / depression, [facility] lumbar spinal 8/23 BIBEMS accompanied by  s/p witnessed seizure like activity x2min this AM while in ED. Pt endorses feeling disoriented currently, all-over pain. Afebrile, VSS. Pt well appearing, in NAD. No focal neuro deficits. Agree w/ planned w/u & dispo.

## 2023-11-16 NOTE — H&P ADULT - ASSESSMENT
67 years old female with h/o HTN, HLD, hypothyroidism, Sjogren's syndrome, L spine surgery ( 08/2023), h/o lupus, h/o pyoderma gangrenosum was brought in to ED with seizure concern. Today at around 8:30AM, patient was noted to have seizure like activity by , whole body shaking, lasted for 2 minutes and associated with confusion. Patient also had tongue biting. No h/o seizure. Patient reported significant sleep loss recently due to low back pain. No fever or chills  Mildly hypertensive, afebrile, sat well at RA. EKG with NSR, QTc 468. No leukocytosis, plt 299, Cr 0.71, glucose 108, lactate 2.9. CT head  ( I personally review) with no acute pathology, CXR  ( I personally review) with no focal consolidation

## 2023-11-16 NOTE — ED PROVIDER NOTE - PROGRESS NOTE DETAILS
LALITA Lane: Workup reviewed. Patient accepted for admission to Medicine/[facility] by Dr. Maxwell. LALITA Lane: Workup reviewed - labs WNL/not actionable at this time, lactate 2.9 likely in setting of seizure activity, UA negative for infection, CXR without any acute pathology, CT Head unremarkable. Patient accepted for admission to Medicine/[facility] by Dr. Maxwell for further workup/evaluation of new-onset seizures.

## 2023-11-16 NOTE — ED PROVIDER NOTE - OBJECTIVE STATEMENT
66F w/ PMH Lupus, Hashimoto's, Pyogenic Granuloma, Sjogren's, Sciatica who presents to ED with new-onset "seizure-like" activity x 2 hours ago. 66F w/ PMH Lupus, Hashimoto's, Pyogenic Granuloma, Sjogren's, Sciatica who presents to ED with new-onset "seizure-like" activity x 2 hours ago. Pt's  at bedside providing collateral history - states that around 8 AM, pt was lying down in bed when she began having "full body shaking" and pt's "eyes rolled back", pt was not responding to pt's  verbal prompting during this time, took about 2 minutes before pt returned to normal and began to answer questions again, pt does not recall event, no head injury/trauma. Pt with no history of seizures in the past, no recent illness, no known ill contacts, no recent travel, no recent head injury/trauma or falls. Denies fever, chills, chest pain, shortness of breath, abdominal pain, N/V/D, dysuria, urinary frequency/urgency, extremity weakness/numbness/tingling, lightheadedness, dizziness, or headaches.

## 2023-11-16 NOTE — ED ADULT NURSE NOTE - OBJECTIVE STATEMENT
PT BIBEMS C/O OF SEIZURE ACTIVITY THIS MORNING WITNESS BY , LASTED ABOUT 2 MINUTES AND DESCRIBED AS TONIC CLONIC WITH EYES ROLLED BACK. HX BACK SURGERY IN 08/2023.

## 2023-11-16 NOTE — PATIENT PROFILE ADULT - FALL HARM RISK - HARM RISK INTERVENTIONS

## 2023-11-16 NOTE — ED PROVIDER NOTE - CONSTITUTIONAL, MLM
Well appearing, awake, alert, oriented to person, place, time/situation and in no apparent distress. normal... Well appearing, awake, alert, oriented to person, place, time/situation and in no apparent distress. Answering all questions appropriately in full sentences.

## 2023-11-16 NOTE — ED PROVIDER NOTE - ENMT, MLM
Head atraumatic, normocephalic. Airway patent, Nasal mucosa clear. Mouth with normal mucosa, moist mucous membranes. Throat has no vesicles, no oropharyngeal exudates and uvula is midline. Head atraumatic, normocephalic. Airway patent, Nasal mucosa clear. Mouth with normal mucosa, moist mucous membranes, no tongue lacerations visualized. Throat has no vesicles, no oropharyngeal exudates and uvula is midline.

## 2023-11-16 NOTE — ED ADULT NURSE NOTE - NSICDXPASTSURGICALHX_GEN_ALL_CORE_FT
PAST SURGICAL HISTORY:  H/O oral surgery     S/P      S/P myomectomy      PAST SURGICAL HISTORY:  H/O oral surgery     History of back surgery     S/P      S/P myomectomy

## 2023-11-16 NOTE — H&P ADULT - PROBLEM SELECTOR PLAN 4
on pilocarpine 5mg tid  Discussed with pharmacy, we don't have it here.   Patient will need to bring in home medication

## 2023-11-16 NOTE — ED PROVIDER NOTE - NEUROLOGICAL, MLM
Alert and oriented, no focal deficits, no motor or sensory deficits. Alert and oriented, no focal deficits, no motor or sensory deficits. No facial droop, face symmetrical.

## 2023-11-16 NOTE — ED ADULT NURSE NOTE - SKIN INTEGRITY
OLD SCAR IN THE BACK, NONBLEEDING HEALING SCAR NOTED TO LOWER MID BACK WITH SCANT YELLOW DRAINAGE NOTED ON NONSTICK DRESSING

## 2023-11-16 NOTE — ED PROVIDER NOTE - MUSCULOSKELETAL, MLM
Spine appears normal, range of motion is not limited, no muscle or joint tenderness, no midline spinal tenderness, moving all extremities equally, full strength against resistance, sensation intact, 2+ distal pulses, sensation intact.

## 2023-11-16 NOTE — H&P ADULT - NSHPADDITIONALINFOADULT_GEN_ALL_CORE
h/o lupus---> off Plaquenil per patient  H/o spine surgery---> wound almost completely heal. No sign of infection. Wound care consult

## 2023-11-16 NOTE — ED ADULT TRIAGE NOTE - CHIEF COMPLAINT QUOTE
Pt biba from home with c/o seizure like activity witness by . as per  pt was shaking violently for 2 minutes, eye roll back ( NO H/O SEIZURE) .  h/o back surgery ( 8/30/23) ,htn

## 2023-11-16 NOTE — H&P ADULT - PROBLEM SELECTOR PLAN 1
witnessed by , whole body shaking, tongue biting with postictal confusion  Likely triggered by significant sleep loss recently  CT head  ( I personally review) with no acute pathology  Seizure/aspiration precaution  EEG, MRI brain w/wo contrast with seizure protocol  ED consulted neurology

## 2023-11-16 NOTE — ED PROVIDER NOTE - GASTROINTESTINAL, MLM
Abdomen soft, non-tender, no guarding. Abdomen soft, non-tender, no guarding or rebound, bowel sounds present in all quadrants.

## 2023-11-16 NOTE — PATIENT PROFILE ADULT - MONEY FOR FOOD
PED DISCHARGE INSTRUCTIONS    Patient: Yina Sung MRN: [id number]  SSN: [id number]    YOB: 2014  Age: 3 wk.o. Sex: male        Primary Diagnosis:   Problem List as of 2017  Never Reviewed          Codes Class Noted - Resolved    Transaminitis ICD-10-CM: R74.0  ICD-9-CM: 790.4  2017 - Present        * (Principal)Failure to thrive (0-17) ICD-10-CM: R62.51  ICD-9-CM: 783.41  1/3/2017 - Present        RESOLVED: Dehydration ICD-10-CM: E86.0  ICD-9-CM: 276.51  2016 - 2017        RESOLVED: Hypernatremia ICD-10-CM: E87.0  ICD-9-CM: 276.0  2016 - 2017        RESOLVED: Hyperbilirubinemia ICD-10-CM: E80.6  ICD-9-CM: 782.4  2016 - 2017        RESOLVED: Normal  (single liveborn) ICD-10-CM: Z38.2  ICD-9-CM: V30.00  2016 - 2017        RESOLVED: Small for gestational age ICD-8-CM: P05.00  ICD-9-CM: 764.00  2016 - 2017                    Diet/Diet Restrictions: Breast feed on demand every 2-3 hours. Pump and give 60 ml or expressed breast milk and/or formula    Physical Activities/Restrictions/Safety: as tolerated    Discharge Instructions/Special Treatment/Home Care Needs:   Contact your physician for poor feeding. Call your physician with any concerns or questions. Pain Management: Tylenol    Asthma action plan was given to family: not applicable    Follow-up Care:   Appointment with: Dr. Toni Hilliard on  at 4 pm.      Please discuss with your Pediatrician having the Liver Function tests repeated in a few weeks. [facility] lactation services next week    Signed By: Scott Quinteros.  Mehnaz Alexis MD Time: 2:10 PM
PT evaluation complete and appropriate goals established.    Corie Morales, PT, DPT   2019  [phone]    Problem: Physical Therapy Goal  Goal: Physical Therapy Goal  Description  Goals to be met by: 1/10/2020     Patient will increase functional independence with mobility by performin. Supine to sit with MInimal Assistance  2. Sit to stand transfer with Minimal Assistance  3. Bed to chair transfer with Minimal Assistance using Rolling Walker  4. Gait  x 25 feet with Minimal Assistance using Rolling Walker.  5. Lower extremity exercise program x15 reps, with supervision, in order to increase LE strength and (I) with functional mobility.       Outcome: Ongoing, Progressing     
no

## 2023-11-16 NOTE — H&P ADULT - NSHPPHYSICALEXAM_GEN_ALL_CORE
CONSTITUTIONAL: alert and cooperative, no acute distress  EYES: PERRL, EOMI, no scleral icterus  ENT: Mucosa moist, tongue bruising noted  NECK: Neck supple, trachea midline, non-tender  CARDIAC: Normal S1 and S2. Regular rate and rhythms. No Pedal edema. Peripheral pulses intact  LUNGS: Equal air entry both lungs. No rales, rhonchi, wheezing. Normal respiratory effort.   ABDOMEN: Soft, nondistended, nontender. No guarding or rebound tenderness. No hepatomegaly or splenomegaly. Bowel sound normal.   MUSCULOSKELETAL: Normocephalic, atraumatic. Spine normal without deformity or tenderness. Spine surgery scar appear clean, almost completely heal, no sign of infection  NEUROLOGICAL: No gross motor or sensory deficits.  SKIN: no lesions or eruptions. Normal turgor  PSYCHIATRIC: A&O x 3, appropriate mood and affect.

## 2023-11-17 LAB
A1C WITH ESTIMATED AVERAGE GLUCOSE RESULT: 5.4 % — SIGNIFICANT CHANGE UP (ref 4–5.6)
A1C WITH ESTIMATED AVERAGE GLUCOSE RESULT: 5.4 % — SIGNIFICANT CHANGE UP (ref 4–5.6)
ALBUMIN SERPL ELPH-MCNC: 3.1 G/DL — LOW (ref 3.3–5)
ALBUMIN SERPL ELPH-MCNC: 3.1 G/DL — LOW (ref 3.3–5)
ALP SERPL-CCNC: 125 U/L — HIGH (ref 40–120)
ALP SERPL-CCNC: 125 U/L — HIGH (ref 40–120)
ALT FLD-CCNC: 23 U/L — SIGNIFICANT CHANGE UP (ref 12–78)
ALT FLD-CCNC: 23 U/L — SIGNIFICANT CHANGE UP (ref 12–78)
ANION GAP SERPL CALC-SCNC: 5 MMOL/L — SIGNIFICANT CHANGE UP (ref 5–17)
ANION GAP SERPL CALC-SCNC: 5 MMOL/L — SIGNIFICANT CHANGE UP (ref 5–17)
AST SERPL-CCNC: 17 U/L — SIGNIFICANT CHANGE UP (ref 15–37)
AST SERPL-CCNC: 17 U/L — SIGNIFICANT CHANGE UP (ref 15–37)
BILIRUB SERPL-MCNC: 0.4 MG/DL — SIGNIFICANT CHANGE UP (ref 0.2–1.2)
BILIRUB SERPL-MCNC: 0.4 MG/DL — SIGNIFICANT CHANGE UP (ref 0.2–1.2)
BUN SERPL-MCNC: 12 MG/DL — SIGNIFICANT CHANGE UP (ref 7–23)
BUN SERPL-MCNC: 12 MG/DL — SIGNIFICANT CHANGE UP (ref 7–23)
CALCIUM SERPL-MCNC: 8.6 MG/DL — SIGNIFICANT CHANGE UP (ref 8.5–10.1)
CALCIUM SERPL-MCNC: 8.6 MG/DL — SIGNIFICANT CHANGE UP (ref 8.5–10.1)
CHLORIDE SERPL-SCNC: 107 MMOL/L — SIGNIFICANT CHANGE UP (ref 96–108)
CHLORIDE SERPL-SCNC: 107 MMOL/L — SIGNIFICANT CHANGE UP (ref 96–108)
CHOLEST SERPL-MCNC: 180 MG/DL — SIGNIFICANT CHANGE UP
CHOLEST SERPL-MCNC: 180 MG/DL — SIGNIFICANT CHANGE UP
CK SERPL-CCNC: 62 U/L — SIGNIFICANT CHANGE UP (ref 26–192)
CK SERPL-CCNC: 62 U/L — SIGNIFICANT CHANGE UP (ref 26–192)
CO2 SERPL-SCNC: 26 MMOL/L — SIGNIFICANT CHANGE UP (ref 22–31)
CO2 SERPL-SCNC: 26 MMOL/L — SIGNIFICANT CHANGE UP (ref 22–31)
CREAT SERPL-MCNC: 0.85 MG/DL — SIGNIFICANT CHANGE UP (ref 0.5–1.3)
CREAT SERPL-MCNC: 0.85 MG/DL — SIGNIFICANT CHANGE UP (ref 0.5–1.3)
EGFR: 76 ML/MIN/1.73M2 — SIGNIFICANT CHANGE UP
EGFR: 76 ML/MIN/1.73M2 — SIGNIFICANT CHANGE UP
ESTIMATED AVERAGE GLUCOSE: 108 MG/DL — SIGNIFICANT CHANGE UP (ref 68–114)
ESTIMATED AVERAGE GLUCOSE: 108 MG/DL — SIGNIFICANT CHANGE UP (ref 68–114)
GLUCOSE SERPL-MCNC: 91 MG/DL — SIGNIFICANT CHANGE UP (ref 70–99)
GLUCOSE SERPL-MCNC: 91 MG/DL — SIGNIFICANT CHANGE UP (ref 70–99)
HCT VFR BLD CALC: 36 % — SIGNIFICANT CHANGE UP (ref 34.5–45)
HCT VFR BLD CALC: 36 % — SIGNIFICANT CHANGE UP (ref 34.5–45)
HCV AB S/CO SERPL IA: 0.15 S/CO — SIGNIFICANT CHANGE UP (ref 0–0.99)
HCV AB S/CO SERPL IA: 0.15 S/CO — SIGNIFICANT CHANGE UP (ref 0–0.99)
HCV AB SERPL-IMP: SIGNIFICANT CHANGE UP
HCV AB SERPL-IMP: SIGNIFICANT CHANGE UP
HDLC SERPL-MCNC: 76 MG/DL — SIGNIFICANT CHANGE UP
HDLC SERPL-MCNC: 76 MG/DL — SIGNIFICANT CHANGE UP
HGB BLD-MCNC: 11.5 G/DL — SIGNIFICANT CHANGE UP (ref 11.5–15.5)
HGB BLD-MCNC: 11.5 G/DL — SIGNIFICANT CHANGE UP (ref 11.5–15.5)
LIPID PNL WITH DIRECT LDL SERPL: 94 MG/DL — SIGNIFICANT CHANGE UP
LIPID PNL WITH DIRECT LDL SERPL: 94 MG/DL — SIGNIFICANT CHANGE UP
MAGNESIUM SERPL-MCNC: 2 MG/DL — SIGNIFICANT CHANGE UP (ref 1.6–2.6)
MAGNESIUM SERPL-MCNC: 2 MG/DL — SIGNIFICANT CHANGE UP (ref 1.6–2.6)
MCHC RBC-ENTMCNC: 27.8 PG — SIGNIFICANT CHANGE UP (ref 27–34)
MCHC RBC-ENTMCNC: 27.8 PG — SIGNIFICANT CHANGE UP (ref 27–34)
MCHC RBC-ENTMCNC: 31.9 G/DL — LOW (ref 32–36)
MCHC RBC-ENTMCNC: 31.9 G/DL — LOW (ref 32–36)
MCV RBC AUTO: 87.2 FL — SIGNIFICANT CHANGE UP (ref 80–100)
MCV RBC AUTO: 87.2 FL — SIGNIFICANT CHANGE UP (ref 80–100)
NON HDL CHOLESTEROL: 104 MG/DL — SIGNIFICANT CHANGE UP
NON HDL CHOLESTEROL: 104 MG/DL — SIGNIFICANT CHANGE UP
NRBC # BLD: 0 /100 WBCS — SIGNIFICANT CHANGE UP (ref 0–0)
NRBC # BLD: 0 /100 WBCS — SIGNIFICANT CHANGE UP (ref 0–0)
PHOSPHATE SERPL-MCNC: 3.4 MG/DL — SIGNIFICANT CHANGE UP (ref 2.5–4.5)
PHOSPHATE SERPL-MCNC: 3.4 MG/DL — SIGNIFICANT CHANGE UP (ref 2.5–4.5)
PLATELET # BLD AUTO: 289 K/UL — SIGNIFICANT CHANGE UP (ref 150–400)
PLATELET # BLD AUTO: 289 K/UL — SIGNIFICANT CHANGE UP (ref 150–400)
POTASSIUM SERPL-MCNC: 4 MMOL/L — SIGNIFICANT CHANGE UP (ref 3.5–5.3)
POTASSIUM SERPL-MCNC: 4 MMOL/L — SIGNIFICANT CHANGE UP (ref 3.5–5.3)
POTASSIUM SERPL-SCNC: 4 MMOL/L — SIGNIFICANT CHANGE UP (ref 3.5–5.3)
POTASSIUM SERPL-SCNC: 4 MMOL/L — SIGNIFICANT CHANGE UP (ref 3.5–5.3)
PROLACTIN SERPL-MCNC: 7.1 NG/ML — SIGNIFICANT CHANGE UP (ref 3.4–24.1)
PROLACTIN SERPL-MCNC: 7.1 NG/ML — SIGNIFICANT CHANGE UP (ref 3.4–24.1)
PROT SERPL-MCNC: 6.7 GM/DL — SIGNIFICANT CHANGE UP (ref 6–8.3)
PROT SERPL-MCNC: 6.7 GM/DL — SIGNIFICANT CHANGE UP (ref 6–8.3)
RBC # BLD: 4.13 M/UL — SIGNIFICANT CHANGE UP (ref 3.8–5.2)
RBC # BLD: 4.13 M/UL — SIGNIFICANT CHANGE UP (ref 3.8–5.2)
RBC # FLD: 13.9 % — SIGNIFICANT CHANGE UP (ref 10.3–14.5)
RBC # FLD: 13.9 % — SIGNIFICANT CHANGE UP (ref 10.3–14.5)
SODIUM SERPL-SCNC: 138 MMOL/L — SIGNIFICANT CHANGE UP (ref 135–145)
SODIUM SERPL-SCNC: 138 MMOL/L — SIGNIFICANT CHANGE UP (ref 135–145)
TRIGL SERPL-MCNC: 53 MG/DL — SIGNIFICANT CHANGE UP
TRIGL SERPL-MCNC: 53 MG/DL — SIGNIFICANT CHANGE UP
TSH SERPL-MCNC: 0.2 UIU/ML — LOW (ref 0.36–3.74)
TSH SERPL-MCNC: 0.2 UIU/ML — LOW (ref 0.36–3.74)
WBC # BLD: 6.65 K/UL — SIGNIFICANT CHANGE UP (ref 3.8–10.5)
WBC # BLD: 6.65 K/UL — SIGNIFICANT CHANGE UP (ref 3.8–10.5)
WBC # FLD AUTO: 6.65 K/UL — SIGNIFICANT CHANGE UP (ref 3.8–10.5)
WBC # FLD AUTO: 6.65 K/UL — SIGNIFICANT CHANGE UP (ref 3.8–10.5)

## 2023-11-17 PROCEDURE — 95816 EEG AWAKE AND DROWSY: CPT | Mod: 26

## 2023-11-17 PROCEDURE — 76377 3D RENDER W/INTRP POSTPROCES: CPT | Mod: 26

## 2023-11-17 PROCEDURE — 99233 SBSQ HOSP IP/OBS HIGH 50: CPT

## 2023-11-17 PROCEDURE — 70553 MRI BRAIN STEM W/O & W/DYE: CPT | Mod: 26

## 2023-11-17 RX ORDER — MORPHINE SULFATE 50 MG/1
2 CAPSULE, EXTENDED RELEASE ORAL ONCE
Refills: 0 | Status: DISCONTINUED | OUTPATIENT
Start: 2023-11-17 | End: 2023-11-17

## 2023-11-17 RX ORDER — COLLAGENASE CLOSTRIDIUM HIST. 250 UNIT/G
1 OINTMENT (GRAM) TOPICAL DAILY
Refills: 0 | Status: DISCONTINUED | OUTPATIENT
Start: 2023-11-17 | End: 2023-11-19

## 2023-11-17 RX ADMIN — Medication 80 MILLIGRAM(S): at 12:49

## 2023-11-17 RX ADMIN — ENOXAPARIN SODIUM 40 MILLIGRAM(S): 100 INJECTION SUBCUTANEOUS at 05:11

## 2023-11-17 RX ADMIN — OXYCODONE HYDROCHLORIDE 5 MILLIGRAM(S): 5 TABLET ORAL at 08:20

## 2023-11-17 RX ADMIN — Medication 650 MILLIGRAM(S): at 12:50

## 2023-11-17 RX ADMIN — ATORVASTATIN CALCIUM 20 MILLIGRAM(S): 80 TABLET, FILM COATED ORAL at 21:14

## 2023-11-17 RX ADMIN — GABAPENTIN 800 MILLIGRAM(S): 400 CAPSULE ORAL at 05:12

## 2023-11-17 RX ADMIN — GABAPENTIN 800 MILLIGRAM(S): 400 CAPSULE ORAL at 14:21

## 2023-11-17 RX ADMIN — OXYCODONE HYDROCHLORIDE 5 MILLIGRAM(S): 5 TABLET ORAL at 17:42

## 2023-11-17 RX ADMIN — OXYCODONE HYDROCHLORIDE 5 MILLIGRAM(S): 5 TABLET ORAL at 00:47

## 2023-11-17 RX ADMIN — SENNA PLUS 2 TABLET(S): 8.6 TABLET ORAL at 21:12

## 2023-11-17 RX ADMIN — OXYCODONE HYDROCHLORIDE 5 MILLIGRAM(S): 5 TABLET ORAL at 01:21

## 2023-11-17 RX ADMIN — Medication 25 MILLIGRAM(S): at 05:13

## 2023-11-17 RX ADMIN — Medication 137 MICROGRAM(S): at 05:12

## 2023-11-17 RX ADMIN — GABAPENTIN 800 MILLIGRAM(S): 400 CAPSULE ORAL at 21:12

## 2023-11-17 RX ADMIN — OXYCODONE HYDROCHLORIDE 5 MILLIGRAM(S): 5 TABLET ORAL at 18:39

## 2023-11-17 RX ADMIN — Medication 1 MILLIGRAM(S): at 12:48

## 2023-11-17 RX ADMIN — Medication 650 MILLIGRAM(S): at 13:40

## 2023-11-17 RX ADMIN — OXYCODONE HYDROCHLORIDE 5 MILLIGRAM(S): 5 TABLET ORAL at 09:00

## 2023-11-17 RX ADMIN — LISINOPRIL 20 MILLIGRAM(S): 2.5 TABLET ORAL at 05:12

## 2023-11-17 NOTE — PHYSICAL THERAPY INITIAL EVALUATION ADULT - GENERAL OBSERVATIONS, REHAB EVAL
Pt was seen in supine c dressing in lower back, alert and Ox4. Pt is I in all functional mobility c good balance. Lower back c dehiscence surgical wound. Wound was cleaned, dressing applied and documented in flowsheet A&I  and c recommendation in flowsheet plan of care.

## 2023-11-17 NOTE — PHYSICAL THERAPY INITIAL EVALUATION ADULT - ADDITIONAL COMMENTS
As per pt : There are 5 steps, c L rail up, at the entry of the house and 12 steps, c L rail up, to negotiate at home.

## 2023-11-17 NOTE — PHYSICAL THERAPY INITIAL EVALUATION ADULT - ASSISTIVE DEVICE FOR STAIR TRANSFER, REHAB EVAL
left rail up/right rail down
After evaluating the patient it has been determined they are at risk for postpartum hemorrhage.

## 2023-11-17 NOTE — EEG REPORT - NS EEG TEXT BOX
CALLI NOYOLA [id number]     Study Date: 		11-17-23  Duration: 24 minutes    --------------------------------------------------------------------------------------------------  History:  CC/ HPI Patient is a 67y old  Female who presents with a chief complaint of new onset seizure (16 Nov 2023 14:40)    MEDICATIONS  (STANDING):  atorvastatin 20 milliGRAM(s) Oral at bedtime  enoxaparin Injectable 40 milliGRAM(s) SubCutaneous every 24 hours  FLUoxetine 80 milliGRAM(s) Oral daily  folic acid 1 milliGRAM(s) Oral daily  gabapentin 800 milliGRAM(s) Oral three times a day  levothyroxine 137 MICROGram(s) Oral daily  lisinopril 20 milliGRAM(s) Oral daily  metoprolol succinate ER 25 milliGRAM(s) Oral daily  senna 2 Tablet(s) Oral at bedtime    --------------------------------------------------------------------------------------------------  Study Interpretation:    [Abbreviation Key:  PDR=alpha rhythm/posterior dominant rhythm. A-P=anterior posterior.  Amplitude: ‘very low’:<20; ‘low’:20-49; ‘medium’:; ‘high’:>150uV.  Persistence for periodic/rhythmic patterns (% of epoch) ‘rare’:<1%; ‘occasional’:1-10%; ‘frequent’:10-50%; ‘abundant’:50-90%; ‘continuous’:>90%.  Persistence for sporadic discharges: ‘rare’:<1/hr; ‘occasional’:1/min-1/hr; ‘frequent’:>1/min; ‘abundant’:>1/10 sec.  RPP=rhythmic and periodic patterns; GRDA=generalized rhythmic delta activity; FIRDA=frontal intermittent GRDA; LRDA=lateralized rhythmic delta activity; TIRDA=temporal intermittent rhythmic delta activity;  LPD=PLED=lateralized periodic discharges; GPD=generalized periodic discharges; BIPDs =bilateral independent periodic discharges; Mf=multifocal; SIRPDs=stimulus induced rhythmic, periodic, or ictal appearing discharges; BIRDs=brief potentially ictal rhythmic discharges >4 Hz, lasting .5-10s; PFA (paroxysmal bursts >13 Hz or =8 Hz <10s).  Modifiers: +F=with fast component; +S=with spike component; +R=with rhythmic component.  S-B=burst suppression pattern.  Max=maximal. N1-drowsy; N2-stage II sleep; N3-slow wave sleep. SSS/BETS=small sharp spikes/benign epileptiform transients of sleep. HV=hyperventilation; PS=photic stimulation]    FINDINGS:      Background:  Continuity: continuous  Symmetry: symmetric  PDR: 7 Hz activity, with amplitude to 40 uV, that attenuated to eye opening.    Reactivity: present  Voltage: normal  Anterior Posterior Gradient: present  Other background findings: none  Breach: absent    Background Slowing:  Generalized slowing: diffuse irregular delta and theta activity.  Focal slowing: none was present.    State Changes:   -Drowsiness noted with increased slowing, attenuation of fast activity, vertex transients.  -Present with N2 sleep transients with symmetric spindles and K-complexes.    Sporadic Epileptiform Discharges:    None    Rhythmic and Periodic Patterns (RPPs):  None     Electrographic and Electroclinical seizures:  None    Other Clinical Events:  None    Activation Procedures:   -Hyperventilation was not performed.    -Photic stimulation was performed and did not elicit any abnormalities.       Artifacts:  Intermittent myogenic and movement artifacts were noted.    ECG:  The heart rate on single channel ECG was predominantly between 60 to 70 BPM.    EEG Classification / Summary:  Abnormal EEG study in awake, drowsy, asleep state(s)  Mild generalized background slowing    -----------------------------------------------------------------------------------------------------  Clinical Impression:  Mild diffuse/multi-focal cerebral dysfunction, not specific as to etiology.  There were no epileptiform abnormalities recorded.      This is fellow preliminary read, pending attending review.  -------------------------------------------------------------------------------------------------------  [facility] EEG Reading Room Ph#: [phone]  Epilepsy Answering Service after 5PM and before 8:30AM: Ph#: [phone]    JOSE Cazares  Epilepsy Fellow   CALLI NOYOLA [id number]     Study Date: 		11-17-23  Duration: 24 minutes    --------------------------------------------------------------------------------------------------  History:  CC/ HPI Patient is a 67y old  Female who presents with a chief complaint of new onset seizure (16 Nov 2023 14:40)    MEDICATIONS  (STANDING):  atorvastatin 20 milliGRAM(s) Oral at bedtime  enoxaparin Injectable 40 milliGRAM(s) SubCutaneous every 24 hours  FLUoxetine 80 milliGRAM(s) Oral daily  folic acid 1 milliGRAM(s) Oral daily  gabapentin 800 milliGRAM(s) Oral three times a day  levothyroxine 137 MICROGram(s) Oral daily  lisinopril 20 milliGRAM(s) Oral daily  metoprolol succinate ER 25 milliGRAM(s) Oral daily  senna 2 Tablet(s) Oral at bedtime    --------------------------------------------------------------------------------------------------  Study Interpretation:    [Abbreviation Key:  PDR=alpha rhythm/posterior dominant rhythm. A-P=anterior posterior.  Amplitude: ‘very low’:<20; ‘low’:20-49; ‘medium’:; ‘high’:>150uV.  Persistence for periodic/rhythmic patterns (% of epoch) ‘rare’:<1%; ‘occasional’:1-10%; ‘frequent’:10-50%; ‘abundant’:50-90%; ‘continuous’:>90%.  Persistence for sporadic discharges: ‘rare’:<1/hr; ‘occasional’:1/min-1/hr; ‘frequent’:>1/min; ‘abundant’:>1/10 sec.  RPP=rhythmic and periodic patterns; GRDA=generalized rhythmic delta activity; FIRDA=frontal intermittent GRDA; LRDA=lateralized rhythmic delta activity; TIRDA=temporal intermittent rhythmic delta activity;  LPD=PLED=lateralized periodic discharges; GPD=generalized periodic discharges; BIPDs =bilateral independent periodic discharges; Mf=multifocal; SIRPDs=stimulus induced rhythmic, periodic, or ictal appearing discharges; BIRDs=brief potentially ictal rhythmic discharges >4 Hz, lasting .5-10s; PFA (paroxysmal bursts >13 Hz or =8 Hz <10s).  Modifiers: +F=with fast component; +S=with spike component; +R=with rhythmic component.  S-B=burst suppression pattern.  Max=maximal. N1-drowsy; N2-stage II sleep; N3-slow wave sleep. SSS/BETS=small sharp spikes/benign epileptiform transients of sleep. HV=hyperventilation; PS=photic stimulation]    FINDINGS:      Background:  Continuity: continuous  Symmetry: symmetric  PDR: 7 Hz activity, with amplitude to 40 uV, that attenuated to eye opening.    Reactivity: present  Voltage: normal  Anterior Posterior Gradient: present  Other background findings: none  Breach: absent    Background Slowing:  Generalized slowing: diffuse irregular delta and theta activity.  Focal slowing: none was present.    State Changes:   -Drowsiness noted with increased slowing, attenuation of fast activity, vertex transients.  -Present with N2 sleep transients with symmetric spindles and K-complexes.    Sporadic Epileptiform Discharges:    None    Rhythmic and Periodic Patterns (RPPs):  None     Electrographic and Electroclinical seizures:  None    Other Clinical Events:  None    Activation Procedures:   -Hyperventilation was not performed.    -Photic stimulation was performed and did not elicit any abnormalities.       Artifacts:  Intermittent myogenic and movement artifacts were noted.    ECG:  The heart rate on single channel ECG was predominantly between 60 to 70 BPM.    EEG Classification / Summary:  Abnormal EEG study in awake, drowsy, asleep state(s)  Mild generalized background slowing    -----------------------------------------------------------------------------------------------------  Clinical Impression:  Mild diffuse/multi-focal cerebral dysfunction, not specific as to etiology.  There were no epileptiform abnormalities recorded.      -------------------------------------------------------------------------------------------------------  [facility] EEG Reading Room Ph#: [phone]  Epilepsy Answering Service after 5PM and before 8:30AM: Ph#: [phone]    JOSE Cazares  Epilepsy Fellow    Shane Hill MD  EEG/Epilepsy Attending

## 2023-11-18 PROCEDURE — 99232 SBSQ HOSP IP/OBS MODERATE 35: CPT

## 2023-11-18 RX ORDER — MORPHINE SULFATE 50 MG/1
2 CAPSULE, EXTENDED RELEASE ORAL ONCE
Refills: 0 | Status: DISCONTINUED | OUTPATIENT
Start: 2023-11-18 | End: 2023-11-18

## 2023-11-18 RX ORDER — DIPHENHYDRAMINE HYDROCHLORIDE AND LIDOCAINE HYDROCHLORIDE AND ALUMINUM HYDROXIDE AND MAGNESIUM HYDRO
5 KIT
Refills: 0 | Status: DISCONTINUED | OUTPATIENT
Start: 2023-11-18 | End: 2023-11-19

## 2023-11-18 RX ADMIN — GABAPENTIN 800 MILLIGRAM(S): 400 CAPSULE ORAL at 13:18

## 2023-11-18 RX ADMIN — GABAPENTIN 800 MILLIGRAM(S): 400 CAPSULE ORAL at 05:12

## 2023-11-18 RX ADMIN — MORPHINE SULFATE 2 MILLIGRAM(S): 50 CAPSULE, EXTENDED RELEASE ORAL at 00:02

## 2023-11-18 RX ADMIN — MORPHINE SULFATE 2 MILLIGRAM(S): 50 CAPSULE, EXTENDED RELEASE ORAL at 17:10

## 2023-11-18 RX ADMIN — SENNA PLUS 2 TABLET(S): 8.6 TABLET ORAL at 21:05

## 2023-11-18 RX ADMIN — ATORVASTATIN CALCIUM 20 MILLIGRAM(S): 80 TABLET, FILM COATED ORAL at 21:07

## 2023-11-18 RX ADMIN — LISINOPRIL 20 MILLIGRAM(S): 2.5 TABLET ORAL at 05:12

## 2023-11-18 RX ADMIN — GABAPENTIN 800 MILLIGRAM(S): 400 CAPSULE ORAL at 21:06

## 2023-11-18 RX ADMIN — OXYCODONE HYDROCHLORIDE 5 MILLIGRAM(S): 5 TABLET ORAL at 21:14

## 2023-11-18 RX ADMIN — Medication 1 APPLICATION(S): at 11:33

## 2023-11-18 RX ADMIN — Medication 25 MILLIGRAM(S): at 05:12

## 2023-11-18 RX ADMIN — Medication 1 MILLIGRAM(S): at 11:33

## 2023-11-18 RX ADMIN — Medication 3 MILLIGRAM(S): at 21:12

## 2023-11-18 RX ADMIN — Medication 137 MICROGRAM(S): at 05:12

## 2023-11-18 RX ADMIN — ENOXAPARIN SODIUM 40 MILLIGRAM(S): 100 INJECTION SUBCUTANEOUS at 05:11

## 2023-11-18 RX ADMIN — OXYCODONE HYDROCHLORIDE 5 MILLIGRAM(S): 5 TABLET ORAL at 09:50

## 2023-11-18 RX ADMIN — MORPHINE SULFATE 2 MILLIGRAM(S): 50 CAPSULE, EXTENDED RELEASE ORAL at 03:10

## 2023-11-18 RX ADMIN — OXYCODONE HYDROCHLORIDE 5 MILLIGRAM(S): 5 TABLET ORAL at 01:43

## 2023-11-18 RX ADMIN — OXYCODONE HYDROCHLORIDE 5 MILLIGRAM(S): 5 TABLET ORAL at 03:12

## 2023-11-18 RX ADMIN — OXYCODONE HYDROCHLORIDE 5 MILLIGRAM(S): 5 TABLET ORAL at 22:14

## 2023-11-18 RX ADMIN — Medication 80 MILLIGRAM(S): at 11:33

## 2023-11-18 RX ADMIN — DIPHENHYDRAMINE HYDROCHLORIDE AND LIDOCAINE HYDROCHLORIDE AND ALUMINUM HYDROXIDE AND MAGNESIUM HYDRO 5 MILLILITER(S): KIT at 18:07

## 2023-11-18 NOTE — PROGRESS NOTE ADULT - PROBLEM SELECTOR PLAN 4
on pilocarpine 5mg tid  Discussed with pharmacy, we don't have it here.   Patient will need to bring in home medication
on pilocarpine 5mg tid  Discussed with pharmacy, we don't have it here.   Patient will need to bring in home medication

## 2023-11-18 NOTE — PROGRESS NOTE ADULT - PROBLEM SELECTOR PLAN 1
witnessed by , whole body shaking, tongue biting with postictal confusion  possibly  triggered by significant sleep loss recently vs taking tramadol 300 mg per day (which lowers seizure threshold) and stopping Klonopin prior to seizure   CT head  ( I personally review) with no acute pathology  Seizure/aspiration precaution  EEG, MRI brain w/wo contrast with seizure protocol reviewed. NO active seizure or stroke   pending neurology consult
witnessed by , whole body shaking, tongue biting with postictal confusion  possibly  triggered by significant sleep loss recently vs taking tramadol 300 mg per day (which lowers seizure threshold) and stopping Klonopin prior to seizure   CT head  ( I personally review) with no acute pathology  Seizure/aspiration precaution  EEG, MRI brain w/wo contrast with seizure protocol reviewed. NO active seizure or stroke   pending neurology consult

## 2023-11-18 NOTE — CONSULT NOTE ADULT - ASSESSMENT
Incomplete prewritten  67 years old female with h/o HTN, HLD, hypothyroidism, Sjogren's syndrome, L spine surgery ( 08/2023), h/o lupus, h/o pyoderma gangrenosum was brought in to ED with seizure in setting of Klonopin withdrawal, tramadol use and sleep deprivation  PE:  MRI brain seizure protocol WNL  EEG slowing    Impression:  1rst time provoked seizure, Would no start AEDs  DC planning  Can follow up with Neurology, Dr. Odilon Patel at [phone] Incomplete prewritten  67 years old female with h/o HTN, HLD, hypothyroidism, Sjogren's syndrome, L spine surgery ( 08/2023), h/o lupus, h/o pyoderma gangrenosum was brought in to ED with seizure in setting of Klonopin withdrawal, tramadol use and sleep deprivation  PE:  MRI brain seizure protocol WNL  EEG slowing    Impression:  1rst time provoked seizure, Would no start AEDs  DC planning  Discussed [place] law  Can follow up with Neurology, Dr. Odilon Patel at [phone]   67 years old female with h/o HTN, HLD, hypothyroidism, Sjogren's syndrome, L spine surgery ( 08/2023), h/o lupus, h/o pyoderma gangrenosum was brought in to ED with seizure in setting of Klonopin withdrawal, tramadol use and sleep deprivation  PE:  MRI brain seizure protocol WNL  EEG slowing    Impression:  1rst time provoked seizure, Would no start AEDs  DC planning  Discussed [place] law  Can follow up with Neurology, Dr. Odilon Patel at [phone]

## 2023-11-18 NOTE — CONSULT NOTE ADULT - SUBJECTIVE AND OBJECTIVE BOX
Neurology consult    CALLI [id number]     Patient is a 67y old  Female who presents with a chief complaint of new onset seizure (2023 11:37)      HPI:  67 years old female with h/o HTN, HLD, hypothyroidism, Sjogren's syndrome, L spine surgery ( 2023), h/o lupus, h/o pyoderma gangrenosum was brought in to ED with seizure concern. Today at around 8:30AM, patient was noted to have seizure like activity by , whole body shaking, lasted for 2 minutes and associated with confusion. Patient also had tongue biting. No h/o seizure. Patient reported significant sleep loss recently due to low back pain. No fever or chills  Mildly hypertensive, afebrile, sat well at [facility]. EKG with NSR, QTc 468. No leukocytosis, plt 299, Cr 0.71, glucose 108, lactate 2.9    SH: no toxic habits  FH: Father-lymphoma, CHF, Mother- pancreatic cancer (2023 14:40)      no difficulty with language.  No vision loss or double vision.  No dizziness, vertigo or new hearing loss.  . No difficulty with swallowing.  No focal weakness.  No focal sensory changes.  No numbness or tingling in the bilateral lower extremities.  No difficulty with balance.  No difficulty with ambulation.    REVIEW OF SYSTEMS:    Constitutional: No fever, chills, fatigue, weakness  Eyes: no eye pain, visual disturbances, or discharge  ENT:  No difficulty hearing, tinnitus, vertigo; No sinus or throat pain  Neck: No pain or stiffness  Respiratory: No cough, dyspnea, wheezing   Cardiovascular: No chest pain, palpitations,   Gastrointestinal: No abdominal or epigastric pain. No nausea, vomiting  No diarrhea or constipation.   Genitourinary: No dysuria, frequency, hematuria or incontinence  Neurological: No headaches, lightheadedness, vertigo, numbness or tremors  Psychiatric: No depression, anxiety, mood swings or difficulty sleeping  Musculoskeletal: No joint pain or swelling; No muscle, back or extremity pain  Skin: No itching, burning, rashes or lesions   Lymph Nodes: No enlarged glands  Endocrine: No heat or cold intolerance; No hair loss, No h/o diabetes or thyroid dysfunction  Allergy and Immunologic: No hives or eczema    MEDICATIONS    acetaminophen     Tablet .. 650 milliGRAM(s) Oral every 6 hours PRN  atorvastatin 20 milliGRAM(s) Oral at bedtime  collagenase Ointment 1 Application(s) Topical daily  enoxaparin Injectable 40 milliGRAM(s) SubCutaneous every 24 hours  FIRST- Mouthwash  BLM 5 milliLiter(s) Swish and Spit two times a day  FLUoxetine 80 milliGRAM(s) Oral daily  folic acid 1 milliGRAM(s) Oral daily  gabapentin 800 milliGRAM(s) Oral three times a day  levothyroxine 137 MICROGram(s) Oral daily  lisinopril 20 milliGRAM(s) Oral daily  melatonin 3 milliGRAM(s) Oral at bedtime PRN  metoprolol succinate ER 25 milliGRAM(s) Oral daily  oxyCODONE    IR 5 milliGRAM(s) Oral every 8 hours PRN  senna 2 Tablet(s) Oral at bedtime      PMH: HTN (hypertension)    SLE (systemic lupus erythematosus)    Hypothyroid    Depression    Pyoderma gangrenosum    Steroid-induced psychosis with complication         PSH: S/P myomectomy    S/P     H/O oral surgery    History of back surgery        Family history: No history of dementia, strokes, or seizures   FAMILY HISTORY:  No pertinent family history in first degree relatives        SOCIAL HISTORY:  No history of tobacco or alcohol use     Allergies    Compazine (Other)    Intolerances            Vital Signs Last 24 Hrs  T(C): 37.1 (2023 18:10), Max: 37.1 (2023 18:10)  T(F): 98.7 (2023 18:10), Max: 98.7 (2023 18:10)  HR: 71 (2023 18:10) (67 - 94)  BP: 155/73 (2023 18:10) (134/79 - 181/93)  BP(mean): --  RR: 18 (2023 18:10) (16 - 18)  SpO2: 98% (2023 18:10) (94% - 98%)    Parameters below as of 2023 18:10  Patient On (Oxygen Delivery Method): room air          On Neurological Examination:    Mental Status - Patient is alert, awake, oriented X3. fluent, names, no dysarthria no aphasia Follows commands well and able to answer questions appropriately. Mood and affect  normal    Cranial Nerves - PERRL, EOMI, VFF, V1-V3 intact, no gross facial asymmetry, tongue/uvula midline    Motor Exam -   Right upper  Left upper  Right lower  Left lower      nml bulk/tone    Sensory    Intact to light touch and pinprick bilaterally    Coord: FTN intact bilaterally     Gait -  normal      Reflexes:          2+ throughout                                               GENERAL Exam:     Nontoxic , No Acute Distress   	  HEENT:  normocephalic, atraumatic  		  LUNGS:	Clear bilaterally  No Wheeze    	  HEART:	 Normal S1S2   No murmur RRR        	  GI/ ABDOMEN:  Soft  Non tender    EXTREMITIES:   No Edema  No Clubbing  No Cyanosis No Edema    MUSCULOSKELETAL: Normal Range of Motion  	   SKIN:      Normal   No Ecchymosis               LABS:  CBC Full  -  ( 2023 07:40 )  WBC Count : 6.65 K/uL  RBC Count : 4.13 M/uL  Hemoglobin : 11.5 g/dL  Hematocrit : 36.0 %  Platelet Count - Automated : 289 K/uL  Mean Cell Volume : 87.2 fl  Mean Cell Hemoglobin : 27.8 pg  Mean Cell Hemoglobin Concentration : 31.9 g/dL  Auto Neutrophil # : x  Auto Lymphocyte # : x  Auto Monocyte # : x  Auto Eosinophil # : x  Auto Basophil # : x  Auto Neutrophil % : x  Auto Lymphocyte % : x  Auto Monocyte % : x  Auto Eosinophil % : x  Auto Basophil % : x    Urinalysis Basic - ( 2023 07:40 )    Color: x / Appearance: x / SG: x / pH: x  Gluc: 91 mg/dL / Ketone: x  / Bili: x / Urobili: x   Blood: x / Protein: x / Nitrite: x   Leuk Esterase: x / RBC: x / WBC x   Sq Epi: x / Non Sq Epi: x / Bacteria: x          138  |  107  |  12  ----------------------------<  91  4.0   |  26  |  0.85    Ca    8.6      2023 07:40  Phos  3.4     -  Mg     2.0     -    TPro  6.7  /  Alb  3.1<L>  /  TBili  0.4  /  DBili  x   /  AST  17  /  ALT  23  /  AlkPhos  125<H>      LIVER FUNCTIONS - ( 2023 07:40 )  Alb: 3.1 g/dL / Pro: 6.7 gm/dL / ALK PHOS: 125 U/L / ALT: 23 U/L / AST: 17 U/L / GGT: x           Hemoglobin A1C:             RADIOLOGY  < from: MR 3D Reconstruct w/ Workstation (23 @ 12:25) >    IMPRESSION:    1.Cerebral hemispheric white matter lesions consistent with ischemic   white matter disease typical for age    2. Diffuse brain volume loss lower range typical for age    3.   Anterior temporal lobe structures appear intact.  No abnormal   enhancement    --- End of Report ---            < end of copied text >  Clinical Impression:  Mild diffuse/multi-focal cerebral dysfunction, not specific as to etiology.  There were no epileptiform abnormalities recorded.      -------------------------------------------------------------------------------------------------------  [facility] EEG Reading Room Ph#: [phone]  Epilepsy Answering Service after 5PM and before 8:30AM: Ph#: [phone]    JOSE Cazares  Epilepsy Fellow    Shane Hill MD  EEG/Epilepsy Attending                  Neurology consult    CALLI [id number]     Patient is a 67y old  Female who presents with a chief complaint of new onset seizure (2023 11:37)      HPI:  67 years old female with h/o HTN, HLD, hypothyroidism, Sjogren's syndrome, L spine surgery ( 2023), h/o lupus, h/o pyoderma gangrenosum was brought in to ED with seizure concern. Today at around 8:30AM, patient was noted to have seizure like activity by , whole body shaking, lasted for 2 minutes and associated with confusion. Patient also had tongue biting. No h/o seizure. Patient reported significant sleep loss recently due to low back pain. No fever or chills  Mildly hypertensive, afebrile, sat well at RA. EKG with NSR, QTc 468. No leukocytosis, plt 299, Cr 0.71, glucose 108, lactate 2.9    SH: no toxic habits  FH: Father-lymphoma, CHF, Mother- pancreatic cancer (2023 14:40)    MEDICATIONS    acetaminophen     Tablet .. 650 milliGRAM(s) Oral every 6 hours PRN  atorvastatin 20 milliGRAM(s) Oral at bedtime  collagenase Ointment 1 Application(s) Topical daily  enoxaparin Injectable 40 milliGRAM(s) SubCutaneous every 24 hours  FIRST- Mouthwash  BLM 5 milliLiter(s) Swish and Spit two times a day  FLUoxetine 80 milliGRAM(s) Oral daily  folic acid 1 milliGRAM(s) Oral daily  gabapentin 800 milliGRAM(s) Oral three times a day  levothyroxine 137 MICROGram(s) Oral daily  lisinopril 20 milliGRAM(s) Oral daily  melatonin 3 milliGRAM(s) Oral at bedtime PRN  metoprolol succinate ER 25 milliGRAM(s) Oral daily  oxyCODONE    IR 5 milliGRAM(s) Oral every 8 hours PRN  senna 2 Tablet(s) Oral at bedtime      PMH: HTN (hypertension)    SLE (systemic lupus erythematosus)    Hypothyroid    Depression    Pyoderma gangrenosum    Steroid-induced psychosis with complication         PSH: S/P myomectomy    S/P     H/O oral surgery    History of back surgery        Family history: No history of dementia, strokes, or seizures   FAMILY HISTORY:  No pertinent family history in first degree relatives        SOCIAL HISTORY:  No history of tobacco or alcohol use     Allergies    Compazine (Other)    Intolerances            Vital Signs Last 24 Hrs  T(C): 37.1 (2023 18:10), Max: 37.1 (2023 18:10)  T(F): 98.7 (2023 18:10), Max: 98.7 (2023 18:10)  HR: 71 (2023 18:10) (67 - 94)  BP: 155/73 (2023 18:10) (134/79 - 181/93)  BP(mean): --  RR: 18 (2023 18:10) (16 - 18)  SpO2: 98% (2023 18:10) (94% - 98%)    Parameters below as of 2023 18:10  Patient On (Oxygen Delivery Method): room air          On Neurological Examination:    Mental Status - Patient is alert, awake, oriented X3. fluent, names, no dysarthria no aphasia Follows commands well and able to answer questions appropriately. Mood and affect  normal    Cranial Nerves - PERRL, EOMI, VFF, V1-V3 intact, no gross facial asymmetry, tongue/uvula midline    Motor Exam -   no drift    Sensory    Intact to light touch and pinprick bilaterally    Coordination: Finger to nose dysmetria was not present. Heel-shin dysmetria was not present.   Gait: normal Tandem gait, heel - toe walk: FTN intact bilaterally     Gait -  normal      Reflexes:          2+ throughout                                               GENERAL Exam:     Nontoxic , No Acute Distress   	  HEENT:  normocephalic, atraumatic  		  LUNGS:	Clear bilaterally  No Wheeze    	  HEART:	 Normal S1S2   No murmur RRR        	  GI/ ABDOMEN:  Soft  Non tender    EXTREMITIES:   No Edema  No Clubbing  No Cyanosis No Edema    MUSCULOSKELETAL: Normal Range of Motion  	   SKIN:      Normal   No Ecchymosis               LABS:  CBC Full  -  ( 2023 07:40 )  WBC Count : 6.65 K/uL  RBC Count : 4.13 M/uL  Hemoglobin : 11.5 g/dL  Hematocrit : 36.0 %  Platelet Count - Automated : 289 K/uL  Mean Cell Volume : 87.2 fl  Mean Cell Hemoglobin : 27.8 pg  Mean Cell Hemoglobin Concentration : 31.9 g/dL  Auto Neutrophil # : x  Auto Lymphocyte # : x  Auto Monocyte # : x  Auto Eosinophil # : x  Auto Basophil # : x  Auto Neutrophil % : x  Auto Lymphocyte % : x  Auto Monocyte % : x  Auto Eosinophil % : x  Auto Basophil % : x    Urinalysis Basic - ( 2023 07:40 )    Color: x / Appearance: x / SG: x / pH: x  Gluc: 91 mg/dL / Ketone: x  / Bili: x / Urobili: x   Blood: x / Protein: x / Nitrite: x   Leuk Esterase: x / RBC: x / WBC x   Sq Epi: x / Non Sq Epi: x / Bacteria: x          138  |  107  |  12  ----------------------------<  91  4.0   |  26  |  0.85    Ca    8.6      2023 07:40  Phos  3.4       Mg     2.0         TPro  6.7  /  Alb  3.1<L>  /  TBili  0.4  /  DBili  x   /  AST  17  /  ALT  23  /  AlkPhos  125<H>      LIVER FUNCTIONS - ( 2023 07:40 )  Alb: 3.1 g/dL / Pro: 6.7 gm/dL / ALK PHOS: 125 U/L / ALT: 23 U/L / AST: 17 U/L / GGT: x           Hemoglobin A1C:             RADIOLOGY  < from: MR 3D Reconstruct w/ Workstation (23 @ 12:25) >    IMPRESSION:    1.Cerebral hemispheric white matter lesions consistent with ischemic   white matter disease typical for age    2. Diffuse brain volume loss lower range typical for age    3.   Anterior temporal lobe structures appear intact.  No abnormal   enhancement    --- End of Report ---            < end of copied text >  Clinical Impression:  Mild diffuse/multi-focal cerebral dysfunction, not specific as to etiology.  There were no epileptiform abnormalities recorded.      -------------------------------------------------------------------------------------------------------  [facility] EEG Reading Room Ph#: [phone]  Epilepsy Answering Service after 5PM and before 8:30AM: Ph#: [phone]    JOSE Cazraes  Epilepsy Fellow    Shane Hill MD  EEG/Epilepsy Attending

## 2023-11-19 ENCOUNTER — NON-APPOINTMENT (OUTPATIENT)
Age: 68
End: 2023-11-19

## 2023-11-19 ENCOUNTER — TRANSCRIPTION ENCOUNTER (OUTPATIENT)
Age: 68
End: 2023-11-19

## 2023-11-19 VITALS
SYSTOLIC BLOOD PRESSURE: 141 MMHG | TEMPERATURE: 99 F | HEART RATE: 77 BPM | DIASTOLIC BLOOD PRESSURE: 62 MMHG | RESPIRATION RATE: 18 BRPM | OXYGEN SATURATION: 93 %

## 2023-11-19 PROCEDURE — 99239 HOSP IP/OBS DSCHRG MGMT >30: CPT

## 2023-11-19 RX ORDER — OXYCODONE AND ACETAMINOPHEN 5; 325 MG/1; MG/1
1 TABLET ORAL
Qty: 9 | Refills: 0
Start: 2023-11-19 | End: 2023-11-21

## 2023-11-19 RX ADMIN — Medication 1 APPLICATION(S): at 11:56

## 2023-11-19 RX ADMIN — OXYCODONE HYDROCHLORIDE 5 MILLIGRAM(S): 5 TABLET ORAL at 06:37

## 2023-11-19 RX ADMIN — GABAPENTIN 800 MILLIGRAM(S): 400 CAPSULE ORAL at 05:20

## 2023-11-19 RX ADMIN — Medication 137 MICROGRAM(S): at 05:19

## 2023-11-19 RX ADMIN — Medication 1 MILLIGRAM(S): at 11:55

## 2023-11-19 RX ADMIN — Medication 25 MILLIGRAM(S): at 05:18

## 2023-11-19 RX ADMIN — ENOXAPARIN SODIUM 40 MILLIGRAM(S): 100 INJECTION SUBCUTANEOUS at 05:18

## 2023-11-19 RX ADMIN — GABAPENTIN 800 MILLIGRAM(S): 400 CAPSULE ORAL at 13:02

## 2023-11-19 RX ADMIN — LISINOPRIL 20 MILLIGRAM(S): 2.5 TABLET ORAL at 05:18

## 2023-11-19 RX ADMIN — OXYCODONE HYDROCHLORIDE 5 MILLIGRAM(S): 5 TABLET ORAL at 13:53

## 2023-11-19 RX ADMIN — Medication 80 MILLIGRAM(S): at 11:56

## 2023-11-19 RX ADMIN — DIPHENHYDRAMINE HYDROCHLORIDE AND LIDOCAINE HYDROCHLORIDE AND ALUMINUM HYDROXIDE AND MAGNESIUM HYDRO 5 MILLILITER(S): KIT at 05:18

## 2023-11-19 RX ADMIN — OXYCODONE HYDROCHLORIDE 5 MILLIGRAM(S): 5 TABLET ORAL at 05:26

## 2023-11-19 NOTE — DISCHARGE NOTE PROVIDER - NSDCMRMEDTOKEN_GEN_ALL_CORE_FT
atorvastatin 20 mg oral tablet: 1 tab(s) orally once a day  folic acid 1 mg oral tablet: 1 tab(s) orally once a day  gabapentin 800 mg oral tablet: 1 tab(s) orally 3 times a day  levothyroxine 137 mcg (0.137 mg) oral tablet: 1 tab(s) orally once a day  lisinopril 20 mg oral tablet: 1 tab(s) orally once a day  Percocet 5 mg-325 mg oral tablet: 1 tab(s) orally every 8 hours MDD: 3  pilocarpine 5 mg oral tablet: 1 tab(s) orally 3 times a day  PROzac 40 mg oral capsule: 2 cap(s) orally once a day  Toprol-XL 25 mg oral tablet, extended release: 1 tab(s) orally once a day

## 2023-11-19 NOTE — DISCHARGE NOTE PROVIDER - NSDCCPCAREPLAN_GEN_ALL_CORE_FT
PRINCIPAL DISCHARGE DIAGNOSIS  Diagnosis: New onset seizure  Assessment and Plan of Treatment: follow up with neurologist and psychiatriast  seziure precautions   follow up with pain managment

## 2023-11-19 NOTE — DISCHARGE NOTE PROVIDER - HOSPITAL COURSE
67 years old female with h/o HTN, HLD, hypothyroidism, Sjogren's syndrome, L spine surgery ( 08/2023), h/o lupus, h/o pyoderma gangrenosum was brought in to ED with seizure concern. Today at around 8:30AM, patient was noted to have seizure like activity by , whole body shaking, lasted for 2 minutes and associated with confusion. Patient also had tongue biting. No h/o seizure. Patient reported significant sleep loss recently due to low back pain. No fever or chills  Mildly hypertensive, afebrile, sat well at RA. EKG with NSR, QTc 468. No leukocytosis, plt 299, Cr 0.71, glucose 108, lactate 2.9. CT head  ( I personally review) with no acute pathology, CXR  ( I personally review) with no focal consolidation       Problem/Plan - 1:  ·  Problem: New onset seizure.   ·  Plan: witnessed by , whole body shaking, tongue biting with postictal confusion  possibly  triggered by significant sleep loss recently vs taking tramadol 300 mg per day (which lowers seizure threshold) and stopping Klonopin prior to seizure   CT head  ( I personally review) with no acute pathology  Seizure/aspiration precaution  EEG, MRI brain w/wo contrast with seizure protocol reviewed. NO active seizure or stroke    neurology consulted. no need for AED  discussed with pt that she needs to talk to her pain doctor about alternative medications to tramadol and needs to not stop klonopin cold turkey.      Problem/Plan - 2:  ·  Problem: Benign essential HTN.   ·  Plan: monitor BP and titrate BP meds.     Problem/Plan - 3:  ·  Problem: Hyperlipidemia, unspecified.   ·  Plan: on atorvastatin 20mg hs.     Problem/Plan - 4:  ·  Problem: Sjogrens syndrome.   ·  Plan: on pilocarpine 5mg tid  Discussed with pharmacy, we don't have it here.   Patient will need to bring in home medication.     Problem/Plan - 5:  ·  Problem: Hypothyroidism, unspecified.   ·  Plan: on synthroid 137mcg daily.     Problem/Plan - 6:  ·  Problem: Major depression.   ·  Plan: on fluoxetine 80mg daily.

## 2023-11-19 NOTE — DISCHARGE NOTE PROVIDER - CARE PROVIDER_API CALL
Odilon Patel)  Neurology  [address], [address]  [place] [address]  Phone: [phone]  Fax: [phone]  Follow Up Time:

## 2023-11-19 NOTE — PROGRESS NOTE ADULT - SUBJECTIVE AND OBJECTIVE BOX
Patient seen and examined this am. No new events    MEDICATIONS:    acetaminophen     Tablet .. 650 milliGRAM(s) Oral every 6 hours PRN  atorvastatin 20 milliGRAM(s) Oral at bedtime  collagenase Ointment 1 Application(s) Topical daily  enoxaparin Injectable 40 milliGRAM(s) SubCutaneous every 24 hours  FIRST- Mouthwash  BLM 5 milliLiter(s) Swish and Spit two times a day  FLUoxetine 80 milliGRAM(s) Oral daily  folic acid 1 milliGRAM(s) Oral daily  gabapentin 800 milliGRAM(s) Oral three times a day  levothyroxine 137 MICROGram(s) Oral daily  lisinopril 20 milliGRAM(s) Oral daily  melatonin 3 milliGRAM(s) Oral at bedtime PRN  metoprolol succinate ER 25 milliGRAM(s) Oral daily  oxyCODONE    IR 5 milliGRAM(s) Oral every 8 hours PRN  senna 2 Tablet(s) Oral at bedtime      LABS:            CAPILLARY BLOOD GLUCOSE            I&O's Summary    18 Nov 2023 07:01  -  19 Nov 2023 07:00  --------------------------------------------------------  IN: 400 mL / OUT: 0 mL / NET: 400 mL    19 Nov 2023 07:01  -  19 Nov 2023 10:40  --------------------------------------------------------  IN: 240 mL / OUT: 0 mL / NET: 240 mL      Vital Signs Last 24 Hrs  T(C): 36.7 (19 Nov 2023 10:07), Max: 37.1 (18 Nov 2023 18:10)  T(F): 98 (19 Nov 2023 10:07), Max: 98.7 (18 Nov 2023 18:10)  HR: 69 (19 Nov 2023 10:07) (67 - 71)  BP: 111/66 (19 Nov 2023 10:07) (111/66 - 155/73)  BP(mean): --  RR: 18 (19 Nov 2023 10:07) (16 - 18)  SpO2: 94% (19 Nov 2023 10:07) (94% - 98%)    Parameters below as of 18 Nov 2023 18:10  Patient On (Oxygen Delivery Method): room air            On Neurological Examination:    Mental Status - Patient is alert, awake, oriented X3. fluent, names, no dysarthria no aphasia Follows commands well and able to answer questions appropriately. Mood and affect  normal    Cranial Nerves - PERRL, EOMI, VFF, V1-V3 intact, no gross facial asymmetry, tongue/uvula midline    Motor Exam -   no drift   nml bulk/tone    Sensory    Intact to light touch and pinprick bilaterally    Coord: FTN intact bilaterally     Gait -  deferred                                       RADIOLOGY  < from: MR 3D Reconstruct w/ Workstation (11.17.23 @ 12:25) >    IMPRESSION:    1.Cerebral hemispheric white matter lesions consistent with ischemic   white matter disease typical for age    2. Diffuse brain volume loss lower range typical for age    3.   Anterior temporal lobe structures appear intact.  No abnormal   enhancement    --- End of Report ---            < end of copied text >  Clinical Impression:  Mild diffuse/multi-focal cerebral dysfunction, not specific as to etiology.  There were no epileptiform abnormalities recorded.      -------------------------------------------------------------------------------------------------------  [facility] EEG Reading Room Ph#: [phone]  Epilepsy Answering Service after 5PM and before 8:30AM: Ph#: [phone]    JOSE Cazares  Epilepsy Fellow    Shane Hill MD  EEG/Epilepsy Attending                 
Patient is a 67y old  Female who presents with a chief complaint of new onset seizure (16 Nov 2023 14:40)      INTERVAL HPI/OVERNIGHT EVENTS: no new seizure     MEDICATIONS  (STANDING):  atorvastatin 20 milliGRAM(s) Oral at bedtime  enoxaparin Injectable 40 milliGRAM(s) SubCutaneous every 24 hours  FLUoxetine 80 milliGRAM(s) Oral daily  folic acid 1 milliGRAM(s) Oral daily  gabapentin 800 milliGRAM(s) Oral three times a day  levothyroxine 137 MICROGram(s) Oral daily  lisinopril 20 milliGRAM(s) Oral daily  metoprolol succinate ER 25 milliGRAM(s) Oral daily  senna 2 Tablet(s) Oral at bedtime    MEDICATIONS  (PRN):  acetaminophen     Tablet .. 650 milliGRAM(s) Oral every 6 hours PRN Mild Pain (1 - 3), Moderate Pain (4 - 6)  melatonin 3 milliGRAM(s) Oral at bedtime PRN Insomnia  oxyCODONE    IR 5 milliGRAM(s) Oral every 8 hours PRN Severe Pain (7 - 10)      Allergies    Compazine (Other)    Intolerances        REVIEW OF SYSTEMS:  CONSTITUTIONAL: No fever, weight loss  EYES: No eye pain, visual disturbances, or discharge  ENMT:  No difficulty hearing, tinnitus, vertigo; No sinus or throat pain  RESPIRATORY: No cough, wheezing, chills or hemoptysis; No shortness of breath  CARDIOVASCULAR: No chest pain, palpitations, dizziness, or leg swelling  GASTROINTESTINAL: No abdominal or epigastric pain. No nausea, vomiting, or hematemesis; No diarrhea or constipation. No melena or hematochezia.  GENITOURINARY: No dysuria, frequency, hematuria, or incontinence  NEUROLOGICAL: No headaches, memory loss, loss of strength, numbness, or tremors  SKIN: No itching, burning, rashes, or lesions   PSYCHIATRIC: No depression, anxiety, mood swings, or difficulty sleeping  HEME/LYMPH: No easy bruising, or bleeding gums      Vital Signs Last 24 Hrs  T(C): 36.6 (17 Nov 2023 10:00), Max: 36.6 (17 Nov 2023 04:49)  T(F): 97.9 (17 Nov 2023 10:00), Max: 97.9 (17 Nov 2023 10:00)  HR: 66 (17 Nov 2023 14:30) (64 - 88)  BP: 123/74 (17 Nov 2023 14:30) (123/74 - 160/91)  BP(mean): 108 (16 Nov 2023 16:37) (108 - 108)  RR: 18 (17 Nov 2023 14:30) (17 - 18)  SpO2: 95% (17 Nov 2023 14:30) (95% - 98%)    Parameters below as of 17 Nov 2023 14:30  Patient On (Oxygen Delivery Method): room air        PHYSICAL EXAM:  GENERAL: NAD  HEAD:  Atraumatic, Normocephalic  EYES: EOMI, PERRLA, conjunctiva and sclera clear  ENMT: No tonsillar erythema, exudates, or enlargement;   NECK: Supple, Normal thyroid  NERVOUS SYSTEM:  Alert & Oriented X3, Good concentration; Motor Strength 5/5 B/L upper and lower extremities; DTRs 2+ intact and symmetric  CHEST/LUNG: CTABL; No rales, rhonchi, wheezing, or rubs  HEART: Regular rate and rhythm; No murmurs, rubs, or gallops  ABDOMEN: Soft, Nontender, Nondistended; Bowel sounds present  EXTREMITIES:  2+ Peripheral Pulses, No clubbing, cyanosis, or edema  LYMPH: No lymphadenopathy noted  SKIN: No rashes or lesions    LABS:                        11.5   6.65  )-----------( 289      ( 17 Nov 2023 07:40 )             36.0     11-17    138  |  107  |  12  ----------------------------<  91  4.0   |  26  |  0.85    Ca    8.6      17 Nov 2023 07:40  Phos  3.4     11-17  Mg     2.0     11-17    TPro  6.7  /  Alb  3.1<L>  /  TBili  0.4  /  DBili  x   /  AST  17  /  ALT  23  /  AlkPhos  125<H>  11-17      Urinalysis Basic - ( 17 Nov 2023 07:40 )    Color: x / Appearance: x / SG: x / pH: x  Gluc: 91 mg/dL / Ketone: x  / Bili: x / Urobili: x   Blood: x / Protein: x / Nitrite: x   Leuk Esterase: x / RBC: x / WBC x   Sq Epi: x / Non Sq Epi: x / Bacteria: x      CAPILLARY BLOOD GLUCOSE          RADIOLOGY & ADDITIONAL TESTS:    Imaging Personally Reviewed:  [ ] YES  [ ] NO    Consultant(s) Notes Reviewed:  [ ] YES  [ ] NO    Care Discussed with Consultants/Other Providers [ ] YES  [ ] NO
Patient is a 67y old  Female who presents with a chief complaint of new onset seizure (16 Nov 2023 14:40)      INTERVAL HPI/OVERNIGHT EVENTS: no new seizure     MEDICATIONS  (STANDING):  atorvastatin 20 milliGRAM(s) Oral at bedtime  collagenase Ointment 1 Application(s) Topical daily  enoxaparin Injectable 40 milliGRAM(s) SubCutaneous every 24 hours  FLUoxetine 80 milliGRAM(s) Oral daily  folic acid 1 milliGRAM(s) Oral daily  gabapentin 800 milliGRAM(s) Oral three times a day  levothyroxine 137 MICROGram(s) Oral daily  lisinopril 20 milliGRAM(s) Oral daily  metoprolol succinate ER 25 milliGRAM(s) Oral daily  senna 2 Tablet(s) Oral at bedtime    MEDICATIONS  (PRN):  acetaminophen     Tablet .. 650 milliGRAM(s) Oral every 6 hours PRN Mild Pain (1 - 3), Moderate Pain (4 - 6)  melatonin 3 milliGRAM(s) Oral at bedtime PRN Insomnia  oxyCODONE    IR 5 milliGRAM(s) Oral every 8 hours PRN Severe Pain (7 - 10)        Allergies    Compazine (Other)    Intolerances        REVIEW OF SYSTEMS:  CONSTITUTIONAL: No fever, weight loss  EYES: No eye pain, visual disturbances, or discharge  ENMT:  No difficulty hearing, tinnitus, vertigo; No sinus or throat pain  RESPIRATORY: No cough, wheezing, chills or hemoptysis; No shortness of breath  CARDIOVASCULAR: No chest pain, palpitations, dizziness, or leg swelling  GASTROINTESTINAL: No abdominal or epigastric pain. No nausea, vomiting, or hematemesis; No diarrhea or constipation. No melena or hematochezia.  GENITOURINARY: No dysuria, frequency, hematuria, or incontinence  NEUROLOGICAL: No headaches, memory loss, loss of strength, numbness, or tremors  SKIN: No itching, burning, rashes, or lesions   PSYCHIATRIC: No depression, anxiety, mood swings, or difficulty sleeping  HEME/LYMPH: No easy bruising, or bleeding gums      Vital Signs Last 24 Hrs  T(C): 36.7 (11-18-23 @ 11:34), Max: 36.7 (11-18-23 @ 11:34)  T(F): 98 (11-18-23 @ 11:34), Max: 98 (11-18-23 @ 11:34)  HR: 97 (11-18-23 @ 11:34) (62 - 97)  BP: 134/79 (11-18-23 @ 11:34) (121/77 - 181/93)  BP(mean): --  RR: 18 (11-18-23 @ 11:34) (16 - 18)  SpO2: 98% (11-18-23 @ 11:34) (94% - 98%)          PHYSICAL EXAM:  GENERAL: NAD  HEAD:  Atraumatic, Normocephalic  EYES: EOMI, PERRLA, conjunctiva and sclera clear  ENMT: No tonsillar erythema, exudates, or enlargement;   NECK: Supple, Normal thyroid  NERVOUS SYSTEM:  Alert & Oriented X3, Good concentration; Motor Strength 5/5 B/L upper and lower extremities; DTRs 2+ intact and symmetric  CHEST/LUNG: CTABL; No rales, rhonchi, wheezing, or rubs  HEART: Regular rate and rhythm; No murmurs, rubs, or gallops  ABDOMEN: Soft, Nontender, Nondistended; Bowel sounds present  EXTREMITIES:  2+ Peripheral Pulses, No clubbing, cyanosis, or edema  LYMPH: No lymphadenopathy noted  SKIN: No rashes or lesions    LABS:                        11.5   6.65  )-----------( 289      ( 17 Nov 2023 07:40 )             36.0     11-17    138  |  107  |  12  ----------------------------<  91  4.0   |  26  |  0.85    Ca    8.6      17 Nov 2023 07:40  Phos  3.4     11-17  Mg     2.0     11-17    TPro  6.7  /  Alb  3.1<L>  /  TBili  0.4  /  DBili  x   /  AST  17  /  ALT  23  /  AlkPhos  125<H>  11-17      Urinalysis Basic - ( 17 Nov 2023 07:40 )    Color: x / Appearance: x / SG: x / pH: x  Gluc: 91 mg/dL / Ketone: x  / Bili: x / Urobili: x   Blood: x / Protein: x / Nitrite: x   Leuk Esterase: x / RBC: x / WBC x   Sq Epi: x / Non Sq Epi: x / Bacteria: x      CAPILLARY BLOOD GLUCOSE          RADIOLOGY & ADDITIONAL TESTS:    Imaging Personally Reviewed:  [ ] YES  [ ] NO    Consultant(s) Notes Reviewed:  [ ] YES  [ ] NO    Care Discussed with Consultants/Other Providers [ ] YES  [ ] NO

## 2023-11-19 NOTE — PROGRESS NOTE ADULT - ASSESSMENT
67 years old female with h/o HTN, HLD, hypothyroidism, Sjogren's syndrome, L spine surgery ( 08/2023), h/o lupus, h/o pyoderma gangrenosum was brought in to ED with seizure in setting of Klonopin withdrawal, tramadol use and sleep deprivation  PE: non-focal  MRI brain seizure protocol WNL  EEG slowing    Impression:  1rst time provoked seizure, Would not start AEDs  DC planning  Discuss[place] law  Can follow up with Neurology, Dr. Odilon Patel at [phone]
67 years old female with h/o HTN, HLD, hypothyroidism, Sjogren's syndrome, L spine surgery ( 08/2023), h/o lupus, h/o pyoderma gangrenosum was brought in to ED with seizure concern. Today at around 8:30AM, patient was noted to have seizure like activity by , whole body shaking, lasted for 2 minutes and associated with confusion. Patient also had tongue biting. No h/o seizure. Patient reported significant sleep loss recently due to low back pain. No fever or chills  Mildly hypertensive, afebrile, sat well at RA. EKG with NSR, QTc 468. No leukocytosis, plt 299, Cr 0.71, glucose 108, lactate 2.9. CT head  ( I personally review) with no acute pathology, CXR  ( I personally review) with no focal consolidation
67 years old female with h/o HTN, HLD, hypothyroidism, Sjogren's syndrome, L spine surgery ( 08/2023), h/o lupus, h/o pyoderma gangrenosum was brought in to ED with seizure concern. Today at around 8:30AM, patient was noted to have seizure like activity by , whole body shaking, lasted for 2 minutes and associated with confusion. Patient also had tongue biting. No h/o seizure. Patient reported significant sleep loss recently due to low back pain. No fever or chills  Mildly hypertensive, afebrile, sat well at RA. EKG with NSR, QTc 468. No leukocytosis, plt 299, Cr 0.71, glucose 108, lactate 2.9. CT head  ( I personally review) with no acute pathology, CXR  ( I personally review) with no focal consolidation

## 2023-11-24 ENCOUNTER — APPOINTMENT (OUTPATIENT)
Dept: NEUROLOGY | Facility: CLINIC | Age: 68
End: 2023-11-24
Payer: MEDICARE

## 2023-11-24 VITALS — SYSTOLIC BLOOD PRESSURE: 124 MMHG | HEART RATE: 76 BPM | DIASTOLIC BLOOD PRESSURE: 84 MMHG

## 2023-11-24 DIAGNOSIS — F41.9 ANXIETY DISORDER, UNSPECIFIED: ICD-10-CM

## 2023-11-24 DIAGNOSIS — G43.909 MIGRAINE, UNSPECIFIED, NOT INTRACTABLE, W/OUT STATUS MIGRAINOSUS: ICD-10-CM

## 2023-11-24 DIAGNOSIS — Z82.0 FAMILY HISTORY OF EPILEPSY AND OTHER DISEASES OF THE NERVOUS SYSTEM: ICD-10-CM

## 2023-11-24 PROCEDURE — 99215 OFFICE O/P EST HI 40 MIN: CPT

## 2023-11-28 DIAGNOSIS — R56.9 UNSPECIFIED CONVULSIONS: ICD-10-CM

## 2023-11-28 DIAGNOSIS — E78.5 HYPERLIPIDEMIA, UNSPECIFIED: ICD-10-CM

## 2023-11-28 DIAGNOSIS — I10 ESSENTIAL (PRIMARY) HYPERTENSION: ICD-10-CM

## 2023-11-28 DIAGNOSIS — M35.00 SJOGREN SYNDROME, UNSPECIFIED: ICD-10-CM

## 2023-11-28 DIAGNOSIS — E03.9 HYPOTHYROIDISM, UNSPECIFIED: ICD-10-CM

## 2023-11-28 DIAGNOSIS — L88 PYODERMA GANGRENOSUM: ICD-10-CM

## 2023-11-28 DIAGNOSIS — F32.9 MAJOR DEPRESSIVE DISORDER, SINGLE EPISODE, UNSPECIFIED: ICD-10-CM

## 2023-12-01 RX ORDER — FLUOXETINE HYDROCHLORIDE 40 MG/1
40 CAPSULE ORAL
Qty: 180 | Refills: 1 | Status: ACTIVE | COMMUNITY
Start: 2019-05-01 | End: 1900-01-01

## 2023-12-28 ENCOUNTER — APPOINTMENT (OUTPATIENT)
Dept: NEUROLOGY | Facility: CLINIC | Age: 68
End: 2023-12-28
Payer: MEDICARE

## 2023-12-28 PROCEDURE — 95816 EEG AWAKE AND DROWSY: CPT

## 2023-12-29 PROCEDURE — 95719 EEG PHYS/QHP EA INCR W/O VID: CPT

## 2023-12-29 PROCEDURE — 95708 EEG WO VID EA 12-26HR UNMNTR: CPT

## 2023-12-29 PROCEDURE — 95700 EEG CONT REC W/VID EEG TECH: CPT

## 2023-12-31 NOTE — HISTORY OF PRESENT ILLNESS
[FreeTextEntry1] : This is a 67-year-old right-handed woman who underwent spinal surgery in earlier this fall at the [facility] and was taking Tramadol as needed for pain after that (she was also given Suboxone one day but stopped it after she experienced diaphoresis with one dose). On 11/16/23, while the patient was lying in bed, her  observed that she had an episode of whole-body shaking lasting  seconds and fell off the bed, without head injury. The patient's  called EMS, and when the patient was brought to the ambulance about 10 minutes later, she started to be aware of her surroundings. She was admitted until 11/19/23 and was advised that she did not need to start an antiepileptic medication for a single lifetime seizure that was likely triggered by Clonazepam withdrawal and Tramadol use. She recounts that when she was a child, she had migraines, as well as incidental spikes seen on routine EEG after she had a concussion, but never needed to be started on an antiepileptic medication. She previously took Clonazepam to help with anxiety and tremors in all four extremities but has not taken it since her seizure.

## 2024-01-18 ENCOUNTER — APPOINTMENT (OUTPATIENT)
Dept: NEUROLOGY | Facility: CLINIC | Age: 69
End: 2024-01-18
Payer: MEDICARE

## 2024-01-18 PROCEDURE — 99443: CPT | Mod: 93

## 2024-01-26 ENCOUNTER — APPOINTMENT (OUTPATIENT)
Dept: NEUROLOGY | Facility: CLINIC | Age: 69
End: 2024-01-26
Payer: MEDICARE

## 2024-01-26 DIAGNOSIS — R41.3 OTHER AMNESIA: ICD-10-CM

## 2024-01-26 PROCEDURE — 99443: CPT | Mod: 93

## 2024-01-30 ENCOUNTER — INPATIENT (INPATIENT)
Facility: HOSPITAL | Age: 69
LOS: 1 days | Discharge: HOME | End: 2024-02-01
Attending: INTERNAL MEDICINE | Admitting: INTERNAL MEDICINE
Payer: MEDICARE

## 2024-01-30 VITALS
OXYGEN SATURATION: 97 % | RESPIRATION RATE: 20 BRPM | WEIGHT: 190.04 LBS | HEIGHT: 63 IN | HEART RATE: 72 BPM | DIASTOLIC BLOOD PRESSURE: 85 MMHG | SYSTOLIC BLOOD PRESSURE: 138 MMHG | TEMPERATURE: 98 F

## 2024-01-30 DIAGNOSIS — Z98.890 OTHER SPECIFIED POSTPROCEDURAL STATES: Chronic | ICD-10-CM

## 2024-01-30 DIAGNOSIS — Z98.89 OTHER SPECIFIED POSTPROCEDURAL STATES: Chronic | ICD-10-CM

## 2024-01-30 LAB
ALBUMIN SERPL ELPH-MCNC: 3.2 G/DL — LOW (ref 3.3–5)
ALP SERPL-CCNC: 108 U/L — SIGNIFICANT CHANGE UP (ref 40–120)
ALT FLD-CCNC: 22 U/L — SIGNIFICANT CHANGE UP (ref 12–78)
AMPHET UR-MCNC: NEGATIVE — SIGNIFICANT CHANGE UP
ANION GAP SERPL CALC-SCNC: 5 MMOL/L — SIGNIFICANT CHANGE UP (ref 5–17)
APPEARANCE UR: CLEAR — SIGNIFICANT CHANGE UP
APTT BLD: 30.3 SEC — SIGNIFICANT CHANGE UP (ref 24.5–35.6)
AST SERPL-CCNC: 28 U/L — SIGNIFICANT CHANGE UP (ref 15–37)
BARBITURATES UR SCN-MCNC: NEGATIVE — SIGNIFICANT CHANGE UP
BASOPHILS # BLD AUTO: 0.03 K/UL — SIGNIFICANT CHANGE UP (ref 0–0.2)
BASOPHILS NFR BLD AUTO: 0.5 % — SIGNIFICANT CHANGE UP (ref 0–2)
BENZODIAZ UR-MCNC: POSITIVE — SIGNIFICANT CHANGE UP
BILIRUB SERPL-MCNC: 1 MG/DL — SIGNIFICANT CHANGE UP (ref 0.2–1.2)
BILIRUB UR-MCNC: NEGATIVE — SIGNIFICANT CHANGE UP
BUN SERPL-MCNC: 19 MG/DL — SIGNIFICANT CHANGE UP (ref 7–23)
CALCIUM SERPL-MCNC: 9.2 MG/DL — SIGNIFICANT CHANGE UP (ref 8.5–10.1)
CHLORIDE SERPL-SCNC: 108 MMOL/L — SIGNIFICANT CHANGE UP (ref 96–108)
CO2 SERPL-SCNC: 27 MMOL/L — SIGNIFICANT CHANGE UP (ref 22–31)
COCAINE METAB.OTHER UR-MCNC: NEGATIVE — SIGNIFICANT CHANGE UP
COLOR SPEC: YELLOW — SIGNIFICANT CHANGE UP
CREAT SERPL-MCNC: 0.92 MG/DL — SIGNIFICANT CHANGE UP (ref 0.5–1.3)
DIFF PNL FLD: NEGATIVE — SIGNIFICANT CHANGE UP
EGFR: 69 ML/MIN/1.73M2 — SIGNIFICANT CHANGE UP
EOSINOPHIL # BLD AUTO: 0.21 K/UL — SIGNIFICANT CHANGE UP (ref 0–0.5)
EOSINOPHIL NFR BLD AUTO: 3.4 % — SIGNIFICANT CHANGE UP (ref 0–6)
ETHANOL SERPL-MCNC: <10 MG/DL — SIGNIFICANT CHANGE UP (ref 0–10)
GLUCOSE SERPL-MCNC: 96 MG/DL — SIGNIFICANT CHANGE UP (ref 70–99)
GLUCOSE UR QL: NEGATIVE MG/DL — SIGNIFICANT CHANGE UP
HCT VFR BLD CALC: 37.6 % — SIGNIFICANT CHANGE UP (ref 34.5–45)
HGB BLD-MCNC: 11.8 G/DL — SIGNIFICANT CHANGE UP (ref 11.5–15.5)
IMM GRANULOCYTES NFR BLD AUTO: 0.2 % — SIGNIFICANT CHANGE UP (ref 0–0.9)
INR BLD: 1 RATIO — SIGNIFICANT CHANGE UP (ref 0.85–1.18)
KETONES UR-MCNC: 15 MG/DL
LACTATE SERPL-SCNC: 0.9 MMOL/L — SIGNIFICANT CHANGE UP (ref 0.7–2)
LEUKOCYTE ESTERASE UR-ACNC: NEGATIVE — SIGNIFICANT CHANGE UP
LYMPHOCYTES # BLD AUTO: 1.6 K/UL — SIGNIFICANT CHANGE UP (ref 1–3.3)
LYMPHOCYTES # BLD AUTO: 26.1 % — SIGNIFICANT CHANGE UP (ref 13–44)
MAGNESIUM SERPL-MCNC: 2.1 MG/DL — SIGNIFICANT CHANGE UP (ref 1.6–2.6)
MCHC RBC-ENTMCNC: 28.2 PG — SIGNIFICANT CHANGE UP (ref 27–34)
MCHC RBC-ENTMCNC: 31.4 G/DL — LOW (ref 32–36)
MCV RBC AUTO: 90 FL — SIGNIFICANT CHANGE UP (ref 80–100)
METHADONE UR-MCNC: NEGATIVE — SIGNIFICANT CHANGE UP
MONOCYTES # BLD AUTO: 0.46 K/UL — SIGNIFICANT CHANGE UP (ref 0–0.9)
MONOCYTES NFR BLD AUTO: 7.5 % — SIGNIFICANT CHANGE UP (ref 2–14)
NEUTROPHILS # BLD AUTO: 3.83 K/UL — SIGNIFICANT CHANGE UP (ref 1.8–7.4)
NEUTROPHILS NFR BLD AUTO: 62.3 % — SIGNIFICANT CHANGE UP (ref 43–77)
NITRITE UR-MCNC: NEGATIVE — SIGNIFICANT CHANGE UP
NRBC # BLD: 0 /100 WBCS — SIGNIFICANT CHANGE UP (ref 0–0)
OPIATES UR-MCNC: POSITIVE — SIGNIFICANT CHANGE UP
PCP SPEC-MCNC: SIGNIFICANT CHANGE UP
PCP UR-MCNC: NEGATIVE — SIGNIFICANT CHANGE UP
PH UR: 5.5 — SIGNIFICANT CHANGE UP (ref 5–8)
PLATELET # BLD AUTO: 183 K/UL — SIGNIFICANT CHANGE UP (ref 150–400)
POTASSIUM SERPL-MCNC: 3.5 MMOL/L — SIGNIFICANT CHANGE UP (ref 3.5–5.3)
POTASSIUM SERPL-SCNC: 3.5 MMOL/L — SIGNIFICANT CHANGE UP (ref 3.5–5.3)
PROT SERPL-MCNC: 7.2 GM/DL — SIGNIFICANT CHANGE UP (ref 6–8.3)
PROT UR-MCNC: NEGATIVE MG/DL — SIGNIFICANT CHANGE UP
PROTHROM AB SERPL-ACNC: 11.9 SEC — SIGNIFICANT CHANGE UP (ref 9.5–13)
RBC # BLD: 4.18 M/UL — SIGNIFICANT CHANGE UP (ref 3.8–5.2)
RBC # FLD: 16 % — HIGH (ref 10.3–14.5)
SODIUM SERPL-SCNC: 140 MMOL/L — SIGNIFICANT CHANGE UP (ref 135–145)
SP GR SPEC: 1.03 — SIGNIFICANT CHANGE UP (ref 1–1.03)
THC UR QL: NEGATIVE — SIGNIFICANT CHANGE UP
TROPONIN I, HIGH SENSITIVITY RESULT: 48.4 NG/L — SIGNIFICANT CHANGE UP
TSH SERPL-MCNC: 0.1 UU/ML — LOW (ref 0.36–3.74)
UROBILINOGEN FLD QL: 1 MG/DL — SIGNIFICANT CHANGE UP (ref 0.2–1)
WBC # BLD: 6.14 K/UL — SIGNIFICANT CHANGE UP (ref 3.8–10.5)
WBC # FLD AUTO: 6.14 K/UL — SIGNIFICANT CHANGE UP (ref 3.8–10.5)

## 2024-01-30 PROCEDURE — 70450 CT HEAD/BRAIN W/O DYE: CPT | Mod: 26,MA

## 2024-01-30 PROCEDURE — 99285 EMERGENCY DEPT VISIT HI MDM: CPT

## 2024-01-30 PROCEDURE — 71045 X-RAY EXAM CHEST 1 VIEW: CPT | Mod: 26

## 2024-01-30 PROCEDURE — 99222 1ST HOSP IP/OBS MODERATE 55: CPT

## 2024-01-30 PROCEDURE — 93010 ELECTROCARDIOGRAM REPORT: CPT

## 2024-01-30 RX ORDER — METOPROLOL TARTRATE 50 MG
25 TABLET ORAL DAILY
Refills: 0 | Status: DISCONTINUED | OUTPATIENT
Start: 2024-01-30 | End: 2024-02-01

## 2024-01-30 RX ORDER — GABAPENTIN 400 MG/1
1 CAPSULE ORAL
Refills: 0 | DISCHARGE

## 2024-01-30 RX ORDER — LISINOPRIL 2.5 MG/1
20 TABLET ORAL DAILY
Refills: 0 | Status: DISCONTINUED | OUTPATIENT
Start: 2024-01-30 | End: 2024-02-01

## 2024-01-30 RX ORDER — FOLIC ACID 0.8 MG
1 TABLET ORAL
Refills: 0 | DISCHARGE

## 2024-01-30 RX ORDER — ATORVASTATIN CALCIUM 80 MG/1
20 TABLET, FILM COATED ORAL AT BEDTIME
Refills: 0 | Status: DISCONTINUED | OUTPATIENT
Start: 2024-01-30 | End: 2024-02-01

## 2024-01-30 RX ORDER — LEVOTHYROXINE SODIUM 125 MCG
1 TABLET ORAL
Refills: 0 | DISCHARGE

## 2024-01-30 RX ORDER — ENOXAPARIN SODIUM 100 MG/ML
40 INJECTION SUBCUTANEOUS EVERY 24 HOURS
Refills: 0 | Status: DISCONTINUED | OUTPATIENT
Start: 2024-01-30 | End: 2024-02-01

## 2024-01-30 RX ORDER — METOPROLOL TARTRATE 50 MG
1 TABLET ORAL
Qty: 0 | Refills: 0 | DISCHARGE

## 2024-01-30 RX ORDER — PILOCARPINE HCL
1 CRYSTALS MISCELLANEOUS
Refills: 0 | DISCHARGE

## 2024-01-30 RX ORDER — LEVOTHYROXINE SODIUM 125 MCG
137 TABLET ORAL DAILY
Refills: 0 | Status: DISCONTINUED | OUTPATIENT
Start: 2024-01-30 | End: 2024-02-01

## 2024-01-30 RX ORDER — LANOLIN ALCOHOL/MO/W.PET/CERES
3 CREAM (GRAM) TOPICAL AT BEDTIME
Refills: 0 | Status: DISCONTINUED | OUTPATIENT
Start: 2024-01-30 | End: 2024-02-01

## 2024-01-30 RX ORDER — GABAPENTIN 400 MG/1
800 CAPSULE ORAL THREE TIMES A DAY
Refills: 0 | Status: DISCONTINUED | OUTPATIENT
Start: 2024-01-30 | End: 2024-01-30

## 2024-01-30 RX ORDER — FLUOXETINE HCL 10 MG
40 CAPSULE ORAL DAILY
Refills: 0 | Status: DISCONTINUED | OUTPATIENT
Start: 2024-01-30 | End: 2024-02-01

## 2024-01-30 RX ORDER — FOLIC ACID 0.8 MG
1 TABLET ORAL DAILY
Refills: 0 | Status: DISCONTINUED | OUTPATIENT
Start: 2024-01-30 | End: 2024-02-01

## 2024-01-30 RX ORDER — ONDANSETRON 8 MG/1
4 TABLET, FILM COATED ORAL EVERY 8 HOURS
Refills: 0 | Status: DISCONTINUED | OUTPATIENT
Start: 2024-01-30 | End: 2024-02-01

## 2024-01-30 RX ORDER — GABAPENTIN 400 MG/1
800 CAPSULE ORAL THREE TIMES A DAY
Refills: 0 | Status: DISCONTINUED | OUTPATIENT
Start: 2024-01-30 | End: 2024-02-01

## 2024-01-30 RX ORDER — ATORVASTATIN CALCIUM 80 MG/1
1 TABLET, FILM COATED ORAL
Refills: 0 | DISCHARGE

## 2024-01-30 RX ORDER — ACETAMINOPHEN 500 MG
650 TABLET ORAL EVERY 6 HOURS
Refills: 0 | Status: DISCONTINUED | OUTPATIENT
Start: 2024-01-30 | End: 2024-02-01

## 2024-01-30 RX ORDER — KETOROLAC TROMETHAMINE 30 MG/ML
15 SYRINGE (ML) INJECTION ONCE
Refills: 0 | Status: DISCONTINUED | OUTPATIENT
Start: 2024-01-30 | End: 2024-01-30

## 2024-01-30 RX ORDER — ACETAMINOPHEN 500 MG/1
500 TABLET, FILM COATED ORAL
Qty: 30 | Refills: 0 | Status: ACTIVE | COMMUNITY
Start: 2023-09-04

## 2024-01-30 RX ORDER — LISINOPRIL 2.5 MG/1
1 TABLET ORAL
Refills: 0 | DISCHARGE

## 2024-01-30 RX ADMIN — ATORVASTATIN CALCIUM 20 MILLIGRAM(S): 80 TABLET, FILM COATED ORAL at 22:31

## 2024-01-30 RX ADMIN — ENOXAPARIN SODIUM 40 MILLIGRAM(S): 100 INJECTION SUBCUTANEOUS at 22:33

## 2024-01-30 RX ADMIN — GABAPENTIN 800 MILLIGRAM(S): 400 CAPSULE ORAL at 22:31

## 2024-01-30 NOTE — H&P ADULT - NSHPPHYSICALEXAM_GEN_ALL_CORE
PHYSICAL EXAM:    Vital Signs Last 24 Hrs  T(C): 36.4 (30 Jan 2024 22:35), Max: 36.7 (30 Jan 2024 15:26)  T(F): 97.6 (30 Jan 2024 22:35), Max: 98.1 (30 Jan 2024 15:26)  HR: 82 (30 Jan 2024 22:35) (72 - 83)  BP: 100/59 (30 Jan 2024 22:35) (100/59 - 143/81)  BP(mean): --  RR: 18 (30 Jan 2024 22:35) (16 - 20)  SpO2: 94% (30 Jan 2024 22:35) (94% - 97%)    Parameters below as of 30 Jan 2024 22:35  Patient On (Oxygen Delivery Method): room air        GENERAL: Pt lying in bed comfortably in NAD  HEENT:  Atraumatic, EOMI, PERRL, conjunctiva and sclera clear, MMM  NECK: Supple, No JVD  CHEST/LUNG: Clear to auscultation bilaterally; No rales, rhonchi, wheezing or rubs. Unlabored respirations  HEART: Regular rate and rhythm; No murmurs, rubs, or gallops  ABDOMEN: Bowel sounds present; Soft, Nontender, Nondistended. No guarding or rigidity    EXTREMITIES:  2+ Peripheral Pulses, brisk capillary refill. No clubbing, cyanosis, or edema  NEUROLOGICAL:  Alert & Oriented X3, speech clear. Answers questions appropriately. Full and equal strength B/L upper and lower extremities. No deficits   MSK: FROM x 4 extremities   SKIN: No rashes or lesions PHYSICAL EXAM:    Vital Signs Last 24 Hrs  T(C): 36.4 (30 Jan 2024 22:35), Max: 36.7 (30 Jan 2024 15:26)  T(F): 97.6 (30 Jan 2024 22:35), Max: 98.1 (30 Jan 2024 15:26)  HR: 82 (30 Jan 2024 22:35) (72 - 83)  BP: 100/59 (30 Jan 2024 22:35) (100/59 - 143/81)  BP(mean): --  RR: 18 (30 Jan 2024 22:35) (16 - 20)  SpO2: 94% (30 Jan 2024 22:35) (94% - 97%)    Parameters below as of 30 Jan 2024 22:35  Patient On (Oxygen Delivery Method): room air        GENERAL: Pt lying in bed comfortably in NAD, sluggish, seems intoxicated  HEENT:  Atraumatic, EOMI, PERRL, conjunctiva and sclera clear, MMM  NECK: Supple,  CHEST/LUNG: Clear to auscultation bilaterally;   HEART: Regular rate and rhythm;  ABDOMEN: Bowel sounds present; Soft, Nontender, Nondistended.     EXTREMITIES:  2+ Peripheral Pulses, brisk capillary refill. No edema  NEUROLOGICAL:  Alert & Oriented X3, slow to respond, trouble remembering words, otherwise exam non-focal  MSK: FROM x 4 extremities   SKIN: No rashes or lesions

## 2024-01-30 NOTE — ED PROVIDER NOTE - PROGRESS NOTE DETAILS
Per , pt dressed this AM w/ purse, shoes & nightgown, paper towels in purse, asking where bathroom was, pt asking if she has a fetus / is pregnant. Pt does not understand why she is hospital, slow to answer questions, agitated by questioning of , asking if he is her child, not currently on steroids, hx steroid psychosis. Pt does not understand why she is hospital, slow to answer questions, agitated by questioning of , asking if he is her child. Pt now asking if she has a fetus / is pregnant.

## 2024-01-30 NOTE — ED ADULT NURSE NOTE - NSFALLHARMRISKINTERV_ED_ALL_ED
Assistance OOB with selected safe patient handling equipment if applicable/Assistance with ambulation/Communicate risk of Fall with Harm to all staff, patient, and family/Monitor gait and stability/Monitor for mental status changes and reorient to person, place, and time, as needed/Provide visual cue: red socks, yellow wristband, yellow gown, etc/Reinforce activity limits and safety measures with patient and family/Toileting schedule using arm’s reach rule for commode and bathroom/Use of alarms - bed, stretcher, chair and/or video monitoring/Bed in lowest position, wheels locked, appropriate side rails in place/Call bell, personal items and telephone in reach/Instruct patient to call for assistance before getting out of bed/chair/stretcher/Non-slip footwear applied when patient is off stretcher/[place] to call system/Physically safe environment - no spills, clutter or unnecessary equipment/Purposeful Proactive Rounding/Room/bathroom lighting operational, light cord in reach

## 2024-01-30 NOTE — H&P ADULT - ASSESSMENT
66 y/o F w/ PMHx of HTN, hypothyroid, Lupus, Sjogren's, anxiety/depression BIBEMS initially accompanied by  d/t worsening AMS.    # AMS       66 y/o F w/ PMHx of HTN, hypothyroid, Lupus, Sjogren's, anxiety/depression BIBEMS initially accompanied by  d/t worsening AMS.    # AMS  - CT head neg  - UDS +ve for Benzos and opiates none of which patient takes; med list from Nov +ve percocet however pt denies taking them currently  - f/u Neuro consult; ED spoke w/ Dr. Velasquez  - cont to monitor    # Anxiety/depression  - c/w fluoxetine    # HTN  - c/w lisinopril, BB    # Hypothyroidism  - c/w synthroid  - TSH 0.096    # HLD  - c/w statin    # DVT ppx - lovenox subq

## 2024-01-30 NOTE — ED ADULT TRIAGE NOTE - NS ED NURSE DIRECT TO ROOM YN
DISCHARGE SUMMARY:  Discharge Time: 2255  Via: ambulatory  Accompanied by: SO  Verbal Instructions given: yes  Verbalized understanding: yes  Written Instructions given: yes  Discharged Stable Per Provider Order: yes- per MARIN Calloway    
No

## 2024-01-30 NOTE — ED PROVIDER NOTE - NSICDXPASTSURGICALHX_GEN_ALL_CORE_FT
PAST SURGICAL HISTORY:  H/O oral surgery     History of back surgery     S/P      S/P myomectomy

## 2024-01-30 NOTE — H&P ADULT - NSHPLABSRESULTS_GEN_ALL_CORE
T(C): 36.4 (24 @ 22:35), Max: 36.7 (24 @ 15:26)  HR: 82 (24 @ 22:35) (72 - 83)  BP: 100/59 (24 @ 22:35) (100/59 - 143/81)  RR: 18 (24 @ 22:35) (16 - 20)  SpO2: 94% (24 @ 22:35) (94% - 97%)                        11.8   6.14  )-----------( 183      ( 2024 13:25 )             37.6         140  |  108  |  19  ----------------------------<  96  3.5   |  27  |  0.92    Ca    9.2      2024 13:25  Mg     2.1         TPro  7.2  /  Alb  3.2<L>  /  TBili  1.0  /  DBili  x   /  AST  28  /  ALT  22  /  AlkPhos  108      LIVER FUNCTIONS - ( 2024 13:25 )  Alb: 3.2 g/dL / Pro: 7.2 gm/dL / ALK PHOS: 108 U/L / ALT: 22 U/L / AST: 28 U/L / GGT: x           PT/INR - ( 2024 13:25 )   PT: 11.9 sec;   INR: 1.00 ratio         PTT - ( 2024 13:25 )  PTT:30.3 sec  Urinalysis Basic - ( 2024 19:41 )    Color: Yellow / Appearance: Clear / S.029 / pH: x  Gluc: x / Ketone: 15 mg/dL  / Bili: Negative / Urobili: 1.0 mg/dL   Blood: x / Protein: Negative mg/dL / Nitrite: Negative   Leuk Esterase: Negative / RBC: x / WBC x   Sq Epi: x / Non Sq Epi: x / Bacteria: x    < from: CT Head No Cont (24 @ 14:31) >    MPRESSION:  No acute intracranial hemorrhage or acute territorial infarction.      < end of copied text >      acetaminophen     Tablet .. 650 milliGRAM(s) Oral every 6 hours PRN  aluminum hydroxide/magnesium hydroxide/simethicone Suspension 30 milliLiter(s) Oral every 4 hours PRN  atorvastatin 20 milliGRAM(s) Oral at bedtime  enoxaparin Injectable 40 milliGRAM(s) SubCutaneous every 24 hours  FLUoxetine 40 milliGRAM(s) Oral daily  folic acid 1 milliGRAM(s) Oral daily  gabapentin 800 milliGRAM(s) Oral three times a day  levothyroxine 137 MICROGram(s) Oral daily  lisinopril 20 milliGRAM(s) Oral daily  melatonin 3 milliGRAM(s) Oral at bedtime PRN  metoprolol succinate ER 25 milliGRAM(s) Oral daily  ondansetron Injectable 4 milliGRAM(s) IV Push every 8 hours PRN

## 2024-01-30 NOTE — ED PROVIDER NOTE - OBJECTIVE STATEMENT
67F PMH HTN, hypothyroid, Lupus, Sjogren's, anxiety / depression BIBEMS accompanied by  d/t worsening AMS. Per , pt diagnosed w/ seizure disorder 11/2023, pt w/ negative inpatient w/u, follows w/ Dr Portillo outpatient. Pt w/ recent 24hr EEG w/ noted abnormalities, resulted 1wk ago. Per , call from  on Friday: wife in store w/ generalized weakness, not speaking sense / delirious, pt w/ worsening over weekend. Pt w/ outpatient Neuro appt in 2wks, called office on Friday to move-up appt, Neuro relayed to wife plan for MR, but wife did not share w/ ,  found out Neuro ordered MR when he called office this AM. Pt w/o fever, cough, vomiting, diarrhea, changes in urination, LE swelling.     PMH as above, PSH lumbar spine, Allergy Compazine, Meds as listed. 67F PMH HTN, hypothyroid, Lupus, Sjogren's, anxiety / depression BIBEMS accompanied by  d/t worsening AMS. Per , pt diagnosed w/ seizure disorder 11/2023, pt w/ negative inpatient w/u, follows w/ Dr Portillo outpatient. Pt w/ recent 24hr EEG w/ noted abnormalities, resulted 1wk ago. Per , call from  on Friday: wife in store w/ generalized weakness, not speaking sense / delirious, pt w/ worsening over weekend. Pt w/ outpatient Neuro appt in 2wks, called office on Friday to move-up appt, Neuro relayed to wife plan for MR, but wife did not share w/ ,  found out Neuro ordered MR when he called office this AM. Per , pt dressed this AM w/ purse, shoes & nightgown, roll of paper towels in purse, asking where bathroom was w/in her home. Pt w/o fever, cough, vomiting, diarrhea, changes in urination, LE swelling. Pt w/ hx steroid psychosis, but not currently on steroids.    PMH as above, PSH lumbar spine, Allergy Compazine, Meds as listed.

## 2024-01-30 NOTE — ED ADULT TRIAGE NOTE - CHIEF COMPLAINT QUOTE
Pt biba with Altered Mental Status x 4 days. as per  pt slow to respond  to question with  some slurring in speech x 2 months ago. h/o SEIZURES, HTN.

## 2024-01-30 NOTE — ED PROVIDER NOTE - PHYSICAL EXAMINATION
GEN: Awake, alert, interactive, NAD.  HEAD AND NECK: NC/AT. Airway patent. Neck supple.   EYES:  Clear b/l. EOMI. PERRL.   ENT: Moist mucus membranes.   CARDIAC: Regular rate, regular rhythm. No evident pedal edema.    RESP/CHEST: Normal respiratory effort with no use of accessory muscles or retractions. Clear throughout on auscultation.  ABD: Soft, non-distended, non-tender. No rebound, no guarding.   BACK: No midline spinal TTP. No CVAT.   EXTREMITIES: Moving all extremities with no apparent deformities.   SKIN: Warm, dry, intact normal color. No rash.   NEURO: CN II-XII grossly intact, no focal deficits.   PSYCH: Appropriate mood and affect.

## 2024-01-30 NOTE — ED ADULT NURSE NOTE - OBJECTIVE STATEMENT
BIBA with c/o AMS x4 days, Neuro MD Portillo, f/u WITH EEG showed abnormal results with appt in two weeks, f/u MRI pending. But worsening confusion, delayed speech, falls. Denies fever, chills, n/v/d. HX seizures

## 2024-01-30 NOTE — H&P ADULT - HISTORY OF PRESENT ILLNESS
66 y/o F w/ PMHx of HTN, hypothyroid, Lupus, Sjogren's, anxiety/depression BIBEMS accompanied by  d/t worsening AMS. Per , pt diagnosed w/ seizure disorder 11/2023, pt w/ negative inpatient w/u, follows w/ Dr Portillo outpatient. Pt w/ recent 24hr EEG w/ noted abnormalities, resulted 1wk ago. Per , call from  on Friday: wife in store w/ generalized weakness, not speaking sense / delirious, pt w/ worsening over weekend. Pt w/ outpatient Neuro appt in 2wks, called office on Friday to move-up appt, Neuro relayed to wife plan for MR, but wife did not share w/ ,  found out Neuro ordered MR when he called office this AM. Per , pt dressed this AM w/ purse, shoes & nightgown, roll of paper towels in purse, asking where bathroom was w/in her home. Pt w/o fever, cough, vomiting, diarrhea, changes in urination, LE swelling. Pt w/ hx             66 y/o F w/ PMHx of HTN, hypothyroid, Lupus, Sjogren's, anxiety/depression BIBEMS initially accompanied by  d/t worsening AMS. Pt seemingly confused, slow to respond, states  believes she has been confused. Per ED staff who spke w/ , "pt diagnosed w/ seizure disorder 11/2023, pt w/ negative inpatient w/u, follows w/ Dr Portillo outpatient. Pt w/ recent 24hr EEG w/ noted abnormalities, resulted 1wk ago. Per , call from  on Friday: wife in store w/ generalized weakness, not making sense / delirious, pt worsened over weekend".  call pt's Neurologist this morning; ordered MR for pt. "Per , pt dressed this AM w/ purse, shoes & nightgown, roll of paper towels in purse, asking where bathroom was w/in her home". No reported fevers, chills, HA, cp, dizziness or falls. Pt denies medication overdose, opioid or drug use.             66 y/o F w/ PMHx of HTN, hypothyroid, Lupus, Sjogren's, anxiety/depression BIBEMS initially accompanied by  d/t worsening AMS. Pt seemingly confused, slow to respond, states  believes she has been confused. Per ED staff who spke w/ , "pt diagnosed w/ seizure disorder 11/2023, pt w/ negative inpatient w/u, follows w/ Dr Portillo outpatient. Pt w/ recent 24hr EEG w/ noted abnormalities, resulted 1wk ago. Per , call from  on Friday: wife in store w/ generalized weakness, not making sense / delirious, pt worsened over weekend".  call pt's Neurologist this morning; ordered MR for pt. "Per , pt dressed this AM w/ purse, shoes & nightgown, roll of paper towels in purse, asking where bathroom was w/in her home". No reported fevers, chills, HA, cp, dizziness or falls. Pt denies medication overdose, opioid or drug use.     ED Course   Vitals stable, labs stable, UDS +ve for benzos and opiates. CT head neg for acute pathology. Dr Velasquez consulted.

## 2024-01-30 NOTE — PATIENT PROFILE ADULT - FALL HARM RISK - HARM RISK INTERVENTIONS

## 2024-01-30 NOTE — ED PROVIDER NOTE - CLINICAL SUMMARY MEDICAL DECISION MAKING FREE TEXT BOX
67F PMH HTN, hypothyroid, Lupus, Sjogren's, anxiety / depression BIBEMS accompanied by  d/t worsening AMS. Afebrile, VSS. Exam as noted in PE. Plan for CBC, CMP, mag, TSH, CT brain, CXR, UA/C, ARNEL, Utox, coags, ECG. Anticipate admission. 67F PMH HTN, hypothyroid, Lupus, Sjogren's, anxiety / depression BIBEMS accompanied by  d/t worsening AMS. Afebrile, VSS. Exam as noted in PE. Plan for CBC, CMP, mag, TSH, CT brain, CXR, UA/C, ARNEL, Utox, coags, ECG. Anticipate admission.  W/u w/o significant abnormalities. On re-eval, resting comfortably. Pt exhibiting AMS: confusion, agitation, bizarre. Neuro (Dr Velasquez) consulted, will admit to medicine (d/w Dr Cali). Pt,  updated to results, admission. They understand / agree w/ this plan.

## 2024-01-31 ENCOUNTER — APPOINTMENT (OUTPATIENT)
Dept: NEUROLOGY | Facility: CLINIC | Age: 69
End: 2024-01-31
Payer: MEDICARE

## 2024-01-31 LAB
ANION GAP SERPL CALC-SCNC: 5 MMOL/L — SIGNIFICANT CHANGE UP (ref 5–17)
BUN SERPL-MCNC: 10 MG/DL — SIGNIFICANT CHANGE UP (ref 7–23)
CALCIUM SERPL-MCNC: 9 MG/DL — SIGNIFICANT CHANGE UP (ref 8.5–10.1)
CHLORIDE SERPL-SCNC: 110 MMOL/L — HIGH (ref 96–108)
CO2 SERPL-SCNC: 28 MMOL/L — SIGNIFICANT CHANGE UP (ref 22–31)
CREAT SERPL-MCNC: 0.59 MG/DL — SIGNIFICANT CHANGE UP (ref 0.5–1.3)
EGFR: 99 ML/MIN/1.73M2 — SIGNIFICANT CHANGE UP
GLUCOSE SERPL-MCNC: 86 MG/DL — SIGNIFICANT CHANGE UP (ref 70–99)
HCT VFR BLD CALC: 35.3 % — SIGNIFICANT CHANGE UP (ref 34.5–45)
HGB BLD-MCNC: 11 G/DL — LOW (ref 11.5–15.5)
MAGNESIUM SERPL-MCNC: 1.9 MG/DL — SIGNIFICANT CHANGE UP (ref 1.6–2.6)
MCHC RBC-ENTMCNC: 27.8 PG — SIGNIFICANT CHANGE UP (ref 27–34)
MCHC RBC-ENTMCNC: 31.2 G/DL — LOW (ref 32–36)
MCV RBC AUTO: 89.1 FL — SIGNIFICANT CHANGE UP (ref 80–100)
NRBC # BLD: 0 /100 WBCS — SIGNIFICANT CHANGE UP (ref 0–0)
PLATELET # BLD AUTO: 185 K/UL — SIGNIFICANT CHANGE UP (ref 150–400)
POTASSIUM SERPL-MCNC: 3.4 MMOL/L — LOW (ref 3.5–5.3)
POTASSIUM SERPL-SCNC: 3.4 MMOL/L — LOW (ref 3.5–5.3)
RBC # BLD: 3.96 M/UL — SIGNIFICANT CHANGE UP (ref 3.8–5.2)
RBC # FLD: 15.9 % — HIGH (ref 10.3–14.5)
SODIUM SERPL-SCNC: 143 MMOL/L — SIGNIFICANT CHANGE UP (ref 135–145)
WBC # BLD: 3.99 K/UL — SIGNIFICANT CHANGE UP (ref 3.8–10.5)
WBC # FLD AUTO: 3.99 K/UL — SIGNIFICANT CHANGE UP (ref 3.8–10.5)

## 2024-01-31 PROCEDURE — 99443: CPT | Mod: 93

## 2024-01-31 PROCEDURE — 99223 1ST HOSP IP/OBS HIGH 75: CPT | Mod: FS

## 2024-01-31 PROCEDURE — 99232 SBSQ HOSP IP/OBS MODERATE 35: CPT

## 2024-01-31 RX ORDER — POTASSIUM CHLORIDE 20 MEQ
40 PACKET (EA) ORAL ONCE
Refills: 0 | Status: COMPLETED | OUTPATIENT
Start: 2024-01-31 | End: 2024-01-31

## 2024-01-31 RX ADMIN — Medication 137 MICROGRAM(S): at 05:48

## 2024-01-31 RX ADMIN — Medication 40 MILLIEQUIVALENT(S): at 14:47

## 2024-01-31 RX ADMIN — ENOXAPARIN SODIUM 40 MILLIGRAM(S): 100 INJECTION SUBCUTANEOUS at 21:44

## 2024-01-31 RX ADMIN — GABAPENTIN 800 MILLIGRAM(S): 400 CAPSULE ORAL at 05:47

## 2024-01-31 RX ADMIN — GABAPENTIN 800 MILLIGRAM(S): 400 CAPSULE ORAL at 21:43

## 2024-01-31 RX ADMIN — GABAPENTIN 800 MILLIGRAM(S): 400 CAPSULE ORAL at 13:10

## 2024-01-31 RX ADMIN — LISINOPRIL 20 MILLIGRAM(S): 2.5 TABLET ORAL at 05:48

## 2024-01-31 RX ADMIN — ATORVASTATIN CALCIUM 20 MILLIGRAM(S): 80 TABLET, FILM COATED ORAL at 21:43

## 2024-01-31 RX ADMIN — Medication 25 MILLIGRAM(S): at 05:47

## 2024-01-31 NOTE — CONSULT NOTE ADULT - SUBJECTIVE AND OBJECTIVE BOX
Patient is a 68y old  Female who presents with a chief complaint of AMS (2024 14:22)      CC: confusion    HPI:             66 y/o F w/ PMHx of HTN, hypothyroid, Lupus, Sjogren's, anxiety/depression BIBEMS initially accompanied by  d/t worsening AMS. Pt seemingly confused, slow to respond, states  believes she has been confused. Per ED staff who spke w/ , "pt diagnosed w/ seizure disorder 2023, pt w/ negative inpatient w/u, follows w/ Dr Portillo outpatient. Pt w/ recent 24hr EEG w/ noted abnormalities, resulted 1wk ago. Per , call from  on Friday: wife in store w/ generalized weakness, not making sense / delirious, pt worsened over weekend".  call pt's Neurologist this morning; ordered MR for pt. "Per , pt dressed this AM w/ purse, shoes & nightgown, roll of paper towels in purse, asking where bathroom was w/in her home". No reported fevers, chills, HA, cp, dizziness or falls. Pt denies medication overdose, opioid or drug use.   ED Course   Vitals stable, labs stable, UDS +ve for benzos and opiates. CT head neg for acute pathology.    Patient's  reports patient was acting strangely prior to admission, at home and also while in the supermarket.  At first patient did not recall using benzodiazepines or opioids recently, although both found in urine toxicology.   report both are meds are at home (ie clonazepam, percocet).  Patient had a seizure in 2023 suspected of being related to clonazepam withdrawal + tramadol use.  Also reportedly had been on Suboxone at that time.    Amb EEG done in Dec 2024 showed mild background slowing, no epileptiform abnormalities or seizures.    Head CT is negative    PAST MEDICAL & SURGICAL HISTORY:  HTN (hypertension)  SLE (systemic lupus erythematosus)  Hypothyroid  Depression  Pyoderma gangrenosum  Steroid-induced psychosis with complication  S/P myomectomy  S/P   H/O oral surgery  History of back surgery    FAMILY HISTORY:  No pertinent family history in first degree relatives    Social Hx:  Nonsmoker, no drug or alcohol use    MEDICATIONS  (STANDING):  atorvastatin 20 milliGRAM(s) Oral at bedtime  enoxaparin Injectable 40 milliGRAM(s) SubCutaneous every 24 hours  FLUoxetine 40 milliGRAM(s) Oral daily  folic acid 1 milliGRAM(s) Oral daily  gabapentin 800 milliGRAM(s) Oral three times a day  levothyroxine 137 MICROGram(s) Oral daily  lisinopril 20 milliGRAM(s) Oral daily  metoprolol succinate ER 25 milliGRAM(s) Oral daily     Allergies  Compazine (Other)    ROS: Pertinent positives in HPI, all other ROS were reviewed and are negative.      Vital Signs Last 24 Hrs  T(C): 37 (2024 16:40), Max: 37 (2024 16:40)  T(F): 98.6 (2024 16:40), Max: 98.6 (2024 16:40)  HR: 80 (2024 16:40) (72 - 82)  BP: 126/78 (2024 16:40) (100/59 - 130/79)  BP(mean): --  RR: 18 (2024 16:40) (18 - 19)  SpO2: 93% (2024 16:40) (93% - 96%)    Parameters below as of 2024 16:40  Patient On (Oxygen Delivery Method): room air    Neurological exam:  MS: A x O x 3. Appropriately interactive, tearful affect. Speech fluent, No Aphasia or paraphasic errors. Naming /repetition intact   CN: ELLIE, EOMI, VFF, facial sensation normal, no NLFD, tongue midline, Palate moves equally, SCM equal bilaterally  Motor: No pronator drift, Strength 5/5 in all 4 ext, normal bulk and tone, no tremor, rigidity or bradykinesia.    Sens: Intact to light touch / VS   Reflexes: Symmetric . BJ 2+, BR 2+, KJ 2+, AJ 2+, downgoing toes b/l  Coord:  No FNFA, no dysmetria, ABRAHAN intact     NIHSS: 0    Labs:       143  |  110<H>  |  10  ----------------------------<  86  3.4<L>   |  28  |  0.59    Ca    9.0      2024 06:40  Mg     1.9         TPro  7.2  /  Alb  3.2<L>  /  TBili  1.0  /  DBili  x   /  AST  28  /  ALT  22  /  AlkPhos  108  -                              11.0   3.99  )-----------( 185      ( 2024 06:40 )             35.3       A: 69 yo woman admitted with confusion and abnormal behavior.  May be related to benzodiazepines/opioid use, seems improved today, fully oriented.    Plan:  1. MRI brain to rule out acute pathology  2. EEG  3. Monitor for signs/symptoms of withdrawal    Yonatan Velasquez MD  Neurology Attending Physician

## 2024-01-31 NOTE — PROGRESS NOTE ADULT - SUBJECTIVE AND OBJECTIVE BOX
68 y/o F w/ PMHx of HTN, hypothyroid, Lupus, Sjogren's, Pyoderma gangrenosum, Steroid-induced psychosis with complication, anxiety/depression p/w worsening altered mental status. She is lying in bed in NAD.    MEDICATIONS  (STANDING):  atorvastatin 20 milliGRAM(s) Oral at bedtime  enoxaparin Injectable 40 milliGRAM(s) SubCutaneous every 24 hours  FLUoxetine 40 milliGRAM(s) Oral daily  folic acid 1 milliGRAM(s) Oral daily  gabapentin 800 milliGRAM(s) Oral three times a day  levothyroxine 137 MICROGram(s) Oral daily  lisinopril 20 milliGRAM(s) Oral daily  metoprolol succinate ER 25 milliGRAM(s) Oral daily    MEDICATIONS  (PRN):  acetaminophen     Tablet .. 650 milliGRAM(s) Oral every 6 hours PRN Temp greater or equal to 38C (100.4F), Mild Pain (1 - 3)  aluminum hydroxide/magnesium hydroxide/simethicone Suspension 30 milliLiter(s) Oral every 4 hours PRN Dyspepsia  melatonin 3 milliGRAM(s) Oral at bedtime PRN Insomnia  ondansetron Injectable 4 milliGRAM(s) IV Push every 8 hours PRN Nausea and/or Vomiting      Allergies    Compazine (Other)    Intolerances        Vital Signs Last 24 Hrs  T(C): 36.6 (31 Jan 2024 12:41), Max: 36.7 (30 Jan 2024 15:26)  T(F): 97.8 (31 Jan 2024 12:41), Max: 98.1 (30 Jan 2024 15:26)  HR: 76 (31 Jan 2024 12:41) (72 - 83)  BP: 121/79 (31 Jan 2024 12:41) (100/59 - 143/81)   RR: 19 (31 Jan 2024 12:41) (16 - 19)  SpO2: 96% (31 Jan 2024 12:41) (94% - 96%)    Parameters below as of 31 Jan 2024 12:41  Patient On (Oxygen Delivery Method): room air        PHYSICAL EXAM:  GENERAL: NAD   HEAD:  Atraumatic, Normocephalic  EYES: EOMI, PERRLA,   NECK: Supple   NERVOUS SYSTEM:  Alert   CHEST/LUNG: Clear to auscultation bilaterally; No rales, rhonchi, wheezing, or rubs  HEART: Regular rate and rhythm; No murmurs, rubs, or gallops  ABDOMEN: Soft, Nontender, Nondistended; Bowel sounds present  EXTREMITIES:  No clubbing, cyanosis, or edema     LABS:                        11.0   3.99  )-----------( 185      ( 31 Jan 2024 06:40 )             35.3     01-31    143  |  110<H>  |  10  ----------------------------<  86  3.4<L>   |  28  |  0.59    Ca    9.0      31 Jan 2024 06:40  Mg     2.1     01-30    TPro  7.2  /  Alb  3.2<L>  /  TBili  1.0  /  DBili  x   /  AST  28  /  ALT  22  /  AlkPhos  108  01-30    PT/INR - ( 30 Jan 2024 13:25 )   PT: 11.9 sec;   INR: 1.00 ratio         PTT - ( 30 Jan 2024 13:25 )  PTT:30.3 sec  Urinalysis Basic - ( 31 Jan 2024 06:40 )    Color: x / Appearance: x / SG: x / pH: x  Gluc: 86 mg/dL / Ketone: x  / Bili: x / Urobili: x   Blood: x / Protein: x / Nitrite: x   Leuk Esterase: x / RBC: x / WBC x   Sq Epi: x / Non Sq Epi: x / Bacteria: x       RADIOLOGY & ADDITIONAL TESTS:    01-30-24 @ 07:01  -  01-31-24 @ 07:00  --------------------------------------------------------  IN:  Total IN: 0 mL    OUT:    Voided (mL): 500 mL  Total OUT: 500 mL    Total NET: -500 mL       66 y/o F w/ PMHx of HTN, hypothyroid, Lupus, Sjogren's, Pyoderma gangrenosum, Steroid-induced psychosis with complication, anxiety/depression p/w worsening altered mental status. She is lying in bed in NAD.    MEDICATIONS  (STANDING):  atorvastatin 20 milliGRAM(s) Oral at bedtime  enoxaparin Injectable 40 milliGRAM(s) SubCutaneous every 24 hours  FLUoxetine 40 milliGRAM(s) Oral daily  folic acid 1 milliGRAM(s) Oral daily  gabapentin 800 milliGRAM(s) Oral three times a day  levothyroxine 137 MICROGram(s) Oral daily  lisinopril 20 milliGRAM(s) Oral daily  metoprolol succinate ER 25 milliGRAM(s) Oral daily    MEDICATIONS  (PRN):  acetaminophen     Tablet .. 650 milliGRAM(s) Oral every 6 hours PRN Temp greater or equal to 38C (100.4F), Mild Pain (1 - 3)  aluminum hydroxide/magnesium hydroxide/simethicone Suspension 30 milliLiter(s) Oral every 4 hours PRN Dyspepsia  melatonin 3 milliGRAM(s) Oral at bedtime PRN Insomnia  ondansetron Injectable 4 milliGRAM(s) IV Push every 8 hours PRN Nausea and/or Vomiting      Allergies    Compazine (Other)    Intolerances        Vital Signs Last 24 Hrs  T(C): 36.6 (31 Jan 2024 12:41), Max: 36.7 (30 Jan 2024 15:26)  T(F): 97.8 (31 Jan 2024 12:41), Max: 98.1 (30 Jan 2024 15:26)  HR: 76 (31 Jan 2024 12:41) (72 - 83)  BP: 121/79 (31 Jan 2024 12:41) (100/59 - 143/81)   RR: 19 (31 Jan 2024 12:41) (16 - 19)  SpO2: 96% (31 Jan 2024 12:41) (94% - 96%)    Parameters below as of 31 Jan 2024 12:41  Patient On (Oxygen Delivery Method): room air        PHYSICAL EXAM:  GENERAL: NAD   HEAD:  Atraumatic, Normocephalic  EYES: EOMI, PERRLA,   NECK: Supple   NERVOUS SYSTEM:  Alert & oriented x 3 but still confused   CHEST/LUNG: Clear to auscultation bilaterally; No rales, rhonchi, wheezing, or rubs  HEART: Regular rate and rhythm; No murmurs, rubs, or gallops  ABDOMEN: Soft, Nontender, Nondistended; Bowel sounds present  EXTREMITIES:  No clubbing, cyanosis, or edema     LABS:                        11.0   3.99  )-----------( 185      ( 31 Jan 2024 06:40 )             35.3     01-31    143  |  110<H>  |  10  ----------------------------<  86  3.4<L>   |  28  |  0.59    Ca    9.0      31 Jan 2024 06:40  Mg     2.1     01-30    TPro  7.2  /  Alb  3.2<L>  /  TBili  1.0  /  DBili  x   /  AST  28  /  ALT  22  /  AlkPhos  108  01-30    PT/INR - ( 30 Jan 2024 13:25 )   PT: 11.9 sec;   INR: 1.00 ratio         PTT - ( 30 Jan 2024 13:25 )  PTT:30.3 sec  Urinalysis Basic - ( 31 Jan 2024 06:40 )    Color: x / Appearance: x / SG: x / pH: x  Gluc: 86 mg/dL / Ketone: x  / Bili: x / Urobili: x   Blood: x / Protein: x / Nitrite: x   Leuk Esterase: x / RBC: x / WBC x   Sq Epi: x / Non Sq Epi: x / Bacteria: x       RADIOLOGY & ADDITIONAL TESTS:    01-30-24 @ 07:01  -  01-31-24 @ 07:00  --------------------------------------------------------  IN:  Total IN: 0 mL    OUT:    Voided (mL): 500 mL  Total OUT: 500 mL    Total NET: -500 mL

## 2024-01-31 NOTE — PROGRESS NOTE ADULT - ASSESSMENT
66 y/o F w/ PMHx of HTN, hypothyroid, Lupus, Sjogren's, Pyoderma gangrenosum, Steroid-induced psychosis with complication, anxiety/depression p/w worsening altered mental status.     AMS/ Metabolic encephalopathy   - CT head showed no acute intracranial hemorrhage or acute territorial infarction  - UDS +ve for Benzos and opiates none of which patient takes; med list from Nov +ve percocet however pt denies taking them currently  - f/u Neuro consult pending; ED spoke w/ Dr. Velasquez asper H&P  - cont to monitor    hypokalemia  - replace w/ KCl    Anxiety/depression  - c/w fluoxetine    HTN  - c/w lisinopril & Toprol XL    Hypothyroidism  - c/w synthroid  - TSH 0.096, will check free T4    HLD  - c/w atorvastatin    Prophylaxis:  DVT: Lovenox   GI: PO diet       68 y/o F w/ PMHx of HTN, hypothyroid, Lupus, Sjogren's, Pyoderma gangrenosum, Steroid-induced psychosis with complication, anxiety/depression p/w worsening altered mental status.     AMS/ Metabolic encephalopathy   - CT head showed no acute intracranial hemorrhage or acute territorial infarction  - UDS +ve for Benzos and opiates none of which patient takes; med list from Nov +ve percocet however pt denies taking them currently  - f/u Neuro consult pending; ED spoke w/ Dr. Ron hickey H&P  - cont to monitor  - will get MRI    hypokalemia  - replace w/ KCl    Anxiety/depression  - c/w fluoxetine    HTN  - c/w lisinopril & Toprol XL    Hypothyroidism  - c/w synthroid  - TSH 0.096, will check free T4    HLD  - c/w atorvastatin    Prophylaxis:  DVT: Lovenox   GI: PO diet    Spoke w/ the pt's Allan mullins at bedside.

## 2024-02-01 ENCOUNTER — TRANSCRIPTION ENCOUNTER (OUTPATIENT)
Age: 69
End: 2024-02-01

## 2024-02-01 VITALS
OXYGEN SATURATION: 97 % | TEMPERATURE: 98 F | HEART RATE: 88 BPM | DIASTOLIC BLOOD PRESSURE: 86 MMHG | RESPIRATION RATE: 18 BRPM | SYSTOLIC BLOOD PRESSURE: 144 MMHG

## 2024-02-01 LAB
CULTURE RESULTS: SIGNIFICANT CHANGE UP
SPECIMEN SOURCE: SIGNIFICANT CHANGE UP
T4 FREE SERPL-MCNC: 1.6 NG/DL — SIGNIFICANT CHANGE UP (ref 0.9–1.8)

## 2024-02-01 PROCEDURE — 95816 EEG AWAKE AND DROWSY: CPT | Mod: 26

## 2024-02-01 PROCEDURE — 70551 MRI BRAIN STEM W/O DYE: CPT | Mod: 26

## 2024-02-01 PROCEDURE — 99239 HOSP IP/OBS DSCHRG MGMT >30: CPT

## 2024-02-01 RX ORDER — POTASSIUM CHLORIDE 20 MEQ
40 PACKET (EA) ORAL ONCE
Refills: 0 | Status: COMPLETED | OUTPATIENT
Start: 2024-02-01 | End: 2024-02-01

## 2024-02-01 RX ORDER — CLONAZEPAM 1 MG
1 TABLET ORAL THREE TIMES A DAY
Refills: 0 | Status: DISCONTINUED | OUTPATIENT
Start: 2024-02-01 | End: 2024-02-01

## 2024-02-01 RX ORDER — HYDRALAZINE HCL 50 MG
10 TABLET ORAL ONCE
Refills: 0 | Status: COMPLETED | OUTPATIENT
Start: 2024-02-01 | End: 2024-02-01

## 2024-02-01 RX ORDER — CLONAZEPAM 1 MG
1 TABLET ORAL
Refills: 0 | DISCHARGE

## 2024-02-01 RX ADMIN — GABAPENTIN 800 MILLIGRAM(S): 400 CAPSULE ORAL at 05:13

## 2024-02-01 RX ADMIN — Medication 10 MILLIGRAM(S): at 17:30

## 2024-02-01 RX ADMIN — LISINOPRIL 20 MILLIGRAM(S): 2.5 TABLET ORAL at 05:14

## 2024-02-01 RX ADMIN — Medication 40 MILLIEQUIVALENT(S): at 12:33

## 2024-02-01 RX ADMIN — Medication 25 MILLIGRAM(S): at 05:13

## 2024-02-01 RX ADMIN — Medication 40 MILLIGRAM(S): at 12:31

## 2024-02-01 RX ADMIN — GABAPENTIN 800 MILLIGRAM(S): 400 CAPSULE ORAL at 12:32

## 2024-02-01 RX ADMIN — Medication 1 MILLIGRAM(S): at 14:11

## 2024-02-01 RX ADMIN — Medication 1 MILLIGRAM(S): at 12:32

## 2024-02-01 RX ADMIN — Medication 137 MICROGRAM(S): at 05:13

## 2024-02-01 NOTE — DISCHARGE NOTE NURSING/CASE MANAGEMENT/SOCIAL WORK - NSDCPEFALRISK_GEN_ALL_CORE
For information on Fall & Injury Prevention, visit: [email or web address] OR  [email or web address] OR  https://www.cdc.gov/steadi/patient.html

## 2024-02-01 NOTE — DISCHARGE NOTE PROVIDER - NSDCFUSCHEDAPPT_GEN_ALL_CORE_FT
Isaías Portillo  [facility]  NEUROLOGY [facility]  Scheduled Appointment: 02/27/2024

## 2024-02-01 NOTE — EEG REPORT - NS EEG TEXT BOX
Patient Identifiers  Name: CALLI NOYOLA  : 59  Age: 68y Female    Start Time:   End Time: 24 10:36 - 10:59    History: 68F with h/o HTN, hypothyroid, lupus, Sjogren's, mood disorder, presented with AMS, improving.    Medications:   acetaminophen     Tablet .. 650 milliGRAM(s) Oral every 6 hours PRN  FLUoxetine 40 milliGRAM(s) Oral daily  gabapentin 800 milliGRAM(s) Oral three times a day  melatonin 3 milliGRAM(s) Oral at bedtime PRN  ondansetron Injectable 4 milliGRAM(s) IV Push every 8 hours PRN      ___________________________________________________________________________  Recording Technique:     The patient underwent routine Video/EEG monitoring using a cable telemetry system [employer].  The EEG was recorded from 21 electrodes using the standard 10/20 placement, with EKG.  Time synchronized digital video recording was done simultaneously with EEG recording.  ___________________________________________________________________________    Background in wakefulness:   The background activity during wakefulness was well organized and characterized by the presence of 8Hz rhythm that appeared symmetrically over both posterior hemispheres and was attenuated with eye opening. A normal anterior to posterior gradient was present.    Slowing:    Generalized: rare, very brief, polymorphic delta slowing  Focal: occasional R temporal delta slowing    Attenuation and Asymmetry: as above    Interictal Activity: None.      Patient Events/ Ictal Activity: No push button events or seizures were recorded during the monitoring period.      Activation Procedures:  Intermittent photic stimulation in incremental frequencies up to 30 Hz did not produce abnormal activation of epileptiform activity.      EKG:  regular rhythm    Impression:  This is an abnormal routine EEG study in the awake and drowsy states.  - Occasional R temporal delta slowing  - Rare, very brief, generalized polymorphic delta slowing  No epileptiform discharges or seizures    Clinical Correlation:   Focal slowing can correlate with a structural abnormality in the appropriate clinical setting  Mild diffuse slowing is nonspecific with regards to etiology.  No seizures were recorded during the monitoring period.     CALLI NOYOLA [id number]     Study Date: 02-01-24  Duration: 21 min  --------------------------------------------------------------------------------------------------  History:  CC/ HPI Patient is a 68y old  Female who presents with a chief complaint of AMS (01 Feb 2024 16:29)    MEDICATIONS  (STANDING):  atorvastatin 20 milliGRAM(s) Oral at bedtime  clonazePAM  Tablet 1 milliGRAM(s) Oral three times a day  enoxaparin Injectable 40 milliGRAM(s) SubCutaneous every 24 hours  FLUoxetine 40 milliGRAM(s) Oral daily  folic acid 1 milliGRAM(s) Oral daily  gabapentin 800 milliGRAM(s) Oral three times a day  levothyroxine 137 MICROGram(s) Oral daily  lisinopril 20 milliGRAM(s) Oral daily  metoprolol succinate ER 25 milliGRAM(s) Oral daily    --------------------------------------------------------------------------------------------------  Study Interpretation:    [[[Abbreviation Key:  PDR=alpha rhythm/posterior dominant rhythm. A-P=anterior posterior.  Amplitude: ‘very low’:<20; ‘low’:20-49; ‘medium’:; ‘high’:>150uV.  Persistence for periodic/rhythmic patterns (% of epoch) ‘rare’:<1%; ‘occasional’:1-10%; ‘frequent’:10-50%; ‘abundant’:50-90%; ‘continuous’:>90%.  Persistence for sporadic discharges: ‘rare’:<1/hr; ‘occasional’:1/min-1/hr; ‘frequent’:>1/min; ‘abundant’:>1/10 sec.  RPP=rhythmic and periodic patterns; GRDA=generalized rhythmic delta activity; FIRDA=frontal intermittent GRDA; LRDA=lateralized rhythmic delta activity; TIRDA=temporal intermittent rhythmic delta activity;  LPD=PLED=lateralized periodic discharges; GPD=generalized periodic discharges; BIPDs =bilateral independent periodic discharges; Mf=multifocal; SIRPDs=stimulus induced rhythmic, periodic, or ictal appearing discharges; BIRDs=brief potentially ictal rhythmic discharges >4 Hz, lasting .5-10s; PFA (paroxysmal bursts >13 Hz or =8 Hz <10s).  Modifiers: +F=with fast component; +S=with spike component; +R=with rhythmic component.  S-B=burst suppression pattern.  Max=maximal. N1-drowsy; N2-stage II sleep; N3-slow wave sleep. SSS/BETS=small sharp spikes/benign epileptiform transients of sleep. HV=hyperventilation; PS=photic stimulation]]]    Daily EEG Visual Analysis    FINDINGS:      Background:  Continuity: Continuous  Symmetry: Symmetric  Posterior dominant rhythm (PDR): 8.5 Hz, reactive to eye closure. Symmetric low-amplitude frontal beta in wakefulness.  State Change: Present  Voltage: Normal  Anterior-Posterior Gradient: Present  Other background findings: Intermittent diffuse polymorphic theta > delta slowing in wakefulness  Breach: Absent    Background Slowing:  Generalized slowing: As above  Focal slowing: Frequent bilateral independent temporal > hemispheric focal polymorphic delta slowing    State Changes:   Drowsiness and stage 2 sleep not captured.    Interictal Findings:  None    Electrographic and Electroclinical seizures:  None    Other Clinical Events:  None    Activation Procedures:   Hyperventilation is not performed.    Photic stimulation is performed and does not elicit any abnormalities.      Artifacts:  Intermittent myogenic and movement artifacts are present.    EKG:  Single-lead EKG shows regular rhythm.    EEG Classification / Summary:  Abnormal routine EEG in the awake state.  -Bilateral temporal > hemispheric focal slowing.  -Mild diffuse slowing.  -No epileptiform abnormalities are captured.     Clinical Impression:  -Bilateral temporal > hemispheric focal cerebral dysfunction can be structural or functional in etiology.  -Mild diffuse cerebral dysfunction is nonspecific in etiology.           -------------------------------------------------------------------------------------------------------    Austyn Paul MD  Neurocritical Care    Galina Lin MD  Attending Physician, [facility]ile[facility] [place]    -------------------------------------------------------------------------------------------------------    To reach EEG technologist:  Please use the pager number for the appropriate hospital or contact the .  At [facility] - Pager #: [phone]    To reach EEG-reading physician:  [facility] EEG Reading Room Phone #: [phone]  Epilepsy Answering Service after 5PM and before 8:30AM: Phone #: [phone]

## 2024-02-01 NOTE — DISCHARGE NOTE PROVIDER - HOSPITAL COURSE
66 y/o F w/ PMHx of HTN, hypothyroid, Lupus, Sjogren's, Pyoderma gangrenosum, Steroid-induced psychosis with complication, anxiety/depression p/w worsening altered mental status/ Metabolic encephalopathy, likley due to Percocet use that she was taking for back pain status post recent lower back surgery. CT head showed no acute intracranial hemorrhage or acute territorial infarction. UDS +ve for Benzos and opiates. Pt says she takes Klonopin for anxiety. As per neurology, EEG and MRI were ordered. The MRI showed o evidence of acute infarct or midline shift. EEG showed occasional R temporal delta slowing w/ rare, very brief, generalized polymorphic delta slowing but no epileptiform discharges or seizures. Her mental status improved and she was cleared for discharge as per my conversation w/ Dr. Velasquez today. I spoke w/ the pt's , Allan Robles at bedside, and advised that she stop taking Percocet.     Discharge time: 43 minutes       Vital Signs Last 24 Hrs  T(C): 36.8 (01 Feb 2024 12:48), Max: 36.8 (01 Feb 2024 12:48)  T(F): 98.2 (01 Feb 2024 12:48), Max: 98.2 (01 Feb 2024 12:48)  HR: 90 (01 Feb 2024 12:48) (80 - 99)  BP: 149/85 (01 Feb 2024 12:48) (149/85 - 193/85)   RR: 18 (01 Feb 2024 12:48) (16 - 18)  SpO2: 96% (01 Feb 2024 12:48) (95% - 96%)    Parameters below as of 01 Feb 2024 12:48  Patient On (Oxygen Delivery Method): room air     PHYSICAL EXAM:  GENERAL: NAD   HEAD:  Atraumatic, Normocephalic  EYES: EOMI, PERRLA,   NECK: Supple   NERVOUS SYSTEM:  Alert & oriented x 3 but still confused   CHEST/LUNG: Clear to auscultation bilaterally; No rales, rhonchi, wheezing, or rubs  HEART: Regular rate and rhythm; No murmurs, rubs, or gallops  ABDOMEN: Soft, Nontender, Nondistended; Bowel sounds present  EXTREMITIES:  No clubbing, cyanosis, or edema

## 2024-02-01 NOTE — DISCHARGE NOTE PROVIDER - NSDCDCMDCOMP_GEN_ALL_CORE
Universal Protocol   Consent: Verbal consent obtained  Written consent obtained  Risks and benefits: risks, benefits and alternatives were discussed  Consent given by: patient  Time out: Immediately prior to procedure a "time out" was called to verify the correct patient, procedure, equipment, support staff and site/side marked as required  Patient understanding: patient states understanding of the procedure being performed  Patient consent: the patient's understanding of the procedure matches consent given  Procedure consent: procedure consent matches procedure scheduled  Relevant documents: relevant documents present and verified  Patient identity confirmed: verbally with patient        Chemodenervation     Date/Time 1/6/2023 2:23 PM     Performed by  Denzel Rodriguez PA-C     Authorized by Denzel Rodriguez PA-C        Pre-procedure details      Prepped With: Alcohol     Anesthesia  (see MAR for exact dosages): Anesthesia method:  None   Procedure details     Position:  Upright   Botox     Botox Type:  Type A    Brand:  Botox    mL's of Botulinum Toxin:  100    Final Concentration per CC:  50 units    Needle Gauge:  30 G 2 5 inch   Procedures     Botox Procedures: chronic headache     Injection Location      Head / Face:  R frontalis, L frontalis and L temporalis    L lateral frontalis:  45 unit(s)    R lateral frontalis:  10 unit(s)    Comments:  Around scar    L medial frontalis:  0 unit(s)    R medial frontalis:  5 unit(s)    Comments:  Around scar    L temporalis injection amount:  40 unit(s)   Total Units     Total units used:  100    Total units discarded:  0   Post-procedure details      Chemodenervation:  Chronic migraine    Facial Nerve Location[de-identified]  Bilateral facial nerve    Patient tolerance of procedure:   Tolerated well, no immediate complications   Comments      All medically necessary This document is complete and the patient is ready for discharge.

## 2024-02-01 NOTE — DISCHARGE NOTE PROVIDER - NSDCMRMEDTOKEN_GEN_ALL_CORE_FT
atorvastatin 20 mg oral tablet: 1 tab(s) orally once a day  clonazePAM 1 mg oral tablet: 1 tab(s) orally 3 times a day  folic acid 1 mg oral tablet: 1 tab(s) orally once a day  gabapentin 800 mg oral tablet: 1 tab(s) orally 3 times a day  levothyroxine 137 mcg (0.137 mg) oral tablet: 1 tab(s) orally once a day  lisinopril 20 mg oral tablet: 1 tab(s) orally once a day  pilocarpine 5 mg oral tablet: 1 tab(s) orally 3 times a day  PROzac 40 mg oral capsule: 2 cap(s) orally once a day  Toprol-XL 25 mg oral tablet, extended release: 1 tab(s) orally once a day

## 2024-02-01 NOTE — DISCHARGE NOTE NURSING/CASE MANAGEMENT/SOCIAL WORK - PATIENT PORTAL LINK FT
You can access the FollowMyHealth Patient Portal offered by [facility] by registering at the following website: [email or web address]. By joining [facility]’s FollowMyHealth portal, you will also be able to view your health information using other applications (apps) compatible with our system.

## 2024-02-06 DIAGNOSIS — Z79.890 HORMONE REPLACEMENT THERAPY: ICD-10-CM

## 2024-02-06 DIAGNOSIS — T40.2X5A ADVERSE EFFECT OF OTHER OPIOIDS, INITIAL ENCOUNTER: ICD-10-CM

## 2024-02-06 DIAGNOSIS — M32.9 SYSTEMIC LUPUS ERYTHEMATOSUS, UNSPECIFIED: ICD-10-CM

## 2024-02-06 DIAGNOSIS — E87.6 HYPOKALEMIA: ICD-10-CM

## 2024-02-06 DIAGNOSIS — F32.A DEPRESSION, UNSPECIFIED: ICD-10-CM

## 2024-02-06 DIAGNOSIS — R41.82 ALTERED MENTAL STATUS, UNSPECIFIED: ICD-10-CM

## 2024-02-06 DIAGNOSIS — F41.9 ANXIETY DISORDER, UNSPECIFIED: ICD-10-CM

## 2024-02-06 DIAGNOSIS — G92.8 OTHER TOXIC ENCEPHALOPATHY: ICD-10-CM

## 2024-02-06 DIAGNOSIS — I10 ESSENTIAL (PRIMARY) HYPERTENSION: ICD-10-CM

## 2024-02-06 DIAGNOSIS — E78.5 HYPERLIPIDEMIA, UNSPECIFIED: ICD-10-CM

## 2024-02-06 DIAGNOSIS — M35.00 SJOGREN SYNDROME, UNSPECIFIED: ICD-10-CM

## 2024-02-06 DIAGNOSIS — E03.9 HYPOTHYROIDISM, UNSPECIFIED: ICD-10-CM

## 2024-02-06 DIAGNOSIS — Z88.8 ALLERGY STATUS TO OTHER DRUGS, MEDICAMENTS AND BIOLOGICAL SUBSTANCES: ICD-10-CM

## 2024-02-23 PROBLEM — R41.3 MEMORY IMPAIRMENT: Status: ACTIVE | Noted: 2022-04-05

## 2024-02-27 ENCOUNTER — NON-APPOINTMENT (OUTPATIENT)
Age: 69
End: 2024-02-27

## 2024-03-06 ENCOUNTER — APPOINTMENT (OUTPATIENT)
Dept: NEUROLOGY | Facility: CLINIC | Age: 69
End: 2024-03-06
Payer: MEDICARE

## 2024-03-06 VITALS
DIASTOLIC BLOOD PRESSURE: 97 MMHG | WEIGHT: 185 LBS | SYSTOLIC BLOOD PRESSURE: 151 MMHG | BODY MASS INDEX: 30.82 KG/M2 | HEIGHT: 65 IN | HEART RATE: 85 BPM

## 2024-03-06 DIAGNOSIS — R56.9 UNSPECIFIED CONVULSIONS: ICD-10-CM

## 2024-03-06 DIAGNOSIS — F13.20 SEDATIVE, HYPNOTIC OR ANXIOLYTIC DEPENDENCE, UNCOMPLICATED: ICD-10-CM

## 2024-03-06 PROCEDURE — 92012 INTRM OPH EXAM EST PATIENT: CPT

## 2024-03-06 PROCEDURE — 99204 OFFICE O/P NEW MOD 45 MIN: CPT

## 2024-03-10 ENCOUNTER — RX RENEWAL (OUTPATIENT)
Age: 69
End: 2024-03-10

## 2024-03-11 PROBLEM — F13.20 BENZODIAZEPINE DEPENDENCE: Status: ACTIVE | Noted: 2022-04-29

## 2024-03-11 PROBLEM — R56.9 SEIZURE: Status: ACTIVE | Noted: 2023-11-24

## 2024-03-11 NOTE — HISTORY OF PRESENT ILLNESS
[FreeTextEntry1] :  *** 03/06/2024 ***  CALLI NOYOLA is seen for initial eval for a clinical seizures. she has a complex neurological and surgical history as well as polypharmacy for anxiety/depression/pain sdr  Post spine surgery she received tramadol and she had a convulsive seizure, likely provoked one. her EEG showed diffuse slowing( non specific)    from previous notes This is a 67-year-old right-handed woman who underwent spinal surgery in earlier this fall at the [facility] and was taking Tramadol as needed for pain after that (she was also given Suboxone one day but stopped it after she experienced diaphoresis with one dose). On 11/16/23, while the patient was lying in bed, her  observed that she had an episode of whole-body shaking lasting  seconds and fell off the bed, without head injury. The patient's  called EMS, and when the patient was brought to the ambulance about 10 minutes later, she started to be aware of her surroundings. She was admitted until 11/19/23 and was advised that she did not need to start an antiepileptic medication for a single lifetime seizure that was likely triggered by Clonazepam withdrawal and Tramadol use. She recounts that when she was a child, she had migraines, as well as incidental spikes seen on routine EEG after she had a concussion, but never needed to be started on an antiepileptic medication. She previously took Clonazepam to help with anxiety and tremors in all four extremities but has not taken it since her seizure.

## 2024-03-11 NOTE — ASSESSMENT
[FreeTextEntry1] : CALLI NOYOLA is a 67 yo F w a provoked seizure in the context of polypharmacy as well as tramadol.  Plan: pain consult ( dr. Garcia) avoidance of medication that can lower her seizure threshold  will f/u only if she has an unprovoked seizure

## 2024-03-11 NOTE — REASON FOR VISIT
[Initial Eval - Existing Diagnosis] : an initial evaluation of an existing diagnosis [Spouse] : spouse [FreeTextEntry1] : Seizure

## 2024-03-13 ENCOUNTER — APPOINTMENT (OUTPATIENT)
Dept: PAIN MANAGEMENT | Facility: CLINIC | Age: 69
End: 2024-03-13
Payer: MEDICARE

## 2024-03-13 VITALS
DIASTOLIC BLOOD PRESSURE: 91 MMHG | SYSTOLIC BLOOD PRESSURE: 169 MMHG | HEIGHT: 65 IN | WEIGHT: 180 LBS | HEART RATE: 60 BPM | BODY MASS INDEX: 29.99 KG/M2

## 2024-03-13 PROCEDURE — 99215 OFFICE O/P EST HI 40 MIN: CPT

## 2024-03-17 NOTE — ED ADULT NURSE NOTE - TEMPLATE
Physical Therapy    Physical Therapy Treatment    Patient Name: Rosibel Satton  MRN: [id number]  Today's Date: 3/19/2024        PT Therapeutic Procedures Time Entry  Manual Therapy Time Entry: 25  Therapeutic Exercise Time Entry: 15     Visit number: 13  Insurance: [facility]  Authorized visits: 12 plus 12  Authorization end date: 3/26/2024      Assessment:     Patient with decreased cervical tone, improved cervical mobility after manual therapy as well as decreased pain to 1/10.    Plan:     Continue to work to have patient manage tone independently    Current Problem  1. Injury of head, subsequent encounter  Follow Up In Physical Therapy      2. Strain of neck muscle, subsequent encounter  Follow Up In Physical Therapy          Subjective    Patient states that she continues to have high stress.    Pain     3/10 right side in neck    Objective     Treatments:  3/18/2024:  Therapeutic ex: 10  Doorway 3 position cues for elbows  Rhombliod stretch 30    Manual 30  CRC for right upper trap and levator scap with good release  Cross fiber and trigger point to posterior scalenes, semispinalis    3/15/2024:  Stretches reviewed with good technique and understanding of which exercises to do to manage her painful area.    Patient shown theracane and options on line for decrease cost.    3/6/2024:  Manual therapy to right >left cervical spine especially posterior scalenes and semi spinalis.  Patient with decreased cervical pain at the end of the session.    2/22/2024:  Therapeutic ex:  Cervical stretches with decreased pain,   Manual therapy   Cross fiber semi spinalis, upper trap, levator scap, posterior scalenes, recutus capitis    2/20/2024:  Manual therapy: 30  Treadmill 2.5 MPH for 15 min with about 3000 steps.  Patient encouraged to return to normal activity, not stay in garage with low light  Cervical lateral flexion 1 time  Levator scap stretch 1 time with reported decreased pain    Manual therapy 10  Right side to upper  "trap and levator scap    2/8/2024:  Shoulder ext green 10 times 1 set *  Rows 10 times 1 set *   Cervical retraction supine and sitting 10 times 1 set *     Manual therapy:  Cross fiber semi spinals, and upper trap right side    2/5/2024:  Upper cervical rotation self  Lower cervical rotation self    Manual therapy  Cross fiber, trigger point levator scap, upper trap and Middle scalenes    2/1/2024:  Manual therapy   Upper cervical rotation limited to right severly  C1 rotation to the left, C2-C4 rotated to the right  Cross fiber and Trigger point to SCM, Anterior scalene, paraspinals, semi spinalis  Patient with mild cervical restriction to the right after manual therapy and improve cervical rotation.  As well as decreased pain.    1/29/2024:  Cross fiber and trigger point rhomboids, levator scap, SCM, upper trap and posterior scalenes    1/25/2024:  Cross fiber to Temporalis, pterygoids with patient education for self management.  MFR to posterior scalenes right>left and cross fiber to same muscles.    Prior:  MFR to middle scalenes, upper trap, right > left levator scap left>right rhomboids bilaterally right >left    Use of tennis/raquette ball in pillow case for trigger point self release    Therapeutic Ex  1/29/2024:  Door way pec stretch 3 position 3 times 30\" *   Rhomboid stretch in the door 3 times 30\" *   Sleeper stretch 3 times 30\" *     1/25/2024:  Taping to decreased activation of upper trap and levator scap as well as pectoralis muscles.    1/23/2024:  Door way rhomboid stretch 2 min  Door way pec stretch 2 min 3 positions  Supine chin tucks 10 times 1 set * hold 5 sec  Supine scapular retraction 10 times 1 set * hold 5 sec    PRIOR:  Use of towel roll for decreased cervical thoracic stress and strain  Use of heat 20 min or ice 10 min every hour for pain control  Meditation for relaxation, pain control and sleep  No naps during the day unless normal sleep at night  Walk for 30 minutes daily  Levator " "scap stretch 3 times 30\" * mod cues for pull to stretch not pain  Upper trap 3 times 30\" * mod cues for pull to stretch not pain  OP EDUCATION:       Goals:  Active       PT Problem       Patient decreased neck disability index score to 16 points for self reported decline in symptoms.        Start:  01/15/24    Expected End:  04/14/24            Patient will increase cervical rotation to 0-60 degrees to allow the patient to scan environment without pain.  (Progressing)       Start:  01/15/24    Expected End:  04/14/24         Goal Note       Right 0-54 left 0-58              Patient decrease DHI to 20 points for self reported improvement in function.        Start:  01/15/24    Expected End:  04/14/24            Patient report headaches at 0-4/10 to allow increased ease in daily tasks (Progressing)       Start:  01/15/24    Expected End:  04/14/24         Goal Note       Headaches 1-8/10, 2 migraines in the last week, with new meds this week to address.  Patient reports 40% of normal for headache.              Patient will be independent in Home Exercise Program to manage symptoms and maximize their functional independence.  (Progressing)       Start:  01/15/24    Expected End:  04/14/24            Patient Goal: Pain releif       Start:  01/15/24    Expected End:  04/14/24              " General

## 2024-03-24 NOTE — ASSESSMENT
[FreeTextEntry1] : This is a case 68-year-old woman with a longstanding history of chronic back pain with lumbar radiculopathy now presents with nonradicular low back pain following lumbar surgery in August 2023.  Her neurologic examination essentially nonfocal.  We discussed the possibility of nonopiate therapies as well as reintroduction of analgesic opioid medication.  I have asked her to undergo a urine drug screen at our office today.  She agreed.  No medications provided at this time.  Will make a decision following the results of the urine tox screening.

## 2024-03-24 NOTE — PHYSICAL EXAM
[FreeTextEntry1] : Constitutional: No signs of distress or signs of toxicity. Mental Status: Alert and well oriented. Speech fluent. No aphasia. Fund of knowledge intact. Psychiatric: Mood stable Cranial Nerve: PERRLA: No papilledema; No VFC; EOM full. no nystagmus. No Sharonda. V1-3 intact. No facial asymmetry, hearing grossly intact; palate elevates symmetrically, tongue midline Motor: No involuntary movements noted.  Adequate bulk, tone throughout, 5/5 strength of all muscle groups DTR: present and symmetrical; no clonus, plantars  downgoing Sensory: intact to primary and secondary modalities; neg Romberg Cerebellar: adequate finger to nose and heel to shin bilaterally. Gait: non antalgic or ataxic. Eyes: no redness or swelling HEENT: intact; no signs of trauma. Neck: No masses noted Pulmonary: no respiratory distress Vascular: no temperature, color change or sudomotor changes.; no edema Musculoskeletal: examination of the cervical spine reveals no midline tenderness, range of motion full upon flexion, extension and lateral rotation. Negative facet tenderness, Negative Spurlings bilaterally. examination of the lumbar spine reveals no midline or paraspinal tenderness; Range of motion full upon flexion, extension and lateral rotation; negative facet loading, No tenderness of sciatic notch, No tenderness of bilateral greater trochanters, Negative JARED, negative SLRT bilaterally, Abd: non tender Skin: No rash.

## 2024-03-24 NOTE — HISTORY OF PRESENT ILLNESS
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  Patient reports level having had undergone lumbar surgery in August 2023 presenting with worsening back pain.  The pain is 1nonradicular nature this was able and notes "I am no longer suffering from sciatica".  The pain is described as a throbbing sharp sensation worse with movement but also notes some pain at rest.  She is currently not on any pain medications since November 2023.  Upon further questioning, she is gabapentin 800 daily steroid and the Bactrim to 2400 mg daily.  Effexor was ineffective.  She also notes that Percocet 5/325 times a day which helped the pain.  Tramadol was also tried but she notes that she is on no pain medications at the time of my evaluation.  Patient recalls that I had seen her many years ago and felt that she was not a candidate for chronic opioid therapy.  However, she notes "I am in a different time of my life than in the past".   Speech patient denies prior history or family history of drug, alcohol abuse.  Denies preadolescent sexual abuse.

## 2024-03-25 ENCOUNTER — APPOINTMENT (OUTPATIENT)
Dept: NEUROLOGY | Facility: CLINIC | Age: 69
End: 2024-03-25

## 2024-05-07 ENCOUNTER — LABORATORY RESULT (OUTPATIENT)
Age: 69
End: 2024-05-07

## 2024-05-07 ENCOUNTER — NON-APPOINTMENT (OUTPATIENT)
Age: 69
End: 2024-05-07

## 2024-05-07 ENCOUNTER — APPOINTMENT (OUTPATIENT)
Dept: GASTROENTEROLOGY | Facility: CLINIC | Age: 69
End: 2024-05-07
Payer: MEDICARE

## 2024-05-07 VITALS
BODY MASS INDEX: 29.99 KG/M2 | HEART RATE: 68 BPM | DIASTOLIC BLOOD PRESSURE: 101 MMHG | WEIGHT: 180 LBS | HEIGHT: 65 IN | SYSTOLIC BLOOD PRESSURE: 138 MMHG

## 2024-05-07 DIAGNOSIS — R15.9 FULL INCONTINENCE OF FECES: ICD-10-CM

## 2024-05-07 DIAGNOSIS — K86.2 CYST OF PANCREAS: ICD-10-CM

## 2024-05-07 DIAGNOSIS — R07.9 CHEST PAIN, UNSPECIFIED: ICD-10-CM

## 2024-05-07 DIAGNOSIS — R14.0 ABDOMINAL DISTENSION (GASEOUS): ICD-10-CM

## 2024-05-07 DIAGNOSIS — R19.4 CHANGE IN BOWEL HABIT: ICD-10-CM

## 2024-05-07 PROCEDURE — 99215 OFFICE O/P EST HI 40 MIN: CPT | Mod: 25

## 2024-05-07 PROCEDURE — 82272 OCCULT BLD FECES 1-3 TESTS: CPT

## 2024-05-07 RX ORDER — DIFLUPREDNATE 0.5 MG/ML
0.05 EMULSION OPHTHALMIC
Qty: 5 | Refills: 0 | Status: DISCONTINUED | COMMUNITY
Start: 2022-06-24 | End: 2024-05-07

## 2024-05-07 RX ORDER — HYDROMORPHONE HYDROCHLORIDE 4 MG/1
4 TABLET ORAL
Qty: 30 | Refills: 0 | Status: DISCONTINUED | COMMUNITY
Start: 2023-09-05 | End: 2024-05-07

## 2024-05-07 RX ORDER — FLUCONAZOLE 150 MG/1
150 TABLET ORAL
Qty: 3 | Refills: 0 | Status: DISCONTINUED | COMMUNITY
Start: 2023-10-18 | End: 2024-05-07

## 2024-05-07 RX ORDER — FLUTICASONE PROPIONATE 50 MCG
17 SPRAY, SUSPENSION (ML) NASAL
Qty: 5 | Refills: 0 | Status: DISCONTINUED | COMMUNITY
Start: 2023-09-04 | End: 2024-05-07

## 2024-05-07 RX ORDER — TRAMADOL HYDROCHLORIDE 50 MG/1
50 TABLET, COATED ORAL
Qty: 30 | Refills: 0 | Status: DISCONTINUED | COMMUNITY
Start: 2023-11-15 | End: 2024-05-07

## 2024-05-07 RX ORDER — CHLORHEXIDINE GLUCONATE 4% 4 G/100ML
4 LIQUID TOPICAL
Qty: 236 | Refills: 0 | Status: DISCONTINUED | COMMUNITY
Start: 2023-08-10 | End: 2024-05-07

## 2024-05-07 RX ORDER — ASPIRIN 81 MG/1
81 TABLET, COATED ORAL
Qty: 35 | Refills: 0 | Status: DISCONTINUED | COMMUNITY
Start: 2023-09-04 | End: 2024-05-07

## 2024-05-07 RX ORDER — LEVOTHYROXINE SODIUM 137 UG/1
137 TABLET ORAL
Refills: 0 | Status: ACTIVE | COMMUNITY

## 2024-05-07 RX ORDER — LIDOCAINE 4 G/100G
4 PATCH TOPICAL
Qty: 5 | Refills: 0 | Status: DISCONTINUED | COMMUNITY
Start: 2023-09-05 | End: 2024-05-07

## 2024-05-07 RX ORDER — FLUOXETINE HYDROCHLORIDE 40 MG/1
40 CAPSULE ORAL
Refills: 0 | Status: ACTIVE | COMMUNITY

## 2024-05-07 RX ORDER — ALBUTEROL SULFATE 90 UG/1
108 (90 BASE) INHALANT RESPIRATORY (INHALATION)
Qty: 1 | Refills: 0 | Status: DISCONTINUED | COMMUNITY
Start: 2023-03-16 | End: 2024-05-07

## 2024-05-07 RX ORDER — BUPRENORPHINE AND NALOXONE 8; 2 MG/1; MG/1
8-2 TABLET SUBLINGUAL
Qty: 42 | Refills: 0 | Status: DISCONTINUED | COMMUNITY
Start: 2023-10-18 | End: 2024-05-07

## 2024-05-07 RX ORDER — METHOCARBAMOL 750 MG/1
750 TABLET, FILM COATED ORAL
Qty: 21 | Refills: 0 | Status: DISCONTINUED | COMMUNITY
Start: 2023-10-26 | End: 2024-05-07

## 2024-05-07 RX ORDER — NALOXONE HYDROCHLORIDE 4 MG/.1ML
4 SPRAY NASAL
Qty: 2 | Refills: 0 | Status: DISCONTINUED | COMMUNITY
Start: 2023-09-07 | End: 2024-05-07

## 2024-05-07 RX ORDER — FOLIC ACID 1 MG/1
1 TABLET ORAL DAILY
Qty: 90 | Refills: 1 | Status: DISCONTINUED | COMMUNITY
Start: 2021-11-30 | End: 2024-05-07

## 2024-05-07 RX ORDER — ONDANSETRON 4 MG/1
4 TABLET ORAL
Qty: 20 | Refills: 0 | Status: DISCONTINUED | COMMUNITY
Start: 2023-09-04 | End: 2024-05-07

## 2024-05-07 RX ORDER — ENALAPRIL MALEATE 10 MG/1
10 TABLET ORAL
Qty: 90 | Refills: 0 | Status: DISCONTINUED | COMMUNITY
Start: 2023-05-31 | End: 2024-05-07

## 2024-05-07 RX ORDER — METHOCARBAMOL 500 MG/1
500 TABLET, FILM COATED ORAL
Qty: 90 | Refills: 0 | Status: DISCONTINUED | COMMUNITY
Start: 2023-09-20 | End: 2024-05-07

## 2024-05-07 RX ORDER — PREDNISOLONE ACETATE 10 MG/ML
1 SUSPENSION/ DROPS OPHTHALMIC
Qty: 5 | Refills: 0 | Status: DISCONTINUED | COMMUNITY
Start: 2023-07-13 | End: 2024-05-07

## 2024-05-07 RX ORDER — SENNOSIDES 8.6 MG TABLETS 8.6 MG/1
8.6 TABLET ORAL
Qty: 30 | Refills: 0 | Status: DISCONTINUED | COMMUNITY
Start: 2023-09-04 | End: 2024-05-07

## 2024-05-07 NOTE — ASSESSMENT
[FreeTextEntry1] : 1.  Change in bowels with fecal incontinence; history of acute self-limited colitis , with only left-sided diverticulosis and hemorrhoids at last colonoscopy 2018--rule out smoldering inflammation (such as from colitis and/or celiac disease), underlying neoplasm.  DJD spine/spinal surgery could be contributing to her fecal (and urinary) incontinence.  Diet (including coffee, diet soda, etc.) could be contributory.  Also at increased risk for bacterial overgrowth, pancreatic insufficiency.  Thyroid supplement dosing may need to be adjusted.   2.  Pancreatic cysts (and probable hepatic cysts), family history of pancreatic cancer (mother). 3.  Gastritis at last EGD . 4.  Obesity. 5.  Pyoderma gangrenosum. 6.  Hashimoto's thyroiditis. 7.  History of SLE, scleroderma, Sjogren's. 8.  Hypertension. 9.  Hyperlipidemia. 10.  Hypothyroidism. 11.  DJD of spine/sciatica--status post L3-L5 fusion/laminectomy 2023. 12.  Urinary incontinence. 13.  Status post , myomectomy, hand surgery. 14.  Allergic to Compazine, "fruit".  Plan: 1.  Interim medical records have been reviewed. 2.  Extensive bloodwork drawn by me this afternoon.  She will call for test results after the weekend. 3.  NIH brochure on "Fecal Incontinence" given and discussed. 4.  Dietary instruction given. 5.  Try Metamucil 1 heaping tablespoon daily; can increase to twice daily dosing after 1-2 weeks if needed. 6.  If symptoms remain problematic despite above, add Imodium at bedtime. 7.  She is aware that repeat panendoscopy with biopsies will likely be advised. 8.  Possible repeat cross-sectional imaging study, pending results of above. 9.  She was advised to contact Cardiology today because of chest pain noted en route here.

## 2024-05-07 NOTE — PHYSICAL EXAM
[Well Developed] : well developed [Well Nourished] : well nourished [Obese (BMI >= 30)] : obese (BMI >= 30) [None] : no edema [Bowel Sounds] : normal bowel sounds [Abdomen Tenderness] : non-tender [No Masses] : no abdominal mass palpated [Abdomen Soft] : soft [] : no hepatosplenomegaly [No External Hemorrhoid] : no external hemorrhoids [Inguinal Lymph Nodes Enlarged Bilaterally] : no inguinal lymphadenopathy [Normal] : oriented to person, place, and time [Occult Blood] : negative occult blood [de-identified] : surgical scars, striae [de-identified] : sphincter tone somewhat decreased

## 2024-05-07 NOTE — CONSULT LETTER
[Dear  ___] : Dear  [unfilled], [Courtesy Letter:] : I had the pleasure of seeing your patient, [unfilled], in my office today. [Please see my note below.] : Please see my note below. [Consult Closing:] : Thank you very much for allowing me to participate in the care of this patient.  If you have any questions, please do not hesitate to contact me. [Sincerely,] : Sincerely, [FreeTextEntry3] : Heraclio Jacobsen M.D.

## 2024-05-07 NOTE — HISTORY OF PRESENT ILLNESS
[FreeTextEntry1] : Since undergoing spinal surgery (L3-L5 fusion/laminectomy) 8/31/2023, Christine has noted frequent fecal incontinence.  She thinks that some of this could be related to coffee ingestion.  The incontinence averages 5 days/week, sometimes occurring in the middle of the night. She is typically incontinent of liquidy stools but does have some "normal" BMs interspersed.  She has had a long-standing urinary incontinence that preceded the surgery.  Her low back pain is definitely much improved since undergoing the surgery.  She has history of pancreatic neck cyst, although repeat MRI 2020 revealed a few scattered smaller cysts.  Last colonoscopy September 2018 revealed left-sided diverticulosis and hemorrhoids.  EGD 2014 revealed gastritis.  She has history of pyoderma gangrenosum (on breast and abdomen), SLE/scleroderma, Hashimoto's, and Sjogren's.  She mentioned that she had some chest pain while driving here, but that this has since abated; last seen by Cardiology 4 months ago.

## 2024-05-07 NOTE — REVIEW OF SYSTEMS
[Feeling Poorly] : feeling poorly [Feeling Tired] : feeling tired [Chest Pain] : chest pain [Shortness Of Breath] : shortness of breath [Diarrhea] : diarrhea [As Noted in HPI] : as noted in HPI [Fecal Incontinence (soiling)] : fecal incontinence [FreeTextEntry8] : urinary incontinence  [de-identified] : Grand mal seizure in November

## 2024-05-08 ENCOUNTER — NON-APPOINTMENT (OUTPATIENT)
Age: 69
End: 2024-05-08

## 2024-05-08 LAB
25(OH)D3 SERPL-MCNC: 16.3 NG/ML
ALBUMIN SERPL ELPH-MCNC: 4.6 G/DL
ALP BLD-CCNC: 127 U/L
ALT SERPL-CCNC: 19 U/L
AMYLASE/CREAT SERPL: 150 U/L
ANION GAP SERPL CALC-SCNC: 15 MMOL/L
ANISOCYTOSIS BLD QL: SLIGHT
AST SERPL-CCNC: 23 U/L
BASOPHILS # BLD AUTO: 0.07 K/UL
BASOPHILS NFR BLD AUTO: 1 %
BILIRUB DIRECT SERPL-MCNC: 0.1 MG/DL
BILIRUB SERPL-MCNC: 0.2 MG/DL
BUN SERPL-MCNC: 9 MG/DL
CALCIUM SERPL-MCNC: 9.6 MG/DL
CHLORIDE SERPL-SCNC: 101 MMOL/L
CO2 SERPL-SCNC: 21 MMOL/L
CREAT SERPL-MCNC: 0.85 MG/DL
CRP SERPL-MCNC: <3 MG/L
EGFR: 76 ML/MIN/1.73M2
ENDOMYSIUM IGA SER QL: NEGATIVE
ENDOMYSIUM IGA TITR SER: NORMAL
EOSINOPHIL # BLD AUTO: 0.07 K/UL
EOSINOPHIL NFR BLD AUTO: 1 %
ERYTHROCYTE [SEDIMENTATION RATE] IN BLOOD BY WESTERGREN METHOD: 54 MM/HR
ESTIMATED AVERAGE GLUCOSE: 108 MG/DL
FERRITIN SERPL-MCNC: 91 NG/ML
FOLATE SERPL-MCNC: 3.5 NG/ML
GGT SERPL-CCNC: 28 U/L
GLUCOSE SERPL-MCNC: 99 MG/DL
HBA1C MFR BLD HPLC: 5.4 %
HCT VFR BLD CALC: 46.3 %
HGB BLD-MCNC: 14.3 G/DL
IRON SATN MFR SERPL: 31 %
IRON SERPL-MCNC: 121 UG/DL
LPL SERPL-CCNC: 39 U/L
LYMPHOCYTES # BLD AUTO: 2.61 K/UL
LYMPHOCYTES NFR BLD AUTO: 36 %
MAGNESIUM SERPL-MCNC: 2 MG/DL
MCHC RBC-ENTMCNC: 29.2 PG
MCHC RBC-ENTMCNC: 30.9 GM/DL
MCV RBC AUTO: 94.7 FL
MONOCYTES # BLD AUTO: 0.15 K/UL
MONOCYTES NFR BLD AUTO: 2 %
MSMEAR: NORMAL
NEUTROPHILS # BLD AUTO: 4.35 K/UL
NEUTROPHILS NFR BLD AUTO: 60 %
PHOSPHATE SERPL-MCNC: 3.7 MG/DL
PLAT MORPH BLD: NORMAL
PLATELET # BLD AUTO: 274 K/UL
POTASSIUM SERPL-SCNC: 4.6 MMOL/L
PROT SERPL-MCNC: 7.5 G/DL
RBC # BLD: 4.89 M/UL
RBC # FLD: 15.1 %
RBC BLD AUTO: ABNORMAL
SODIUM SERPL-SCNC: 137 MMOL/L
T3 SERPL-MCNC: 107 NG/DL
T3RU NFR SERPL: 0.9 TBI
T4 FREE SERPL-MCNC: 1.9 NG/DL
T4 SERPL-MCNC: 10.5 UG/DL
TIBC SERPL-MCNC: 391 UG/DL
TSH SERPL-ACNC: 0.14 UIU/ML
UIBC SERPL-MCNC: 270 UG/DL
VIT B12 SERPL-MCNC: 346 PG/ML
WBC # FLD AUTO: 7.25 K/UL

## 2024-05-10 LAB
GLIADIN IGA SER QL: 8.8 UNITS
GLIADIN IGG SER QL: 6.7 UNITS
GLIADIN PEPTIDE IGA SER-ACNC: NEGATIVE
GLIADIN PEPTIDE IGG SER-ACNC: NEGATIVE
IGA SER QL IEP: 253 MG/DL
TTG IGA SER IA-ACNC: <1.2 U/ML
TTG IGA SER-ACNC: NEGATIVE
TTG IGG SER IA-ACNC: 4.8 U/ML
TTG IGG SER IA-ACNC: NEGATIVE

## 2024-05-13 ENCOUNTER — NON-APPOINTMENT (OUTPATIENT)
Age: 69
End: 2024-05-13

## 2024-05-14 RX ORDER — ATORVASTATIN CALCIUM 20 MG/1
20 TABLET, FILM COATED ORAL
Qty: 90 | Refills: 1 | Status: ACTIVE | COMMUNITY
Start: 2022-03-16 | End: 1900-01-01

## 2024-05-15 ENCOUNTER — APPOINTMENT (OUTPATIENT)
Dept: INTERNAL MEDICINE | Facility: CLINIC | Age: 69
End: 2024-05-15
Payer: MEDICARE

## 2024-05-15 DIAGNOSIS — R32 UNSPECIFIED URINARY INCONTINENCE: ICD-10-CM

## 2024-05-15 DIAGNOSIS — M25.512 PAIN IN LEFT SHOULDER: ICD-10-CM

## 2024-05-15 DIAGNOSIS — E03.9 HYPOTHYROIDISM, UNSPECIFIED: ICD-10-CM

## 2024-05-15 PROCEDURE — 99213 OFFICE O/P EST LOW 20 MIN: CPT

## 2024-05-15 RX ORDER — LEVOTHYROXINE SODIUM 0.12 MG/1
125 TABLET ORAL
Qty: 90 | Refills: 0 | Status: ACTIVE | COMMUNITY
Start: 2019-08-20 | End: 1900-01-01

## 2024-05-15 NOTE — HISTORY OF PRESENT ILLNESS
[FreeTextEntry1] : tele visit  pt at home   physician in office  consent obtained [de-identified] : pt has worsening pain in the L shoulder - started after she bumped it in the wall a few months back  the pain is worse on extension , abduction and internal rotation  also reports worsening UI and stool incontinence  the UI is mostly of the urge type - she feels the urge but often cannot make it to the bathroom the fecal incontinence is mostly when she has loose stools , and mostly when she passes flatus  she also would like to discussed the thyriod results

## 2024-05-15 NOTE — ASSESSMENT
[FreeTextEntry1] : 1) Shoulder pain - x-ray ordered  - ortho eval   2) UI - does not want tx at present   3) fecal incontinence  - reviewed GI note  - start metamucil   4) hypothyroid  - decrease the levothyroxine to 125  - check TSH in 3 month

## 2024-05-15 NOTE — PHYSICAL EXAM
[No Acute Distress] : no acute distress [No Respiratory Distress] : no respiratory distress  [Normal] : no rash [de-identified] : pain on L shoulder ROM testing

## 2024-06-04 ENCOUNTER — APPOINTMENT (OUTPATIENT)
Dept: ORTHOPEDIC SURGERY | Facility: CLINIC | Age: 69
End: 2024-06-04
Payer: MEDICARE

## 2024-06-04 DIAGNOSIS — M17.0 BILATERAL PRIMARY OSTEOARTHRITIS OF KNEE: ICD-10-CM

## 2024-06-04 PROCEDURE — 73564 X-RAY EXAM KNEE 4 OR MORE: CPT | Mod: LT

## 2024-06-04 PROCEDURE — 99213 OFFICE O/P EST LOW 20 MIN: CPT

## 2024-06-09 PROBLEM — M17.0 PRIMARY OSTEOARTHRITIS OF BOTH KNEES: Status: ACTIVE | Noted: 2023-06-09

## 2024-06-09 NOTE — HISTORY OF PRESENT ILLNESS
[de-identified] : A 68-year-old female presents today with worsening left knee pain since June 2023. She reports a longstanding history of joint issues but notes a significant exacerbation of knee pain over the past six months. The pain is constant and worsens with walking. The patient denies experiencing symptoms such as buckling, catching, numbness, or tingling in the affected knee. She has a past medical history significant for granuloma pyoderma and lupus. Additionally, she underwent lumbar fusion surgery at the L3-4 level performed at [facility] Currently, she is under the care of Dr. Garcia for pain management. She presents today with multiple joint complaints and has previously sought evaluation from Dr. Aldrich for bilateral knee and shoulder issues.  The patient's past medical history, past surgical history, medications and allergies were reviewed by me today with the patient and documented accordingly. In addition, the patient's family and social history, which were noncontributory to this visit were reviewed also.

## 2024-06-09 NOTE — PHYSICAL EXAM
[de-identified] : Left knee exam  Skin: Clean, dry, intact Inspection: No obvious malalignment, no masses, no swelling, no effusion Pulses: 2+ DP/PT pulses ROM: 0-125 degrees of flexion. No pain with deep knee flexion/extension. Tenderness: + MJLT. +LJLT. Global ttp. + patella pain Stability: Stable to varus, valgus. Negative lachman testing. Negative anterior drawer, negative posterior drawer. Strength: 5/5 Q/H/TA/GS/EHL, without atrophy Neuro: In tact to light touch throughout, DTR's normal Additional tests: Negative McMurrays test, Negative patellar grind test. Other: Cane  [de-identified] : The following radiographs were ordered and read by me during this patients visit. I reviewed each radiograph in detail with the patient and discussed the findings as highlighted below.   4 views of the left knee were obtained, 06/27/2023, that show no acute fracture or dislocation. There is moderate medial, moderate lateral and modeate patellofemoral degenerative changes seen. There is no significant malalignment. No significant other obvious osseous abnormality, otherwise unremarkable.

## 2024-06-09 NOTE — DISCUSSION/SUMMARY
[de-identified] : 67 y/o female with left knee OA.   I discussed the treatment of degenerative arthritis with the patient at length today, as well as the chronic degenerative process and likely future progression of the disease. Pain may be related to the bony degenerative changes seen on XR imaging, or the associated degeneration to the soft tissues within the knee joint, including the cartilage, meniscus, or ligamentous structures. The denigrative could also be due to the rheumatological conditions of the patient.   I described the spectrum of treatment options available including nonoperative modalities to total joint arthroplasty. Noninvasive and nonoperative treatment modalities include weight reduction, activity modification with low impact exercise, PRN use of acetaminophen or anti-inflammatory medication, natural anti-inflammatory supplements, glucosamine/chondroitin, and physical therapy (for strengthening and conditioning). More invasive treatments can include injection therapies; including cortisone, hyaluronic acid (visco-supplementation), or platelet-rish-plasma (PRP) injections. Definitive treatment could also include future total joint arthroplasty if symptoms persist and cause prolonged disability or interference with activities of daily living.   The risks and benefits of each treatment options was discussed, and all questions were answered. At this time recommendations are for conservative and symptomatic care as detailed above with impact loading activity restriction, low impact exercise and avoidance of strenuous activity. Other simple recommendations include OTC NSAID's or acetaminophen (if tolerated/able), with application of ice to the knee 2-3x daily for 20 minutes or after periods of activity.   Given the degree of arthrosis present on imaging, I discussed potential limitations of significant symptomatic improvement with prolonged conservative treatment as outline. Patient may benefit from consideration of TKA at this time.   Referral provided for Orthopaedic Arthroplasty consultation.   Follow up as needed.

## 2024-06-09 NOTE — ADDENDUM
[FreeTextEntry1] : This note was written by Myra Ingram on 06/04/2024 acting solely as a scribe for Dr. Júnior High.  All medical record entries made by the Scribe were at my, Dr. Júnior High, direction and personally dictated by me on 06/04/2024. I have personally reviewed the chart and agree that the record accurately reflects my personal performance of the history, physical exam, assessment and plan.

## 2024-06-11 RX ORDER — GABAPENTIN 800 MG/1
800 TABLET, COATED ORAL
Qty: 90 | Refills: 3 | Status: ACTIVE | COMMUNITY
Start: 2021-11-30 | End: 1900-01-01

## 2024-06-11 RX ORDER — PILOCARPINE HYDROCHLORIDE 5 MG/1
5 TABLET, FILM COATED ORAL
Qty: 90 | Refills: 2 | Status: ACTIVE | COMMUNITY
Start: 2023-08-15 | End: 1900-01-01

## 2024-07-03 NOTE — ED ADULT NURSE NOTE - NS PRO PASSIVE SMOKE EXP
Writer responded via [employer].  PETER KellerN, RN-BC  [facility]     No

## 2024-08-06 ENCOUNTER — APPOINTMENT (OUTPATIENT)
Dept: INTERNAL MEDICINE | Facility: CLINIC | Age: 69
End: 2024-08-06

## 2024-08-06 PROCEDURE — G2211 COMPLEX E/M VISIT ADD ON: CPT

## 2024-08-06 PROCEDURE — 99213 OFFICE O/P EST LOW 20 MIN: CPT

## 2024-08-06 NOTE — PHYSICAL EXAM
[No Acute Distress] : no acute distress [Normal Voice/Communication] : normal voice/communication [Normal Sclera/Conjunctiva] : normal sclera/conjunctiva [No Respiratory Distress] : no respiratory distress  [Speech Grossly Normal] : speech grossly normal [Normal Mood] : the mood was normal

## 2024-08-06 NOTE — ASSESSMENT
[FreeTextEntry1] : 1) HTN  - Ct amlodipine 5 BID  - ct metoprolol 100  - enalapril 20 - will call cardio - DR Rockwell to get report  2) OA - f/u ortho  3) pain management referral   4) mammo ordered   f.u in 3 month

## 2024-08-06 NOTE — HISTORY OF PRESENT ILLNESS
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

## 2024-08-12 ENCOUNTER — RX RENEWAL (OUTPATIENT)
Age: 69
End: 2024-08-12

## 2024-08-13 ENCOUNTER — RX RENEWAL (OUTPATIENT)
Age: 69
End: 2024-08-13

## 2024-08-16 NOTE — ED PROVIDER NOTE - CONSTITUTIONAL DISTRESS
----- Message from WENDY Mcpherson sent at 8/16/2024  1:39 PM CDT -----  1.  Normal myocardial perfusion imaging at rest and following pharmacologic stress / low-level exercise.   2.  No significant EKG changes noted during low-level exercise / pharmacologic stress  3.  Normal left ventricular cavity size and systolic function.   4.  Cardiac risk assessment:  Low      Continue current treatment plan. Follow up as needed.      
Attempt to phone patient to relay results of Stress Test  Patient was unavailable left message on voicemail/answering machine to return call to clinic    
Spoke with patient relay results of Stress Test  Patient did not have questions or concerns at this time.    
uncomfortable

## 2024-08-19 ENCOUNTER — APPOINTMENT (OUTPATIENT)
Dept: ORTHOPEDIC SURGERY | Facility: CLINIC | Age: 69
End: 2024-08-19

## 2024-08-19 ENCOUNTER — NON-APPOINTMENT (OUTPATIENT)
Age: 69
End: 2024-08-19

## 2024-08-20 ENCOUNTER — APPOINTMENT (OUTPATIENT)
Dept: RHEUMATOLOGY | Facility: CLINIC | Age: 69
End: 2024-08-20
Payer: MEDICARE

## 2024-08-20 VITALS
WEIGHT: 199 LBS | BODY MASS INDEX: 33.15 KG/M2 | OXYGEN SATURATION: 92 % | HEART RATE: 75 BPM | DIASTOLIC BLOOD PRESSURE: 75 MMHG | SYSTOLIC BLOOD PRESSURE: 120 MMHG | HEIGHT: 65 IN

## 2024-08-20 DIAGNOSIS — G89.29 OTHER CHRONIC PAIN: ICD-10-CM

## 2024-08-20 PROCEDURE — 99214 OFFICE O/P EST MOD 30 MIN: CPT

## 2024-08-20 PROCEDURE — G2211 COMPLEX E/M VISIT ADD ON: CPT

## 2024-08-20 NOTE — HISTORY OF PRESENT ILLNESS
[___ Month(s) Ago] : [unfilled] month(s) ago [FreeTextEntry1] : ongoing pain - now diagnosed with bilateral knee OA - seen by Dr. High and recommended for TKA, also seen surgeon on HSS -  patient appears disoriented today - cannot find words, and appears sleepy as well.  still on gabapentin 800 mg as needed for pain control

## 2024-08-20 NOTE — PHYSICAL EXAM
[General Appearance - Alert] : alert [General Appearance - In No Acute Distress] : in no acute distress [Neck Appearance] : the appearance of the neck was normal [Neck Cervical Mass (___cm)] : no neck mass was observed [Jugular Venous Distention Increased] : there was no jugular-venous distention [Thyroid Diffuse Enlargement] : the thyroid was not enlarged [Thyroid Nodule] : there were no palpable thyroid nodules [Auscultation Breath Sounds / Voice Sounds] : lungs were clear to auscultation bilaterally [Heart Rate And Rhythm] : heart rate was normal and rhythm regular [Heart Sounds] : normal S1 and S2 [Heart Sounds Gallop] : no gallops [Murmurs] : no murmurs [Heart Sounds Pericardial Friction Rub] : no pericardial rub [Full Pulse] : the pedal pulses are present [Edema] : there was no peripheral edema [Bowel Sounds] : normal bowel sounds [Abdomen Soft] : soft [Abdomen Tenderness] : non-tender [] : no hepato-splenomegaly [Abdomen Mass (___ Cm)] : no abdominal mass palpated [Cervical Lymph Nodes Enlarged Posterior Bilaterally] : posterior cervical [Cervical Lymph Nodes Enlarged Anterior Bilaterally] : anterior cervical [No CVA Tenderness] : no ~M costovertebral angle tenderness [No Spinal Tenderness] : no spinal tenderness [Deep Tendon Reflexes (DTR)] : deep tendon reflexes were 2+ and symmetric [Sensation] : the sensory exam was normal to light touch and pinprick [No Focal Deficits] : no focal deficits [Oriented To Time, Place, And Person] : oriented to person, place, and time [Impaired Insight] : insight and judgment were intact [Affect] : the affect was normal [FreeTextEntry1] : all joints with full ROM - no synovitis -

## 2024-08-20 NOTE — ASSESSMENT
[FreeTextEntry1] :  [facility] dermatology -  Dr. Preciado  1. Pyoderma gangrenosus (?) with ongoing open wounds over bilateral breasts and abdomen - last biopsy with chronic inflammatory changes  - non-specific- leflunomide gave her diarrhea -  has multiple new wounds. Case discussed with Dr. Preciado - dermatologist - it appears that her wounds are likely traumatic in nature and self inflicted stopped HCQ -  no new lesions - healed and scarred one 2. chronic pain/fibromyalgia - -given 1 month supply of oxycodone in 11/2021 - patient reports taking the entire month supply in 1 week - no more narcotics - also seen by pain management - hx of drug use in the past -  continue with gabapentin takes sometimes 4G -  needs pain management - defer medical marijuana referrral at this time no narcotics to ever be prescribed - has been fired from many pain management offices around the area 4. left sciatica pain  - referral to physiatry - did not make an appt 5. memory issue - referral to neuro 6. colitis - seen by GI X1 - but no f/u made 7. positive SSA with no sign of Sjogren;s   8. lumbar DDD - pending seen specialist at [facility]    I reviewed previous labs results with patients. Diagnosis and Prognosis discussed Continue with current medications medications refilled education provided on F/u as needed

## 2024-08-20 NOTE — DATA REVIEWED
[FreeTextEntry1] : There is multi-focal dermal enhancement seen in the medial breasts, most pronounced in the right lower inner quadrant and left upper inner quadrant, with associated T2 hyperintense signal (38445:95 and 163). These findings correlate with patient's known ulcerative skin lesions.\par  * There is bilateral lymphadenopathy likely reactive given skin findings. For reference on the right measuring 1.9 x 1.4 cm and on the left measuring 1.8 x 1.2 cm (3:138).\par  * There is no significant internal mammary lymphadenopathy.\par  \par  IMPRESSION:\par  1. No MRI evidence of malignancy.\par  2. Bilateral multifocal medial breast dermal enhancement/T2 hyperintense signal, correlating with patient's known recent history of bilateral breast chronic ulcerative lesions.\par  3. Bilateral axillary lymphadenopathy, reactive in etiology.

## 2024-08-27 ENCOUNTER — NON-APPOINTMENT (OUTPATIENT)
Age: 69
End: 2024-08-27

## 2024-08-27 NOTE — ED PROVIDER NOTE - CHIEF COMPLAINT
"[facility]   Physical Therapy Treatment Note      Name: Hue Mcez  Clinic Number: [id number]    Physician: Keke Saini,*    Visit Date: 8/27/2024    Physician Orders: PT Eval and Treat   Medical Diagnosis from Referral: E78.2 (ICD-10-CM) - Mixed hyperlipidemia M54.40 (ICD-10-CM) - Acute right-sided low back pain with sciatica, sciatica laterality unspecified E66.9,Z68.37 (ICD-10-CM) - Class 2 obesity with body mass index (BMI) of 37.0 to 37.9 in adult, unspecified obesity type, unspecified whether serious comorbidity present  Evaluation Date: 8/1/2024  Authorization Period Expiration: 12/31/2024  Plan of Care Expiration: 11/1/2024  Progress Note Due: VISIT 4  Date of Surgery: N/A  Visit # / Visits authorized: 4/20  FOTO: 1/ 3     Precautions: Standard and Diabetes      Time In: 1345  Time Out: 1324  Total Billable Time: 39 minutes    PTA Visit #: 1/5       Subjective     Patient reports: "the ex's are rough". She states "I'm hurting today."  She was somewhat compliant with home exercise program.  Response to previous treatment: positive  Functional change: minimal    Pain: 7/10  Location: bilateral back      Objective      Updated on 8/20/2024    Observation: labored sit to stand from lobby and antalgic gait noted      Posture:  elevated left PSIS, iliac crest, increase lordosis noted      Lumbar Range of Motion:     Degrees Pain   Flexion 75%    -         Extension 100%    Feels good          Left Side Bending At knee          Right Side Bending At knee  *         Left rotation    100% -         Right Rotation    100% -               Lower Extremity Strength  Right LE   Left LE     Knee extension: 5/5 Knee extension: 5/5   Knee flexion: 5/5 Knee flexion: 5/5   Hip flexion: 4/5 Hip flexion: 4/5   Hip extension:  5/5 Hip extension: 5/5   Hip abduction: 5/5 Hip abduction: 5/5   Hip adduction: 5/5 Hip adduction 5/5   Ankle dorsiflexion: 5/5 Ankle dorsiflexion: 5/5 " "  Ankle plantarflexion: 5/5 Ankle plantarflexion: 5/5        Flexibility: hamstring left >right      Intake Outcome Measure for FOTO Lumbar Survey     Therapist reviewed FOTO scores for Hue Hernandez on 8/1/2024.   FOTO report - see Media section or FOTO account episode details.     Intake Score: 72%           Treatment     Hue received the treatments listed below:      hot pack for 15 minutes to lumbar spine while performing supine ther ex.     therapeutic exercises to develop ROM, flexibility, and core stabilization for 39 minutes including:    2 x 10 BKTC with SB  2 x 10 lower trunk rotations with SB  Mini bridge 2 x 10 on ball   Bent knee fall out with red theraband 2 x 15 with cuing for abdominal hold   Knee to chest with red theraband 2 x 10 on ea lower extremity   Hamstring mobilization supine with 90/90 stretch x 2 min on ea   Prone press ups x 10   Seated hamstring stretch 10 x 5" hold on Left lower extremity   Modified plantigrade leg lift 2 x 10 ea side   Thread the needle in modified plantigrade x 10   Forward flexion on ball x 10 slow   Lateral ball walk outs in sitting x 10 ea direction       Patient Education and Home Exercises       Education provided:   - Cont HEP    Written Home Exercises Provided: Patient instructed to cont prior HEP. Exercises were reviewed and Hue was able to demonstrate them prior to the end of the session.  Hue demonstrated good  understanding of the education provided. See Electronic Medical Record under Patient Instructions for exercises provided during therapy sessions    Assessment     Pt performed ex's with good understanding, limited ROM. No exacerbation of pain reported post session.     Hue Is progressing well towards her goals.   Patient prognosis is Good.     Patient will continue to benefit from skilled outpatient physical therapy to address the deficits listed in the problem list box on initial evaluation, provide pt/family education and to maximize " The patient is a 61y Female complaining of eye pain/injury. pt's level of independence in the home and community environment.     Patient's spiritual, cultural and educational needs considered and pt agreeable to plan of care and goals.     Anticipated barriers to physical therapy: none    Goals: Short Term Goals (3 Weeks):  1. Patient will be compliant with home exercise program to supplement therapy in promoting functional mobility. NOT MET   2. Patient will perform sit to stand from chair  with good control to demonstrate improved core strength. GOAL MET   3. Patient will report <5/10 pain during thoracolumbar active range of motion to promote functional mobility. GOAL MET   4. Patient will improve impaired lower extremity bilateral manual muscle tests  to >/=4/5 to improve strength for functional tasks. GOAL MET   Long Term Goals (6 Weeks):   1. Patient will improve FOTO score to </= 59% limited to decrease perceived limitation with maintaining/changing body position.   2. Patient will perform supine to sit  with good control to demonstrate improved core strength.  3. Patient will improve impaired lower extremity bilateral manual muscle tests to >/=4+/5 to improve strength for functional tasks.  4. Patient will report <4/10 pain during standing for 6 minute activity.  Plan      Plan of care Certification: 8/1/2024 to 11/1/2024.     Outpatient Physical Therapy 2 times weekly for 6 weeks to include the following interventions: Cervical/Lumbar Traction, Electrical Stimulation  , Gait Training, Manual Therapy, Moist Heat/ Ice, Neuromuscular Re-ed, Orthotic Management and Training, Patient Education, Self Care, Therapeutic Activities, and Therapeutic Exercise.       Kelly Salazar, PTA

## 2024-08-28 ENCOUNTER — APPOINTMENT (OUTPATIENT)
Dept: ORTHOPEDIC SURGERY | Facility: CLINIC | Age: 69
End: 2024-08-28

## 2024-08-29 DIAGNOSIS — R05.9 COUGH, UNSPECIFIED: ICD-10-CM

## 2024-08-29 RX ORDER — BENZONATATE 100 MG/1
100 CAPSULE ORAL
Qty: 21 | Refills: 0 | Status: ACTIVE | COMMUNITY
Start: 2024-08-29 | End: 1900-01-01

## 2024-09-11 ENCOUNTER — OUTPATIENT (OUTPATIENT)
Dept: OUTPATIENT SERVICES | Facility: HOSPITAL | Age: 69
LOS: 1 days | End: 2024-09-11
Payer: MEDICARE

## 2024-09-11 ENCOUNTER — RESULT REVIEW (OUTPATIENT)
Age: 69
End: 2024-09-11

## 2024-09-11 ENCOUNTER — APPOINTMENT (OUTPATIENT)
Dept: MAMMOGRAPHY | Facility: CLINIC | Age: 69
End: 2024-09-11
Payer: MEDICARE

## 2024-09-11 DIAGNOSIS — Z98.890 OTHER SPECIFIED POSTPROCEDURAL STATES: Chronic | ICD-10-CM

## 2024-09-11 DIAGNOSIS — Z98.89 OTHER SPECIFIED POSTPROCEDURAL STATES: Chronic | ICD-10-CM

## 2024-09-11 DIAGNOSIS — Z00.00 ENCOUNTER FOR GENERAL ADULT MEDICAL EXAMINATION WITHOUT ABNORMAL FINDINGS: ICD-10-CM

## 2024-09-11 PROCEDURE — 77067 SCR MAMMO BI INCL CAD: CPT

## 2024-09-11 PROCEDURE — 77067 SCR MAMMO BI INCL CAD: CPT | Mod: 26

## 2024-09-11 PROCEDURE — 77063 BREAST TOMOSYNTHESIS BI: CPT

## 2024-09-11 PROCEDURE — 77063 BREAST TOMOSYNTHESIS BI: CPT | Mod: 26

## 2024-09-12 DIAGNOSIS — Z00.00 ENCOUNTER FOR GENERAL ADULT MEDICAL EXAMINATION W/OUT ABNORMAL FINDINGS: ICD-10-CM

## 2024-09-16 ENCOUNTER — EMERGENCY (EMERGENCY)
Facility: HOSPITAL | Age: 69
LOS: 0 days | Discharge: HOME | End: 2024-09-16
Attending: STUDENT IN AN ORGANIZED HEALTH CARE EDUCATION/TRAINING PROGRAM
Payer: MEDICARE

## 2024-09-16 VITALS
OXYGEN SATURATION: 96 % | DIASTOLIC BLOOD PRESSURE: 68 MMHG | HEART RATE: 73 BPM | TEMPERATURE: 98 F | RESPIRATION RATE: 15 BRPM | SYSTOLIC BLOOD PRESSURE: 123 MMHG

## 2024-09-16 VITALS
DIASTOLIC BLOOD PRESSURE: 83 MMHG | SYSTOLIC BLOOD PRESSURE: 111 MMHG | WEIGHT: 199.96 LBS | RESPIRATION RATE: 20 BRPM | TEMPERATURE: 98 F | HEART RATE: 79 BPM | OXYGEN SATURATION: 96 % | HEIGHT: 65 IN

## 2024-09-16 DIAGNOSIS — I10 ESSENTIAL (PRIMARY) HYPERTENSION: ICD-10-CM

## 2024-09-16 DIAGNOSIS — Z98.89 OTHER SPECIFIED POSTPROCEDURAL STATES: Chronic | ICD-10-CM

## 2024-09-16 DIAGNOSIS — Z98.890 OTHER SPECIFIED POSTPROCEDURAL STATES: Chronic | ICD-10-CM

## 2024-09-16 DIAGNOSIS — R41.82 ALTERED MENTAL STATUS, UNSPECIFIED: ICD-10-CM

## 2024-09-16 DIAGNOSIS — M35.00 SJOGREN SYNDROME, UNSPECIFIED: ICD-10-CM

## 2024-09-16 DIAGNOSIS — E03.9 HYPOTHYROIDISM, UNSPECIFIED: ICD-10-CM

## 2024-09-16 DIAGNOSIS — Z88.6 ALLERGY STATUS TO ANALGESIC AGENT: ICD-10-CM

## 2024-09-16 DIAGNOSIS — F32.A DEPRESSION, UNSPECIFIED: ICD-10-CM

## 2024-09-16 DIAGNOSIS — G40.909 EPILEPSY, UNSPECIFIED, NOT INTRACTABLE, WITHOUT STATUS EPILEPTICUS: ICD-10-CM

## 2024-09-16 DIAGNOSIS — M32.9 SYSTEMIC LUPUS ERYTHEMATOSUS, UNSPECIFIED: ICD-10-CM

## 2024-09-16 LAB
ALBUMIN SERPL ELPH-MCNC: 3.4 G/DL — SIGNIFICANT CHANGE UP (ref 3.3–5)
ALP SERPL-CCNC: 103 U/L — SIGNIFICANT CHANGE UP (ref 40–120)
ALT FLD-CCNC: 24 U/L — SIGNIFICANT CHANGE UP (ref 12–78)
AMPHET UR-MCNC: NEGATIVE — SIGNIFICANT CHANGE UP
ANION GAP SERPL CALC-SCNC: 8 MMOL/L — SIGNIFICANT CHANGE UP (ref 5–17)
APPEARANCE UR: CLEAR — SIGNIFICANT CHANGE UP
APTT BLD: 22.4 SEC — LOW (ref 24.5–35.6)
AST SERPL-CCNC: 25 U/L — SIGNIFICANT CHANGE UP (ref 15–37)
BARBITURATES UR SCN-MCNC: NEGATIVE — SIGNIFICANT CHANGE UP
BASE EXCESS BLDV CALC-SCNC: 3.7 MMOL/L — HIGH (ref -2–3)
BASOPHILS # BLD AUTO: 0.06 K/UL — SIGNIFICANT CHANGE UP (ref 0–0.2)
BASOPHILS NFR BLD AUTO: 0.8 % — SIGNIFICANT CHANGE UP (ref 0–2)
BENZODIAZ UR-MCNC: POSITIVE — SIGNIFICANT CHANGE UP
BILIRUB SERPL-MCNC: 1.1 MG/DL — SIGNIFICANT CHANGE UP (ref 0.2–1.2)
BILIRUB UR-MCNC: NEGATIVE — SIGNIFICANT CHANGE UP
BLOOD GAS COMMENTS, VENOUS: SIGNIFICANT CHANGE UP
BUN SERPL-MCNC: 24 MG/DL — HIGH (ref 7–23)
CALCIUM SERPL-MCNC: 9.3 MG/DL — SIGNIFICANT CHANGE UP (ref 8.5–10.1)
CHLORIDE BLDV-SCNC: 103 MMOL/L — SIGNIFICANT CHANGE UP (ref 98–107)
CHLORIDE SERPL-SCNC: 104 MMOL/L — SIGNIFICANT CHANGE UP (ref 96–108)
CO2 BLDV-SCNC: 32 MMOL/L — HIGH (ref 22–26)
CO2 SERPL-SCNC: 26 MMOL/L — SIGNIFICANT CHANGE UP (ref 22–31)
COCAINE METAB.OTHER UR-MCNC: NEGATIVE — SIGNIFICANT CHANGE UP
COLOR SPEC: YELLOW — SIGNIFICANT CHANGE UP
CREAT SERPL-MCNC: 1.06 MG/DL — SIGNIFICANT CHANGE UP (ref 0.5–1.3)
DIFF PNL FLD: NEGATIVE — SIGNIFICANT CHANGE UP
EGFR: 58 ML/MIN/1.73M2 — LOW
EOSINOPHIL # BLD AUTO: 0.36 K/UL — SIGNIFICANT CHANGE UP (ref 0–0.5)
EOSINOPHIL NFR BLD AUTO: 5 % — SIGNIFICANT CHANGE UP (ref 0–6)
ETHANOL SERPL-MCNC: <10 MG/DL — SIGNIFICANT CHANGE UP (ref 0–10)
FLUAV AG NPH QL: SIGNIFICANT CHANGE UP
FLUBV AG NPH QL: SIGNIFICANT CHANGE UP
GAS PNL BLDV: 135 MMOL/L — LOW (ref 136–145)
GAS PNL BLDV: SIGNIFICANT CHANGE UP
GAS PNL BLDV: SIGNIFICANT CHANGE UP
GLUCOSE BLDV-MCNC: 90 MG/DL — SIGNIFICANT CHANGE UP (ref 65–95)
GLUCOSE SERPL-MCNC: 93 MG/DL — SIGNIFICANT CHANGE UP (ref 70–99)
GLUCOSE UR QL: NEGATIVE MG/DL — SIGNIFICANT CHANGE UP
HCO3 BLDV-SCNC: 31 MMOL/L — HIGH (ref 22–28)
HCT VFR BLD CALC: 37.6 % — SIGNIFICANT CHANGE UP (ref 34.5–45)
HCT VFR BLDA CALC: 38 % — SIGNIFICANT CHANGE UP (ref 37–47)
HGB BLD CALC-MCNC: 12.5 G/DL — SIGNIFICANT CHANGE UP (ref 11.7–16.1)
HGB BLD-MCNC: 12 G/DL — SIGNIFICANT CHANGE UP (ref 11.5–15.5)
HOROWITZ INDEX BLDV+IHG-RTO: SIGNIFICANT CHANGE UP
IMM GRANULOCYTES NFR BLD AUTO: 0.1 % — SIGNIFICANT CHANGE UP (ref 0–0.9)
INR BLD: 1 RATIO — SIGNIFICANT CHANGE UP (ref 0.85–1.18)
KETONES UR-MCNC: NEGATIVE MG/DL — SIGNIFICANT CHANGE UP
LACTATE BLDV-MCNC: 0.9 MMOL/L — SIGNIFICANT CHANGE UP (ref 0.56–1.39)
LACTATE SERPL-SCNC: 0.9 MMOL/L — SIGNIFICANT CHANGE UP (ref 0.7–2)
LEUKOCYTE ESTERASE UR-ACNC: NEGATIVE — SIGNIFICANT CHANGE UP
LYMPHOCYTES # BLD AUTO: 2.53 K/UL — SIGNIFICANT CHANGE UP (ref 1–3.3)
LYMPHOCYTES # BLD AUTO: 34.8 % — SIGNIFICANT CHANGE UP (ref 13–44)
MCHC RBC-ENTMCNC: 30 PG — SIGNIFICANT CHANGE UP (ref 27–34)
MCHC RBC-ENTMCNC: 31.9 G/DL — LOW (ref 32–36)
MCV RBC AUTO: 94 FL — SIGNIFICANT CHANGE UP (ref 80–100)
METHADONE UR-MCNC: NEGATIVE — SIGNIFICANT CHANGE UP
MONOCYTES # BLD AUTO: 0.64 K/UL — SIGNIFICANT CHANGE UP (ref 0–0.9)
MONOCYTES NFR BLD AUTO: 8.8 % — SIGNIFICANT CHANGE UP (ref 2–14)
NEUTROPHILS # BLD AUTO: 3.67 K/UL — SIGNIFICANT CHANGE UP (ref 1.8–7.4)
NEUTROPHILS NFR BLD AUTO: 50.5 % — SIGNIFICANT CHANGE UP (ref 43–77)
NITRITE UR-MCNC: NEGATIVE — SIGNIFICANT CHANGE UP
NRBC # BLD: 0 /100 WBCS — SIGNIFICANT CHANGE UP (ref 0–0)
OPIATES UR-MCNC: POSITIVE — SIGNIFICANT CHANGE UP
PCO2 BLDV: 57 MMHG — HIGH (ref 42–55)
PCP SPEC-MCNC: SIGNIFICANT CHANGE UP
PCP UR-MCNC: NEGATIVE — SIGNIFICANT CHANGE UP
PH BLDV: 7.34 — SIGNIFICANT CHANGE UP (ref 7.32–7.43)
PH UR: 5 — SIGNIFICANT CHANGE UP (ref 5–8)
PLATELET # BLD AUTO: 218 K/UL — SIGNIFICANT CHANGE UP (ref 150–400)
PO2 BLDV: 33 MMHG — SIGNIFICANT CHANGE UP (ref 25–45)
POTASSIUM BLDV-SCNC: 3.6 MMOL/L — SIGNIFICANT CHANGE UP (ref 3.5–5.1)
POTASSIUM SERPL-MCNC: 3.4 MMOL/L — LOW (ref 3.5–5.3)
POTASSIUM SERPL-SCNC: 3.4 MMOL/L — LOW (ref 3.5–5.3)
PROT SERPL-MCNC: 7.3 GM/DL — SIGNIFICANT CHANGE UP (ref 6–8.3)
PROT UR-MCNC: NEGATIVE MG/DL — SIGNIFICANT CHANGE UP
PROTHROM AB SERPL-ACNC: 11.9 SEC — SIGNIFICANT CHANGE UP (ref 9.5–13)
RBC # BLD: 4 M/UL — SIGNIFICANT CHANGE UP (ref 3.8–5.2)
RBC # FLD: 13.3 % — SIGNIFICANT CHANGE UP (ref 10.3–14.5)
SAO2 % BLDV: 56.3 % — LOW (ref 94–98)
SARS-COV-2 RNA SPEC QL NAA+PROBE: SIGNIFICANT CHANGE UP
SODIUM SERPL-SCNC: 138 MMOL/L — SIGNIFICANT CHANGE UP (ref 135–145)
SP GR SPEC: 1.02 — SIGNIFICANT CHANGE UP (ref 1–1.03)
THC UR QL: NEGATIVE — SIGNIFICANT CHANGE UP
TSH SERPL-MCNC: 0.69 UU/ML — SIGNIFICANT CHANGE UP (ref 0.36–3.74)
UROBILINOGEN FLD QL: 0.2 MG/DL — SIGNIFICANT CHANGE UP (ref 0.2–1)
WBC # BLD: 7.27 K/UL — SIGNIFICANT CHANGE UP (ref 3.8–10.5)
WBC # FLD AUTO: 7.27 K/UL — SIGNIFICANT CHANGE UP (ref 3.8–10.5)

## 2024-09-16 PROCEDURE — 70450 CT HEAD/BRAIN W/O DYE: CPT | Mod: 26,MC

## 2024-09-16 PROCEDURE — 93010 ELECTROCARDIOGRAM REPORT: CPT

## 2024-09-16 PROCEDURE — 99285 EMERGENCY DEPT VISIT HI MDM: CPT

## 2024-09-16 PROCEDURE — 71045 X-RAY EXAM CHEST 1 VIEW: CPT | Mod: 26

## 2024-09-16 RX ADMIN — Medication 2 MILLIGRAM(S): at 15:36

## 2024-09-16 RX ADMIN — Medication 2 MILLIGRAM(S): at 16:36

## 2024-09-16 NOTE — ED PROVIDER NOTE - PATIENT PORTAL LINK FT
You can access the FollowMyHealth Patient Portal offered by [facility] by registering at the following website: [email or web address]. By joining [facility]’s FollowMyHealth portal, you will also be able to view your health information using other applications (apps) compatible with our system.

## 2024-09-16 NOTE — ED PROVIDER NOTE - NSFOLLOWUPINSTRUCTIONS_ED_ALL_ED_FT
Please follow up with your primary care physician within the next 2-3 days.    Please continue taking your medications as prescribed by your doctors.     Please return to the emergency department if you experience any of the following symptoms:    Fever  Chest pain  Difficulty breathing  Abdominal pain  Nausea  Vomiting   Lethargic  Headaches  Weakness or numbness in arms or legs  Changes in vision

## 2024-09-16 NOTE — ED ADULT NURSE NOTE - MUSCLE PAIN OR WEAKNESS
April 5, 2017      Osman Rock  [address] [facility] [address]        Dear Mr. Rock,    The results of your colonoscopy performed on March 30, 2017 have returned and indicate the following:    Hyperplastic Polyp(s)  Hyperplastic polyps are grape-like growths of tissue in the colon that are benign (non-cancerous) extra tissue growth.        My office will send you a reminder letter when it is time for you to schedule another colon screening procedure in 5 years.     It has been a pleasure caring for you. If you have questions or concerns regarding these test results or your plan of care, please feel free to contact us. You may either contact us by phone, MyChart or schedule a follow-up office visit to go over these results.      Sincerely,          Keegan Bergeron MD  [facility] GI Department  [address]  [place] [address] [phone]    
no

## 2024-09-16 NOTE — ED PROVIDER NOTE - CLINICAL SUMMARY MEDICAL DECISION MAKING FREE TEXT BOX
68F PMH HTN, hypothyroid, Lupus, Sjogren's, anxiety / depression, seizure disorder BIBEMS AMS / bizarre behavior. Afebrile, VSS. FS 91. Plan for CT brain, r/o ICH / seizure, infectious, e-lyte, metabolic, tox etiology of AMS. Anticipate admission. 68F PMH HTN, hypothyroid, Lupus, Sjogren's, anxiety / depression, seizure disorder BIBEMS AMS / bizarre behavior. Afebrile, VSS. FS 91. Plan for CT brain, r/o ICH / seizure, infectious, e-lyte, metabolic, tox etiology of AMS.   W/u w/o significant abnormalities, pt care signed out to incoming team at change of shift pending UA. On re-eval, resting comfortably. Pt  now at bedside and endorses pt is at her baseline mental status.

## 2024-09-16 NOTE — ED ADULT TRIAGE NOTE - HEIGHT IN CM
Jaret Rubio, was evaluated through a synchronous (real-time) audio-video encounter. The patient (or guardian if applicable) is aware that this is a billable service, which includes applicable co-pays. This Virtual Visit was conducted with patient's (and/or legal guardian's) consent. Patient identification was verified, and a caregiver was present when appropriate.   The patient was located at Home: [address] [place] [address]  Provider was located at Home (Appt Dept State): [place]      Jaret Rubio (:  1968) is a Established patient, presenting virtually for evaluation of the following:  Symptoms started x4 days ago, pt c/o sinus pressure, headache, thick clear/yellow mucus when blowing nose, sweats/chills previously, denies body aches.   Assessment & Plan   Below is the assessment and plan developed based on review of pertinent history, physical exam, labs, studies, and medications.  1. COPD exacerbation (HCC)  Problem  -     albuterol sulfate HFA (PROVENTIL;VENTOLIN;PROAIR) 108 (90 Base) MCG/ACT inhaler; Inhale 2 puffs into the lungs every 4 hours as needed for Wheezing, Disp-18 g, R-0Normal  -     amoxicillin-clavulanate (AUGMENTIN) 875-125 MG per tablet; Take 1 tablet by mouth 2 times daily for 7 days, Disp-14 tablet, R-0Normal  Continue nebulizer as previously ordered by provider  See patient instructions    Chronic obstructive pulmonary disease (COPD) is a lung disease that makes it hard to breathe. It is caused by damage to the lungs over many years, usually from smoking.  Chronic bronchitis and emphysema are two lung problems that are types of COPD:  Chronic bronchitis: The airways that carry air to the lungs (bronchial tubes) get inflamed and make a lot of mucus. This can narrow or block the airways. It can also make you cough.  Emphysema: The tiny air sacs (alveoli) at the end of the airways in the lungs are damaged. When the air sacs are damaged or destroyed, the inner walls break  165.1

## 2024-09-16 NOTE — ED ADULT TRIAGE NOTE - TEMPERATURE IN FAHRENHEIT (DEGREES F)
4 MONTH WELL CHILD EXAMINATION    Irving Cagle is a 4 month old male here with his Mom for 4 month well examination.    Parental concerns include:   URI symptoms last 3-4 days, maybe a little improved but having some difficulty sleeping.  Trying to keep upright.  Sucking out booger, humidifier.    Head shape.     Nursing notes reviewed and accepted.    PAST MEDICAL HISTORY:  There are no problems to display for this patient.      MEDICATIONS:  Current Outpatient Medications   Medication Sig Dispense Refill    Cholecalciferol (VITAMIN D INFANT PO)        No current facility-administered medications for this visit.       ALLERGIES:  Allergies as of 2024    (No Known Allergies)       Interim History:  Overall well  Doing PT for head shape and     DIET:  Child is taking 3.5oz every 2-3 hours.  Doing all breast milk.  Doing some donor breast milk.      Vitamin D drops: Yes  Solid foods: No    ELIMINATION PATTERNS:  Normal wet diapers.  Bowel movements are soft.    SLEEP:  Concerns: No  Sleeping on back: Yes    SOCIAL/FAMILY HISTORY:  Reviewed and updated/accepted per [facility].  :  [facility]  Lives with:  Mom and Dad    DEVELOPMENT:  Concerns:  None  Hearing concerns:  None  Vision concerns:  None    Motor:    Pushes chest up to elbows: Yes   Good head control: Yes   Reaching for objects: Yes   Rolling: Not yet but close  Language:     Babbling more spontaneously/expressively: Yes   Laughing: Yes  Social-Emotional:     Spontaneous smile: Yes   Better at comforting self: Yes    SCREENING/SAFETY:  Child is in a backseat, rear-facing car seat in the car.   Second hand smoke exposure:  no    Lead risk:  Low  TB risk:  Low    Oral health risk:  Low    ANTICIPATORY GUIDANCE:  Routine anticipatory guidance regarding safety and development of a 4 month old was discussed verbally or provided in handout and included but is not limited to:  Reading and brain development  Normal infant feeding (solids starting 4-6  months)  Oral health (not sharing spoons, tooth care, teething)  Bedtime routine  Car safety and home safety (water heater, water safety, falls and rolling)  Normal behavior and development    REVIEW OF SYSTEMS:   A 10 point review of systems was performed including constitutional, EENT, cardiovascular, respiratory, gastrointestinal, genitourinary, skin, musculoskeletal and neurologic systems and apart from those items mentioned above, is negative.    PHYSICAL EXAM:  Visit Vitals  Pulse 139   Temp 98.1 °F (36.7 °C) (Tympanic)   Resp 33   Ht 24.8\" (63 cm)   Wt 5.86 kg (12 lb 14.7 oz)   HC 41.3 cm (16.26\")   BMI 14.76 kg/m²     5 %ile (Z= -1.62) based on WHO (Boys, 0-2 years) weight-for-age data using vitals from 2024.  30 %ile (Z= -0.53) based on WHO (Boys, 0-2 years) Length-for-age data based on Length recorded on 2024.  36 %ile (Z= -0.36) based on WHO (Boys, 0-2 years) head circumference-for-age based on Head Circumference recorded on 2024.    GENERAL:  Alert, well-nourished, well appearing and in no distress.  SKIN:  No rashes.  HEAD: Normocephalic overall with mild to moderate positional plagiocephaly  EYES:  Normal lids, sclerae and conjunctivae bilaterally.  Red reflex bilaterally.  EARS:  Normal pinnae, canals, tympanic membranes bilaterally.  NOSE:  No drainage.  Septum and turbinates normal.     OROPHARYNX:  No erythema, no exudate.  NECK:  Supple, no thyromegaly, lymphadenopathy or masses.  FROM (full range of motion)  CARDIOVASCULAR:  Regular rate and rhythm, no murmur.  Femoral pulses 2+.  LUNGS:  Clear to auscultation, normal respiratory effort.  ABDOMEN:  Soft, without hepatosplenomegaly or masses.  BACK:  Straight, no saud or abnormalities  MUSCULOSKELETAL:  Uses all extremities symmetrically and well.   Symmetric hip creases with full abduction, no clicks or clunks  GENITALIA:  Normal male, Puberty Stage 1  and Testicles Normal Bilaterally  NEUROLOGICAL:  Moves all extremities  symmetrically and well, normal tone    ASSESSMENT:  Healthy 4 month male with normal growth and development.  Mild to moderate positional plagiocephaly  Viral URI with cough    PLAN:  We discussed the natural history of plagiocephaly and we will continue through physical therapy with measurements and neck strengthening.  Consider helmet clinic if things are stagnant or worsening    Vaccines today:  Pediarix (DTaP, IPV, Hep B), HIB, Pneumococcus (Prevnar 20), and Rotavirus (Rotateq) as NV in future due to cold and parent preference   Patient WIR ([place] Immunization Registry) reviewed.       Follow up at 6 months of age, sooner if concerns.    Mann Meza MD         98

## 2024-09-16 NOTE — ED PROVIDER NOTE - PHYSICAL EXAMINATION
GEN: Awake, alert, interactive, NAD.  HEAD AND NECK: NC/AT. Airway patent. Neck supple.   EYES:  Clear b/l. EOMI. PERRL.   ENT: Moist mucus membranes.   CARDIAC: Regular rate, regular rhythm. No evident pedal edema.    RESP/CHEST: Normal respiratory effort with no use of accessory muscles or retractions. Clear throughout on auscultation.  ABD: Soft, non-distended, non-tender. No rebound, no guarding.   BACK: No midline spinal TTP. No CVAT.   EXTREMITIES: Moving all extremities with no apparent deformities.   SKIN: Warm, dry, intact normal color. No rash.   NEURO: AAOx3, CN II-XII grossly intact, no focal deficits.   PSYCH: Appropriate mood and affect.

## 2024-09-16 NOTE — ED ADULT NURSE NOTE - NSFALLRISKINTERV_ED_ALL_ED
Assistance OOB with selected safe patient handling equipment if applicable/Assistance with ambulation/Communicate fall risk and risk factors to all staff, patient, and family/Monitor for mental status changes and reorient to person, place, and time, as needed/Move patient closer to nursing station/within visual sight of ED staff/Provide visual cue: yellow wristband, yellow gown, etc/Reinforce activity limits and safety measures with patient and family/Toileting schedule using arm’s reach rule for commode and bathroom/Use of alarms - bed, stretcher, chair and/or video monitoring/Call bell, personal items and telephone in reach/Instruct patient to call for assistance before getting out of bed/chair/stretcher/Non-slip footwear applied when patient is off stretcher/[place] to call system/Physically safe environment - no spills, clutter or unnecessary equipment/Purposeful Proactive Rounding/Room/bathroom lighting operational, light cord in reach

## 2024-09-16 NOTE — ED ADULT TRIAGE NOTE - CHIEF COMPLAINT QUOTE
Neighbors found pt on front stoop, pt babbling for days after a bad mammogram reading, found with a Yetti she said it was soda denies alcohol intake not answering questions appropriately

## 2024-09-16 NOTE — ED ADULT NURSE NOTE - OBJECTIVE STATEMENT
69 yo female bibems for AMS, per EMS pt was found by  sitting on neighbor's stoop rambling and acting bizarrely. Pt states, "You're gonna find percocet in my urine". As per chart, pt was recently seen in [facility] ED for similar presentation in February 2024. Last admission her s/s seemed to be caused by benzo and opioid use, as medical work up was otherwise negative. Pmhx HTN, hypothyroidism, SLE, seizure disorder, Sjorgen's syndrome, anxiety, depression, chronic low back pain. Per pt's  Allan, "pt has not been herself since she had her mammogram done last Tuesday". Pt placed on cardiac and spo2 monitoring. VSS. Respirations even and unlabored. GCS 14. Neuro intact. 12 lead ECG in progress. FS 91 in triage.

## 2024-09-16 NOTE — ED ADULT NURSE NOTE - NURSING NEURO ORIENTATION
----- Message from Twila Rodriguez sent at 3/31/2020  9:30 AM CDT -----  Contact: SYBIL FINNEY [[id number]]  Name of Who is Calling: SYBIL FINNEY [[id number]]    What is the request in detail:SYBIL FINNEY [[id number]] is requesting a call back in regards to being tested for diabetes....   Please contact to further discuss and advise      Can the clinic reply by [facility]: yes      What Number to [facility] Back [facility]:  [phone] (home)      disoriented to time/situation

## 2024-09-16 NOTE — ED PROVIDER NOTE - OBJECTIVE STATEMENT
68F PMH HTN, hypothyroid, Lupus, Sjogren's, anxiety / depression, seizure disorder BIBEMS AMS / bizarre behavior. Per triage note, pt not acting herself since mammogram last Tuesday. Pt found by neighbor sitting outside, babbling. Per EMS, pt not answering questions appropriately. Pt answering questions, but slowly and w/ difficulty remembering line of conversation. Pt states 'my  thinks I'm nuts,' pt requesting results of Tuesday's mammogram. Pt w/ hx similar episode, seen in this ED and admitted January 2024.    PMH as above, PSH lumbar spine, Allergy Compazine, Meds as listed.

## 2024-09-19 LAB
-  AMPICILLIN: SIGNIFICANT CHANGE UP
-  CIPROFLOXACIN: SIGNIFICANT CHANGE UP
-  LEVOFLOXACIN: SIGNIFICANT CHANGE UP
-  NITROFURANTOIN: SIGNIFICANT CHANGE UP
-  TETRACYCLINE: SIGNIFICANT CHANGE UP
-  VANCOMYCIN: SIGNIFICANT CHANGE UP
CULTURE RESULTS: ABNORMAL
METHOD TYPE: SIGNIFICANT CHANGE UP
ORGANISM # SPEC MICROSCOPIC CNT: ABNORMAL
ORGANISM # SPEC MICROSCOPIC CNT: SIGNIFICANT CHANGE UP
SPECIMEN SOURCE: SIGNIFICANT CHANGE UP

## 2024-09-20 ENCOUNTER — APPOINTMENT (OUTPATIENT)
Dept: ULTRASOUND IMAGING | Facility: IMAGING CENTER | Age: 69
End: 2024-09-20

## 2024-09-20 RX ORDER — AMOXICILLIN 500 MG
1 CAPSULE ORAL
Qty: 21 | Refills: 0
Start: 2024-09-20 | End: 2024-09-26

## 2024-09-23 NOTE — ED ADULT NURSE NOTE - NS ED NURSE IV DC DT
"Daily Note     Today's date: 2024  Patient name: Holly Rucker  : 1961  MRN: [id number]  Referring provider: Dinorah Doran 16-Sep-2024 17:30

## 2024-10-08 ENCOUNTER — APPOINTMENT (OUTPATIENT)
Dept: ULTRASOUND IMAGING | Facility: CLINIC | Age: 69
End: 2024-10-08
Payer: MEDICARE

## 2024-10-08 ENCOUNTER — RESULT REVIEW (OUTPATIENT)
Age: 69
End: 2024-10-08

## 2024-10-08 ENCOUNTER — APPOINTMENT (OUTPATIENT)
Dept: MAMMOGRAPHY | Facility: CLINIC | Age: 69
End: 2024-10-08

## 2024-10-08 PROCEDURE — 76642 ULTRASOUND BREAST LIMITED: CPT | Mod: RT

## 2024-10-10 ENCOUNTER — APPOINTMENT (OUTPATIENT)
Dept: INTERNAL MEDICINE | Facility: CLINIC | Age: 69
End: 2024-10-10

## 2024-10-15 ENCOUNTER — APPOINTMENT (OUTPATIENT)
Dept: INTERNAL MEDICINE | Facility: CLINIC | Age: 69
End: 2024-10-15
Payer: MEDICARE

## 2024-10-15 VITALS
DIASTOLIC BLOOD PRESSURE: 67 MMHG | BODY MASS INDEX: 31.65 KG/M2 | HEIGHT: 65 IN | WEIGHT: 190 LBS | TEMPERATURE: 97.9 F | SYSTOLIC BLOOD PRESSURE: 105 MMHG | HEART RATE: 71 BPM | OXYGEN SATURATION: 96 %

## 2024-10-15 DIAGNOSIS — R30.0 DYSURIA: ICD-10-CM

## 2024-10-15 DIAGNOSIS — M54.9 DORSALGIA, UNSPECIFIED: ICD-10-CM

## 2024-10-15 DIAGNOSIS — E03.9 HYPOTHYROIDISM, UNSPECIFIED: ICD-10-CM

## 2024-10-15 DIAGNOSIS — I10 ESSENTIAL (PRIMARY) HYPERTENSION: ICD-10-CM

## 2024-10-15 DIAGNOSIS — G89.29 DORSALGIA, UNSPECIFIED: ICD-10-CM

## 2024-10-15 DIAGNOSIS — M17.0 BILATERAL PRIMARY OSTEOARTHRITIS OF KNEE: ICD-10-CM

## 2024-10-15 PROCEDURE — 99214 OFFICE O/P EST MOD 30 MIN: CPT

## 2024-10-15 PROCEDURE — G2211 COMPLEX E/M VISIT ADD ON: CPT

## 2024-10-23 NOTE — ED ADULT TRIAGE NOTE - RESPIRATORY RATE (BREATHS/MIN)
PAST MEDICAL HISTORY:  H/O: rheumatic fever     High cholesterol     HTN (hypertension)     KODAK on CPAP     Status post medial meniscus repair      16

## 2024-11-26 ENCOUNTER — RX RENEWAL (OUTPATIENT)
Age: 69
End: 2024-11-26

## 2024-12-04 NOTE — ED PROVIDER NOTE - OBJECTIVE STATEMENT
Caller: FERDINAND CATALAN    Relationship to patient: WIFE    Best call back number: [phone]     Patient is needing: PLEASE CALL PT TO R/S TPODAY'S MISSED SURGERY         62F presenting with skin lesions from pyoderma gangrenosum with increased pain. Pt was admitted twice for pain control for these skin lesions. No fever or chills. No nausea or vomiting. Pt had percocet and morphine this morning but did not help with the pain. No urinary sxs. Dr. Sidhu (Dermatology) follows the patient and gives her injection to the lesions. No oral mucosa. 62F presenting with painful skin lesions from pyoderma gangrenosum. Pt has been admitted twice for pain control for these skin lesions. No fever or chills. No nausea or vomiting. Pt had percocet and morphine this morning but did not help with the pain. No urinary sxs. Dr. Sidhu (Dermatology) follows the patient - last seen 2 weeks ago, this latest flare has been for 1 week.  Has received Kenalog in past with improvement. Also has been on Thalidomide.  No oral mucosal involvement.  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 Takes Morphine IR 15 mg tid, last took this AM, and percocet, without relief.

## 2024-12-11 ENCOUNTER — NON-APPOINTMENT (OUTPATIENT)
Age: 69
End: 2024-12-11

## 2024-12-13 ENCOUNTER — NON-APPOINTMENT (OUTPATIENT)
Age: 69
End: 2024-12-13

## 2024-12-17 DIAGNOSIS — F11.90 OPIOID USE, UNSPECIFIED, UNCOMPLICATED: ICD-10-CM

## 2024-12-19 ENCOUNTER — APPOINTMENT (OUTPATIENT)
Dept: INTERNAL MEDICINE | Facility: CLINIC | Age: 69
End: 2024-12-19

## 2024-12-21 ENCOUNTER — EMERGENCY (EMERGENCY)
Facility: HOSPITAL | Age: 69
LOS: 0 days | Discharge: HOME | End: 2024-12-21
Attending: EMERGENCY MEDICINE
Payer: MEDICARE

## 2024-12-21 VITALS
HEIGHT: 66 IN | WEIGHT: 149.91 LBS | TEMPERATURE: 98 F | HEART RATE: 107 BPM | DIASTOLIC BLOOD PRESSURE: 94 MMHG | OXYGEN SATURATION: 97 % | SYSTOLIC BLOOD PRESSURE: 148 MMHG | RESPIRATION RATE: 16 BRPM

## 2024-12-21 VITALS
OXYGEN SATURATION: 99 % | TEMPERATURE: 98 F | SYSTOLIC BLOOD PRESSURE: 158 MMHG | HEART RATE: 99 BPM | DIASTOLIC BLOOD PRESSURE: 100 MMHG | RESPIRATION RATE: 18 BRPM

## 2024-12-21 DIAGNOSIS — Z98.89 OTHER SPECIFIED POSTPROCEDURAL STATES: Chronic | ICD-10-CM

## 2024-12-21 DIAGNOSIS — F19.959 OTHER PSYCHOACTIVE SUBSTANCE USE, UNSPECIFIED WITH PSYCHOACTIVE SUBSTANCE-INDUCED PSYCHOTIC DISORDER, UNSPECIFIED: ICD-10-CM
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
## 2024-12-21 DIAGNOSIS — M25.522 PAIN IN LEFT ELBOW: ICD-10-CM

## 2024-12-21 DIAGNOSIS — T50.906A UNDERDOSING OF UNSPECIFIED DRUGS, MEDICAMENTS AND BIOLOGICAL SUBSTANCES, INITIAL ENCOUNTER: ICD-10-CM

## 2024-12-21 DIAGNOSIS — Z98.890 OTHER SPECIFIED POSTPROCEDURAL STATES: Chronic | ICD-10-CM

## 2024-12-21 DIAGNOSIS — I10 ESSENTIAL (PRIMARY) HYPERTENSION: ICD-10-CM

## 2024-12-21 DIAGNOSIS — E03.9 HYPOTHYROIDISM, UNSPECIFIED: ICD-10-CM

## 2024-12-21 DIAGNOSIS — L88 PYODERMA GANGRENOSUM: ICD-10-CM

## 2024-12-21 DIAGNOSIS — M79.602 PAIN IN LEFT ARM: ICD-10-CM

## 2024-12-21 DIAGNOSIS — Z88.6 ALLERGY STATUS TO ANALGESIC AGENT: ICD-10-CM

## 2024-12-21 DIAGNOSIS — Z91.148 PATIENT'S OTHER NONCOMPLIANCE WITH MEDICATION REGIMEN FOR OTHER REASON: ICD-10-CM

## 2024-12-21 DIAGNOSIS — G89.29 OTHER CHRONIC PAIN: ICD-10-CM

## 2024-12-21 DIAGNOSIS — M25.512 PAIN IN LEFT SHOULDER: ICD-10-CM

## 2024-12-21 DIAGNOSIS — F32.A DEPRESSION, UNSPECIFIED: ICD-10-CM

## 2024-12-21 DIAGNOSIS — M32.9 SYSTEMIC LUPUS ERYTHEMATOSUS, UNSPECIFIED: ICD-10-CM
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
## 2024-12-21 PROCEDURE — 73060 X-RAY EXAM OF HUMERUS: CPT | Mod: 26,LT

## 2024-12-21 PROCEDURE — 99284 EMERGENCY DEPT VISIT MOD MDM: CPT | Mod: GC

## 2024-12-21 PROCEDURE — 73080 X-RAY EXAM OF ELBOW: CPT | Mod: 26,LT

## 2024-12-21 PROCEDURE — 73030 X-RAY EXAM OF SHOULDER: CPT | Mod: 26,LT

## 2024-12-21 RX ORDER — ACETAMINOPHEN 500 MG/5ML
650 LIQUID (ML) ORAL ONCE
Refills: 0 | Status: COMPLETED | OUTPATIENT
Start: 2024-12-21 | End: 2024-12-21

## 2024-12-21 RX ORDER — METHOCARBAMOL 500 MG/1
1500 TABLET, FILM COATED ORAL ONCE
Refills: 0 | Status: COMPLETED | OUTPATIENT
Start: 2024-12-21 | End: 2024-12-21

## 2024-12-21 RX ORDER — OXYCODONE HYDROCHLORIDE 30 MG/1
1 TABLET ORAL
Qty: 12 | Refills: 0
Start: 2024-12-21 | End: 2024-12-23

## 2024-12-21 RX ORDER — METHOCARBAMOL 500 MG/1
2 TABLET, FILM COATED ORAL
Qty: 112 | Refills: 0
Start: 2024-12-21 | End: 2025-01-03

## 2024-12-21 RX ORDER — ACETAMINOPHEN 500 MG/5ML
2 LIQUID (ML) ORAL
Qty: 112 | Refills: 0
Start: 2024-12-21 | End: 2025-01-03

## 2024-12-21 RX ORDER — LIDOCAINE HYDROCHLORIDE 20 MG/ML
1 JELLY TOPICAL ONCE
Refills: 0 | Status: COMPLETED | OUTPATIENT
Start: 2024-12-21 | End: 2024-12-21

## 2024-12-21 RX ORDER — OXYCODONE HYDROCHLORIDE AND ACETAMINOPHEN 10; 325 MG/1; MG/1
1 TABLET ORAL ONCE
Refills: 0 | Status: DISCONTINUED | OUTPATIENT
Start: 2024-12-21 | End: 2024-12-21

## 2024-12-21 RX ADMIN — OXYCODONE HYDROCHLORIDE AND ACETAMINOPHEN 1 TABLET(S): 10; 325 TABLET ORAL at 13:51

## 2024-12-21 RX ADMIN — LIDOCAINE HYDROCHLORIDE 1 PATCH: 20 JELLY TOPICAL at 13:52

## 2024-12-21 RX ADMIN — Medication 650 MILLIGRAM(S): at 13:51

## 2024-12-21 RX ADMIN — METHOCARBAMOL 1500 MILLIGRAM(S): 500 TABLET, FILM COATED ORAL at 13:51

## 2024-12-26 ENCOUNTER — LABORATORY RESULT (OUTPATIENT)
Age: 69
End: 2024-12-26

## 2024-12-26 ENCOUNTER — APPOINTMENT (OUTPATIENT)
Dept: GASTROENTEROLOGY | Facility: CLINIC | Age: 69
End: 2024-12-26
Payer: MEDICARE

## 2024-12-26 VITALS
DIASTOLIC BLOOD PRESSURE: 84 MMHG | HEIGHT: 63 IN | BODY MASS INDEX: 33.66 KG/M2 | HEART RATE: 61 BPM | WEIGHT: 190 LBS | SYSTOLIC BLOOD PRESSURE: 145 MMHG

## 2024-12-26 DIAGNOSIS — E53.8 DEFICIENCY OF OTHER SPECIFIED B GROUP VITAMINS: ICD-10-CM

## 2024-12-26 DIAGNOSIS — Z80.0 FAMILY HISTORY OF MALIGNANT NEOPLASM OF DIGESTIVE ORGANS: ICD-10-CM

## 2024-12-26 DIAGNOSIS — R15.9 FULL INCONTINENCE OF FECES: ICD-10-CM

## 2024-12-26 DIAGNOSIS — K86.2 CYST OF PANCREAS: ICD-10-CM

## 2024-12-26 PROCEDURE — 36415 COLL VENOUS BLD VENIPUNCTURE: CPT | Mod: 59

## 2024-12-26 PROCEDURE — 99215 OFFICE O/P EST HI 40 MIN: CPT | Mod: 25

## 2024-12-27 ENCOUNTER — NON-APPOINTMENT (OUTPATIENT)
Age: 69
End: 2024-12-27

## 2024-12-27 LAB
ALBUMIN SERPL ELPH-MCNC: 3.8 G/DL
ALP BLD-CCNC: 107 U/L
ALT SERPL-CCNC: 19 U/L
AMYLASE/CREAT SERPL: 123 U/L
ANION GAP SERPL CALC-SCNC: 10 MMOL/L
AST SERPL-CCNC: 19 U/L
BASOPHILS # BLD AUTO: 0.04 K/UL
BASOPHILS NFR BLD AUTO: 0.7 %
BILIRUB DIRECT SERPL-MCNC: 0.1 MG/DL
BILIRUB SERPL-MCNC: 0.2 MG/DL
BUN SERPL-MCNC: 10 MG/DL
CALCIUM SERPL-MCNC: 8.5 MG/DL
CHLORIDE SERPL-SCNC: 108 MMOL/L
CO2 SERPL-SCNC: 24 MMOL/L
CREAT SERPL-MCNC: 0.72 MG/DL
CRP SERPL-MCNC: 5 MG/L
EGFR: 92 ML/MIN/1.73M2
EOSINOPHIL # BLD AUTO: 0.33 K/UL
EOSINOPHIL NFR BLD AUTO: 5.6 %
ERYTHROCYTE [SEDIMENTATION RATE] IN BLOOD BY WESTERGREN METHOD: 25 MM/HR
ESTIMATED AVERAGE GLUCOSE: 114 MG/DL
FERRITIN SERPL-MCNC: 88 NG/ML
GGT SERPL-CCNC: 59 U/L
GLUCOSE SERPL-MCNC: 85 MG/DL
HBA1C MFR BLD HPLC: 5.6 %
HCT VFR BLD CALC: 42.2 %
HGB BLD-MCNC: 13.5 G/DL
IMM GRANULOCYTES NFR BLD AUTO: 0.2 %
IRON SATN MFR SERPL: 33 %
IRON SERPL-MCNC: 83 UG/DL
LPL SERPL-CCNC: 39 U/L
LYMPHOCYTES # BLD AUTO: 2.36 K/UL
LYMPHOCYTES NFR BLD AUTO: 40.2 %
MAGNESIUM SERPL-MCNC: 1.9 MG/DL
MAN DIFF?: NORMAL
MCHC RBC-ENTMCNC: 30.2 PG
MCHC RBC-ENTMCNC: 32 G/DL
MCV RBC AUTO: 94.4 FL
MONOCYTES # BLD AUTO: 0.35 K/UL
MONOCYTES NFR BLD AUTO: 6 %
NEUTROPHILS # BLD AUTO: 2.78 K/UL
NEUTROPHILS NFR BLD AUTO: 47.3 %
PHOSPHATE SERPL-MCNC: 3.4 MG/DL
PLATELET # BLD AUTO: 217 K/UL
POTASSIUM SERPL-SCNC: 4.3 MMOL/L
PROT SERPL-MCNC: 6.6 G/DL
RBC # BLD: 4.47 M/UL
RBC # FLD: 13.1 %
SODIUM SERPL-SCNC: 142 MMOL/L
TIBC SERPL-MCNC: 253 UG/DL
TSH SERPL-ACNC: 0.52 UIU/ML
UIBC SERPL-MCNC: 170 UG/DL
WBC # FLD AUTO: 5.87 K/UL

## 2024-12-28 ENCOUNTER — NON-APPOINTMENT (OUTPATIENT)
Age: 69
End: 2024-12-28

## 2024-12-29 ENCOUNTER — NON-APPOINTMENT (OUTPATIENT)
Age: 69
End: 2024-12-29

## 2024-12-30 ENCOUNTER — NON-APPOINTMENT (OUTPATIENT)
Age: 69
End: 2024-12-30

## 2024-12-30 LAB
BAKER'S YEAST AB QL: 24.9 UNITS
BAKER'S YEAST IGA QL IA: 19.8 UNITS
BAKER'S YEAST IGA QN IA: NEGATIVE
BAKER'S YEAST IGG QN IA: ABNORMAL
FOLATE SERPL-MCNC: 5.1 NG/ML
VIT B12 SERPL-MCNC: 403 PG/ML

## 2025-01-09 DIAGNOSIS — M54.9 DORSALGIA, UNSPECIFIED: ICD-10-CM

## 2025-01-09 DIAGNOSIS — G89.29 DORSALGIA, UNSPECIFIED: ICD-10-CM

## 2025-01-09 RX ORDER — METHOCARBAMOL 750 MG/1
750 TABLET, FILM COATED ORAL TWICE DAILY
Qty: 14 | Refills: 0 | Status: ACTIVE | COMMUNITY
Start: 2025-01-09 | End: 1900-01-01

## 2025-02-10 ENCOUNTER — APPOINTMENT (OUTPATIENT)
Dept: PSYCHIATRY | Facility: CLINIC | Age: 70
End: 2025-02-10

## 2025-03-11 ENCOUNTER — EMERGENCY (EMERGENCY)
Facility: HOSPITAL | Age: 70
LOS: 0 days | Discharge: HOME | End: 2025-03-11
Attending: STUDENT IN AN ORGANIZED HEALTH CARE EDUCATION/TRAINING PROGRAM
Payer: MEDICARE

## 2025-03-11 VITALS
OXYGEN SATURATION: 94 % | HEART RATE: 67 BPM | RESPIRATION RATE: 17 BRPM | TEMPERATURE: 99 F | DIASTOLIC BLOOD PRESSURE: 82 MMHG | SYSTOLIC BLOOD PRESSURE: 134 MMHG

## 2025-03-11 VITALS
WEIGHT: 190.04 LBS | DIASTOLIC BLOOD PRESSURE: 115 MMHG | TEMPERATURE: 98 F | HEART RATE: 94 BPM | SYSTOLIC BLOOD PRESSURE: 221 MMHG | OXYGEN SATURATION: 94 % | HEIGHT: 63 IN | RESPIRATION RATE: 18 BRPM
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
## 2025-03-11 DIAGNOSIS — E03.9 HYPOTHYROIDISM, UNSPECIFIED: ICD-10-CM

## 2025-03-11 DIAGNOSIS — L88 PYODERMA GANGRENOSUM: ICD-10-CM

## 2025-03-11 DIAGNOSIS — M25.511 PAIN IN RIGHT SHOULDER: ICD-10-CM

## 2025-03-11 DIAGNOSIS — M35.00 SJOGREN SYNDROME, UNSPECIFIED: ICD-10-CM

## 2025-03-11 DIAGNOSIS — I10 ESSENTIAL (PRIMARY) HYPERTENSION: ICD-10-CM

## 2025-03-11 DIAGNOSIS — F32.A DEPRESSION, UNSPECIFIED: ICD-10-CM

## 2025-03-11 DIAGNOSIS — Z98.89 OTHER SPECIFIED POSTPROCEDURAL STATES: Chronic | ICD-10-CM
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
## 2025-03-11 DIAGNOSIS — Z88.8 ALLERGY STATUS TO OTHER DRUGS, MEDICAMENTS AND BIOLOGICAL SUBSTANCES: ICD-10-CM

## 2025-03-11 DIAGNOSIS — G89.29 OTHER CHRONIC PAIN: ICD-10-CM

## 2025-03-11 DIAGNOSIS — S42.021A DISPLACED FRACTURE OF SHAFT OF RIGHT CLAVICLE, INITIAL ENCOUNTER FOR CLOSED FRACTURE: ICD-10-CM

## 2025-03-11 DIAGNOSIS — L93.2 OTHER LOCAL LUPUS ERYTHEMATOSUS: ICD-10-CM

## 2025-03-11 LAB
ANION GAP SERPL CALC-SCNC: 9 MMOL/L — SIGNIFICANT CHANGE UP (ref 5–17)
BASOPHILS # BLD AUTO: 0.05 K/UL — SIGNIFICANT CHANGE UP (ref 0–0.2)
BASOPHILS NFR BLD AUTO: 0.7 % — SIGNIFICANT CHANGE UP (ref 0–2)
BUN SERPL-MCNC: 5 MG/DL — LOW (ref 7–23)
CALCIUM SERPL-MCNC: 9.1 MG/DL — SIGNIFICANT CHANGE UP (ref 8.5–10.1)
CHLORIDE SERPL-SCNC: 106 MMOL/L — SIGNIFICANT CHANGE UP (ref 96–108)
CO2 SERPL-SCNC: 22 MMOL/L — SIGNIFICANT CHANGE UP (ref 22–31)
CREAT SERPL-MCNC: 0.82 MG/DL — SIGNIFICANT CHANGE UP (ref 0.5–1.3)
EGFR: 78 ML/MIN/1.73M2 — SIGNIFICANT CHANGE UP
EOSINOPHIL # BLD AUTO: 0.09 K/UL — SIGNIFICANT CHANGE UP (ref 0–0.5)
EOSINOPHIL NFR BLD AUTO: 1.3 % — SIGNIFICANT CHANGE UP (ref 0–6)
GLUCOSE SERPL-MCNC: 110 MG/DL — HIGH (ref 70–99)
HCT VFR BLD CALC: 42 % — SIGNIFICANT CHANGE UP (ref 34.5–45)
HGB BLD-MCNC: 13.7 G/DL — SIGNIFICANT CHANGE UP (ref 11.5–15.5)
IMM GRANULOCYTES NFR BLD AUTO: 0.1 % — SIGNIFICANT CHANGE UP (ref 0–0.9)
LYMPHOCYTES # BLD AUTO: 1.61 K/UL — SIGNIFICANT CHANGE UP (ref 1–3.3)
LYMPHOCYTES # BLD AUTO: 23.1 % — SIGNIFICANT CHANGE UP (ref 13–44)
MCHC RBC-ENTMCNC: 30.1 PG — SIGNIFICANT CHANGE UP (ref 27–34)
MCHC RBC-ENTMCNC: 32.6 G/DL — SIGNIFICANT CHANGE UP (ref 32–36)
MCV RBC AUTO: 92.3 FL — SIGNIFICANT CHANGE UP (ref 80–100)
MONOCYTES # BLD AUTO: 0.53 K/UL — SIGNIFICANT CHANGE UP (ref 0–0.9)
MONOCYTES NFR BLD AUTO: 7.6 % — SIGNIFICANT CHANGE UP (ref 2–14)
NEUTROPHILS # BLD AUTO: 4.68 K/UL — SIGNIFICANT CHANGE UP (ref 1.8–7.4)
NEUTROPHILS NFR BLD AUTO: 67.2 % — SIGNIFICANT CHANGE UP (ref 43–77)
NRBC BLD AUTO-RTO: 0 /100 WBCS — SIGNIFICANT CHANGE UP (ref 0–0)
PLATELET # BLD AUTO: 217 K/UL — SIGNIFICANT CHANGE UP (ref 150–400)
POTASSIUM SERPL-MCNC: 4.1 MMOL/L — SIGNIFICANT CHANGE UP (ref 3.5–5.3)
POTASSIUM SERPL-SCNC: 4.1 MMOL/L — SIGNIFICANT CHANGE UP (ref 3.5–5.3)
RBC # BLD: 4.55 M/UL — SIGNIFICANT CHANGE UP (ref 3.8–5.2)
RBC # FLD: 13.4 % — SIGNIFICANT CHANGE UP (ref 10.3–14.5)
SODIUM SERPL-SCNC: 137 MMOL/L — SIGNIFICANT CHANGE UP (ref 135–145)
WBC # BLD: 6.97 K/UL — SIGNIFICANT CHANGE UP (ref 3.8–10.5)
WBC # FLD AUTO: 6.97 K/UL — SIGNIFICANT CHANGE UP (ref 3.8–10.5)

## 2025-03-11 PROCEDURE — 99285 EMERGENCY DEPT VISIT HI MDM: CPT

## 2025-03-11 PROCEDURE — 72125 CT NECK SPINE W/O DYE: CPT | Mod: 26

## 2025-03-11 PROCEDURE — 70450 CT HEAD/BRAIN W/O DYE: CPT | Mod: 26

## 2025-03-11 PROCEDURE — 71045 X-RAY EXAM CHEST 1 VIEW: CPT | Mod: 26

## 2025-03-11 PROCEDURE — 73000 X-RAY EXAM OF COLLAR BONE: CPT | Mod: 26,RT

## 2025-03-11 PROCEDURE — 73030 X-RAY EXAM OF SHOULDER: CPT | Mod: 26,RT

## 2025-03-11 PROCEDURE — 73060 X-RAY EXAM OF HUMERUS: CPT | Mod: 26,RT

## 2025-03-11 RX ORDER — ENALAPRIL MALEATE 20 MG
20 TABLET ORAL ONCE
Refills: 0 | Status: COMPLETED | OUTPATIENT
Start: 2025-03-11 | End: 2025-03-11

## 2025-03-11 RX ORDER — LIDOCAINE HYDROCHLORIDE 20 MG/ML
1 JELLY TOPICAL ONCE
Refills: 0 | Status: COMPLETED | OUTPATIENT
Start: 2025-03-11 | End: 2025-03-11

## 2025-03-11 RX ORDER — KETOROLAC TROMETHAMINE 30 MG/ML
30 INJECTION, SOLUTION INTRAMUSCULAR; INTRAVENOUS ONCE
Refills: 0 | Status: DISCONTINUED | OUTPATIENT
Start: 2025-03-11 | End: 2025-03-11

## 2025-03-11 RX ORDER — FLUOXETINE HYDROCHLORIDE 20 MG/1
80 CAPSULE ORAL ONCE
Refills: 0 | Status: COMPLETED | OUTPATIENT
Start: 2025-03-11 | End: 2025-03-11

## 2025-03-11 RX ORDER — ACETAMINOPHEN 500 MG/5ML
1000 LIQUID (ML) ORAL ONCE
Refills: 0 | Status: COMPLETED | OUTPATIENT
Start: 2025-03-11 | End: 2025-03-11

## 2025-03-11 RX ORDER — LEVOTHYROXINE SODIUM 300 MCG
125 TABLET ORAL ONCE
Refills: 0 | Status: COMPLETED | OUTPATIENT
Start: 2025-03-11 | End: 2025-03-11

## 2025-03-11 RX ADMIN — Medication 4 MILLIGRAM(S): at 08:49

## 2025-03-11 RX ADMIN — Medication 125 MICROGRAM(S): at 08:03

## 2025-03-11 RX ADMIN — Medication 4 MILLIGRAM(S): at 07:49

## 2025-03-11 RX ADMIN — Medication 400 MILLIGRAM(S): at 07:47

## 2025-03-11 RX ADMIN — FLUOXETINE HYDROCHLORIDE 80 MILLIGRAM(S): 20 CAPSULE ORAL at 08:03

## 2025-03-11 RX ADMIN — Medication 2 MILLIGRAM(S): at 09:43

## 2025-03-11 RX ADMIN — KETOROLAC TROMETHAMINE 30 MILLIGRAM(S): 30 INJECTION, SOLUTION INTRAMUSCULAR; INTRAVENOUS at 09:44

## 2025-03-11 RX ADMIN — Medication 20 MILLIGRAM(S): at 08:01

## 2025-03-11 RX ADMIN — Medication 1000 MILLIGRAM(S): at 08:47

## 2025-03-11 RX ADMIN — LIDOCAINE HYDROCHLORIDE 1 PATCH: 20 JELLY TOPICAL at 07:48

## 2025-03-11 NOTE — ED ADULT TRIAGE NOTE - CHIEF COMPLAINT QUOTE
BIBA, pt stated she fell out of bed yesterday at 5pm.  pt last took percocet 1 tab at 10pm.  pt denies hitting head, denies LOC/blood thinners.

## 2025-03-11 NOTE — ED PROVIDER NOTE - CARE PLAN
Principal Discharge DX:	Closed traumatic minimally displaced fracture of shaft of right clavicle   1

## 2025-03-11 NOTE — ED PROVIDER NOTE - CARE PROVIDER_API CALL
Atul Aceves  Orthopaedic Surgery  [address]  [place] [address]  Phone: [phone]  Fax: [phone]  Follow Up Time: 4-6 Days

## 2025-03-11 NOTE — ED PROVIDER NOTE - PATIENT PORTAL LINK FT
You can access the FollowMyHealth Patient Portal offered by [facility] by registering at the following website: [email or web address]. By joining [employer]’s FollowMyHealth portal, you will also be able to view your health information using other applications (apps) compatible with our system.

## 2025-03-11 NOTE — ED PROVIDER NOTE - PHYSICAL EXAMINATION
Gen: aox3, nad   Head: NCAT  ENT: Airway patent, moist mucous membranes, nasal passageways clear   Cardiac: Normal rate, normal rhythm   Respiratory: Lungs CTA B/L  Gastrointestinal: Abdomen soft, nontender, nondistended, no rebound, no guarding  MSK: No gross abnormalities, Limited ROM R shoulder due to pain but no overlying ecchymosis/edema or rash, + ttp right distal clavicle, no midline c-t-l spine ttp, no hip ttp, chronic mild ttp b/l knees but full AROM knees   HEME: Extremities warm, pulses intact and symmetrical  Skin: No rashes, no lesions  Neuro: No gross neurologic deficits, normal speech, no motor or sensory deficits

## 2025-03-11 NOTE — ED ADULT NURSE NOTE - NSFALLRISKINTERV_ED_ALL_ED

## 2025-03-11 NOTE — ED ADULT NURSE NOTE - OBJECTIVE STATEMENT
Patient presents to ED c/o Right shoulder pain radiating down right arm 10/10 after falling out of bed yesterday at 5pm.  Patient reports taking percocet 1 tab at 10pm. Patient denies hitting head, denies LOC/blood thinners.

## 2025-03-11 NOTE — ED PROVIDER NOTE - CLINICAL SUMMARY MEDICAL DECISION MAKING FREE TEXT BOX
68 y/o F w/ PMHx of HTN, hypothyroid, Lupus, Sjogren's, Pyoderma gangrenosum, Steroid-induced psychosis with complication, anxiety/depression, prior lumbar spine surgery 8/2023, b/l chronic knee pain,  who presents for evaluation of right shoulder pain sustained approx 5pm day prior after falling out of bed - states she was reaching over for remote control when she fell out of the bed and landing on right shoulder. Reports occasional right sided radicular neck pain but feels it is coming from the shoulder. Denies motor/sensory changes. Limited ROM R shoulder due to pain. States othewise in normal state of health. Did recently start following with pain specialist and had recent rx for percocet which she took 2 tablets yday but none after 7pm. States the percocet did not make her feel unwell. Pt states did not take anything else overnight for pain control. Denies LOC. She is not sure if she hit head when she fell. Pt states she missed her home medications this morning -states usually by this time she would have taken enalapril 20mg, levothyroxine 125mcg and her fluoxetine 80mg - clarified that she is not on lisinopril anymore and verified she is on enalapril 20mg  physical exam as above  reproducible right shoulder pain and distal right clavicle ttp - will obtain cxr, xry right shoulder/humerus and ct head/ c spine to eval for traumatic injuries, check renal fcn, analgesia, reassess.       Istop - did not reveal any list of control substances- Reference #: [id number] 70 y/o F w/ PMHx of HTN, hypothyroid, Lupus, Sjogren's, Pyoderma gangrenosum, Steroid-induced psychosis with complication, anxiety/depression, prior lumbar spine surgery 8/2023, b/l chronic knee pain,  who presents for evaluation of right shoulder pain sustained approx 5pm day prior after falling out of bed - states she was reaching over for remote control when she fell out of the bed and landing on right shoulder. Reports occasional right sided radicular neck pain but feels it is coming from the shoulder. Denies motor/sensory changes. Limited ROM R shoulder due to pain. States othewise in normal state of health. Did recently start following with pain specialist and had recent rx for percocet which she took 2 tablets yday but none after 7pm. States the percocet did not make her feel unwell. Pt states did not take anything else overnight for pain control. Denies LOC. She is not sure if she hit head when she fell. Pt states she missed her home medications this morning -states usually by this time she would have taken enalapril 20mg, levothyroxine 125mcg and her fluoxetine 80mg - clarified that she is not on lisinopril anymore and verified she is on enalapril 20mg  physical exam as above  reproducible right shoulder pain and distal right clavicle ttp - will obtain cxr, xry right shoulder/humerus and ct head/ c spine to eval for traumatic injuries, check renal fcn, analgesia, reassess.       Istop - did not reveal any list of control substances- Reference #: [id number]    - pt improved, pain managed while immobilized, neurovasc intact, pt reports ambulates with cane and will be able to call uber home, will provide sling right arm, outpt ortho follow up discussed, pt has already reached out to her pain management specialist who will help her with further pain control, stable for dc, no tenting of skin on clinical eval.

## 2025-03-11 NOTE — ED ADULT TRIAGE NOTE - NSSEPSISSUSPECTED_ED_A_ED
New patient calling; he has never been circumcised and has a rash on the inside of his foreskin; please call him back to discuss before appt is made.  Patient has medicare and [employer].  
Per pt, for the last 6 months he developed a red line inside his foreskin that continues to get worse. Foreskin currently red and inflamed. Denies pain or abnormal discharge. No urinary symptoms. No F/V/N/V. Pt pulls back the foreskin daily in the shower and applies Vaseline afterworlds. Will discuss with provider for recommendations.      Reviewed with Dr. Britt. As advised, pt should be seen in 2-3 weeks with a MD to be further evaluated. If pt should develop difficulty urinating, hematuria, F/C/N/V or uncontrolled pain. Pt should go to the ER. Updated pt with Dr. Britt's recommendation. Agreeable. Will direct to PSR to schedule an appointment.   
No

## 2025-03-11 NOTE — ED PROVIDER NOTE - NSFOLLOWUPINSTRUCTIONS_ED_ALL_ED_FT
You were seen today for right arm pain and you were found to have a fracture of the right clavicle at the midshaft - treatment is the immobilize in sling to prevent arm movement/displacement and follow up with an orthopedist within 1 week for further evaluation    Continue pain medications as prescribed by your pain specialist. Can also take tylenol 650mg every 6 hours as needed for moderate pain and ibip You were seen today for right arm pain and you were found to have a fracture of the right clavicle at the midshaft - treatment is the immobilize in sling to prevent arm movement/displacement and follow up with an orthopedist within 1 week for further evaluation    Continue pain medications as prescribed by your pain specialist. Can also take tylenol 650mg every 6 hours as needed for moderate pain and ibuprofen 400mg every 6 hours as needed for pain - take with food to prevent stomach ulcer/ gastritis - can use local lidocaine patch for pain relief.     Stay hydrated.    Return to the emergency room if you develop severe pain, chest pain, numbness, weakness or new concerns/ symptoms

## 2025-03-11 NOTE — ED PROVIDER NOTE - INPATIENT RECORD SUMMARY
prior admission notes and home meds reviewed from prior hospitalization from 2/2024 for AMS suspected due to percocet use with neg MRI

## 2025-03-11 NOTE — ED ADULT NURSE NOTE - NEURO GAIT
Lizette was in our department today, 11/9/2021, for a splenic biopsy and Y-90 mapping for a liver tumor. Upon admission, patients vitals were: /83, 75 bpm and normal sinus rhythm, respirations 11, and 83% SpO2 on room air. Lung sounds had crackles throughout. 2L of O2 placed and oxygen saturation back up to 92%. Pt states the he only feels short of breath with activities. Respirations are regular and unlabored. Post procedure, during ambulation, SpO2 dropped down to 60% on room air. Oxygen re-applied and came back up to 92% on 2L. Unable to titrate down past 2 liters without his oxygen saturation dipping down into the high 80's. Respiratory therapy consult order placed for evaluation.  Patient's family went back home to [place] to get his oxygen tank. Will instruct to keep him on the 2L. Pt will dialyze tomorrow, 11/10/21.   4000- MD Barajas updated. No new orders. Staff message sent to update patients pulmonologist.      
Detail Level: Detailed
Quality 130: Documentation Of Current Medications In The Medical Record: Current Medications Documented
Quality 431: Preventive Care And Screening: Unhealthy Alcohol Use - Screening: Patient not identified as an unhealthy alcohol user when screened for unhealthy alcohol use using a systematic screening method
Quality 226: Preventive Care And Screening: Tobacco Use: Screening And Cessation Intervention: Patient screened for tobacco use and is an ex/non-smoker
steady

## 2025-03-12 ENCOUNTER — EMERGENCY (EMERGENCY)
Facility: HOSPITAL | Age: 70
LOS: 0 days | Discharge: HOME | End: 2025-03-12
Attending: EMERGENCY MEDICINE
Payer: MEDICARE

## 2025-03-12 VITALS
HEIGHT: 63 IN | SYSTOLIC BLOOD PRESSURE: 206 MMHG | WEIGHT: 190.04 LBS | HEART RATE: 80 BPM | DIASTOLIC BLOOD PRESSURE: 115 MMHG | TEMPERATURE: 98 F | OXYGEN SATURATION: 95 % | RESPIRATION RATE: 17 BRPM

## 2025-03-12 VITALS
OXYGEN SATURATION: 96 % | SYSTOLIC BLOOD PRESSURE: 171 MMHG | RESPIRATION RATE: 18 BRPM | HEART RATE: 72 BPM | TEMPERATURE: 98 F | DIASTOLIC BLOOD PRESSURE: 93 MMHG

## 2025-03-12 DIAGNOSIS — Z98.890 OTHER SPECIFIED POSTPROCEDURAL STATES: Chronic | ICD-10-CM

## 2025-03-12 DIAGNOSIS — Z98.89 OTHER SPECIFIED POSTPROCEDURAL STATES: Chronic | ICD-10-CM
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
## 2025-03-12 DIAGNOSIS — Z88.8 ALLERGY STATUS TO OTHER DRUGS, MEDICAMENTS AND BIOLOGICAL SUBSTANCES: ICD-10-CM

## 2025-03-12 DIAGNOSIS — M25.511 PAIN IN RIGHT SHOULDER: ICD-10-CM

## 2025-03-12 PROCEDURE — 99284 EMERGENCY DEPT VISIT MOD MDM: CPT

## 2025-03-12 RX ORDER — ENALAPRIL MALEATE 20 MG
20 TABLET ORAL ONCE
Refills: 0 | Status: COMPLETED | OUTPATIENT
Start: 2025-03-12 | End: 2025-03-12

## 2025-03-12 RX ORDER — FLUOXETINE HYDROCHLORIDE 20 MG/1
80 CAPSULE ORAL ONCE
Refills: 0 | Status: DISCONTINUED | OUTPATIENT
Start: 2025-03-12 | End: 2025-03-12

## 2025-03-12 RX ORDER — LEVOTHYROXINE SODIUM 300 MCG
125 TABLET ORAL DAILY
Refills: 0 | Status: DISCONTINUED | OUTPATIENT
Start: 2025-03-12 | End: 2025-03-12

## 2025-03-12 RX ORDER — FLUOXETINE HYDROCHLORIDE 20 MG/1
80 CAPSULE ORAL ONCE
Refills: 0 | Status: COMPLETED | OUTPATIENT
Start: 2025-03-12 | End: 2025-03-12

## 2025-03-12 RX ORDER — KETOROLAC TROMETHAMINE 30 MG/ML
15 INJECTION, SOLUTION INTRAMUSCULAR; INTRAVENOUS ONCE
Refills: 0 | Status: DISCONTINUED | OUTPATIENT
Start: 2025-03-12 | End: 2025-03-12

## 2025-03-12 RX ORDER — LIDOCAINE HYDROCHLORIDE 20 MG/ML
1 JELLY TOPICAL ONCE
Refills: 0 | Status: COMPLETED | OUTPATIENT
Start: 2025-03-12 | End: 2025-03-12

## 2025-03-12 RX ADMIN — Medication 20 MILLIGRAM(S): at 07:01

## 2025-03-12 RX ADMIN — KETOROLAC TROMETHAMINE 15 MILLIGRAM(S): 30 INJECTION, SOLUTION INTRAMUSCULAR; INTRAVENOUS at 06:08

## 2025-03-12 RX ADMIN — LIDOCAINE HYDROCHLORIDE 1 PATCH: 20 JELLY TOPICAL at 06:08

## 2025-03-12 RX ADMIN — Medication 125 MICROGRAM(S): at 07:01

## 2025-03-12 RX ADMIN — KETOROLAC TROMETHAMINE 15 MILLIGRAM(S): 30 INJECTION, SOLUTION INTRAMUSCULAR; INTRAVENOUS at 07:06

## 2025-03-12 RX ADMIN — FLUOXETINE HYDROCHLORIDE 80 MILLIGRAM(S): 20 CAPSULE ORAL at 07:02

## 2025-03-12 RX ADMIN — Medication 4 MILLIGRAM(S): at 07:06

## 2025-03-12 RX ADMIN — LIDOCAINE HYDROCHLORIDE 1 PATCH: 20 JELLY TOPICAL at 07:20

## 2025-03-12 RX ADMIN — Medication 4 MILLIGRAM(S): at 06:08

## 2025-03-12 NOTE — ED PROVIDER NOTE - CLINICAL SUMMARY MEDICAL DECISION MAKING FREE TEXT BOX
female presenting with acute pain 2/2 clavicle fracture. pain not controlled with home meds  pt has upcoming pain management follow-up    neuromuscular intact  pain control  home meds  dispo home female presenting with acute pain 2/2 clavicle fracture. pain not controlled with home meds  pt has upcoming pain management follow-up    imaging performed yesterday revealed R clavicular fracture  neurovascular intact  pain control  home meds  dispo home with continued outpatient follow-up with pain management and orthopedics

## 2025-03-12 NOTE — ED ADULT NURSE NOTE - ED STAT RN HANDOFF DETAILS
Handoff report received from HINA Pantoja. pt is stable at this time. Pt reports slight improvement in pain, 9/10 pain.  aware, will followup with pain management. New sling placed on right arm, pt reports improved stability compared to old sling. Extra sling provided to go home with. SW contacted to set up ambulance home

## 2025-03-12 NOTE — ED ADULT NURSE NOTE - NSFALLHARMRISKINTERV_ED_ALL_ED

## 2025-03-12 NOTE — ED PROVIDER NOTE - PROGRESS NOTE DETAILS
Attending note (John): Patient in ED, discharged by prior team (evaluated for persistent pain at site of clavicular fracture).  Came to bedside when patient was screaming: Patient yelling at this provider upset that the ambulance is not here yet as someone will be only at her house for short while longer.  Also complaining that she is still been having pain but has had some relief with medications administered previously.  Offered additional pain medication and to check on requested ambulance, patient declined pain medication continues to reiterate that she is upset about waiting for transport home.  Explained to patient that request for ambulance can take time and that transport of ill patients into the hospital can sometimes supersede requests for transport out of the hospital for discharges.  At that time ambulance service arrived to take patient I will proceed with discharge as planned.

## 2025-03-12 NOTE — ED ADULT NURSE NOTE - OBJECTIVE STATEMENT
Patient presents to ED c/o of right shoulder pain. States she was diagnosed with a right clavicle fracture yesterday morning after falling. Reports taking percocet 2 tabs at 2300 without relief.

## 2025-03-12 NOTE — ED ADULT TRIAGE NOTE - CHIEF COMPLAINT QUOTE
Pt complains of right shoulder pain. States she was diagnosed with a right clavicle fracture yesterday morning after falling. Reports taking percocet 2 tabs at 2300 without relief.

## 2025-03-12 NOTE — ED PROVIDER NOTE - PATIENT PORTAL LINK FT
You can access the FollowMyHealth Patient Portal offered by [facility] by registering at the following website: [email or web address]. By joining [employer]’s FollowMyHealth portal, you will also be able to view your health information using other applications (apps) compatible with our system.

## 2025-03-12 NOTE — ED PROVIDER NOTE - PHYSICAL EXAMINATION
General: Well appearing elderly female in no acute distress  HEENT: Normocephalic, atraumatic. Moist mucous membranes. Oropharynx clear. No lymphadenopathy.  Eyes: No scleral icterus. EOMI. DANNA.  Neck:. Soft and supple. Full ROM without pain. No midline tenderness  Cardiac: Regular rate and regular rhythm. No murmurs, rubs, gallops. Peripheral pulses 2+ and symmetric. No LE edema.  Resp: Lungs CTAB. Speaking in full sentences. No wheezes, rales or rhonchi.  Abd: Soft, non-tender, non-distended. No guarding or rebound. No scars, masses, or lesions.  Back: Spine midline and non-tender. No CVA tenderness.    MSK: R shoulder sling in place, ttp over R clavicular region. no swelling, ecchymosis or decreased ROM  Skin: No rashes, abrasions, or lacerations.  Neuro: AO x 3. Moves all extremities symmetrically. Motor strength and sensation grossly intact.

## 2025-03-12 NOTE — ED PROVIDER NOTE - NS ED ROS FT
General: Denies fever, chills  HEENT: Denies sensory changes, sore throat  Neck: Denies neck pain, neck stiffness  Resp: Denies coughing, SOB  Cardiovascular: Denies CP, palpitations, LE edema  GI: Denies nausea, vomiting, abdominal pain, diarrhea, constipation, blood in stool  : Denies dysuria, hematuria, frequency, incontinence  MSK: Denies back pain; + collar bone & shoulder pain  Neuro: Denies HA, dizziness, numbness, weakness  Skin: Denies rashes.

## 2025-03-12 NOTE — ED PROVIDER NOTE - OBJECTIVE STATEMENT
69 F w/ recently diagnosed R clavicular fracture presenting to the ED with continued pain. Pt states that she took her home medications but they only lasted a short period of time. She states that she had trouble managing her shoulder sling and states that it was making her uncomfortable. Pt states that she called her pain management doctor and she has an upcoming appointment. She is requesting medications for pain relief. Pt denies any new falls or trauma to the body. She states that she has been able to walk with her cane without issues

## 2025-03-12 NOTE — ED PROVIDER NOTE - NSFOLLOWUPINSTRUCTIONS_ED_ALL_ED_FT
Fracture    A fracture is a break in one of your bones. This can occur from a variety of injuries, especially traumatic ones. Symptoms include pain, bruising, or swelling. Do not use the injured limb. If a fracture is in one of the bones below your waist, do not put weight on that limb unless instructed to do so by your healthcare provider. Crutches or a cane may have been provided. A splint or cast may have been applied by your health care provider. Make sure to keep it dry and follow up with an orthopedist as instructed.    SEEK IMMEDIATE MEDICAL CARE IF YOU HAVE ANY OF THE FOLLOWING SYMPTOMS: numbness, tingling, increasing pain, or weakness in any part of the injured limb.      Chronic Pain    Chronic pain is a complex condition and the Emergency Department is not the ideal place to manage this condition. Prescription painkillers must be written by your primary care provider or a pain management physician. Avoid activities or triggers that exacerbate your pain.    SEEK IMMEDIATE MEDICAL CARE IF YOU HAVE ANY OF THE FOLLOWING SYMPTOMS: severe chest pain, fainting, vomiting blood, dark or bloody stools, or pain different from your chronic pain.

## 2025-03-13 ENCOUNTER — EMERGENCY (EMERGENCY)
Facility: HOSPITAL | Age: 70
LOS: 0 days | Discharge: HOME | End: 2025-03-13
Attending: EMERGENCY MEDICINE
Payer: MEDICARE

## 2025-03-13 VITALS
WEIGHT: 195.11 LBS | TEMPERATURE: 98 F | RESPIRATION RATE: 18 BRPM | DIASTOLIC BLOOD PRESSURE: 99 MMHG | OXYGEN SATURATION: 96 % | SYSTOLIC BLOOD PRESSURE: 163 MMHG | HEART RATE: 78 BPM | HEIGHT: 63 IN

## 2025-03-13 VITALS
HEART RATE: 88 BPM | SYSTOLIC BLOOD PRESSURE: 183 MMHG | OXYGEN SATURATION: 96 % | DIASTOLIC BLOOD PRESSURE: 108 MMHG | TEMPERATURE: 99 F | RESPIRATION RATE: 20 BRPM

## 2025-03-13 DIAGNOSIS — Z98.89 OTHER SPECIFIED POSTPROCEDURAL STATES: Chronic | ICD-10-CM

## 2025-03-13 DIAGNOSIS — Z98.890 OTHER SPECIFIED POSTPROCEDURAL STATES: Chronic | ICD-10-CM

## 2025-03-13 PROCEDURE — 99284 EMERGENCY DEPT VISIT MOD MDM: CPT | Mod: FS

## 2025-03-13 PROCEDURE — 73030 X-RAY EXAM OF SHOULDER: CPT | Mod: 26,RT

## 2025-03-13 PROCEDURE — 73000 X-RAY EXAM OF COLLAR BONE: CPT | Mod: 26,RT

## 2025-03-13 RX ORDER — OXYCODONE HYDROCHLORIDE 30 MG/1
5 TABLET ORAL ONCE
Refills: 0 | Status: DISCONTINUED | OUTPATIENT
Start: 2025-03-13 | End: 2025-03-13

## 2025-03-13 RX ADMIN — Medication 5 MILLIGRAM(S): at 21:22

## 2025-03-13 RX ADMIN — OXYCODONE HYDROCHLORIDE 5 MILLIGRAM(S): 30 TABLET ORAL at 18:22

## 2025-03-13 RX ADMIN — OXYCODONE HYDROCHLORIDE 5 MILLIGRAM(S): 30 TABLET ORAL at 19:00

## 2025-03-13 NOTE — ED PROVIDER NOTE - PHYSICAL EXAMINATION
CONSTITUTIONAL: Appears well, in no acute distress  HEAD: Normocephalic, no obvious signs of trauma  EENT: PERRL, EOMI w/o pain, no nystagmus, nares patent no drainage, no pharyngeal erythema, swelling, or exudates  NECK: Trachea midline, no goiter  RESP: L/S equal clr, bilat, apices and bases, no accessory muscle use, speaking full sentences  CARDIC: RRR, +S1/S2, no peripheral edema  GI: ABD soft, nondistended, nontender on palpation, no palpable masses  MSK: right clavicle ecchymosis, no skin tenting or obvious deformity, RUE motor/sensation intact, radial pulse +2   SKIN: No rashes, normal color/condition   NEURO: A&OX4, No focal motor deficits/weakness, no sensory deficits, no slurred speech, no facial droop

## 2025-03-13 NOTE — ED PROVIDER NOTE - PATIENT PORTAL LINK FT
You can access the FollowMyHealth Patient Portal offered by [facility] by registering at the following website: [email or web address]. By joining [employer]’s FollowMyHealth portal, you will also be able to view your health information using other applications (apps) compatible with our system.

## 2025-03-13 NOTE — ED PROVIDER NOTE - PROGRESS NOTE DETAILS
WILLY Magdaleno NP: X-rays shows no new acute fracture/dislocation.  Patient neurovascularly intact.  Will DC patient with outpatient Ortho and pain management follow-up.  Spoke to patient's  will pick patient up.  Patient agreeable to plan, questions answered understanding verbalized, return precautions given.

## 2025-03-13 NOTE — ED PROVIDER NOTE - OBJECTIVE STATEMENT
69-year-old female past medical history hypertension, hypothyroid, lupus, stroke and anxiety depression presenting the ED complaining of right shoulder pain.  Patient had a fall on 3/11 and sustained right clavicle fracture.  Patient has pain management doctor per chart review who was to assist with patient pain control.  Patient has prescription for Percocet, was instructed to take Tylenol and ibuprofen as needed.  Patient seen yesterday and discharged due to pain as well.  Patient returns today stating she fell last night on right shoulder out of bed.  Denies hitting head, no neck or back pain, loss of consciousness, blurry vision, lightheadedness or dizziness, nausea/vomiting.  Patient denies any numbness/tingling to right upper extremity.

## 2025-03-13 NOTE — ED PROVIDER NOTE - CLINICAL SUMMARY MEDICAL DECISION MAKING FREE TEXT BOX
- 69-year-old female past medical history hypertension, hypothyroid, lupus, stroke and anxiety depression presenting the ED complaining of right shoulder pain.  Patient had a fall on 3/11 and sustained right clavicle fracture.  Patient has pain management doctor per chart review who was to assist with patient pain control.  Patient has prescription for Percocet, was instructed to take Tylenol and ibuprofen as needed.  Patient seen yesterday and discharged due to pain as well.  Patient returns today stating she fell last night on right shoulder out of bed.  Denies hitting head, no neck or back pain, loss of consciousness, blurry vision, lightheadedness or dizziness, nausea/vomiting.  Patient denies any numbness/tingling to right upper extremity.  - On exam patient has ecchymosis right clavicle, no obvious deformity or skin tenting.  Right upper extremity radial pulse +2, warm to touch, motor/sensation intact, no concern for neurovascular compromise.  Will repeat x-ray of clavicle and shoulder to assess for further injury given complaint of additional fall.  No suspicion for ICH given no head strike, neuro logically intact.  Will order pain medication and reassess.  Dispo likely DC with outpatient Ortho follow-up.  Patient states has upcoming appointment scheduled on Monday.

## 2025-03-13 NOTE — ED PROVIDER NOTE - NSFOLLOWUPINSTRUCTIONS_ED_ALL_ED_FT
- You were seen in the Emergency Department Today for right shoulder pain after a fall   - your x-rays were showed no new acute injuries  - Please continue taking your medications as previously prescribed. Take Tylenol up to 1,000mg every 6 hours as needed for pain - do not take more than 4g in 24hrs or Take Motrin 600 mg every 8 hours as needed for moderate pain-- take with food.   -Please  follow up with your primary care doctor, pain management doctor, and orthopedist as discussed  - Return to the Emergency Department IMMEDIATELY if you experience Any new or concerning symptoms including increased pain, numbness, weakness, tingling your fingers turn cold or blue, difficulty breathing, chest pain, palpitations, lightheadedness or dizziness, you pass out      English    Clavicle Fracture  The upper body, showing a clavicle fracture.  A clavicle fracture is a break in the long bone that connects the shoulder to the chest wall (clavicle). The clavicle is also called the collarbone. A clavicle fracture is a common injury that can happen at any age.    What are the causes?  Common causes of a clavicle fracture include:  A hard, direct hit to the shoulder.  A motor vehicle accident.  A fall, especially if you try to break your fall with an outstretched arm.  What increases the risk?  You are more likely to develop this condition if you:  Are younger than 25 years old, or you are older than 75 years old. Most clavicle fractures happen to people who are younger than 25 years old.  Are male.  Play contact sports.  What are the signs or symptoms?  Symptoms of this condition include:  Pain near your injured clavicle and in the shoulder or arm on your injured side.  Trouble moving the arm on your injured side.  The injured shoulder dropping downward and forward.  Pain when trying to lift the shoulder on your injured side.  Bruising, swelling, and tenderness over your injured clavicle.  A grinding noise when you try to move the shoulder on your injured side.  A bump over your injured clavicle.  How is this diagnosed?  This condition is diagnosed based on:  Your medical history.  A physical exam.  X-rays to check the position of your injured clavicle.  How is this treated?  Treatment for this condition depends on the position of your clavicle after the fracture:  If the broken ends of the bone are not out of place, your health care provider may put your arm in a sling.  If the broken ends of the bone are out of place, you may need surgery. This may involve placing screws, pins, or plates to keep your clavicle stable while it heals.  You may be given medicines for pain.  Your provider may prescribe a brace (clavicle strap) that wraps around the shoulders and upper back to prevent the clavicle from moving while it heals.  When your fracture has healed, you may need to do physical therapy exercises to improve movement and strength in your shoulder and arm.    Follow these instructions at home:  Medicines    Ask your provider if the medicine prescribed to you:  Requires you to avoid driving or using machinery.  Can cause constipation. You may need to take these actions to prevent or treat constipation:  Drink enough fluid to keep your pee (urine) pale yellow.  Take over-the-counter or prescription medicines.  Eat foods that are high in fiber, such as beans, whole grains, and fresh fruits and vegetables.  Limit foods that are high in fat and processed sugars, such as fried or sweet foods.  If you have a removable sling or brace:    Wear the sling or brace as told by your provider. Remove it only as told by your provider.  Check the skin around the sling or brace every day. Tell your provider about any concerns.  Loosen the sling or brace if your fingers tingle, become numb, or turn cold and blue.  Keep the sling or brace clean and dry.  Ask your provider if you may remove the sling or brace when you take a bath or shower.  If you need to wear the sling or brace while bathing, and the sling or brace is not waterproof:  Do not let it get wet.  Cover it with a watertight covering when you take a bath or shower.  If you may remove your sling or brace when you take a bath or a shower, keep your shoulder in the same position as when the sling or brace is on.  Managing pain, stiffness, and swelling    A bag of ice on a towel on the skin.  If told, put ice on the injured area.  If you have a removable sling or brace, remove it as told by your provider.  Put ice in a plastic bag.  Place a towel between your skin and the bag.  Leave the ice on for 20 minutes, 2–3 times a day.  If your skin turns bright red, remove the ice right away to prevent skin damage. The risk of damage is higher if you cannot feel pain, heat, or cold.  Move your fingers often to reduce stiffness and swelling.  Activity    Avoid activities that make your symptoms worse for 4–6 weeks, or as long as told.  You may have to avoid lifting. Ask your provider how much you can safely lift.  Do not put weight on your arm on the injured side, such as pushing your arm down, until your provider says that it is safe.  Do not use the injured limb to support your body weight until your provider says that you can.  Ask your provider when it is safe for you to drive.  Do exercises as told by your provider.  Return to your normal activities as told by your provider. Ask your provider what activities are safe for you.  General instructions    Do not use any products that contain nicotine or tobacco. These products include cigarettes, chewing tobacco, and vaping devices, such as e-cigarettes. These can delay bone healing. If you need help quitting, ask your provider.  Take over-the-counter and prescription medicines only as told by your provider.  Contact a health care provider if:  Your medicine is not helping to relieve pain and swelling.  Get help right away if:  Your arm on the injured side is numb, cold, or pale.  This information is not intended to replace advice given to you by your health care provider. Make sure you discuss any questions you have with your health care provider.    Document Revised: 09/28/2023 Document Reviewed: 09/28/2023  [employer] Patient Education © 2025 [employer]  [employer] logo  Terms and Conditions  Privacy Policy  Editorial Policy  All content on this site: Copyright © 2025 [employer], its licensors, and contributors. All rights are reserved, including those for text and data mining, AI training, and similar technologies. For all open access content, the Creative Commons licensing terms apply.  Cookies are used by this site. To decline or learn more, visit our Cookies page.  [employer]

## 2025-03-13 NOTE — ED ADULT NURSE NOTE - NSFALLHARMRISKINTERV_ED_ALL_ED
Single Mechanical/Accidental Fall Communicate risk of Fall with Harm to all staff, patient, and family/Provide visual cue: red socks, yellow wristband, yellow gown, etc/Reinforce activity limits and safety measures with patient and family/Bed in lowest position, wheels locked, appropriate side rails in place/Call bell, personal items and telephone in reach/Instruct patient to call for assistance before getting out of bed/chair/stretcher/Non-slip footwear applied when patient is off stretcher/[place] to call system/Physically safe environment - no spills, clutter or unnecessary equipment/Purposeful Proactive Rounding/Room/bathroom lighting operational, light cord in reach

## 2025-03-13 NOTE — ED PROVIDER NOTE - ATTENDING APP SHARED VISIT CONTRIBUTION OF CARE
Alisson:  I have independently evaluated the patient and have documented in the appropriate sections above.  I agree with the exam and plan as noted above by the advanced care practitioner, Sharla. My contribution consisted of establishing the plan of care related to the chief complaint related to her acute on chronic pain management.

## 2025-03-13 NOTE — ED ADULT TRIAGE NOTE - CHIEF COMPLAINT QUOTE
Patient reports : I fell off the bed 2 days ago. I can't stand the pain anymore - its a ten." Patient using a sling reports R clavicle fracture. Patient can't wait for Orthopedic Appointment next week. Placed in wheelchair. "I fell again last night."  PMH: HTN, Chronic Pain, Osteoarthritis, Spine surgery, Hashimoto's disease, Sjogren's Syndrome, Lupus

## 2025-03-18 ENCOUNTER — APPOINTMENT (OUTPATIENT)
Dept: INTERNAL MEDICINE | Facility: CLINIC | Age: 70
End: 2025-03-18

## 2025-03-18 ENCOUNTER — INPATIENT (INPATIENT)
Facility: HOSPITAL | Age: 70
LOS: 3 days | Discharge: HOME HEALTH | End: 2025-03-22
Attending: INTERNAL MEDICINE | Admitting: INTERNAL MEDICINE
Payer: MEDICARE

## 2025-03-18 VITALS
DIASTOLIC BLOOD PRESSURE: 86 MMHG | TEMPERATURE: 98 F | HEIGHT: 63 IN | RESPIRATION RATE: 20 BRPM | WEIGHT: 190.04 LBS | SYSTOLIC BLOOD PRESSURE: 130 MMHG | HEART RATE: 78 BPM | OXYGEN SATURATION: 100 %

## 2025-03-18 DIAGNOSIS — R79.89 OTHER SPECIFIED ABNORMAL FINDINGS OF BLOOD CHEMISTRY: ICD-10-CM

## 2025-03-18 DIAGNOSIS — Z98.890 OTHER SPECIFIED POSTPROCEDURAL STATES: Chronic | ICD-10-CM

## 2025-03-18 DIAGNOSIS — Z98.89 OTHER SPECIFIED POSTPROCEDURAL STATES: Chronic | ICD-10-CM

## 2025-03-18 DIAGNOSIS — E03.9 HYPOTHYROIDISM, UNSPECIFIED: ICD-10-CM

## 2025-03-18 DIAGNOSIS — E78.5 HYPERLIPIDEMIA, UNSPECIFIED: ICD-10-CM

## 2025-03-18 DIAGNOSIS — I10 ESSENTIAL (PRIMARY) HYPERTENSION: ICD-10-CM

## 2025-03-18 LAB
ALBUMIN SERPL ELPH-MCNC: 3.5 G/DL — SIGNIFICANT CHANGE UP (ref 3.3–5)
ALP SERPL-CCNC: 145 U/L — HIGH (ref 40–120)
ALT FLD-CCNC: 27 U/L — SIGNIFICANT CHANGE UP (ref 12–78)
AMPHET UR-MCNC: NEGATIVE — SIGNIFICANT CHANGE UP
ANION GAP SERPL CALC-SCNC: 4 MMOL/L — LOW (ref 5–17)
APPEARANCE UR: CLEAR — SIGNIFICANT CHANGE UP
AST SERPL-CCNC: 25 U/L — SIGNIFICANT CHANGE UP (ref 15–37)
BARBITURATES UR SCN-MCNC: POSITIVE — SIGNIFICANT CHANGE UP
BASOPHILS # BLD AUTO: 0.04 K/UL — SIGNIFICANT CHANGE UP (ref 0–0.2)
BASOPHILS NFR BLD AUTO: 0.9 % — SIGNIFICANT CHANGE UP (ref 0–2)
BENZODIAZ UR-MCNC: POSITIVE — SIGNIFICANT CHANGE UP
BILIRUB SERPL-MCNC: 0.4 MG/DL — SIGNIFICANT CHANGE UP (ref 0.2–1.2)
BILIRUB UR-MCNC: NEGATIVE — SIGNIFICANT CHANGE UP
BUN SERPL-MCNC: 15 MG/DL — SIGNIFICANT CHANGE UP (ref 7–23)
CALCIUM SERPL-MCNC: 9.8 MG/DL — SIGNIFICANT CHANGE UP (ref 8.5–10.1)
CHLORIDE SERPL-SCNC: 110 MMOL/L — HIGH (ref 96–108)
CK MB BLD-MCNC: <0.4 % — SIGNIFICANT CHANGE UP (ref 0–3.5)
CK MB CFR SERPL CALC: <1 NG/ML — SIGNIFICANT CHANGE UP (ref 0.5–3.6)
CK SERPL-CCNC: 226 U/L — HIGH (ref 26–192)
CO2 SERPL-SCNC: 25 MMOL/L — SIGNIFICANT CHANGE UP (ref 22–31)
COCAINE METAB.OTHER UR-MCNC: NEGATIVE — SIGNIFICANT CHANGE UP
COLOR SPEC: YELLOW — SIGNIFICANT CHANGE UP
CREAT SERPL-MCNC: 0.75 MG/DL — SIGNIFICANT CHANGE UP (ref 0.5–1.3)
DIFF PNL FLD: NEGATIVE — SIGNIFICANT CHANGE UP
EGFR: 87 ML/MIN/1.73M2 — SIGNIFICANT CHANGE UP
EGFR: 87 ML/MIN/1.73M2 — SIGNIFICANT CHANGE UP
EOSINOPHIL # BLD AUTO: 0.1 K/UL — SIGNIFICANT CHANGE UP (ref 0–0.5)
EOSINOPHIL NFR BLD AUTO: 2.3 % — SIGNIFICANT CHANGE UP (ref 0–6)
ETHANOL SERPL-MCNC: <10 MG/DL — SIGNIFICANT CHANGE UP (ref 0–10)
FLUAV AG NPH QL: SIGNIFICANT CHANGE UP
FLUBV AG NPH QL: SIGNIFICANT CHANGE UP
GLUCOSE SERPL-MCNC: 88 MG/DL — SIGNIFICANT CHANGE UP (ref 70–99)
GLUCOSE UR QL: NEGATIVE MG/DL — SIGNIFICANT CHANGE UP
HCT VFR BLD CALC: 44.4 % — SIGNIFICANT CHANGE UP (ref 34.5–45)
HGB BLD-MCNC: 14.9 G/DL — SIGNIFICANT CHANGE UP (ref 11.5–15.5)
IMM GRANULOCYTES NFR BLD AUTO: 0.2 % — SIGNIFICANT CHANGE UP (ref 0–0.9)
KETONES UR-MCNC: ABNORMAL MG/DL
LACTATE SERPL-SCNC: 1.5 MMOL/L — SIGNIFICANT CHANGE UP (ref 0.7–2)
LEUKOCYTE ESTERASE UR-ACNC: ABNORMAL
LYMPHOCYTES # BLD AUTO: 1.69 K/UL — SIGNIFICANT CHANGE UP (ref 1–3.3)
LYMPHOCYTES # BLD AUTO: 39.3 % — SIGNIFICANT CHANGE UP (ref 13–44)
MAGNESIUM SERPL-MCNC: 2.3 MG/DL — SIGNIFICANT CHANGE UP (ref 1.6–2.6)
MCHC RBC-ENTMCNC: 30.5 PG — SIGNIFICANT CHANGE UP (ref 27–34)
MCHC RBC-ENTMCNC: 33.6 G/DL — SIGNIFICANT CHANGE UP (ref 32–36)
MCV RBC AUTO: 90.8 FL — SIGNIFICANT CHANGE UP (ref 80–100)
METHADONE UR-MCNC: NEGATIVE — SIGNIFICANT CHANGE UP
MONOCYTES # BLD AUTO: 0.23 K/UL — SIGNIFICANT CHANGE UP (ref 0–0.9)
MONOCYTES NFR BLD AUTO: 5.3 % — SIGNIFICANT CHANGE UP (ref 2–14)
NEUTROPHILS # BLD AUTO: 2.23 K/UL — SIGNIFICANT CHANGE UP (ref 1.8–7.4)
NEUTROPHILS NFR BLD AUTO: 52 % — SIGNIFICANT CHANGE UP (ref 43–77)
NITRITE UR-MCNC: NEGATIVE — SIGNIFICANT CHANGE UP
NRBC BLD AUTO-RTO: 0 /100 WBCS — SIGNIFICANT CHANGE UP (ref 0–0)
OPIATES UR-MCNC: NEGATIVE — SIGNIFICANT CHANGE UP
PCP SPEC-MCNC: SIGNIFICANT CHANGE UP
PCP UR-MCNC: NEGATIVE — SIGNIFICANT CHANGE UP
PH UR: 5.5 — SIGNIFICANT CHANGE UP (ref 5–8)
PHOSPHATE SERPL-MCNC: 3.4 MG/DL — SIGNIFICANT CHANGE UP (ref 2.5–4.5)
PLATELET # BLD AUTO: 225 K/UL — SIGNIFICANT CHANGE UP (ref 150–400)
POTASSIUM SERPL-MCNC: 3.9 MMOL/L — SIGNIFICANT CHANGE UP (ref 3.5–5.3)
POTASSIUM SERPL-SCNC: 3.9 MMOL/L — SIGNIFICANT CHANGE UP (ref 3.5–5.3)
PROT SERPL-MCNC: 8.2 GM/DL — SIGNIFICANT CHANGE UP (ref 6–8.3)
PROT UR-MCNC: NEGATIVE MG/DL — SIGNIFICANT CHANGE UP
RBC # BLD: 4.89 M/UL — SIGNIFICANT CHANGE UP (ref 3.8–5.2)
RBC # FLD: 13 % — SIGNIFICANT CHANGE UP (ref 10.3–14.5)
RSV RNA NPH QL NAA+NON-PROBE: SIGNIFICANT CHANGE UP
SARS-COV-2 RNA SPEC QL NAA+PROBE: SIGNIFICANT CHANGE UP
SODIUM SERPL-SCNC: 139 MMOL/L — SIGNIFICANT CHANGE UP (ref 135–145)
SP GR SPEC: 1.02 — SIGNIFICANT CHANGE UP (ref 1–1.03)
THC UR QL: NEGATIVE — SIGNIFICANT CHANGE UP
TROPONIN I, HIGH SENSITIVITY RESULT: 110.8 NG/L — HIGH
TROPONIN I, HIGH SENSITIVITY RESULT: 74.3 NG/L — HIGH
TROPONIN I, HIGH SENSITIVITY RESULT: 93.7 NG/L — HIGH
TSH SERPL-MCNC: 0.28 UU/ML — LOW (ref 0.36–3.74)
UROBILINOGEN FLD QL: 1 MG/DL — SIGNIFICANT CHANGE UP (ref 0.2–1)
WBC # BLD: 4.3 K/UL — SIGNIFICANT CHANGE UP (ref 3.8–10.5)
WBC # FLD AUTO: 4.3 K/UL — SIGNIFICANT CHANGE UP (ref 3.8–10.5)

## 2025-03-18 PROCEDURE — 73551 X-RAY EXAM OF FEMUR 1: CPT | Mod: 26,RT

## 2025-03-18 PROCEDURE — 70450 CT HEAD/BRAIN W/O DYE: CPT | Mod: 26

## 2025-03-18 PROCEDURE — 93010 ELECTROCARDIOGRAM REPORT: CPT

## 2025-03-18 PROCEDURE — 71045 X-RAY EXAM CHEST 1 VIEW: CPT | Mod: 26

## 2025-03-18 PROCEDURE — 99285 EMERGENCY DEPT VISIT HI MDM: CPT

## 2025-03-18 PROCEDURE — 73560 X-RAY EXAM OF KNEE 1 OR 2: CPT | Mod: 26,RT

## 2025-03-18 PROCEDURE — 99222 1ST HOSP IP/OBS MODERATE 55: CPT

## 2025-03-18 PROCEDURE — 72170 X-RAY EXAM OF PELVIS: CPT | Mod: 26,XE

## 2025-03-18 PROCEDURE — 73502 X-RAY EXAM HIP UNI 2-3 VIEWS: CPT | Mod: 26,RT

## 2025-03-18 RX ORDER — ENALAPRIL MALEATE 20 MG
20 TABLET ORAL ONCE
Refills: 0 | Status: COMPLETED | OUTPATIENT
Start: 2025-03-18 | End: 2025-03-18

## 2025-03-18 RX ORDER — LEVOTHYROXINE SODIUM 300 MCG
125 TABLET ORAL DAILY
Refills: 0 | Status: DISCONTINUED | OUTPATIENT
Start: 2025-03-18 | End: 2025-03-18

## 2025-03-18 RX ORDER — PAROXETINE HYDROCHLORIDE 20 MG/1
1 TABLET, FILM COATED ORAL
Refills: 0 | DISCHARGE

## 2025-03-18 RX ORDER — CLONAZEPAM 0.5 MG/1
1 TABLET ORAL THREE TIMES A DAY
Refills: 0 | Status: DISCONTINUED | OUTPATIENT
Start: 2025-03-18 | End: 2025-03-22

## 2025-03-18 RX ORDER — ACETAMINOPHEN 500 MG/5ML
650 LIQUID (ML) ORAL EVERY 6 HOURS
Refills: 0 | Status: DISCONTINUED | OUTPATIENT
Start: 2025-03-18 | End: 2025-03-22

## 2025-03-18 RX ORDER — MELATONIN 5 MG
3 TABLET ORAL AT BEDTIME
Refills: 0 | Status: DISCONTINUED | OUTPATIENT
Start: 2025-03-18 | End: 2025-03-22

## 2025-03-18 RX ORDER — METOPROLOL SUCCINATE 50 MG/1
100 TABLET, EXTENDED RELEASE ORAL DAILY
Refills: 0 | Status: DISCONTINUED | OUTPATIENT
Start: 2025-03-18 | End: 2025-03-22

## 2025-03-18 RX ORDER — ENALAPRIL MALEATE 20 MG
1 TABLET ORAL
Refills: 0 | DISCHARGE

## 2025-03-18 RX ORDER — ATORVASTATIN CALCIUM 80 MG/1
20 TABLET, FILM COATED ORAL AT BEDTIME
Refills: 0 | Status: DISCONTINUED | OUTPATIENT
Start: 2025-03-18 | End: 2025-03-22

## 2025-03-18 RX ORDER — KETOROLAC TROMETHAMINE 30 MG/ML
15 INJECTION, SOLUTION INTRAMUSCULAR; INTRAVENOUS ONCE
Refills: 0 | Status: DISCONTINUED | OUTPATIENT
Start: 2025-03-18 | End: 2025-03-18

## 2025-03-18 RX ORDER — ENALAPRIL MALEATE 20 MG
20 TABLET ORAL DAILY
Refills: 0 | Status: DISCONTINUED | OUTPATIENT
Start: 2025-03-18 | End: 2025-03-20

## 2025-03-18 RX ORDER — LEVOTHYROXINE SODIUM 300 MCG
1 TABLET ORAL
Refills: 0 | DISCHARGE

## 2025-03-18 RX ORDER — FLUOXETINE HYDROCHLORIDE 20 MG/1
1 CAPSULE ORAL
Refills: 0 | DISCHARGE

## 2025-03-18 RX ORDER — LEVOTHYROXINE SODIUM 300 MCG
112 TABLET ORAL DAILY
Refills: 0 | Status: DISCONTINUED | OUTPATIENT
Start: 2025-03-19 | End: 2025-03-22

## 2025-03-18 RX ORDER — METOPROLOL SUCCINATE 50 MG/1
1 TABLET, EXTENDED RELEASE ORAL
Refills: 0 | DISCHARGE

## 2025-03-18 RX ADMIN — KETOROLAC TROMETHAMINE 15 MILLIGRAM(S): 30 INJECTION, SOLUTION INTRAMUSCULAR; INTRAVENOUS at 21:15

## 2025-03-18 RX ADMIN — Medication 650 MILLIGRAM(S): at 21:14

## 2025-03-18 RX ADMIN — Medication 20 MILLIGRAM(S): at 16:00

## 2025-03-18 RX ADMIN — KETOROLAC TROMETHAMINE 15 MILLIGRAM(S): 30 INJECTION, SOLUTION INTRAMUSCULAR; INTRAVENOUS at 22:14

## 2025-03-18 RX ADMIN — Medication 3 MILLIGRAM(S): at 22:43

## 2025-03-18 RX ADMIN — Medication 20 MILLIGRAM(S): at 20:14

## 2025-03-18 RX ADMIN — ATORVASTATIN CALCIUM 20 MILLIGRAM(S): 80 TABLET, FILM COATED ORAL at 21:16

## 2025-03-18 RX ADMIN — CLONAZEPAM 1 MILLIGRAM(S): 0.5 TABLET ORAL at 22:44

## 2025-03-18 RX ADMIN — METOPROLOL SUCCINATE 100 MILLIGRAM(S): 50 TABLET, EXTENDED RELEASE ORAL at 16:00

## 2025-03-18 RX ADMIN — Medication 650 MILLIGRAM(S): at 20:14

## 2025-03-19 ENCOUNTER — APPOINTMENT (OUTPATIENT)
Dept: ORTHOPEDIC SURGERY | Facility: CLINIC | Age: 70
End: 2025-03-19

## 2025-03-19 LAB
ALBUMIN SERPL ELPH-MCNC: 3 G/DL — LOW (ref 3.3–5)
ALP SERPL-CCNC: 113 U/L — SIGNIFICANT CHANGE UP (ref 40–120)
ALT FLD-CCNC: 19 U/L — SIGNIFICANT CHANGE UP (ref 12–78)
ANION GAP SERPL CALC-SCNC: 4 MMOL/L — LOW (ref 5–17)
AST SERPL-CCNC: 17 U/L — SIGNIFICANT CHANGE UP (ref 15–37)
BILIRUB SERPL-MCNC: 0.4 MG/DL — SIGNIFICANT CHANGE UP (ref 0.2–1.2)
BUN SERPL-MCNC: 18 MG/DL — SIGNIFICANT CHANGE UP (ref 7–23)
CALCIUM SERPL-MCNC: 8.9 MG/DL — SIGNIFICANT CHANGE UP (ref 8.5–10.1)
CHLORIDE SERPL-SCNC: 111 MMOL/L — HIGH (ref 96–108)
CK MB BLD-MCNC: <1.4 % — SIGNIFICANT CHANGE UP (ref 0–3.5)
CK MB CFR SERPL CALC: <1 NG/ML — SIGNIFICANT CHANGE UP (ref 0.5–3.6)
CK SERPL-CCNC: 72 U/L — SIGNIFICANT CHANGE UP (ref 26–192)
CO2 SERPL-SCNC: 23 MMOL/L — SIGNIFICANT CHANGE UP (ref 22–31)
CREAT SERPL-MCNC: 0.72 MG/DL — SIGNIFICANT CHANGE UP (ref 0.5–1.3)
EGFR: 92 ML/MIN/1.73M2 — SIGNIFICANT CHANGE UP
EGFR: 92 ML/MIN/1.73M2 — SIGNIFICANT CHANGE UP
GLUCOSE SERPL-MCNC: 86 MG/DL — SIGNIFICANT CHANGE UP (ref 70–99)
HCT VFR BLD CALC: 39.2 % — SIGNIFICANT CHANGE UP (ref 34.5–45)
HGB BLD-MCNC: 13 G/DL — SIGNIFICANT CHANGE UP (ref 11.5–15.5)
MAGNESIUM SERPL-MCNC: 2 MG/DL — SIGNIFICANT CHANGE UP (ref 1.6–2.6)
MCHC RBC-ENTMCNC: 30.4 PG — SIGNIFICANT CHANGE UP (ref 27–34)
MCHC RBC-ENTMCNC: 33.2 G/DL — SIGNIFICANT CHANGE UP (ref 32–36)
MCV RBC AUTO: 91.8 FL — SIGNIFICANT CHANGE UP (ref 80–100)
NRBC BLD AUTO-RTO: 0 /100 WBCS — SIGNIFICANT CHANGE UP (ref 0–0)
PHOSPHATE SERPL-MCNC: 3.8 MG/DL — SIGNIFICANT CHANGE UP (ref 2.5–4.5)
PLATELET # BLD AUTO: 218 K/UL — SIGNIFICANT CHANGE UP (ref 150–400)
POTASSIUM SERPL-MCNC: 3.5 MMOL/L — SIGNIFICANT CHANGE UP (ref 3.5–5.3)
POTASSIUM SERPL-SCNC: 3.5 MMOL/L — SIGNIFICANT CHANGE UP (ref 3.5–5.3)
PROT SERPL-MCNC: 7.1 GM/DL — SIGNIFICANT CHANGE UP (ref 6–8.3)
RBC # BLD: 4.27 M/UL — SIGNIFICANT CHANGE UP (ref 3.8–5.2)
RBC # FLD: 13.2 % — SIGNIFICANT CHANGE UP (ref 10.3–14.5)
SODIUM SERPL-SCNC: 138 MMOL/L — SIGNIFICANT CHANGE UP (ref 135–145)
T4 FREE SERPL-MCNC: 1.5 NG/DL — SIGNIFICANT CHANGE UP (ref 0.9–1.7)
TROPONIN I, HIGH SENSITIVITY RESULT: 84.1 NG/L — HIGH
WBC # BLD: 4.9 K/UL — SIGNIFICANT CHANGE UP (ref 3.8–10.5)
WBC # FLD AUTO: 4.9 K/UL — SIGNIFICANT CHANGE UP (ref 3.8–10.5)

## 2025-03-19 PROCEDURE — 99232 SBSQ HOSP IP/OBS MODERATE 35: CPT

## 2025-03-19 PROCEDURE — 90792 PSYCH DIAG EVAL W/MED SRVCS: CPT | Mod: 2W

## 2025-03-19 RX ORDER — KETOROLAC TROMETHAMINE 30 MG/ML
15 INJECTION, SOLUTION INTRAMUSCULAR; INTRAVENOUS ONCE
Refills: 0 | Status: DISCONTINUED | OUTPATIENT
Start: 2025-03-19 | End: 2025-03-19

## 2025-03-19 RX ORDER — ACETAMINOPHEN 500 MG/5ML
1000 LIQUID (ML) ORAL ONCE
Refills: 0 | Status: COMPLETED | OUTPATIENT
Start: 2025-03-19 | End: 2025-03-19

## 2025-03-19 RX ORDER — FLUOXETINE HYDROCHLORIDE 20 MG/1
80 CAPSULE ORAL DAILY
Refills: 0 | Status: DISCONTINUED | OUTPATIENT
Start: 2025-03-19 | End: 2025-03-22

## 2025-03-19 RX ORDER — OXYCODONE HYDROCHLORIDE 30 MG/1
2.5 TABLET ORAL ONCE
Refills: 0 | Status: DISCONTINUED | OUTPATIENT
Start: 2025-03-19 | End: 2025-03-19

## 2025-03-19 RX ORDER — KETOROLAC TROMETHAMINE 30 MG/ML
30 INJECTION, SOLUTION INTRAMUSCULAR; INTRAVENOUS ONCE
Refills: 0 | Status: DISCONTINUED | OUTPATIENT
Start: 2025-03-19 | End: 2025-03-19

## 2025-03-19 RX ADMIN — KETOROLAC TROMETHAMINE 30 MILLIGRAM(S): 30 INJECTION, SOLUTION INTRAMUSCULAR; INTRAVENOUS at 14:58

## 2025-03-19 RX ADMIN — METOPROLOL SUCCINATE 100 MILLIGRAM(S): 50 TABLET, EXTENDED RELEASE ORAL at 08:38

## 2025-03-19 RX ADMIN — Medication 20 MILLIGRAM(S): at 06:09

## 2025-03-19 RX ADMIN — FLUOXETINE HYDROCHLORIDE 80 MILLIGRAM(S): 20 CAPSULE ORAL at 17:37

## 2025-03-19 RX ADMIN — Medication 650 MILLIGRAM(S): at 03:16

## 2025-03-19 RX ADMIN — Medication 400 MILLIGRAM(S): at 17:39

## 2025-03-19 RX ADMIN — KETOROLAC TROMETHAMINE 15 MILLIGRAM(S): 30 INJECTION, SOLUTION INTRAMUSCULAR; INTRAVENOUS at 19:00

## 2025-03-19 RX ADMIN — Medication 1000 MILLIGRAM(S): at 18:06

## 2025-03-19 RX ADMIN — KETOROLAC TROMETHAMINE 30 MILLIGRAM(S): 30 INJECTION, SOLUTION INTRAMUSCULAR; INTRAVENOUS at 15:30

## 2025-03-19 RX ADMIN — Medication 112 MICROGRAM(S): at 06:10

## 2025-03-19 RX ADMIN — ATORVASTATIN CALCIUM 20 MILLIGRAM(S): 80 TABLET, FILM COATED ORAL at 21:10

## 2025-03-19 RX ADMIN — Medication 650 MILLIGRAM(S): at 08:48

## 2025-03-20 ENCOUNTER — TRANSCRIPTION ENCOUNTER (OUTPATIENT)
Age: 70
End: 2025-03-20

## 2025-03-20 PROCEDURE — 99239 HOSP IP/OBS DSCHRG MGMT >30: CPT

## 2025-03-20 RX ORDER — ENALAPRIL MALEATE 20 MG
20 TABLET ORAL ONCE
Refills: 0 | Status: COMPLETED | OUTPATIENT
Start: 2025-03-20 | End: 2025-03-20

## 2025-03-20 RX ORDER — KETOROLAC TROMETHAMINE 30 MG/ML
30 INJECTION, SOLUTION INTRAMUSCULAR; INTRAVENOUS ONCE
Refills: 0 | Status: DISCONTINUED | OUTPATIENT
Start: 2025-03-20 | End: 2025-03-20

## 2025-03-20 RX ORDER — ENALAPRIL MALEATE 20 MG
40 TABLET ORAL DAILY
Refills: 0 | Status: DISCONTINUED | OUTPATIENT
Start: 2025-03-21 | End: 2025-03-22

## 2025-03-20 RX ORDER — NALOXONE HYDROCHLORIDE 0.4 MG/ML
1 INJECTION, SOLUTION INTRAMUSCULAR; INTRAVENOUS; SUBCUTANEOUS
Qty: 1 | Refills: 0
Start: 2025-03-20

## 2025-03-20 RX ADMIN — Medication 3 MILLIGRAM(S): at 22:19

## 2025-03-20 RX ADMIN — FLUOXETINE HYDROCHLORIDE 80 MILLIGRAM(S): 20 CAPSULE ORAL at 11:09

## 2025-03-20 RX ADMIN — Medication 650 MILLIGRAM(S): at 06:55

## 2025-03-20 RX ADMIN — Medication 650 MILLIGRAM(S): at 08:16

## 2025-03-20 RX ADMIN — CLONAZEPAM 1 MILLIGRAM(S): 0.5 TABLET ORAL at 22:19

## 2025-03-20 RX ADMIN — Medication 650 MILLIGRAM(S): at 22:19

## 2025-03-20 RX ADMIN — Medication 20 MILLIGRAM(S): at 05:16

## 2025-03-20 RX ADMIN — KETOROLAC TROMETHAMINE 30 MILLIGRAM(S): 30 INJECTION, SOLUTION INTRAMUSCULAR; INTRAVENOUS at 16:23

## 2025-03-20 RX ADMIN — Medication 650 MILLIGRAM(S): at 23:14

## 2025-03-20 RX ADMIN — Medication 5 MILLIGRAM(S): at 09:29

## 2025-03-20 RX ADMIN — ATORVASTATIN CALCIUM 20 MILLIGRAM(S): 80 TABLET, FILM COATED ORAL at 22:20

## 2025-03-20 RX ADMIN — KETOROLAC TROMETHAMINE 30 MILLIGRAM(S): 30 INJECTION, SOLUTION INTRAMUSCULAR; INTRAVENOUS at 08:53

## 2025-03-20 RX ADMIN — Medication 112 MICROGRAM(S): at 05:16

## 2025-03-20 RX ADMIN — METOPROLOL SUCCINATE 100 MILLIGRAM(S): 50 TABLET, EXTENDED RELEASE ORAL at 05:16

## 2025-03-20 RX ADMIN — Medication 20 MILLIGRAM(S): at 07:17

## 2025-03-21 PROCEDURE — 99232 SBSQ HOSP IP/OBS MODERATE 35: CPT

## 2025-03-21 RX ORDER — LOSARTAN POTASSIUM 100 MG/1
25 TABLET, FILM COATED ORAL ONCE
Refills: 0 | Status: DISCONTINUED | OUTPATIENT
Start: 2025-03-21 | End: 2025-03-21

## 2025-03-21 RX ORDER — OXYCODONE HYDROCHLORIDE 30 MG/1
5 TABLET ORAL EVERY 6 HOURS
Refills: 0 | Status: DISCONTINUED | OUTPATIENT
Start: 2025-03-21 | End: 2025-03-22

## 2025-03-21 RX ORDER — ACETAMINOPHEN 500 MG/5ML
1000 LIQUID (ML) ORAL ONCE
Refills: 0 | Status: COMPLETED | OUTPATIENT
Start: 2025-03-21 | End: 2025-03-21

## 2025-03-21 RX ORDER — LOSARTAN POTASSIUM 100 MG/1
1 TABLET, FILM COATED ORAL
Qty: 1 | Refills: 0
Start: 2025-03-21 | End: 2025-04-19

## 2025-03-21 RX ORDER — KETOROLAC TROMETHAMINE 30 MG/ML
15 INJECTION, SOLUTION INTRAMUSCULAR; INTRAVENOUS ONCE
Refills: 0 | Status: DISCONTINUED | OUTPATIENT
Start: 2025-03-21 | End: 2025-03-21

## 2025-03-21 RX ORDER — LABETALOL HYDROCHLORIDE 200 MG/1
10 TABLET, FILM COATED ORAL ONCE
Refills: 0 | Status: COMPLETED | OUTPATIENT
Start: 2025-03-21 | End: 2025-03-21

## 2025-03-21 RX ORDER — AMLODIPINE BESYLATE 10 MG/1
1 TABLET ORAL
Qty: 30 | Refills: 0
Start: 2025-03-21 | End: 2025-04-19

## 2025-03-21 RX ORDER — OXYCODONE HYDROCHLORIDE 30 MG/1
1 TABLET ORAL
Qty: 0 | Refills: 0 | DISCHARGE
Start: 2025-03-21

## 2025-03-21 RX ORDER — AMLODIPINE BESYLATE 10 MG/1
10 TABLET ORAL ONCE
Refills: 0 | Status: COMPLETED | OUTPATIENT
Start: 2025-03-21 | End: 2025-03-21

## 2025-03-21 RX ADMIN — Medication 112 MICROGRAM(S): at 05:14

## 2025-03-21 RX ADMIN — LABETALOL HYDROCHLORIDE 10 MILLIGRAM(S): 200 TABLET, FILM COATED ORAL at 18:39

## 2025-03-21 RX ADMIN — OXYCODONE HYDROCHLORIDE 5 MILLIGRAM(S): 30 TABLET ORAL at 18:40

## 2025-03-21 RX ADMIN — FLUOXETINE HYDROCHLORIDE 80 MILLIGRAM(S): 20 CAPSULE ORAL at 11:31

## 2025-03-21 RX ADMIN — METOPROLOL SUCCINATE 100 MILLIGRAM(S): 50 TABLET, EXTENDED RELEASE ORAL at 05:15

## 2025-03-21 RX ADMIN — Medication 10 MILLIGRAM(S): at 21:07

## 2025-03-21 RX ADMIN — KETOROLAC TROMETHAMINE 15 MILLIGRAM(S): 30 INJECTION, SOLUTION INTRAMUSCULAR; INTRAVENOUS at 02:15

## 2025-03-21 RX ADMIN — KETOROLAC TROMETHAMINE 15 MILLIGRAM(S): 30 INJECTION, SOLUTION INTRAMUSCULAR; INTRAVENOUS at 11:31

## 2025-03-21 RX ADMIN — ATORVASTATIN CALCIUM 20 MILLIGRAM(S): 80 TABLET, FILM COATED ORAL at 21:10

## 2025-03-21 RX ADMIN — Medication 40 MILLIGRAM(S): at 05:15

## 2025-03-21 RX ADMIN — Medication 400 MILLIGRAM(S): at 21:07

## 2025-03-21 RX ADMIN — KETOROLAC TROMETHAMINE 15 MILLIGRAM(S): 30 INJECTION, SOLUTION INTRAMUSCULAR; INTRAVENOUS at 12:30

## 2025-03-21 RX ADMIN — Medication 1000 MILLIGRAM(S): at 22:12

## 2025-03-21 RX ADMIN — Medication 10 MILLIGRAM(S): at 16:16

## 2025-03-21 RX ADMIN — OXYCODONE HYDROCHLORIDE 5 MILLIGRAM(S): 30 TABLET ORAL at 12:59

## 2025-03-21 RX ADMIN — AMLODIPINE BESYLATE 10 MILLIGRAM(S): 10 TABLET ORAL at 16:15

## 2025-03-21 RX ADMIN — OXYCODONE HYDROCHLORIDE 5 MILLIGRAM(S): 30 TABLET ORAL at 13:55

## 2025-03-21 RX ADMIN — Medication 5 MILLIGRAM(S): at 12:42

## 2025-03-21 RX ADMIN — KETOROLAC TROMETHAMINE 15 MILLIGRAM(S): 30 INJECTION, SOLUTION INTRAMUSCULAR; INTRAVENOUS at 01:18

## 2025-03-22 VITALS
RESPIRATION RATE: 18 BRPM | OXYGEN SATURATION: 93 % | HEART RATE: 67 BPM | SYSTOLIC BLOOD PRESSURE: 169 MMHG | TEMPERATURE: 98 F | DIASTOLIC BLOOD PRESSURE: 85 MMHG

## 2025-03-22 PROCEDURE — 99232 SBSQ HOSP IP/OBS MODERATE 35: CPT

## 2025-03-22 PROCEDURE — 70450 CT HEAD/BRAIN W/O DYE: CPT | Mod: 26

## 2025-03-22 RX ORDER — ONDANSETRON HCL/PF 4 MG/2 ML
4 VIAL (ML) INJECTION ONCE
Refills: 0 | Status: COMPLETED | OUTPATIENT
Start: 2025-03-22 | End: 2025-03-22

## 2025-03-22 RX ORDER — AMLODIPINE BESYLATE 10 MG/1
10 TABLET ORAL ONCE
Refills: 0 | Status: COMPLETED | OUTPATIENT
Start: 2025-03-22 | End: 2025-03-22

## 2025-03-22 RX ORDER — AMLODIPINE BESYLATE 10 MG/1
5 TABLET ORAL DAILY
Refills: 0 | Status: DISCONTINUED | OUTPATIENT
Start: 2025-03-22 | End: 2025-03-22

## 2025-03-22 RX ADMIN — FLUOXETINE HYDROCHLORIDE 80 MILLIGRAM(S): 20 CAPSULE ORAL at 11:36

## 2025-03-22 RX ADMIN — Medication 650 MILLIGRAM(S): at 05:02

## 2025-03-22 RX ADMIN — Medication 40 MILLIGRAM(S): at 05:03

## 2025-03-22 RX ADMIN — Medication 4 MILLIGRAM(S): at 05:31

## 2025-03-22 RX ADMIN — OXYCODONE HYDROCHLORIDE 5 MILLIGRAM(S): 30 TABLET ORAL at 09:07

## 2025-03-22 RX ADMIN — Medication 112 MICROGRAM(S): at 05:03

## 2025-03-22 RX ADMIN — OXYCODONE HYDROCHLORIDE 5 MILLIGRAM(S): 30 TABLET ORAL at 01:12

## 2025-03-22 RX ADMIN — Medication 650 MILLIGRAM(S): at 06:20

## 2025-03-22 RX ADMIN — OXYCODONE HYDROCHLORIDE 5 MILLIGRAM(S): 30 TABLET ORAL at 02:14

## 2025-03-22 RX ADMIN — AMLODIPINE BESYLATE 10 MILLIGRAM(S): 10 TABLET ORAL at 11:36

## 2025-03-22 RX ADMIN — Medication 650 MILLIGRAM(S): at 12:07

## 2025-03-22 RX ADMIN — METOPROLOL SUCCINATE 100 MILLIGRAM(S): 50 TABLET, EXTENDED RELEASE ORAL at 05:03

## 2025-03-24 LAB — OXYCODONE+OXYMORPHONE UR QL SCN: NEGATIVE — SIGNIFICANT CHANGE UP

## 2025-03-26 ENCOUNTER — APPOINTMENT (OUTPATIENT)
Dept: ORTHOPEDIC SURGERY | Facility: CLINIC | Age: 70
End: 2025-03-26
Payer: MEDICARE

## 2025-03-26 DIAGNOSIS — S42.021D DISPLACED FRACTURE OF SHAFT OF RIGHT CLAVICLE, SUBSEQUENT ENCOUNTER FOR FRACTURE WITH ROUTINE HEALING: ICD-10-CM

## 2025-03-26 PROCEDURE — 73000 X-RAY EXAM OF COLLAR BONE: CPT | Mod: RT

## 2025-03-26 PROCEDURE — L3670: CPT | Mod: KX,RT

## 2025-03-26 PROCEDURE — 99204 OFFICE O/P NEW MOD 45 MIN: CPT

## 2025-03-31 ENCOUNTER — APPOINTMENT (OUTPATIENT)
Dept: GASTROENTEROLOGY | Facility: CLINIC | Age: 70
End: 2025-03-31

## 2025-03-31 DIAGNOSIS — E03.9 HYPOTHYROIDISM, UNSPECIFIED: ICD-10-CM

## 2025-03-31 DIAGNOSIS — Z79.2 LONG TERM (CURRENT) USE OF ANTIBIOTICS: ICD-10-CM

## 2025-03-31 DIAGNOSIS — G92.8 OTHER TOXIC ENCEPHALOPATHY: ICD-10-CM

## 2025-03-31 DIAGNOSIS — T40.2X1A POISONING BY OTHER OPIOIDS, ACCIDENTAL (UNINTENTIONAL), INITIAL ENCOUNTER: ICD-10-CM

## 2025-03-31 DIAGNOSIS — Z91.018 ALLERGY TO OTHER FOODS: ICD-10-CM

## 2025-03-31 DIAGNOSIS — E78.5 HYPERLIPIDEMIA, UNSPECIFIED: ICD-10-CM

## 2025-03-31 DIAGNOSIS — I10 ESSENTIAL (PRIMARY) HYPERTENSION: ICD-10-CM

## 2025-03-31 DIAGNOSIS — Z79.890 HORMONE REPLACEMENT THERAPY: ICD-10-CM

## 2025-03-31 DIAGNOSIS — R79.89 OTHER SPECIFIED ABNORMAL FINDINGS OF BLOOD CHEMISTRY: ICD-10-CM

## 2025-03-31 DIAGNOSIS — F32.A DEPRESSION, UNSPECIFIED: ICD-10-CM

## 2025-03-31 DIAGNOSIS — Z88.8 ALLERGY STATUS TO OTHER DRUGS, MEDICAMENTS AND BIOLOGICAL SUBSTANCES: ICD-10-CM

## 2025-03-31 DIAGNOSIS — F41.9 ANXIETY DISORDER, UNSPECIFIED: ICD-10-CM

## 2025-03-31 DIAGNOSIS — F11.93 OPIOID USE, UNSPECIFIED WITH WITHDRAWAL: ICD-10-CM

## 2025-03-31 DIAGNOSIS — M35.00 SJOGREN SYNDROME, UNSPECIFIED: ICD-10-CM

## 2025-03-31 DIAGNOSIS — Z79.899 OTHER LONG TERM (CURRENT) DRUG THERAPY: ICD-10-CM

## 2025-03-31 DIAGNOSIS — Z11.52 ENCOUNTER FOR SCREENING FOR COVID-19: ICD-10-CM

## 2025-03-31 DIAGNOSIS — M32.9 SYSTEMIC LUPUS ERYTHEMATOSUS, UNSPECIFIED: ICD-10-CM

## 2025-04-02 NOTE — ED PROVIDER NOTE - PROGRESS NOTE DETAILS
Pt scheduled.    Pt tearful stating they found 4 spots on breast, mother w/ hx breast cancer at this age. Pt does not think it would benefit her to speak w/ Psych, though stating 'I just can't deal w/ this anymore.' Pt does not give permission to speak w/ her . Pt tearful stating they found 4 spots on breast, mother w/ hx breast cancer at this age. Pt does not think it would benefit her to speak w/ Psych, though stating 'I just can't deal w/ this anymore.'  now at bedside, states their son who is autistic called neighbors to help pt. Pt was trying to get to [place] for f/u appt. Pt believes neighbors called 911 as she seemed to be in emotional distress.  states pt is acting at her baseline. States pt is in constant pain. Pt tearful stating they found 4 spots on breast, mother w/ hx breast cancer at this age. Pt does not think it would benefit her to speak w/ Psych, though stating 'I just can't deal w/ this anymore.'  now at bedside, states their son who is autistic called neighbors to help pt. Pt was trying to get to [place] for f/u appt. Pt believes neighbors called 911 as she seemed to be in emotional distress.  states pt is acting at her baseline. States pt is in constant pain. Pt saw Neuro in July for episodic behavioral changes / issues w/ memory. Jaz, DO: Patient signed out to me by Dr. Lux awaiting UA results and if negative patient can be safely discharged. UA shows no acute infection. Patient reassessed at bedside. Patient feels better.  at bedside states she is back at her baseline. Patient and  requesting to be discharged.

## 2025-04-04 DIAGNOSIS — I10 ESSENTIAL (PRIMARY) HYPERTENSION: ICD-10-CM

## 2025-04-04 RX ORDER — AMLODIPINE BESYLATE 5 MG/1
5 TABLET ORAL DAILY
Qty: 90 | Refills: 0 | Status: ACTIVE | COMMUNITY
Start: 2025-04-04 | End: 1900-01-01

## 2025-04-09 ENCOUNTER — APPOINTMENT (OUTPATIENT)
Dept: ORTHOPEDIC SURGERY | Facility: CLINIC | Age: 70
End: 2025-04-09
Payer: MEDICARE

## 2025-04-09 DIAGNOSIS — S42.021D DISPLACED FRACTURE OF SHAFT OF RIGHT CLAVICLE, SUBSEQUENT ENCOUNTER FOR FRACTURE WITH ROUTINE HEALING: ICD-10-CM

## 2025-04-09 PROCEDURE — 73000 X-RAY EXAM OF COLLAR BONE: CPT | Mod: RT

## 2025-04-09 PROCEDURE — 99213 OFFICE O/P EST LOW 20 MIN: CPT

## 2025-05-07 ENCOUNTER — APPOINTMENT (OUTPATIENT)
Dept: ORTHOPEDIC SURGERY | Facility: CLINIC | Age: 70
End: 2025-05-07
Payer: MEDICARE

## 2025-05-07 DIAGNOSIS — S42.021D DISPLACED FRACTURE OF SHAFT OF RIGHT CLAVICLE, SUBSEQUENT ENCOUNTER FOR FRACTURE WITH ROUTINE HEALING: ICD-10-CM

## 2025-05-07 PROCEDURE — 73000 X-RAY EXAM OF COLLAR BONE: CPT | Mod: RT

## 2025-05-07 PROCEDURE — 99213 OFFICE O/P EST LOW 20 MIN: CPT

## 2025-06-06 NOTE — ED ADULT NURSE NOTE - ALCOHOL PRE SCREEN (AUDIT - C)
Pt made aware and verbalized understanding. Appt scheduled. Pt advised to call for return of symptoms.     Statement Selected

## 2025-06-09 NOTE — DISCHARGE NOTE ADULT - NS TRANSFER PATIENT BELONGINGS
Heart Failure Clinic  Pharmacist Note     Suellen Matamoros is a 85 y.o. female seen in the Heart Failure Clinic for HFpEF. The last ejection fraction was EF 66-70% from 11/22/24. She was recently admitted from 3/6/25-3/10/25 for SOB and A Fib. Upon discharge, Lasix 40 mg daily and Toprol 25 mg were started.     Suellen Matamoros reports a good understanding of medications. Her son is with her in clinic today and he helps her with her medications. Patient reports that she did not really notice any more swelling (REDS value of 36 concerned her) than usually but reports SOB with activity. She is taking Lasix 40mg daily and continues to take her Toprol. She brought in her daily logs and BP ranges from 's/60-70's with HR in the 80's. She denies any lightheadedness/dizziness, but reports that she has been off-balance for years. She denies any more issues with lightheadedness when sitting up from the lying position.     Her son requests a refill on her Eliquis, as she is starting to run low and he knows that her next appointment with cardiology was pushed back.     Patient is on several OTC/herbal medications.     Medication Use:   Hx of med intolerances: None related to HF  Retail Rx Management: [facility] in [place]    Past Medical History:   Diagnosis Date    Macular degeneration      ALLERGIES: Patient has no known allergies.  Current Outpatient Medications   Medication Sig Dispense Refill    ALPHA LIPOIC ACID PO Take  by mouth As Needed.      Ascorbic Acid (VITAMIN C PO) Take 250 mg by mouth Daily.      Calcium Carb-Cholecalciferol (CALCIUM 500+D PO) Take 1 capsule by mouth Daily.      Cholecalciferol (VITAMIN D-3 PO) Take 400 Units by mouth Daily.      furosemide (Lasix) 40 MG tablet Take 1 tablet by mouth Daily. 90 tablet 1    Magnesium Glycinate 100 MG capsule Take 200 mg by mouth Daily.      metFORMIN ER (GLUCOPHAGE-XR) 500 MG 24 hr tablet Take 1 tablet by mouth Daily With Breakfast.      metoprolol  "succinate XL (TOPROL-XL) 25 MG 24 hr tablet Take 1 tablet by mouth Every 12 (Twelve) Hours. (Patient taking differently: Take 0.5 tablets by mouth Every 12 (Twelve) Hours.) 90 tablet 2    Multiple Vitamins-Minerals (ICAPS AREDS 2 PO) Take 1 capsule by mouth Daily.      multivitamin (MULTI VITAMIN PO) Take 1 tablet by mouth Daily.      multivitamin with minerals (VISION FORMULA PO) Take 1 tablet by mouth Daily.      Omega-3 Fatty Acids (OMEGA 3 PO) Take 1,000 Units by mouth Daily.      apixaban (ELIQUIS) 5 MG tablet tablet Take 1 tablet by mouth 2 (Two) Times a Day. 180 tablet 4    Barberry-Oreg Grape-Goldenseal (BERBERINE COMPLEX PO) Take 1 capsule by mouth Daily.       No current facility-administered medications for this encounter.       Vaccination History:   Pneumonia: not interested   Annual Influenza: not interested   Shingles: not interested     Objective  Vitals:    06/09/25 1509   BP: 112/70   BP Location: Left arm   Patient Position: Sitting   Cuff Size: Adult   Pulse: 50   SpO2: 97%   Weight: 64 kg (141 lb 3.2 oz)   Height: 157.5 cm (62\")         Wt Readings from Last 3 Encounters:   06/09/25 64 kg (141 lb 3.2 oz)   06/03/25 63.7 kg (140 lb 6.4 oz)   04/28/25 62.8 kg (138 lb 6.4 oz)         06/09/25  1509   Weight: 64 kg (141 lb 3.2 oz)         Lab Results   Component Value Date    GLUCOSE 142 (H) 06/09/2025    BUN 26.8 (H) 06/09/2025    CREATININE 0.64 06/09/2025    BCR 41.9 (H) 06/09/2025    K 4.7 06/09/2025    CO2 20.7 (L) 06/09/2025    CALCIUM 8.9 06/09/2025    ALBUMIN 3.6 03/07/2025    AST 36 (H) 03/07/2025    ALT 42 (H) 03/07/2025     Lab Results   Component Value Date    WBC 7.17 03/07/2025    HGB 12.0 03/07/2025    HCT 38.7 03/07/2025    MCV 93.5 03/07/2025     03/07/2025     Lab Results   Component Value Date    TROPONINT 16 (H) 03/06/2025     Lab Results   Component Value Date    PROBNP 1,454.0 06/09/2025     Results for orders placed during the hospital encounter of 04/15/25    Adult " Transesophageal Echo 3D (YARIEL) W/ Cont If Necessary Per Protocol    Interpretation Summary    Left ventricular systolic function is normal. Estimated left ventricular EF = 50-55%    Moderate to severe mitral valve regurgitation is present.    Poor coaptation A2-P2. Posterior leaflet length at best is 8 mm, in some views 6 mm. Anterior leaflet cacification at tip. Suboptimal MVA at 3.7 to 4.1 cm2 by 2 D planimetry.    The mitral valve mean gradient is 2 mmHg.    Moderate tricuspid valve regurgitation is present.    Calculated right ventricular systolic pressure from tricuspid regurgitation is 23 mmHg.    Anesthesia: Cath lab/Echo lab moderate sedation    I was present with the patient for the duration of moderate sedation and supervised staff who had no other duties and monitored the patient for the entire procedure.    Name of independent trained observer: Saad Dupree RN  Intra-service start time: 1305  Intra-service end time: 1345         GDMT     Drug Class   Drug   Dose Last Dose Adjustment Additional Titration   Notes   ACEi/ARB/ARNI Stopped 4/14/25 -Hypotension     Beta Blocker Toprol 25mg *takes 1/2 tab bid     MRA        SGLT2i    Older age    Lasix 40mg daily      Drug Therapy Problems-     Patient reports to taking 3 different MVI and is on a vitamin D supplement, as well as a calcium/vitamin D supplement.   Reports needing eliquis refill  Iron labs      Recommendations-     Counseled the patient and son to make sure that she is not getting too much of the fat-soluble vitamins, which include vitamin A, D, E, and K and going over their recommended daily values. Son to evaluate further.   I messaged Yo Ibanez and he sent in the refill for the patient   Patient is a candidate for iron infusion. Follow-up on. Toyin gave info about an iron rich diet to the patient today.       Patient was educated on heart failure medications and the importance of medication adherence. All questions were addressed and  patient expressed good understanding.   Thank you for allowing me to participate in the care of your patient,    Josey Chisholm, PharmD  06/09/25  16:03 EDT    Clothing

## 2025-06-18 ENCOUNTER — NON-APPOINTMENT (OUTPATIENT)
Age: 70
End: 2025-06-18

## 2025-07-15 NOTE — DISCHARGE NOTE ADULT - THE PATIENT HAS
To get better and follow your care plan as instructed.
difficulty concentrating/difficulty remembering

## 2025-07-30 ENCOUNTER — APPOINTMENT (OUTPATIENT)
Dept: INTERNAL MEDICINE | Facility: CLINIC | Age: 70
End: 2025-07-30

## 2025-08-06 ENCOUNTER — APPOINTMENT (OUTPATIENT)
Dept: INTERNAL MEDICINE | Facility: CLINIC | Age: 70
End: 2025-08-06
Payer: MEDICARE

## 2025-08-06 ENCOUNTER — NON-APPOINTMENT (OUTPATIENT)
Age: 70
End: 2025-08-06

## 2025-08-06 DIAGNOSIS — E03.9 HYPOTHYROIDISM, UNSPECIFIED: ICD-10-CM

## 2025-08-06 DIAGNOSIS — E78.5 HYPERLIPIDEMIA, UNSPECIFIED: ICD-10-CM

## 2025-08-06 DIAGNOSIS — I10 ESSENTIAL (PRIMARY) HYPERTENSION: ICD-10-CM

## 2025-08-06 PROCEDURE — 99212 OFFICE O/P EST SF 10 MIN: CPT | Mod: 2W
